# Patient Record
Sex: FEMALE | Race: WHITE | NOT HISPANIC OR LATINO | Employment: FULL TIME | URBAN - METROPOLITAN AREA
[De-identification: names, ages, dates, MRNs, and addresses within clinical notes are randomized per-mention and may not be internally consistent; named-entity substitution may affect disease eponyms.]

---

## 2023-11-09 ENCOUNTER — ANCILLARY PROCEDURE (OUTPATIENT)
Dept: RADIOLOGY | Facility: CLINIC | Age: 38
End: 2023-11-09
Payer: COMMERCIAL

## 2023-11-09 DIAGNOSIS — Z12.31 SCREENING MAMMOGRAM FOR BREAST CANCER: ICD-10-CM

## 2023-11-09 PROCEDURE — 77063 BREAST TOMOSYNTHESIS BI: CPT | Performed by: RADIOLOGY

## 2023-11-09 PROCEDURE — 77067 SCR MAMMO BI INCL CAD: CPT | Performed by: RADIOLOGY

## 2023-11-09 PROCEDURE — 77063 BREAST TOMOSYNTHESIS BI: CPT

## 2023-12-11 ENCOUNTER — APPOINTMENT (OUTPATIENT)
Dept: OBSTETRICS AND GYNECOLOGY | Facility: CLINIC | Age: 38
End: 2023-12-11
Payer: COMMERCIAL

## 2023-12-13 NOTE — PROGRESS NOTES
"ANNUAL SUBJECTIVE    Macy Chavez is a 38 y.o. female who presents for annual exam today. She has a mirena IUD (inserted in 2019) for contraception and is happy with this.  Strings not visualized on last exam but placement confirmed by ultrasound. She has occasional spotting. Sexually active with , no issues. She had a mammogram in November that was normal but noticed a mass on her left breast a few weeks ago.     PMH - denies  PSH - denies  Social: never smoker, rare EtOH, works from home  Meds/Allergies: reviewed and updated  OB history -  x3    Last pap - 2018  Normal HPV Negative    Last mammogram - 2023, BIRADs category 1    Family history of breast or ovarian cancer - denies    OBJECTIVE  /82   Ht 1.727 m (5' 8\")   Wt 74.8 kg (165 lb)   BMI 25.09 kg/m²     General Appearance   - consistent with stated age, well groomed and cooperative    Integumentary  - skin warm and dry without rash    Head and Neck  - normalocephalic and neck supple    Chest and Lung Exam  - normal breathing effort, no respiratory distress    Breast  - Left breast with 3cm mobile mass at 6 o'clock, symmetry noted, no skin change and no nipple discharge.    Abdomen  - soft, nontender and no hepatomegaly, splenomegaly, or mass    Female Genitourinary  - vulva normal without rash or lesion, normal vaginal rugae, no vaginal discharge, uterus normal size & no palpable masses, no adnexal mass, no adnexal tenderness, no cervical motion tenderness    Peripheral Vascular  - no edema present    ASSESSMENT/PLAN  38 y.o.  female who presents for annual exam.       Actions performed during this visit include:  - Clinical breast exam  - Clinical pelvic exam  - Pap: co-test today  - Mammogram: cat 1 last month, 3cm mobile mass palpated on exam today, for diagnostic US and mammogram  - Contraception: Mirena IUD, reviewed 8 year duration, placement confirmed on BSUS today  - STI screening: declined  - Meds: valtrex refilled for " cold sores  - PCP: Encouraged routine annual exam  - Counseled on flu and COVID    Please return for your next visit in 1 year.    Karin Hughes MD

## 2023-12-14 ENCOUNTER — OFFICE VISIT (OUTPATIENT)
Dept: OBSTETRICS AND GYNECOLOGY | Facility: CLINIC | Age: 38
End: 2023-12-14
Payer: COMMERCIAL

## 2023-12-14 VITALS
DIASTOLIC BLOOD PRESSURE: 82 MMHG | BODY MASS INDEX: 25.01 KG/M2 | WEIGHT: 165 LBS | HEIGHT: 68 IN | SYSTOLIC BLOOD PRESSURE: 118 MMHG

## 2023-12-14 DIAGNOSIS — Z01.419 ENCOUNTER FOR BREAST AND PELVIC EXAMINATION: ICD-10-CM

## 2023-12-14 DIAGNOSIS — Z12.31 VISIT FOR SCREENING MAMMOGRAM: Primary | ICD-10-CM

## 2023-12-14 DIAGNOSIS — N63.0 BREAST MASS IN FEMALE: ICD-10-CM

## 2023-12-14 DIAGNOSIS — B00.1 COLD SORE: ICD-10-CM

## 2023-12-14 DIAGNOSIS — Z12.4 CERVICAL CANCER SCREENING: ICD-10-CM

## 2023-12-14 PROCEDURE — 99396 PREV VISIT EST AGE 40-64: CPT | Performed by: STUDENT IN AN ORGANIZED HEALTH CARE EDUCATION/TRAINING PROGRAM

## 2023-12-14 PROCEDURE — 88175 CYTOPATH C/V AUTO FLUID REDO: CPT

## 2023-12-14 PROCEDURE — 1036F TOBACCO NON-USER: CPT | Performed by: STUDENT IN AN ORGANIZED HEALTH CARE EDUCATION/TRAINING PROGRAM

## 2023-12-14 PROCEDURE — 87624 HPV HI-RISK TYP POOLED RSLT: CPT

## 2023-12-14 RX ORDER — VALACYCLOVIR HYDROCHLORIDE 500 MG/1
500 TABLET, FILM COATED ORAL 2 TIMES DAILY
Qty: 6 TABLET | Refills: 2 | Status: SHIPPED | OUTPATIENT
Start: 2023-12-14 | End: 2023-12-17

## 2023-12-14 RX ORDER — VALACYCLOVIR HYDROCHLORIDE 500 MG/1
500 TABLET, FILM COATED ORAL DAILY
COMMUNITY
End: 2023-12-14 | Stop reason: SDUPTHER

## 2023-12-18 ENCOUNTER — TELEPHONE (OUTPATIENT)
Dept: OBSTETRICS AND GYNECOLOGY | Facility: CLINIC | Age: 38
End: 2023-12-18
Payer: COMMERCIAL

## 2023-12-18 NOTE — TELEPHONE ENCOUNTER
Patient called radiology to schedule her breast imaging, radiology told her that her imaging order was wrong. They did not giver her the correct test they want ordered. Patient is concerned and would like for you to review the order.

## 2023-12-20 ENCOUNTER — HOSPITAL ENCOUNTER (OUTPATIENT)
Dept: RADIOLOGY | Facility: HOSPITAL | Age: 38
Discharge: HOME | End: 2023-12-20
Payer: COMMERCIAL

## 2023-12-20 DIAGNOSIS — N63.0 BREAST MASS IN FEMALE: ICD-10-CM

## 2023-12-20 PROCEDURE — 76642 ULTRASOUND BREAST LIMITED: CPT | Mod: RIGHT SIDE | Performed by: RADIOLOGY

## 2023-12-20 PROCEDURE — 76642 ULTRASOUND BREAST LIMITED: CPT | Mod: RT

## 2024-01-02 LAB
CYTOLOGY CMNT CVX/VAG CYTO-IMP: NORMAL
HPV HR 12 DNA GENITAL QL NAA+PROBE: NEGATIVE
HPV HR GENOTYPES PNL CVX NAA+PROBE: NEGATIVE
HPV16 DNA SPEC QL NAA+PROBE: NEGATIVE
HPV18 DNA SPEC QL NAA+PROBE: NEGATIVE
LAB AP CONTRACEPTIVE HISTORY: NORMAL
LAB AP HPV GENOTYPE QUESTION: YES
LAB AP HPV HR: NORMAL
LABORATORY COMMENT REPORT: NORMAL
LMP START DATE: NORMAL
PATH REPORT.TOTAL CANCER: NORMAL

## 2024-01-22 ENCOUNTER — TELEPHONE (OUTPATIENT)
Dept: OBSTETRICS AND GYNECOLOGY | Facility: CLINIC | Age: 39
End: 2024-01-22
Payer: COMMERCIAL

## 2024-01-22 NOTE — TELEPHONE ENCOUNTER
Pt called in regards to her having a lump in her breast. She received an ultrasound and they said everything looked fine but pt said that there is pain and tenderness that comes and goes along with it spreading to underneath her armpit. She wants to discuss it and also wants to know if she should come back in to get it looked at again.

## 2024-01-25 ENCOUNTER — OFFICE VISIT (OUTPATIENT)
Dept: PRIMARY CARE | Facility: CLINIC | Age: 39
End: 2024-01-25
Payer: COMMERCIAL

## 2024-01-25 ENCOUNTER — LAB (OUTPATIENT)
Dept: LAB | Facility: LAB | Age: 39
End: 2024-01-25
Payer: COMMERCIAL

## 2024-01-25 VITALS
RESPIRATION RATE: 16 BRPM | OXYGEN SATURATION: 97 % | HEART RATE: 90 BPM | DIASTOLIC BLOOD PRESSURE: 78 MMHG | HEIGHT: 68 IN | WEIGHT: 159.5 LBS | SYSTOLIC BLOOD PRESSURE: 102 MMHG | TEMPERATURE: 98.1 F | BODY MASS INDEX: 24.17 KG/M2

## 2024-01-25 DIAGNOSIS — R53.83 OTHER FATIGUE: ICD-10-CM

## 2024-01-25 DIAGNOSIS — E78.2 MIXED HYPERLIPIDEMIA: Primary | ICD-10-CM

## 2024-01-25 DIAGNOSIS — E55.9 VITAMIN D DEFICIENCY: ICD-10-CM

## 2024-01-25 DIAGNOSIS — N63.0 BREAST MASS IN FEMALE: Primary | ICD-10-CM

## 2024-01-25 DIAGNOSIS — N63.20 MASS OF LEFT BREAST, UNSPECIFIED QUADRANT: ICD-10-CM

## 2024-01-25 DIAGNOSIS — Z00.00 PHYSICAL EXAM, ANNUAL: Primary | ICD-10-CM

## 2024-01-25 DIAGNOSIS — Z13.220 LIPID SCREENING: ICD-10-CM

## 2024-01-25 PROBLEM — B00.1 RECURRENT HERPES LABIALIS: Status: ACTIVE | Noted: 2024-01-25

## 2024-01-25 LAB
25(OH)D3 SERPL-MCNC: 18 NG/ML (ref 30–100)
ALBUMIN SERPL BCP-MCNC: 4.8 G/DL (ref 3.4–5)
ALP SERPL-CCNC: 58 U/L (ref 33–110)
ALT SERPL W P-5'-P-CCNC: 17 U/L (ref 7–45)
ANION GAP SERPL CALC-SCNC: 13 MMOL/L (ref 10–20)
AST SERPL W P-5'-P-CCNC: 16 U/L (ref 9–39)
BASOPHILS # BLD AUTO: 0.03 X10*3/UL (ref 0–0.1)
BASOPHILS NFR BLD AUTO: 0.4 %
BILIRUB SERPL-MCNC: 1.1 MG/DL (ref 0–1.2)
BUN SERPL-MCNC: 10 MG/DL (ref 6–23)
CALCIUM SERPL-MCNC: 9.7 MG/DL (ref 8.6–10.3)
CHLORIDE SERPL-SCNC: 103 MMOL/L (ref 98–107)
CHOLEST SERPL-MCNC: 251 MG/DL (ref 0–199)
CHOLESTEROL/HDL RATIO: 4
CO2 SERPL-SCNC: 27 MMOL/L (ref 21–32)
CREAT SERPL-MCNC: 0.87 MG/DL (ref 0.5–1.05)
EGFRCR SERPLBLD CKD-EPI 2021: 87 ML/MIN/1.73M*2
EOSINOPHIL # BLD AUTO: 0.01 X10*3/UL (ref 0–0.7)
EOSINOPHIL NFR BLD AUTO: 0.1 %
ERYTHROCYTE [DISTWIDTH] IN BLOOD BY AUTOMATED COUNT: 13.1 % (ref 11.5–14.5)
GLUCOSE SERPL-MCNC: 99 MG/DL (ref 74–99)
HCT VFR BLD AUTO: 42.7 % (ref 36–46)
HDLC SERPL-MCNC: 62.6 MG/DL
HGB BLD-MCNC: 14.2 G/DL (ref 12–16)
IMM GRANULOCYTES # BLD AUTO: 0.02 X10*3/UL (ref 0–0.7)
IMM GRANULOCYTES NFR BLD AUTO: 0.3 % (ref 0–0.9)
LDLC SERPL CALC-MCNC: 174 MG/DL
LYMPHOCYTES # BLD AUTO: 1.61 X10*3/UL (ref 1.2–4.8)
LYMPHOCYTES NFR BLD AUTO: 23.7 %
MCH RBC QN AUTO: 30.6 PG (ref 26–34)
MCHC RBC AUTO-ENTMCNC: 33.3 G/DL (ref 32–36)
MCV RBC AUTO: 92 FL (ref 80–100)
MONOCYTES # BLD AUTO: 0.39 X10*3/UL (ref 0.1–1)
MONOCYTES NFR BLD AUTO: 5.8 %
NEUTROPHILS # BLD AUTO: 4.72 X10*3/UL (ref 1.2–7.7)
NEUTROPHILS NFR BLD AUTO: 69.7 %
NON HDL CHOLESTEROL: 188 MG/DL (ref 0–149)
NRBC BLD-RTO: 0 /100 WBCS (ref 0–0)
PLATELET # BLD AUTO: 217 X10*3/UL (ref 150–450)
POTASSIUM SERPL-SCNC: 4.6 MMOL/L (ref 3.5–5.3)
PROT SERPL-MCNC: 7.3 G/DL (ref 6.4–8.2)
RBC # BLD AUTO: 4.64 X10*6/UL (ref 4–5.2)
SODIUM SERPL-SCNC: 138 MMOL/L (ref 136–145)
TRIGL SERPL-MCNC: 70 MG/DL (ref 0–149)
TSH SERPL-ACNC: 1.11 MIU/L (ref 0.44–3.98)
VLDL: 14 MG/DL (ref 0–40)
WBC # BLD AUTO: 6.8 X10*3/UL (ref 4.4–11.3)

## 2024-01-25 PROCEDURE — 84443 ASSAY THYROID STIM HORMONE: CPT

## 2024-01-25 PROCEDURE — 85025 COMPLETE CBC W/AUTO DIFF WBC: CPT

## 2024-01-25 PROCEDURE — 80061 LIPID PANEL: CPT

## 2024-01-25 PROCEDURE — 36415 COLL VENOUS BLD VENIPUNCTURE: CPT

## 2024-01-25 PROCEDURE — 99396 PREV VISIT EST AGE 40-64: CPT | Performed by: NURSE PRACTITIONER

## 2024-01-25 PROCEDURE — 82306 VITAMIN D 25 HYDROXY: CPT

## 2024-01-25 PROCEDURE — 80053 COMPREHEN METABOLIC PANEL: CPT

## 2024-01-25 PROCEDURE — 1036F TOBACCO NON-USER: CPT | Performed by: NURSE PRACTITIONER

## 2024-01-25 RX ORDER — VALACYCLOVIR HYDROCHLORIDE 1 G/1
1000 TABLET, FILM COATED ORAL 2 TIMES DAILY
COMMUNITY
Start: 2023-05-02 | End: 2023-05-06

## 2024-01-25 RX ORDER — VALACYCLOVIR HYDROCHLORIDE 500 MG/1
500 TABLET, FILM COATED ORAL 2 TIMES DAILY
COMMUNITY
End: 2024-05-28 | Stop reason: SDUPTHER

## 2024-01-25 RX ORDER — ERGOCALCIFEROL 1.25 MG/1
50000 CAPSULE ORAL
Qty: 12 CAPSULE | Refills: 0 | Status: SHIPPED | OUTPATIENT
Start: 2024-01-25 | End: 2024-04-18

## 2024-01-25 ASSESSMENT — ENCOUNTER SYMPTOMS
SHORTNESS OF BREATH: 0
ABDOMINAL PAIN: 0
WEAKNESS: 0
EYE PAIN: 0
SINUS PAIN: 0
CONSTIPATION: 1
VOMITING: 0
MYALGIAS: 0
DYSURIA: 0
APPETITE CHANGE: 0
FEVER: 0
ARTHRALGIAS: 0
WOUND: 0
PALPITATIONS: 0
SINUS PRESSURE: 0
NUMBNESS: 0
DIARRHEA: 0
CHILLS: 0
ROS GI COMMENTS: OCCASIONAL CONSTIPATION
HEADACHES: 0
NAUSEA: 0
DECREASED CONCENTRATION: 0
CONFUSION: 0
CHEST TIGHTNESS: 0
FREQUENCY: 0

## 2024-01-25 ASSESSMENT — PATIENT HEALTH QUESTIONNAIRE - PHQ9
1. LITTLE INTEREST OR PLEASURE IN DOING THINGS: NOT AT ALL
2. FEELING DOWN, DEPRESSED OR HOPELESS: NOT AT ALL
SUM OF ALL RESPONSES TO PHQ9 QUESTIONS 1 AND 2: 0

## 2024-01-25 NOTE — PATIENT INSTRUCTIONS
Scheduled patient to see breast surgeon in 2 weeks. She will try warm compresses, tylenol, and ibuprofen on the enlarged lymph node. Will call with lab results when available. Follow-up in 1 year, or sooner if needed. Call the office if any problems or concerns in the meantime.

## 2024-01-25 NOTE — PROGRESS NOTES
"Subjective   Macy Chavez is a 38 y.o. female who presents for Annual Exam.  Patient stated is here for annual physical, would like to discuss about her right breast.  Last Pap 12.14.23      Review of Systems   All other systems reviewed and are negative.      Objective   Physical Exam  /78 (BP Location: Right arm, Patient Position: Sitting)   Pulse 90   Temp 36.7 °C (98.1 °F)   Resp 16   Ht 1.727 m (5' 8\")   Wt 72.3 kg (159 lb 8 oz)   SpO2 97%   BMI 24.25 kg/m²       Assessment/Plan   Problem List Items Addressed This Visit    None      "

## 2024-01-25 NOTE — PROGRESS NOTES
"Subjective   Patient ID: Macy Chavez is a 38 y.o. female who presents for Annual Exam.    Patient presents today for annual wellness exam.  She states she is having additional symptoms in her right breast.  She states that she is feeling some discomfort beneath and laterally to her breast.  She has also noticed what she describes as a nondescript \"sensation\" down her right arm.  She says that last night, she noticed a new mass in her right axilla.    She had a screening mammogram in November which was normal, then an ultrasound in December, which showed dense tissue. She feels like the breast mass is getting bigger and moving, and yesterday she felt a large lump in her armpit that is slightly tender.     She has a 10 year old daughter, 8 & 5 year old son. 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 She takes magnesium prn constipation.    Review of Systems   Constitutional:  Negative for appetite change, chills and fever.   HENT:  Negative for congestion, sinus pressure and sinus pain.    Eyes:  Negative for pain.   Respiratory:  Negative for chest tightness and shortness of breath.    Cardiovascular:  Negative for chest pain and palpitations.   Gastrointestinal:  Positive for constipation. Negative for abdominal pain, diarrhea, nausea and vomiting.        Occasional constipation   Genitourinary:  Negative for dysuria, frequency and urgency.   Musculoskeletal:  Negative for arthralgias and myalgias.   Skin:  Negative for rash and wound.   Neurological:  Negative for weakness, numbness and headaches.   Psychiatric/Behavioral:  Negative for confusion and decreased concentration.        Objective   /78 (BP Location: Right arm, Patient Position: Sitting)   Pulse 90   Temp 36.7 °C (98.1 °F)   Resp 16   Ht 1.727 m (5' 8\")   Wt 72.3 kg (159 lb 8 oz)   SpO2 97%   BMI 24.25 kg/m²     Physical Exam  Constitutional:       Appearance: Normal appearance. She is normal weight.   HENT:      Head: " Normocephalic and atraumatic.      Right Ear: Tympanic membrane, ear canal and external ear normal.      Left Ear: Tympanic membrane, ear canal and external ear normal.      Nose: Nose normal.      Mouth/Throat:      Mouth: Mucous membranes are moist.   Eyes:      Conjunctiva/sclera: Conjunctivae normal.      Pupils: Pupils are equal, round, and reactive to light.   Cardiovascular:      Rate and Rhythm: Normal rate and regular rhythm.      Pulses: Normal pulses.      Heart sounds: Normal heart sounds.   Pulmonary:      Effort: Pulmonary effort is normal.      Breath sounds: Normal breath sounds.   Chest:   Breasts:     Right: Tenderness present.      Left: Normal.       Abdominal:      General: Bowel sounds are normal.      Palpations: Abdomen is soft.   Musculoskeletal:         General: Normal range of motion.      Cervical back: Normal range of motion.   Lymphadenopathy:      Upper Body:      Right upper body: Axillary adenopathy present.   Skin:     General: Skin is warm and dry.   Neurological:      General: No focal deficit present.      Mental Status: She is alert and oriented to person, place, and time.   Psychiatric:         Mood and Affect: Mood normal.         Behavior: Behavior normal.         Thought Content: Thought content normal.         Judgment: Judgment normal.         Assessment/Plan   Problem List Items Addressed This Visit    None  Visit Diagnoses       Lump in upper outer quadrant of right breast    -  Primary    Physical exam, annual        Other fatigue        Relevant Orders    Tsh With Reflex To Free T4 If Abnormal (Completed)    CBC and Auto Differential (Completed)    Comprehensive metabolic panel (Completed)    Vitamin D deficiency        Relevant Orders    Vitamin D 25-Hydroxy,Total (for eval of Vitamin D levels) (Completed)    Lipid screening        Relevant Orders    Lipid panel (Completed)          Patient Instructions   Scheduled patient to see breast surgeon in 2 weeks. She will  try warm compresses, tylenol, and ibuprofen on the enlarged lymph node. Will call with lab results when available. Follow-up in 1 year, or sooner if needed. Call the office if any problems or concerns in the meantime.

## 2024-01-30 NOTE — PROGRESS NOTES
Macy Chavez female   1985 38 y.o.   [id number]      Chief Complaint  Right breast mass    History Of Present Illness  Macy Chavez is a very pleasant 38 y.o.  female [facility] by Benitez Russell for right breast mass. She is here with her , Wallace. She first noticed the mass on 2023. She denies trauma to the breast. She states her right breast has become swollen as well, but the swelling has since subsided. She denies nipple discharge, fever or chills. She has no family history of breast cancer. She denies breast surgery or biopsy.      BREAST IMAGIN2023 Right breast ultrasound, indicates BI-RADS Category 2. 2023 Bilateral screening mammogram, indicates BI-RADS Category 1.       REPRODUCTIVE HISTORY: menarche age 12, , first birth age 27,  x 23 months, OCP's x 15 years, premenopausal, LMP 2024, spotting with IUD in place, heterogeneously dense tissue    FAMILY CANCER HISTORY:   Maternal Grandfather: Kidney versus Liver cancer  Paternal Grandmother: Soft cell sarcoma    Surgical History  She has a past surgical history that includes Other surgical history (2016) and [place] tooth extraction (2000).     Social History  She reports that she has never smoked. She has never used smokeless tobacco. She reports current alcohol use of about 1.0 standard drink of alcohol per week. She reports that she does not use drugs.    Family History  Family History   Problem Relation Name Age of Onset    Stroke Father Jad     Other (cancer) Maternal Grandfather Sotero         liver or kidney    Cancer Paternal Grandmother Lizbeth         carcinoma of thigh    Stroke Paternal Grandmother Lizbeth     COPD Paternal Grandfather Mitchell         Allergies  Dog dander and Other    Medications  Current Outpatient Medications   Medication Instructions    ergocalciferol (VITAMIN D-2) 50,000 Units, oral, Weekly    valACYclovir (VALTREX) 500 mg, oral, 2 times daily          REVIEW OF SYSTEMS    Constitutional:  Negative for appetite change, fatigue, fever and unexpected weight change.   HENT:  Negative for ear pain, hearing loss, nosebleeds, sore throat and trouble swallowing.    Eyes:  Negative for discharge, itching and visual disturbance.   Respiratory:  Negative for cough, chest tightness and shortness of breath.    Cardiovascular:  Negative for chest pain, palpitations and leg swelling.   Breast: as indicated in HPI  Gastrointestinal:  Negative for abdominal pain, constipation, diarrhea and nausea.   Endocrine: Negative for cold intolerance and heat intolerance.   Genitourinary:  Negative for dysuria, frequency, hematuria, pelvic pain and vaginal bleeding.   Musculoskeletal:  Negative for arthralgias, back pain, gait problem, joint swelling and myalgias.   Skin:  Negative for color change and rash.   Allergic/Immunologic: Negative for environmental allergies and food allergies.   Neurological:  Negative for dizziness, tremors, speech difficulty, weakness, numbness and headaches.   Hematological:  Does not bruise/bleed easily.   Psychiatric/Behavioral:  Negative for agitation, dysphoric mood and sleep disturbance. The patient is not nervous/anxious.         Past Medical History  She has a past medical history of Other specified health status (09/29/2016).     Physical Exam  Patient is alert and oriented x3 and in a relaxed and appropriate mood. Her gait is steady and hand grasps are equal. Sclera is clear. The breasts are full and nearly symmetrical. The left breast tissue is dense without palpable abnormalities, discrete nodules or masses. The right breast, 7:00, 4 cm from the nipple, 6 x 6 cm round, firm and visible mobile mass. The right breast, 10:00, 8 cm from the nipple, 2 x 2 cm round, moderately firm and mobile mass. The remaining right breast tissue is dense without palpable abnormalities, discrete nodules or masses. The right breast has very mild erythema  overlying palpable mass at 7:00. The left breast skin and both nipples appear normal. There is no cervical, supraclavicular or left axillary lymphadenopathy. The right axilla has a large 4 x 4 cm palpable lymph node. Heart rate and rhythm normal, S1 and S2 appreciated. The lungs are clear to auscultation bilaterally. Abdomen is soft and non-tender.       Physical Exam  Chest:              Last Recorded Vitals  Vitals:    02/06/24 1049   BP: 123/80   Pulse: 72   Resp: 16       Relevant Results   Time was spent discussing digital images of the radiology testing with the patient. I explained the results in depth, along with suggested explanation for follow up recommendations based on the testing results. BI-RADS Category 4      Imaging     Narrative & Impression   Interpreted By:  Francisco Mansfield,   STUDY:  BI US BREAST LIMITED RIGHT;  2/6/2024 11:49 am      ACCESSION NUMBER(S):  [id number]      ORDERING CLINICIAN:  VITOR HARO      INDICATION:  Patient describes an increase in the size of a palpable lump in the  right lower outer breast which was previously assessed in December of 2023. Recent negative screening mammogram from 11/09/2023. She also  describes a new palpable lump in the right axilla. After being seen  by her provider, a nother palpable lump is identified in the upper  outer right breast.      COMPARISON:  12/20/2023, 11/09/2023      FINDINGS:  Targeted ultrasound was performed of the right breast and axilla by a  registered sonographer with elastography.      In the patient's area of palpable concern in the right breast at 7  o'clock, 5 cm from the nipple, there is a large vague heterogeneous  mass with internal vascularity which has the appearance of dense  fibroglandular tissue measuring up to 5 cm. This area is stiff on  elastography. Upon clinical evaluation, it is visible protruding from  the skin with discoloration. In the patient's area of palpable  concern felt by her provider in the right  breast at 10 o'clock, 8 cm  from the nipple, there is an oval parallel heterogeneous mass with  internal vascularity measuring approximately 1.1 x 0.9 x 1.4 cm. This  is intermediate on elastography.      In the right axilla, in the patient's area of palpable concern, 3  enlarged markedly abnormal lymph nodes are visualized with cortical  thickness measuring over 1.5 cm in all 3 lymph nodes.      IMPRESSION:  Suspicious enlarging palpable right breast mass with axillary  lymphadenopathy. Additional suspicious right breast mass felt by the  patient's provider in clinic today. Further evaluation with  ultrasound-guided biopsies are recommended. Dr. Francisco Mansfield explained  the findings and recommendations to the patient and her clinician at  the time of exam. She will undergo her biopsies this afternoon.      Method of Detection: Category Pat - Patient Reported Self-examination  Finding      BI-RADS CATEGORY:      BI-RADS Category:  4 Suspicious.  Recommendation:  Recommendations as Above.  Recommended Date:  Immediate.  Laterality:  Right.       Assessment/Plan   Abnormal mammogram, right breast masses, right axillary lymphadenopathy, no family history of breast cancer, heterogeneously dense tissue    Plan: Right breast and right axillary ultrasound guided biopsies    Patient Discussion/Summary  I recommend right breast and right axillary ultrasound guided biopsy. A breast radiology physician will perform the procedure. Possible diagnoses include benign, atypia or cancer. Bruising and mild discomfort after the biopsy is normal and will improve. I typically have results in 3-5 business days. I will call you with the results, please have your phone handy to take my call. If you receive medical information from [facility] Personal Health Record, it is possible to view or see results of your biopsy or procedure before I contact you directly. I will provide recommendations for future follow up based on your biopsy results.      You can see your health information, review clinical summaries from office visits & test results online when you follow your health with MY  Chart, a personal health record. To sign up go to [email or web address]. If you need assistance with signing up or trouble getting into your account call [facility] Patient Line 24/7 at [phone].    Should you have any questions or concerns after biopsy, please do not hesitate to call my office at [phone]. If it has been more than a week since your biopsy was performed and you have not received results, please call my office at [phone]. Thank you for choosing [facility] and trusting me as your healthcare provider. I am honored to be a provider on your health care team and I remain dedicated to helping you achieve your health goals.      Clarissa Hendricks, APRN-CNP

## 2024-02-06 ENCOUNTER — OFFICE VISIT (OUTPATIENT)
Dept: SURGICAL ONCOLOGY | Facility: HOSPITAL | Age: 39
End: 2024-02-06
Payer: COMMERCIAL

## 2024-02-06 ENCOUNTER — APPOINTMENT (OUTPATIENT)
Dept: RADIOLOGY | Facility: HOSPITAL | Age: 39
End: 2024-02-06
Payer: COMMERCIAL

## 2024-02-06 ENCOUNTER — HOSPITAL ENCOUNTER (OUTPATIENT)
Dept: RADIOLOGY | Facility: HOSPITAL | Age: 39
Discharge: HOME | End: 2024-02-06
Payer: COMMERCIAL

## 2024-02-06 VITALS
HEART RATE: 72 BPM | DIASTOLIC BLOOD PRESSURE: 80 MMHG | BODY MASS INDEX: 23.7 KG/M2 | SYSTOLIC BLOOD PRESSURE: 123 MMHG | HEIGHT: 69 IN | RESPIRATION RATE: 16 BRPM | WEIGHT: 160 LBS

## 2024-02-06 DIAGNOSIS — N63.13 MASS OF LOWER OUTER QUADRANT OF RIGHT BREAST: ICD-10-CM

## 2024-02-06 DIAGNOSIS — R92.8 ABNORMAL MAMMOGRAM: Primary | ICD-10-CM

## 2024-02-06 DIAGNOSIS — R92.8 ABNORMAL MAMMOGRAM: ICD-10-CM

## 2024-02-06 DIAGNOSIS — R59.0 AXILLARY LYMPHADENOPATHY: ICD-10-CM

## 2024-02-06 DIAGNOSIS — N63.0 BREAST MASS IN FEMALE: ICD-10-CM

## 2024-02-06 DIAGNOSIS — R92.8 MAMMOGRAM ABNORMAL: ICD-10-CM

## 2024-02-06 DIAGNOSIS — N63.10 MASS OF RIGHT BREAST, UNSPECIFIED QUADRANT: ICD-10-CM

## 2024-02-06 PROCEDURE — 88342 IMHCHEM/IMCYTCHM 1ST ANTB: CPT | Performed by: SPECIALIST

## 2024-02-06 PROCEDURE — 77065 DX MAMMO INCL CAD UNI: CPT | Mod: RIGHT SIDE | Performed by: STUDENT IN AN ORGANIZED HEALTH CARE EDUCATION/TRAINING PROGRAM

## 2024-02-06 PROCEDURE — 88360 TUMOR IMMUNOHISTOCHEM/MANUAL: CPT | Mod: TC | Performed by: NURSE PRACTITIONER

## 2024-02-06 PROCEDURE — 99214 OFFICE O/P EST MOD 30 MIN: CPT | Performed by: NURSE PRACTITIONER

## 2024-02-06 PROCEDURE — 19084 BX BREAST ADD LESION US IMAG: CPT | Mod: RT

## 2024-02-06 PROCEDURE — 88360 TUMOR IMMUNOHISTOCHEM/MANUAL: CPT | Performed by: SPECIALIST

## 2024-02-06 PROCEDURE — 19083 BX BREAST 1ST LESION US IMAG: CPT | Mod: RIGHT SIDE | Performed by: STUDENT IN AN ORGANIZED HEALTH CARE EDUCATION/TRAINING PROGRAM

## 2024-02-06 PROCEDURE — 77065 DX MAMMO INCL CAD UNI: CPT | Mod: RT

## 2024-02-06 PROCEDURE — 1036F TOBACCO NON-USER: CPT | Performed by: NURSE PRACTITIONER

## 2024-02-06 PROCEDURE — 76642 ULTRASOUND BREAST LIMITED: CPT | Mod: RIGHT SIDE | Performed by: STUDENT IN AN ORGANIZED HEALTH CARE EDUCATION/TRAINING PROGRAM

## 2024-02-06 PROCEDURE — 76642 ULTRASOUND BREAST LIMITED: CPT | Mod: RT

## 2024-02-06 PROCEDURE — 19083 BX BREAST 1ST LESION US IMAG: CPT | Mod: RT

## 2024-02-06 PROCEDURE — 10035 PLMT SFT TISS LOCLZJ DEV 1ST: CPT | Mod: RIGHT SIDE | Performed by: STUDENT IN AN ORGANIZED HEALTH CARE EDUCATION/TRAINING PROGRAM

## 2024-02-06 PROCEDURE — 2720000007 HC OR 272 NO HCPCS

## 2024-02-06 PROCEDURE — 19084 BX BREAST ADD LESION US IMAG: CPT | Mod: RIGHT SIDE | Performed by: STUDENT IN AN ORGANIZED HEALTH CARE EDUCATION/TRAINING PROGRAM

## 2024-02-06 PROCEDURE — 2780000003 HC OR 278 NO HCPCS

## 2024-02-06 PROCEDURE — A4648 IMPLANTABLE TISSUE MARKER: HCPCS

## 2024-02-06 PROCEDURE — 88305 TISSUE EXAM BY PATHOLOGIST: CPT | Performed by: SPECIALIST

## 2024-02-06 PROCEDURE — 2720000003 HC TRAY FOLEY SILER W URIMETER

## 2024-02-06 PROCEDURE — 76982 USE 1ST TARGET LESION: CPT | Mod: RT

## 2024-02-06 PROCEDURE — 2500000005 HC RX 250 GENERAL PHARMACY W/O HCPCS: Performed by: STUDENT IN AN ORGANIZED HEALTH CARE EDUCATION/TRAINING PROGRAM

## 2024-02-06 PROCEDURE — C1819 TISSUE LOCALIZATION-EXCISION: HCPCS

## 2024-02-06 PROCEDURE — 99204 OFFICE O/P NEW MOD 45 MIN: CPT | Performed by: NURSE PRACTITIONER

## 2024-02-06 PROCEDURE — 38505 NEEDLE BIOPSY LYMPH NODES: CPT | Mod: RIGHT SIDE | Performed by: STUDENT IN AN ORGANIZED HEALTH CARE EDUCATION/TRAINING PROGRAM

## 2024-02-06 RX ADMIN — Medication 20 ML: at 13:46

## 2024-02-06 NOTE — PATIENT INSTRUCTIONS
I recommend right breast and right axillary ultrasound guided biopsy. A breast radiology physician will perform the procedure. Possible diagnoses include benign, atypia or cancer. Bruising and mild discomfort after the biopsy is normal and will improve. I typically have results in 3-5 business days. I will call you with the results, please have your phone handy to take my call. If you receive medical information from [facility] Personal Health Record, it is possible to view or see results of your biopsy or procedure before I contact you directly. I will provide recommendations for future follow up based on your biopsy results.     You can see your health information, review clinical summaries from office visits & test results online when you follow your health with MY  Chart, a personal health record. To sign up go to [email or web address]. If you need assistance with signing up or trouble getting into your account call [facility] Patient Line 24/7 at [phone].    Should you have any questions or concerns after biopsy, please do not hesitate to call my office at [phone]. If it has been more than a week since your biopsy was performed and you have not received results, please call my office at [phone]. Thank you for choosing [facility] and trusting me as your healthcare provider. I am honored to be a provider on your health care team and I remain dedicated to helping you achieve your health goals.

## 2024-02-13 ENCOUNTER — TELEPHONE (OUTPATIENT)
Dept: SURGERY | Facility: HOSPITAL | Age: 39
End: 2024-02-13
Payer: COMMERCIAL

## 2024-02-13 DIAGNOSIS — C50.911 BREAST CANCER METASTASIZED TO AXILLARY LYMPH NODE, RIGHT (MULTI): Primary | ICD-10-CM

## 2024-02-13 DIAGNOSIS — C77.3 BREAST CANCER METASTASIZED TO AXILLARY LYMPH NODE, RIGHT (MULTI): Primary | ICD-10-CM

## 2024-02-13 LAB
LAB AP ASR DISCLAIMER: NORMAL
LABORATORY COMMENT REPORT: NORMAL
PATH REPORT.ADDENDUM SPEC: NORMAL
PATH REPORT.FINAL DX SPEC: NORMAL
PATH REPORT.GROSS SPEC: NORMAL
PATH REPORT.RELEVANT HX SPEC: NORMAL
PATH REPORT.TOTAL CANCER: NORMAL

## 2024-02-13 NOTE — TELEPHONE ENCOUNTER
Spoke with Macy regarding right breast and right axillary lymph node core biopsies. Referred to [facility]. Breast MRI ordered. Referred to Genetics.

## 2024-02-14 ENCOUNTER — CLINICAL SUPPORT (OUTPATIENT)
Dept: GENETICS | Facility: CLINIC | Age: 39
End: 2024-02-14
Payer: COMMERCIAL

## 2024-02-14 ENCOUNTER — HOSPITAL ENCOUNTER (OUTPATIENT)
Dept: RADIOLOGY | Facility: HOSPITAL | Age: 39
Discharge: HOME | End: 2024-02-14
Payer: COMMERCIAL

## 2024-02-14 ENCOUNTER — LAB (OUTPATIENT)
Dept: LAB | Facility: LAB | Age: 39
End: 2024-02-14
Payer: COMMERCIAL

## 2024-02-14 VITALS
HEART RATE: 82 BPM | WEIGHT: 157.5 LBS | DIASTOLIC BLOOD PRESSURE: 89 MMHG | BODY MASS INDEX: 23.33 KG/M2 | SYSTOLIC BLOOD PRESSURE: 127 MMHG | TEMPERATURE: 98.2 F | HEIGHT: 69 IN

## 2024-02-14 DIAGNOSIS — C50.911 BREAST CANCER METASTASIZED TO AXILLARY LYMPH NODE, RIGHT (MULTI): ICD-10-CM

## 2024-02-14 DIAGNOSIS — Z80.9 FAMILY HX-MALIGNANCY: ICD-10-CM

## 2024-02-14 DIAGNOSIS — Z13.71 ENCOUNTER FOR NONPROCREATIVE GENETIC COUNSELING AND TESTING: Primary | ICD-10-CM

## 2024-02-14 DIAGNOSIS — Z71.83 ENCOUNTER FOR NONPROCREATIVE GENETIC COUNSELING AND TESTING: Primary | ICD-10-CM

## 2024-02-14 DIAGNOSIS — C77.3 BREAST CANCER METASTASIZED TO AXILLARY LYMPH NODE, RIGHT (MULTI): ICD-10-CM

## 2024-02-14 PROCEDURE — 77049 MRI BREAST C-+ W/CAD BI: CPT

## 2024-02-14 PROCEDURE — A9575 INJ GADOTERATE MEGLUMI 0.1ML: HCPCS | Performed by: NURSE PRACTITIONER

## 2024-02-14 PROCEDURE — 77049 MRI BREAST C-+ W/CAD BI: CPT | Performed by: STUDENT IN AN ORGANIZED HEALTH CARE EDUCATION/TRAINING PROGRAM

## 2024-02-14 PROCEDURE — 96040 PR MEDICAL GENETICS COUNSELING EACH 30 MINUTES: CPT | Performed by: GENETIC COUNSELOR, MS

## 2024-02-14 PROCEDURE — 2550000001 HC RX 255 CONTRASTS: Performed by: NURSE PRACTITIONER

## 2024-02-14 RX ORDER — GADOTERATE MEGLUMINE 376.9 MG/ML
14 INJECTION INTRAVENOUS
Status: COMPLETED | OUTPATIENT
Start: 2024-02-14 | End: 2024-02-14

## 2024-02-14 RX ADMIN — GADOTERATE MEGLUMINE 14 ML: 376.9 INJECTION INTRAVENOUS at 13:00

## 2024-02-14 ASSESSMENT — ENCOUNTER SYMPTOMS
OCCASIONAL FEELINGS OF UNSTEADINESS: 0
DEPRESSION: 0
LOSS OF SENSATION IN FEET: 0

## 2024-02-14 NOTE — PROGRESS NOTES
"History of Present Illness:  Macy Chavez is a 38 y.o. female with a recent diagnosis of early-onset breast cancer. Ms. Chavez was referred to the Cancer Genetics Clinic at [facility] by ALFONSO Dwyer. Ms. Chavez is interested in genetic testing to clarify their personal risk for cancer, as well as the risks to their family members. She is here today with her , Wallace.    Cancer Medical History:  Personal history of cancer? Yes.  Type: Breast (right).  Age at diagnosis: 38.  Summary: Biopsy proven (2023) right breast cancer, invasive ductal carcinoma, ER+ (95%)/VT+ (5-10%)/Her2- Right axillary lymph node also biopsied and proven to have metastatic disease. To meet with surgeon (Dr. Carmencita Patel) tomorrow.    History of other cancers: No.    Prior hereditary cancer genetic testing? No.    Cancer screening history:  Mammograms? Yes.  PAP smear? Yes, per gyn. No known hx abn  Colonoscopy? No.  Upper endoscopy? No.  Dermatology? Yes, summer 2023. Removed some skin tags.  Other cancer screening? No.    Reproductive History:  Number of children: 3.  Number of pregnancies: 3.  Age first birth: 27.  Breast feeding? Yes, for 23 months.  Menarche (age): 12.  Menopause (age): N/A (has IUD).  OCP: Yes, for 15 years.  HRT: N/A.    Hysterectomy? No.  Oophorectomy? No.    Family history:  A 4-generation pedigree was obtained and was significant for the following:  -Patient with early onset breast cancer per the above;  -Brother, history of melanoma, alive at 38;  -Paternal grandmother, soft tissue sarcoma in her 70s,  at 75;  -Maternal grandfather, kidney versus liver cancer in his 50s,  at 85;  -Maternal great grandfather, through [facility] [place] \"stomach cancer\",  of metastatic disease;  -No other known family history of cancer.    Ms. Chavez is of Upper sorbian/German (paternal) and Polish (maternal) ancestry.  There is no known Ashkenazi Confucianist ancestry or consanguinity.    Genetic counseling:  Ms. Chavez " is a 38 y.o. female with a recent diagnosis of early-onset breast cancer concerning for possible hereditary breast cancer.  This could be BRCA1 or BRCA2-related hereditary breast and ovarian cancer (HBOC), or hereditary breast cancer due to a different gene mutation, such as PALB2.  Ms. Chavez meets current national (NCCN) criteria for testing of high-penetrance breast cancer susceptibility genes, including BRCA1, BRCA2, CDH1, PALB2, PTEN, and TP53.  Testing is medically necessary, as it will help determine if Ms. Chavez is a candidate for consideration of bilateral mastectomies as well as risk-reducing BSO (having the ovaries and fallopian tubes surgically removed prevent ovarian cancer related cancers).    We reviewed genes and chromosomes, inherited forms of breast and ovarian cancer, and the BRCA1 and BRCA2 genes causing HBOC.  We discussed that most cancers are not due to an inherited genetic susceptibility.  However, in about 5-10% of families, there is an inherited genetic mutation that can make a person more susceptible to developing certain forms of cancer.  Within these families, we often see multiple family members with cancer, occurring in multiple generations.  In addition, earlier onset and bilateral cancers are suggestive of an inherited form of cancer.  Finally, there is a clustering of certain types of cancer in these families, such as breast and ovarian cancer.    We discussed the BRCA1 and BRCA2 genes, which are two genes that have been linked to early-onset breast and/or ovarian cancer.  Mutations in these genes are inherited in a dominant pattern and confer up to an 87% lifetime risk for breast cancer.  This is elevated compared to the general population risk of 10-12%.  In addition, BRCA1 and BRCA2 mutation carriers have up to a 45% lifetime risk for ovarian cancer, which is elevated over the 2% general population risk.  Mutation carriers who have already been diagnosed with cancer have an  increased risk to develop a second, contralateral breast cancer.  BRCA2 gene mutation carriers have an increased risk for male breast cancer, prostate cancer, melanoma, gastric cancer, and pancreatic cancer.    We discussed that there are multiple genes associated with increased breast and/or ovarian cancer risk. Some genes, like the BRCA genes are considered highly penetrant breast and ovarian cancer genes, meaning a mutation in the gene confers a high risk of breast and/or ovarian cancer. On the other hand, there are other intermediate (moderate risk) breast and ovarian cancer genes. For many of the moderate risk genes, there is sometimes limited information regarding the degree to which a mutation in the gene affects risk of different types of cancers. Additionally, for some of these moderate risk genes, the appropriate management for individuals who have a mutation in one of these genes is not always clear. In many cases, even if an individual tests positive for a mutation in a moderate risk gene, recommendations are still based on the family history, not the positive test result.    Ms. Chavez was counseled about hereditary cancer susceptibility including cancer risks, options for increased screening and/or risk reduction, genetic testing, and the implications for other family members.  We discussed performing testing for high-penetrance breast cancer susceptibility genes, ideally as part of a multi-gene panel.  We specifically discussed both the Xspand BRCAPlus panel, which examines the following 8 genes: DILIP, BRCA1, BRCA2, CDH1, CHEK2, PALB2, PTEN, TP53, and the Xspand CancerNext panel, which examines the following 36 genes:  APC, DILIP, AXIN2, BARD1, BRCA1, BRCA2, BRIP1, BMPR1A, CDH1, CDK4, CDKN2A, CHEK2, DICER1, EPCAM, GREM1, HOXB13, MLH1, MSH2, MSH3, MSH6, MUTYH, NBN, NF1, NTHL1, PALB2, PMS2, POLD1, POLE, PTEN, RAD51C, RAD51D, RECQL, SMAD4, SMARCA4, STK11, TP53.  The smaller panel results generally return in  <=2 weeks from the time of blood draw.  The larger panel results can take up to an additional 3 weeks.    We discussed the Genetic Information Nondiscrimination Act (BAHMAN). We discussed the protections and limitations of BAHMAN. BAHMAN generally makes in illegal for health insurance companies, group health plans, and most employers (with >15 employees) to discriminate based on genetic information. It does not protect against genetic discrimination for life insurance, disability insurance, long-term care, or other insurances.    After a discussion about the risks, benefits, and limitations of genetic testing,  Scott Goetz elected to undergo genetic testing for hereditary cancer using the [employer] panels described above. Ms. Chavez provided both oral and written consent for testing.  She had her blood drawn today using an Modern Feed DNA/RNA blood test kit.  We agreed to call out each results when they become available. Should Ms. Chavez be found to have a pathogenic or likely pathogenic variant/mutation, a follow-up visit to review results in further detail would be recommended.    PLAN:  1. Scott Goetz elected to undergo genetic testing for hereditary cancer using the [employer] panels described above. Ms. Chavez provided both oral and written consent for testing.  She had her blood drawn today using an Modern Feed DNA/RNA blood test kit.    2.  We agreed to call out each results to Ms. Chavezwhen they become available. Should Ms. Chavez be found to have a pathogenic or likely pathogenic variant/mutation, a follow-up visit to review results in further detail would be recommended.    3. We remain available to Ms. Chavez at [phone] if any questions arise regarding information discussed at today's visit.  Alesha can be reached directly at [phone].    Alesha Cedeno MS, [facility]  Genetic Counselor  [facility]  [phone]    Reviewed by:  Faith Mauricio MD  Clinical   [facility]  [phone]    Time spent  with patient: 35 minutes (10:17-10:52 am), in person visit.

## 2024-02-15 ENCOUNTER — OFFICE VISIT (OUTPATIENT)
Dept: SURGICAL ONCOLOGY | Facility: HOSPITAL | Age: 39
End: 2024-02-15
Payer: COMMERCIAL

## 2024-02-15 VITALS — WEIGHT: 157 LBS | RESPIRATION RATE: 16 BRPM | HEIGHT: 68 IN | BODY MASS INDEX: 23.79 KG/M2

## 2024-02-15 DIAGNOSIS — Z17.0 MALIGNANT NEOPLASM OF OVERLAPPING SITES OF RIGHT BREAST IN FEMALE, ESTROGEN RECEPTOR POSITIVE (MULTI): Primary | ICD-10-CM

## 2024-02-15 DIAGNOSIS — C50.811 MALIGNANT NEOPLASM OF OVERLAPPING SITES OF RIGHT BREAST IN FEMALE, ESTROGEN RECEPTOR POSITIVE (MULTI): Primary | ICD-10-CM

## 2024-02-15 PROCEDURE — 99205 OFFICE O/P NEW HI 60 MIN: CPT | Performed by: SURGERY

## 2024-02-15 PROCEDURE — 1036F TOBACCO NON-USER: CPT | Performed by: SURGERY

## 2024-02-15 PROCEDURE — 99215 OFFICE O/P EST HI 40 MIN: CPT | Performed by: SURGERY

## 2024-02-15 NOTE — PROGRESS NOTES
BREAST SURGICAL ONCOLOGY NEW CANCER DIAGNOSIS    Assessment/Plan     Right breast multicentric IDC g2-3 ER 95% DC low 5-10% HER2- spanning 13cm on MRI with a conglomerate of at least 3 markedly enlarged axillary lymph node with biopsy proven metastasis and an abnormal IM node.  -[id number] stage IIIA      Discussed the pathology and treatment options with the patient and family.    First, we discussed surgical options: breast conservation vs mastectomy.      Breast conservation involves removal of the tumor with a rim of normal breast tissue called a margin.  BCT is often done in conjunction with radiation to decrease risk of recurrence.  Mastectomy involves removal of all breast tissue.  When mastectomy is recommended, referral to a plastic surgeon for reconstruction is recommended.      Given the extent of her cancer, she would be best served with a mastectomy for local control.    We then discussed the role of axillary staging. We discussed sentinel lymph node biopsy, indications for axillary node dissection and risks and benefits of each procedure.     Next, we discussed adjuvant therapies to surgery.      Radiation is usually recommended when BCT is performed.  Women 70 or greater with early stage hormone receptor positive cancer may be able to omit radiation.  Radiation is recommended after mastectomy for tumors larger than 5cm, positive lymph nodes, or positive margins.  Given extent of disease post mastectomy radiation would be recommended.    The need for chemotherapy will be based on surgical pathology and will be decided by the medical oncologist.  The medical oncologist is also responsible for prescribing endocrine therapy for hormone receptor positive tumors.  In the setting of advanced disease, Macy is not currently a surgical candidate and would require neoadjuvant systemic therapy to downstage the extent of disease in the breast and axilla.  Referred to medical oncology to further discuss  neoadjuvant treatment.     Also recommend staging scans given extent of disease to rule out distant metastasis.    Will follow up with Macy via phone with results of staging scans.  If she does have distant metastatic disease she will likely not be a surgical candidate.  If she has no evidence of distant metastasis will plan on follow up MRI towards the end of treatment to assess response to disease and finalize surgical planning.    Genetics evaluation in process.      Subjective   Macy Chavez is a 38 y.o. female presents today for evaluation of recently diagnosed carcinoma of the right breast.     Baseline screening mammogram 11/3/2023 negative.    Patient then palpated a mass shortly after her screening mammogram. [facility] at [facility]23 interpreted as benign; however, patient notes interval growth and repeat imaging was performed 2/6/2024 revealing a mass at 7:00 5cmFN measuring at least 5cm and stiff on elastography.  Additionally, there was a second area palpated by Cydney Hendricks at 10:00 8cmFN concerning for a satellite lesion measuring 1.4cm.  In the axilla there are 3 markedly enlarged / abnormal lymph nodes.  Same day biopsies were performed:    A. BREAST, RIGHT, 7:00, 5 CM FN, CORE BIOPSY:   -- Invasive ductal carcinoma, grade 2, see note.     Note:  In this limited sample, the invasive carcinoma measures up to 15 mm in greatest dimension.  Immunohistochemical stain for E-cadherin shows intact membranous staining pattern, supporting the ductal feature of this carcinoma.  ER, TN and HER2 will be reported in an addendum.        B. BREAST, RIGHT, 10:00, 8 CM FN, CORE BIOPSY:   -- Invasive ductal carcinoma, grade 2-3, see note.     Note:  In this limited sample, the invasive carcinoma measures up to 8 mm in greatest dimension.  Immunohistochemical stain for E-cadherin shows intact membranous staining pattern, supporting the ductal feature of this carcinoma.  ER, TN and HER2 will be reported in an  addendum.        C. AXILLARY LYMPH NODE, RIGHT, BIOPSY:     -- Metastatic carcinoma, see note.     Note: The metastatic focus measures 12 mm in greatest dimension.  The morphology of the malignant cells is the same as that seen in part A and part B.    Menarche: 12  AFLB: 27  Menopause: no  HRT: no  Previous biopsies: No  Previous breast surgeries: No  Prior breast cancer: No    Family history: Maternal Grandfather: Kidney versus Liver cancer  Paternal Grandmother: Soft cell sarcoma    Review of Systems   Constitutional symptoms: Denies generalized fatigue.  Denies weight change, fevers/chills, difficulty sleeping   Eyes: Denies double vision, glaucoma, cataracts.  Ear/nose/throat/mouth: Denies hearing changes, sore throat, sinus problems.  Cardiovascular: No chest pain.  Denies irregular heartbeat.  Denies ankle swelling.  Respiratory: No wheezing, cough, or shortness of breath.  Gastrointestinal: No abdominal pain,  No nausea/vomiting.  No indigestion/heartburn.  No change in bowel habits.  No constipation or diarrhea.   Genitourinary: No urinary incontinence.  No urinary frequency.  No painful urination.  Musculoskeletal: No bone pain, no muscle pain, no joint pain.   Integumentary: No rash. No masses.  No changes in moles.  No easy bruising.  Neurological: No headaches.  No tremors. No numbness/tingling.  Psychiatric: No anxiety. No depression.  Endocrine: No excessive thirst.  Not too hot or too cold.  Not tired or fatigued.    Hematological/lymphatic: No swollen glands or blood clotting problems.  No bruising.    Objective   Physical Exam  General: Alert and oriented x 3.  Mood and affect are appropriate.  HEENT: EOMI, PERRLA.   Neck: supple, no masses, no cervical adenopathy.  Cardiovascular: no lower extremity edema.  Pulmonary: breathing non labored on room air.  GI: Abdomen soft, no masses. No hepatomegaly or splenomegaly.  Lymph nodes: No supraclavicular adenopathy bilaterally.  Large palpable mass right  axilla which is non mobile / matted.  Musculoskeletal: Full range of motion in the upper extremities bilaterally.  Neuro: denies dizziness, tremors    Breasts: The breasts were examined in both the seated and supine positions.    RIGHT: The nipple is everted without nipple discharge.  There are no skin changes, skin thickening, or dimpling. There is a large mass in the inferior breast spanning lateral to medial approximately 8cm.  There is an additional mass in the UOQ breast at 10:00, this mass though measuring significantly larger than what is appreciated on U/S but more reflective of extent on MRI.  Skin and muscle not involved.  LEFT: The nipple is everted without nipple discharge.  There are no skin changes, skin thickening, or dimpling. There are no masses palpated in the LEFT breast.     Radiology review: All images and reports were personally reviewed.         Carmencita Patel, DO

## 2024-02-19 ENCOUNTER — HOSPITAL ENCOUNTER (OUTPATIENT)
Dept: RADIOLOGY | Facility: HOSPITAL | Age: 39
Discharge: HOME | End: 2024-02-19
Payer: COMMERCIAL

## 2024-02-19 DIAGNOSIS — Z17.0 MALIGNANT NEOPLASM OF OVERLAPPING SITES OF RIGHT BREAST IN FEMALE, ESTROGEN RECEPTOR POSITIVE (MULTI): ICD-10-CM

## 2024-02-19 DIAGNOSIS — C50.811 MALIGNANT NEOPLASM OF OVERLAPPING SITES OF RIGHT BREAST IN FEMALE, ESTROGEN RECEPTOR POSITIVE (MULTI): ICD-10-CM

## 2024-02-19 PROCEDURE — 78306 BONE IMAGING WHOLE BODY: CPT | Performed by: RADIOLOGY

## 2024-02-19 PROCEDURE — 74177 CT ABD & PELVIS W/CONTRAST: CPT

## 2024-02-19 PROCEDURE — 71260 CT THORAX DX C+: CPT | Performed by: RADIOLOGY

## 2024-02-19 PROCEDURE — A9503 TC99M MEDRONATE: HCPCS | Performed by: SURGERY

## 2024-02-19 PROCEDURE — 3430000001 HC RX 343 DIAGNOSTIC RADIOPHARMACEUTICALS: Performed by: SURGERY

## 2024-02-19 PROCEDURE — 74177 CT ABD & PELVIS W/CONTRAST: CPT | Performed by: RADIOLOGY

## 2024-02-19 PROCEDURE — 78306 BONE IMAGING WHOLE BODY: CPT

## 2024-02-19 PROCEDURE — 2550000001 HC RX 255 CONTRASTS: Performed by: SURGERY

## 2024-02-19 RX ADMIN — IOHEXOL 75 ML: 350 INJECTION, SOLUTION INTRAVENOUS at 12:09

## 2024-02-19 RX ADMIN — TECHNETIUM TC 99M MEDRONATE 24.7 MILLICURIE: 25 INJECTION, POWDER, FOR SOLUTION INTRAVENOUS at 11:50

## 2024-02-20 ENCOUNTER — OFFICE VISIT (OUTPATIENT)
Dept: HEMATOLOGY/ONCOLOGY | Facility: CLINIC | Age: 39
End: 2024-02-20
Payer: COMMERCIAL

## 2024-02-20 DIAGNOSIS — C50.811 MALIGNANT NEOPLASM OF OVERLAPPING SITES OF RIGHT BREAST IN FEMALE, ESTROGEN RECEPTOR POSITIVE (MULTI): Primary | ICD-10-CM

## 2024-02-20 DIAGNOSIS — Z17.0 MALIGNANT NEOPLASM OF OVERLAPPING SITES OF RIGHT BREAST IN FEMALE, ESTROGEN RECEPTOR POSITIVE (MULTI): Primary | ICD-10-CM

## 2024-02-20 PROCEDURE — 1036F TOBACCO NON-USER: CPT | Performed by: STUDENT IN AN ORGANIZED HEALTH CARE EDUCATION/TRAINING PROGRAM

## 2024-02-20 PROCEDURE — 99215 OFFICE O/P EST HI 40 MIN: CPT | Performed by: STUDENT IN AN ORGANIZED HEALTH CARE EDUCATION/TRAINING PROGRAM

## 2024-02-20 PROCEDURE — 99205 OFFICE O/P NEW HI 60 MIN: CPT | Performed by: STUDENT IN AN ORGANIZED HEALTH CARE EDUCATION/TRAINING PROGRAM

## 2024-02-20 RX ORDER — PROCHLORPERAZINE MALEATE 5 MG
10 TABLET ORAL EVERY 6 HOURS PRN
Status: CANCELLED | OUTPATIENT
Start: 2024-03-05

## 2024-02-20 RX ORDER — DOXORUBICIN HYDROCHLORIDE 2 MG/ML
60 INJECTION, SOLUTION INTRAVENOUS ONCE
Status: CANCELLED | OUTPATIENT
Start: 2024-03-05

## 2024-02-20 RX ORDER — ALBUTEROL SULFATE 0.83 MG/ML
3 SOLUTION RESPIRATORY (INHALATION) AS NEEDED
Status: CANCELLED | OUTPATIENT
Start: 2024-03-05

## 2024-02-20 RX ORDER — FAMOTIDINE 10 MG/ML
20 INJECTION INTRAVENOUS ONCE AS NEEDED
Status: CANCELLED | OUTPATIENT
Start: 2024-03-05

## 2024-02-20 RX ORDER — EPINEPHRINE 0.3 MG/.3ML
0.3 INJECTION SUBCUTANEOUS EVERY 5 MIN PRN
Status: CANCELLED | OUTPATIENT
Start: 2024-03-05

## 2024-02-20 RX ORDER — DIPHENHYDRAMINE HYDROCHLORIDE 50 MG/ML
50 INJECTION INTRAMUSCULAR; INTRAVENOUS AS NEEDED
Status: CANCELLED | OUTPATIENT
Start: 2024-03-05

## 2024-02-20 RX ORDER — PALONOSETRON 0.05 MG/ML
0.25 INJECTION, SOLUTION INTRAVENOUS ONCE
Status: CANCELLED | OUTPATIENT
Start: 2024-03-05

## 2024-02-20 RX ORDER — PROCHLORPERAZINE EDISYLATE 5 MG/ML
10 INJECTION INTRAMUSCULAR; INTRAVENOUS EVERY 6 HOURS PRN
Status: CANCELLED | OUTPATIENT
Start: 2024-03-05

## 2024-02-20 ASSESSMENT — COLUMBIA-SUICIDE SEVERITY RATING SCALE - C-SSRS
6. HAVE YOU EVER DONE ANYTHING, STARTED TO DO ANYTHING, OR PREPARED TO DO ANYTHING TO END YOUR LIFE?: NO
2. HAVE YOU ACTUALLY HAD ANY THOUGHTS OF KILLING YOURSELF?: NO
1. IN THE PAST MONTH, HAVE YOU WISHED YOU WERE DEAD OR WISHED YOU COULD GO TO SLEEP AND NOT WAKE UP?: NO

## 2024-02-20 ASSESSMENT — PAIN SCALES - GENERAL: PAINLEVEL: 0-NO PAIN

## 2024-02-20 ASSESSMENT — ENCOUNTER SYMPTOMS
DEPRESSION: 0
LOSS OF SENSATION IN FEET: 0
OCCASIONAL FEELINGS OF UNSTEADINESS: 0

## 2024-02-20 NOTE — PROGRESS NOTES
Patient ID: Macy Chavez is a 38 y.o. female.    The patient presents to clinic today for her history of breast cancer.    Cancer Staging   No matching staging information was found for the patient.      Diagnostic/Therapeutic History:    11/03/2023: Bl screening Mammogram: negative       12/20/2023: Right breast mass for which US done, Nonspecific dense fibroglandular tissues in the right breast in the area of reported lump. Dense fibroglandular tissue also evident throughout both breasts on screening mammogram. No discrete lesionidentified.    02/06/2024: Patient describes an increase in the size of a palpable lump in theright lower outer breast which was previously assessed in December of2023. Recent negative screening mammogram from 11/09/2023. She alsodescribes a new palpable lump in the right axilla. After being seen by her provider, a nother palpable lump is identified in the upper outer right breast.    Ultrasound of the right breast showed In the patient's area of palpable concern in the right breast at 7 o'clock, 5 cm from the nipple, there is a large vague heterogeneousmass with internal vascularity which has the appearance of dense fibroglandular tissue measuring up to 5 cm; it is visible protruding from the skin with discoloration. In the patient's area of palpable concern felt by her provider in the right breast at 10 o'clock, 8 cm from the nipple, there is an oval parallel heterogeneous mass withinternal vascularity measuring approximately 1.1 x 0.9 x 1.4 cm. 3 enlarged axillary LN were seen    02/06/2024: US guided core needle biopsy    A. BREAST, RIGHT, 7:00, 5 CM FN, CORE BIOPSY:   -- Invasive ductal carcinoma, grade 2, ER: 95%, FL: 5-10%, HER2 negative (1+)        B. BREAST, RIGHT, 10:00, 8 CM FN, CORE BIOPSY:   -- Invasive ductal carcinoma, grade 2-3,  ER: 95%, FL<5%, HER2 negative (1+)         C. AXILLARY LYMPH NODE, RIGHT, BIOPSY:     -- Metastatic carcinoma, see note.      02/14/2024:  Biopsy-proven multicentric right breast malignancy with the total  span of up to 13.0 cm associated with bulky metastatic axillary and  abnormal internal mammary lymphadenopathy. No evidence of chest wall,  skin or nipple-areolar complex involvement.    No MRI evidence of malignancy in the left breast.    02/15/2024: staging scans: negative- short term follow up       History of Present Illness (HPI)/Interval History:    Accompanied by her  today    She denies any fevers or chills.    She denies any chest pain or breathing issues.     She denies any vision changes or headache issues, dizziness, loss of balance, neuropathy and no falls.     She denies any new or unexplained bone aches or pains.    She denies any skin lesions or masses, hair loss, nail changes, or oral sores.    She reports a normal appetite and normal bowel movements. Her weight is stable.     She denies any issues with sleep or fatigue.     PMH: none     PSH: no surgery    Allergies/meds: negative     Reproductive hx: Menarche: 12, AFLB: 27, Menopause: no, HRT: no     Family history: Maternal Grandfather: Kidney versus Liver cancer,Paternal Grandmother: Soft cell sarcoma-        Review of Systems:  14-point ROS otherwise negative, as per HPI.    Past Medical History:   Diagnosis Date    Other specified health status 09/29/2016    No pertinent past medical history       Past Surgical History:   Procedure Laterality Date    OTHER SURGICAL HISTORY  09/29/2016    Oral Surgery Tooth Extraction [place] [facility]    WISDOM TOOTH EXTRACTION  6/1/2000       Social History     Socioeconomic History    Marital status:      Spouse name: Not on file    Number of children: Not on file    Years of education: Not on file    Highest education level: Not on file   Occupational History    Not on file   Tobacco Use    Smoking status: Never    Smokeless tobacco: Never   Vaping Use    Vaping Use: Never used   Substance and Sexual Activity    Alcohol use: Yes      Alcohol/week: 1.0 standard drink of alcohol     Types: 1 Glasses of wine per week     Comment: A few drinks a month    Drug use: Never    Sexual activity: Yes     Partners: Male     Birth control/protection: I.U.D.   Other Topics Concern    Not on file   Social History Narrative    Not on file     Social Determinants of Health     Financial Resource Strain: Not on file   Food Insecurity: Not on file   Transportation Needs: Not on file   Physical Activity: Not on file   Stress: Not on file   Social Connections: Not on file   Intimate Partner Violence: Not on file   Housing Stability: Not on file       Allergies   Allergen Reactions    Dog Dander Itching         Current Outpatient Medications:     ergocalciferol (Vitamin D-2) 1.25 MG (28205 UT) capsule, Take 1 capsule (50,000 Units) by mouth 1 (one) time per week., Disp: 12 capsule, Rfl: 0    valACYclovir (Valtrex) 500 mg tablet, Take 1 tablet (500 mg) by mouth 2 times a day., Disp: , Rfl:   No current facility-administered medications for this visit.     Objective    BSA: 1.86 meters squared  /85 (BP Location: Left arm, Patient Position: Sitting)   Pulse 89   Temp 36.6 °C (97.9 °F) (Temporal)   Resp 16   Wt 71.9 kg (158 lb 9.6 oz)   SpO2 96%   BMI 24.12 kg/m²     ECOG Performance Status: 0    Physical Exam    GA: alert, oriented, cooperative  HEENT: anicteric sclera, well injected conjunctiva  Heart: RRR, no murmurs or gallops heard  Lung: CTAB  Abd: +BS, soft, non tender  Breast: large breast mass measuring at inferior breast measuring 8cm. Additional mass at 10 oclock- Right axillary mass palpated    Laboratory Data:  Lab Results   Component Value Date    WBC 6.8 01/25/2024    HGB 14.2 01/25/2024    HCT 42.7 01/25/2024    MCV 92 01/25/2024     01/25/2024       Chemistry    Lab Results   Component Value Date/Time     01/25/2024 1055    K 4.6 01/25/2024 1055     01/25/2024 1055    CO2 27 01/25/2024 1055    BUN 10 01/25/2024 1055     CREATININE 0.87 01/25/2024 1055    Lab Results   Component Value Date/Time    CALCIUM 9.7 01/25/2024 1055    ALKPHOS 58 01/25/2024 1055    AST 16 01/25/2024 1055    ALT 17 01/25/2024 1055    BILITOT 1.1 01/25/2024 1055             Radiology:  NM bone whole body  Narrative: Interpreted By:  Lonnie Chavez and Afshari Mirak Sohrab   STUDY:  NM BONE WHOLE BODY;  2/19/2024 4:42 pm      INDICATION:  Signs/Symptoms:newly diagnosed stage 3 breast cancer.      COMPARISON:  Same day CT chest, abdomen and pelvis      ACCESSION NUMBER(S):  [id number]      ORDERING CLINICIAN:  MARLEE POOL      TECHNIQUE:  DIVISION OF NUCLEAR MEDICINE  BONE SCAN, WHOLE BODY plus REGIONAL VIEWS      The patient received an intravenous dose of  24.7 mCi of Tc-99m MDP.  Anterior and posterior images of the skeleton from skull vertex to  feet were then acquired.  Additional regional skeletal images were  also obtained.      FINDINGS:  No evidence of focal radiotracer uptake to suggest osseous metastatic  disease.      Expected, excreted activity is noted in the kidneys and bladder.      Impression: Normal bone scan.      I personally reviewed the images/study and I agree with the findings  as stated.  This study was interpreted at [facility], [place], [place].      MACRO:  None      Signed by: Lonnie Chavez 2/20/2024 9:56 AM  Dictation workstation:   [id number]       MR breast bilateral w contrast full protocol 02/14/2024    Narrative  Interpreted By:  Riki Flor,  STUDY:  BI MR BREAST BILATERAL WITH CONTRAST FULL PROTOCOL;  2/14/2024 1:33 pm    ACCESSION NUMBER(S):  [id number]    ORDERING CLINICIAN:  VITOR HARO    INDICATION:  38-year-old woman status post ultrasound-guided biopsy of right  breast masses at 7 o'clock and 10 o'clock and a right axillary lymph  demonstrating grade 2 invasive ductal carcinoma at both sites with  axillary simon metastasis.    COMPARISON:  Screening mammogram  11/09/2023, right breast ultrasound 12/20/2023,  02/06/2024; ultrasound-guided biopsy 02/06/2024.    TECHNIQUE:  Using a dedicated breast coil, STIR axial and T1-weighted fat  saturation axial images of the breasts were obtained, the latter both  before and after intravenous administration of Gadolinium DTPA. On an  independent workstation, 3-D images were formulated using NauboD  including time enhancement curves, subtraction images and MIP images.    Intravenous contrast:  14 mL of Dotarem    FINDINGS:  There is asymmetric moderate bilateral background enhancement.    Extreme fibroglandular tissue.    RIGHT BREAST:  There are multiple contiguous masses and nonmass  enhancements occupying the entire lateral and central inferior right  breast including at the two sites of biopsy-proven malignancy at 7  o'clock and 10 o'clock, with a total span of 8.5 x 4.5 x 13.0 cm (AP  x TR x CC), better appreciated on the MIP and sagittal reconstruction  images. There is no involvement of the skin, nipple-areolar complex,  or pectoralis major muscle. The most anterior extent of malignancy is  located approximately 2 cm from the base of the nipple.    Within the span of malignancy, more confluent findings are as follows:    - Dominant irregular heterogeneously enhancing mass in the inferior  lateral and central inferior breast at anterior and middle depths  measuring 5.0 x 4.7 x 4.5 cm, corresponding to the site of biopsy at  7 o'clock (series 7, image 127/176).    - Irregular heterogeneously enhancing mass in superior lateral breast  at posterior depth measuring 2.7 x 2.5 x 2.5 cm, corresponding to the  site of biopsy at 10 o'clock (series 7, image 48/176).    - More confluent nonmass enhancement in superior lateral breast at  middle to posterior depth measuring 3.8 x 2.0 x 2.5 cm (series 7,  image 73/176).    - A small satellite mass in superior lateral breast at middle depth  measuring 1.0 x 1.0 x 0.8 cm located 0.8 cm  superolateral to the  dominant mass (series 7, image 108/176).    Evaluation of the axilla is limited due to incomplete coverage in the  field of view. A conglomerate of at least 3 enlarged level I right  lymph nodes associated with a biopsy clip is partially visualized  measuring at least 4.2 x 2.5 x 3.5 cm (series 6, image 1/176).    An abnormal internal mammary lymph node is seen in the 2nd  intercostal space measuring 1.5 x 1.2 x 1.6 cm    LEFT BREAST: No suspicious mass or nonmass enhancement is identified.  There are numerous scattered small benign cysts.    No axillary or internal mammary lymphadenopathy is appreciated within  limitations of limited field of view.    NON-BREAST FINDINGS:  None.    Impression  1. Biopsy-proven multicentric right breast malignancy with the total  span of up to 13.0 cm associated with bulky metastatic axillary and  abnormal internal mammary lymphadenopathy. No evidence of chest wall,  skin or nipple-areolar complex involvement.  2. No MRI evidence of malignancy in the left breast.    BI-RADS CATEGORY:  BI-RADS CATEGORY:  6 Known Biopsy-Proven Malignancy.  Recommendation:  Immediate Follow-up.  Recommended Date:  Immediate.  Laterality:  Right.    For any future breast imaging appointments, please call [phone]  [phone]).      MACRO:  None    Signed by: Riki Flor 2/15/2024 10:43 AM  Dictation workstation:   [id number]          Assessment/Plan:    Macy is a 38 year old female in excellent health with a recent diagnosis of right sided breast cancer. Macy felt a mass in her right breast in December that prompted diagnostic evaluation.     Ultrasound of the right breast showed In the patient's area of palpable concern in the right breast at 7 o'clock, 5 cm from the nipple, there is a large vague heterogeneousmass with internal vascularity which has the appearance of dense fibroglandular tissue measuring up to 5 cm;  In the patient's area of palpable concern felt by her  provider in the right breast at 10 o'clock, 8 cm from the nipple, there is an oval parallel heterogeneous mass withinternal vascularity measuring approximately 1.1 x 0.9 x 1.4 cm. 3 enlarged axillary LN were seen    She had US guided core needle biopsy of  right breast at 7:00, 5 CM FN,  that showed invasive ductal carcinoma, grade 2, ER: 95%, AK: 5-10%, HER2 negative (1+).  And US guided core needle biopsy of the right breast at  10:00, 8 CM FN, that showed  Invasive ductal carcinoma, grade 2-3,  ER: 95%, AK<5%, HER2 negative (1+). Axillary LN consistent with metastatic carcinoma     On MRI she a  multicentric right breast malignancy with the total span of up to 13.0 cm associated with bulky metastatic axillary and abnormal internal mammary lymphadenopathy. No evidence of chest wall, skin or nipple-areolar complex involvement.     Current stage [id number] (staging scans negative for distant mets)  ECOG PS: 0    I reviewed with her the events that led to her diagnosis of breast cancer. We reviewed all the procedures and diagnostic imaging she underwent thus far. I discussed the features of her breast cancer that include the size, grade, lymph node status and  hormone receptor/ her2-jair status.      We discussed neoadjuvant systemic therapy. The goal of treatment would be to downstage her cancer, assess tumor chemosensitivity and treat potential micrometastatic disease. I would treat, given extensive of disease with anthracyclines based regimen ddAC Q2 weeks x4 followed by taxol weekly x12     - Port placement, Onco echo, Onc cardiology referral  - Tentative schedule to start in 2 weeks and will consent then- chemo orders placed.   - Chemo education done and given chemoe ducation package   - RTC in 2 weeks      At least 60 minutes of direct consultation was spent reviewing the patient's chart as well as discussing and  reviewing the  cancer care plan including educating and answering  questions and concerns, greater  than 50 percent spent in counseling and coordination  of care.            Denise Golden MD  Hematology and Medical Oncology  [facility]

## 2024-02-21 VITALS
HEART RATE: 89 BPM | OXYGEN SATURATION: 96 % | SYSTOLIC BLOOD PRESSURE: 121 MMHG | WEIGHT: 158.6 LBS | RESPIRATION RATE: 16 BRPM | DIASTOLIC BLOOD PRESSURE: 85 MMHG | HEIGHT: 69 IN | BODY MASS INDEX: 23.49 KG/M2 | TEMPERATURE: 97.9 F

## 2024-02-22 ENCOUNTER — HOSPITAL ENCOUNTER (OUTPATIENT)
Dept: CARDIOLOGY | Facility: HOSPITAL | Age: 39
Discharge: HOME | End: 2024-02-22
Payer: COMMERCIAL

## 2024-02-22 DIAGNOSIS — Z51.11 ENCOUNTER FOR ANTINEOPLASTIC CHEMOTHERAPY: ICD-10-CM

## 2024-02-22 DIAGNOSIS — C50.811 MALIGNANT NEOPLASM OF OVERLAPPING SITES OF RIGHT BREAST IN FEMALE, ESTROGEN RECEPTOR POSITIVE (MULTI): ICD-10-CM

## 2024-02-22 DIAGNOSIS — Z17.0 MALIGNANT NEOPLASM OF OVERLAPPING SITES OF RIGHT BREAST IN FEMALE, ESTROGEN RECEPTOR POSITIVE (MULTI): ICD-10-CM

## 2024-02-22 LAB
AORTIC VALVE MEAN GRADIENT: 3 MMHG
AORTIC VALVE PEAK VELOCITY: 1.25 M/S
AV PEAK GRADIENT: 6.3 MMHG
AVA (PEAK VEL): 2.43 CM2
AVA (VTI): 2.18 CM2
EJECTION FRACTION APICAL 4 CHAMBER: 67
EJECTION FRACTION: 67 %
GLOBAL LONGITUDINAL STRAIN: -18.2 %
LEFT VENTRICLE INTERNAL DIMENSION DIASTOLE: 4.5 CM (ref 3.5–6)
LEFT VENTRICULAR OUTFLOW TRACT DIAMETER: 1.9 CM
MITRAL VALVE E/E' RATIO: 7.48
RIGHT VENTRICLE FREE WALL PEAK S': 9.46 CM/S
RIGHT VENTRICLE PEAK SYSTOLIC PRESSURE: 12.5 MMHG
TRICUSPID ANNULAR PLANE SYSTOLIC EXCURSION: 2.2 CM

## 2024-02-22 PROCEDURE — 76376 3D RENDER W/INTRP POSTPROCES: CPT | Performed by: INTERNAL MEDICINE

## 2024-02-22 PROCEDURE — 93356 MYOCRD STRAIN IMG SPCKL TRCK: CPT | Performed by: INTERNAL MEDICINE

## 2024-02-22 PROCEDURE — 93306 TTE W/DOPPLER COMPLETE: CPT | Performed by: INTERNAL MEDICINE

## 2024-02-22 PROCEDURE — 76376 3D RENDER W/INTRP POSTPROCES: CPT

## 2024-02-23 ENCOUNTER — TELEPHONE (OUTPATIENT)
Dept: GENETICS | Facility: CLINIC | Age: 39
End: 2024-02-23
Payer: COMMERCIAL

## 2024-02-23 NOTE — TELEPHONE ENCOUNTER
Spoke with Macy Chavez to apprise her that her initial genetic test results (8-gene BRCAPlus panel from [employer]: DILIP, BRCA1, BRCA2, CDH1, CHEK2, PALB2, PTEN, TP53) are negative (normal). Will call out larger panel results when available. At this time, patient is planning for bilateral mastectomies.    Alesha Cedeno MS, [facility]  Genetic Counselor  [facility]  [phone]

## 2024-02-26 ENCOUNTER — HOSPITAL ENCOUNTER (OUTPATIENT)
Dept: RADIOLOGY | Facility: HOSPITAL | Age: 39
Discharge: HOME | End: 2024-02-26
Payer: COMMERCIAL

## 2024-02-26 VITALS
OXYGEN SATURATION: 98 % | RESPIRATION RATE: 16 BRPM | WEIGHT: 157 LBS | HEART RATE: 81 BPM | HEIGHT: 69 IN | SYSTOLIC BLOOD PRESSURE: 112 MMHG | BODY MASS INDEX: 23.25 KG/M2 | DIASTOLIC BLOOD PRESSURE: 71 MMHG | TEMPERATURE: 98.1 F

## 2024-02-26 DIAGNOSIS — C50.811 MALIGNANT NEOPLASM OF OVERLAPPING SITES OF RIGHT BREAST IN FEMALE, ESTROGEN RECEPTOR POSITIVE (MULTI): ICD-10-CM

## 2024-02-26 DIAGNOSIS — Z17.0 MALIGNANT NEOPLASM OF OVERLAPPING SITES OF RIGHT BREAST IN FEMALE, ESTROGEN RECEPTOR POSITIVE (MULTI): ICD-10-CM

## 2024-02-26 LAB
B-HCG SERPL-ACNC: <2 MIU/ML
INR PPP: 1 (ref 0.9–1.1)
PROTHROMBIN TIME: 11.7 SECONDS (ref 9.8–12.8)

## 2024-02-26 PROCEDURE — 99153 MOD SED SAME PHYS/QHP EA: CPT

## 2024-02-26 PROCEDURE — 99152 MOD SED SAME PHYS/QHP 5/>YRS: CPT

## 2024-02-26 PROCEDURE — 77001 FLUOROGUIDE FOR VEIN DEVICE: CPT | Mod: RT

## 2024-02-26 PROCEDURE — 36561 INSERT TUNNELED CV CATH: CPT | Performed by: RADIOLOGY

## 2024-02-26 PROCEDURE — 36561 INSERT TUNNELED CV CATH: CPT | Mod: RT

## 2024-02-26 PROCEDURE — 99152 MOD SED SAME PHYS/QHP 5/>YRS: CPT | Performed by: RADIOLOGY

## 2024-02-26 PROCEDURE — 85610 PROTHROMBIN TIME: CPT | Performed by: RADIOLOGY

## 2024-02-26 PROCEDURE — 76937 US GUIDE VASCULAR ACCESS: CPT | Mod: RT

## 2024-02-26 PROCEDURE — 84702 CHORIONIC GONADOTROPIN TEST: CPT | Performed by: RADIOLOGY

## 2024-02-26 PROCEDURE — 77001 FLUOROGUIDE FOR VEIN DEVICE: CPT | Performed by: RADIOLOGY

## 2024-02-26 PROCEDURE — 2720000007 HC OR 272 NO HCPCS

## 2024-02-26 PROCEDURE — 7100000001 HC RECOVERY ROOM TIME - INITIAL BASE CHARGE

## 2024-02-26 PROCEDURE — 2780000003 HC OR 278 NO HCPCS

## 2024-02-26 PROCEDURE — 7100000010 HC PHASE TWO TIME - EACH INCREMENTAL 1 MINUTE

## 2024-02-26 PROCEDURE — 36415 COLL VENOUS BLD VENIPUNCTURE: CPT | Performed by: RADIOLOGY

## 2024-02-26 PROCEDURE — 99153 MOD SED SAME PHYS/QHP EA: CPT | Performed by: RADIOLOGY

## 2024-02-26 PROCEDURE — 36010 PLACE CATHETER IN VEIN: CPT

## 2024-02-26 PROCEDURE — 7100000009 HC PHASE TWO TIME - INITIAL BASE CHARGE

## 2024-02-26 PROCEDURE — 36010 PLACE CATHETER IN VEIN: CPT | Performed by: RADIOLOGY

## 2024-02-26 PROCEDURE — 76937 US GUIDE VASCULAR ACCESS: CPT | Performed by: RADIOLOGY

## 2024-02-26 PROCEDURE — 2500000004 HC RX 250 GENERAL PHARMACY W/ HCPCS (ALT 636 FOR OP/ED): Performed by: RADIOLOGY

## 2024-02-26 PROCEDURE — 2500000004 HC RX 250 GENERAL PHARMACY W/ HCPCS (ALT 636 FOR OP/ED)

## 2024-02-26 PROCEDURE — C1788 PORT, INDWELLING, IMP: HCPCS

## 2024-02-26 PROCEDURE — 7100000002 HC RECOVERY ROOM TIME - EACH INCREMENTAL 1 MINUTE

## 2024-02-26 RX ORDER — MIDAZOLAM HYDROCHLORIDE 1 MG/ML
INJECTION, SOLUTION INTRAMUSCULAR; INTRAVENOUS
Status: COMPLETED | OUTPATIENT
Start: 2024-02-26 | End: 2024-02-26

## 2024-02-26 RX ORDER — FENTANYL CITRATE 50 UG/ML
INJECTION, SOLUTION INTRAMUSCULAR; INTRAVENOUS
Status: COMPLETED | OUTPATIENT
Start: 2024-02-26 | End: 2024-02-26

## 2024-02-26 RX ADMIN — MIDAZOLAM 0.5 MG: 1 INJECTION INTRAMUSCULAR; INTRAVENOUS at 14:18

## 2024-02-26 RX ADMIN — MIDAZOLAM 0.5 MG: 1 INJECTION INTRAMUSCULAR; INTRAVENOUS at 14:04

## 2024-02-26 RX ADMIN — FENTANYL CITRATE 50 MCG: 50 INJECTION, SOLUTION INTRAMUSCULAR; INTRAVENOUS at 14:04

## 2024-02-26 RX ADMIN — FENTANYL CITRATE 50 MCG: 50 INJECTION, SOLUTION INTRAMUSCULAR; INTRAVENOUS at 14:19

## 2024-02-26 ASSESSMENT — PAIN SCALES - GENERAL

## 2024-02-26 ASSESSMENT — PAIN - FUNCTIONAL ASSESSMENT
PAIN_FUNCTIONAL_ASSESSMENT: 0-10
PAIN_FUNCTIONAL_ASSESSMENT: 0-10

## 2024-02-26 NOTE — POST-PROCEDURE NOTE
Interventional Radiology Brief Postprocedure Note    Attending: Tyler Stewart MD    Assistant:   Staff Role   Efra Constantino, RN Radiology Nurse   Tyler Stewart MD Radiologist   Wilder Llanes, RT Radiology Technologist   Teagan Awan Radiology Technologist       Diagnosis:   1. Malignant neoplasm of overlapping sites of right breast in female, estrogen receptor positive (CMS/HCC)  IR CVC port placement    IR CVC port placement          Description of procedure: right chest port placement.    Timeout:  Yes    Procedure Area: Procedure Area     Anesthesia:   Conscious Sedation    Complications: None    Estimated Blood Loss:  7-10 cc's    Medications (Filter: Administrations occurring from 1332 to 1437 on 02/26/24) As of 02/26/24 1437      ceFAZolin (Ancef) in dextrose 5% (ADV/MBP) IV (g) Total dose:  1 g      Date/Time Rate/Dose/Volume Action       02/26/24  1354 1 g Given               fentaNYL PF (Sublimaze) injection (mcg) Total dose:  100 mcg      Date/Time Rate/Dose/Volume Action       02/26/24  1404 50 mcg Given      1419 50 mcg Given               midazolam (Versed) injection (mg) Total dose:  1 mg      Date/Time Rate/Dose/Volume Action       02/26/24  1404 0.5 mg Given      1418 0.5 mg Given                   No specimens collected      See detailed result report with images in PACS.    The patient tolerated the procedure well without incident or complication and is in stable condition.

## 2024-02-26 NOTE — PRE-PROCEDURE NOTE
Interventional Radiology Preprocedure Note    Indication for procedure: The encounter diagnosis was Malignant neoplasm of overlapping sites of right breast in female, estrogen receptor positive (CMS/HCC).    Relevant review of systems: NA    Relevant Labs:   Lab Results   Component Value Date    CREATININE 0.87 01/25/2024    EGFR 87 01/25/2024    INR 1.0 02/26/2024    PROTIME 11.7 02/26/2024       Planned Sedation/Anesthesia: Moderate    Airway assessment: normal    Directed physical examination:    RRR, CTA-B    Mallampati: III (soft and hard palate and base of uvula visible)    ASA Score: ASA 2 - Patient with mild systemic disease with no functional limitations    Benefits, risks and alternatives of procedure and planned sedation have been discussed with the patient and/or their representative. All questions answered and they agree to proceed.

## 2024-02-26 NOTE — Clinical Note
Procedure complete. Patient tolerated procedure, no complaints or concerns. Port ready to use, heparinized. Site WNL, steri strips and dressing.

## 2024-03-04 ENCOUNTER — TELEPHONE (OUTPATIENT)
Dept: ADMISSION | Facility: HOSPITAL | Age: 39
End: 2024-03-04
Payer: COMMERCIAL

## 2024-03-04 NOTE — TELEPHONE ENCOUNTER
Macy advised to call the office today if her cold cap would not be delivered in time for her first infusion tomorrow.  Secure Chat sent to breast RN partners to see what her options are for her first treatment tomorrow at [facility].    I informed Macy that she can use a cold cap that's available at [facility]. She knows to bring things in to wet her hair, conditioner and a band to protect her forehead.  No further questions or concerns at this time.

## 2024-03-05 ENCOUNTER — APPOINTMENT (OUTPATIENT)
Dept: HEMATOLOGY/ONCOLOGY | Facility: CLINIC | Age: 39
End: 2024-03-05
Payer: COMMERCIAL

## 2024-03-05 ENCOUNTER — INFUSION (OUTPATIENT)
Dept: HEMATOLOGY/ONCOLOGY | Facility: CLINIC | Age: 39
End: 2024-03-05
Payer: COMMERCIAL

## 2024-03-05 ENCOUNTER — OFFICE VISIT (OUTPATIENT)
Dept: HEMATOLOGY/ONCOLOGY | Facility: CLINIC | Age: 39
End: 2024-03-05
Payer: COMMERCIAL

## 2024-03-05 ENCOUNTER — APPOINTMENT (OUTPATIENT)
Dept: CARDIOLOGY | Facility: HOSPITAL | Age: 39
End: 2024-03-05
Payer: COMMERCIAL

## 2024-03-05 VITALS
OXYGEN SATURATION: 97 % | TEMPERATURE: 97.3 F | SYSTOLIC BLOOD PRESSURE: 132 MMHG | WEIGHT: 157.19 LBS | HEART RATE: 109 BPM | DIASTOLIC BLOOD PRESSURE: 86 MMHG | BODY MASS INDEX: 23.21 KG/M2 | RESPIRATION RATE: 18 BRPM

## 2024-03-05 VITALS — BODY MASS INDEX: 23.28 KG/M2 | HEIGHT: 69 IN

## 2024-03-05 DIAGNOSIS — Z17.0 MALIGNANT NEOPLASM OF OVERLAPPING SITES OF RIGHT BREAST IN FEMALE, ESTROGEN RECEPTOR POSITIVE (MULTI): ICD-10-CM

## 2024-03-05 DIAGNOSIS — C50.811 MALIGNANT NEOPLASM OF OVERLAPPING SITES OF RIGHT BREAST IN FEMALE, ESTROGEN RECEPTOR POSITIVE (MULTI): ICD-10-CM

## 2024-03-05 DIAGNOSIS — C50.811 MALIGNANT NEOPLASM OF OVERLAPPING SITES OF RIGHT BREAST IN FEMALE, ESTROGEN RECEPTOR POSITIVE (MULTI): Primary | ICD-10-CM

## 2024-03-05 DIAGNOSIS — Z17.0 MALIGNANT NEOPLASM OF OVERLAPPING SITES OF RIGHT BREAST IN FEMALE, ESTROGEN RECEPTOR POSITIVE (MULTI): Primary | ICD-10-CM

## 2024-03-05 LAB
ALBUMIN SERPL BCP-MCNC: 4.8 G/DL (ref 3.4–5)
ALP SERPL-CCNC: 51 U/L (ref 33–110)
ALT SERPL W P-5'-P-CCNC: 10 U/L (ref 7–45)
ANION GAP SERPL CALC-SCNC: 13 MMOL/L (ref 10–20)
AST SERPL W P-5'-P-CCNC: 12 U/L (ref 9–39)
B-HCG SERPL-ACNC: <3 MIU/ML
BASOPHILS # BLD AUTO: 0.01 X10*3/UL (ref 0–0.1)
BASOPHILS NFR BLD AUTO: 0.1 %
BILIRUB SERPL-MCNC: 0.8 MG/DL (ref 0–1.2)
BUN SERPL-MCNC: 11 MG/DL (ref 6–23)
CALCIUM SERPL-MCNC: 9.8 MG/DL (ref 8.6–10.3)
CHLORIDE SERPL-SCNC: 103 MMOL/L (ref 98–107)
CO2 SERPL-SCNC: 26 MMOL/L (ref 21–32)
CREAT SERPL-MCNC: 0.82 MG/DL (ref 0.5–1.05)
EGFRCR SERPLBLD CKD-EPI 2021: >90 ML/MIN/1.73M*2
EOSINOPHIL # BLD AUTO: 0.04 X10*3/UL (ref 0–0.7)
EOSINOPHIL NFR BLD AUTO: 0.4 %
ERYTHROCYTE [DISTWIDTH] IN BLOOD BY AUTOMATED COUNT: 12.6 % (ref 11.5–14.5)
GLUCOSE SERPL-MCNC: 89 MG/DL (ref 74–99)
HBV CORE AB SER QL: NONREACTIVE
HBV SURFACE AB SER-ACNC: 4.7 MIU/ML
HBV SURFACE AG SERPL QL IA: NONREACTIVE
HCT VFR BLD AUTO: 39.9 % (ref 36–46)
HGB BLD-MCNC: 13.6 G/DL (ref 12–16)
IMM GRANULOCYTES # BLD AUTO: 0 X10*3/UL (ref 0–0.7)
IMM GRANULOCYTES NFR BLD AUTO: 0 % (ref 0–0.9)
LYMPHOCYTES # BLD AUTO: 1.68 X10*3/UL (ref 1.2–4.8)
LYMPHOCYTES NFR BLD AUTO: 18.3 %
MCH RBC QN AUTO: 31 PG (ref 26–34)
MCHC RBC AUTO-ENTMCNC: 34.1 G/DL (ref 32–36)
MCV RBC AUTO: 91 FL (ref 80–100)
MONOCYTES # BLD AUTO: 0.4 X10*3/UL (ref 0.1–1)
MONOCYTES NFR BLD AUTO: 4.4 %
NEUTROPHILS # BLD AUTO: 7.06 X10*3/UL (ref 1.2–7.7)
NEUTROPHILS NFR BLD AUTO: 76.8 %
NRBC BLD-RTO: NORMAL /100{WBCS}
PLATELET # BLD AUTO: 228 X10*3/UL (ref 150–450)
POTASSIUM SERPL-SCNC: 4.1 MMOL/L (ref 3.5–5.3)
PROT SERPL-MCNC: 7.4 G/DL (ref 6.4–8.2)
RBC # BLD AUTO: 4.39 X10*6/UL (ref 4–5.2)
SODIUM SERPL-SCNC: 138 MMOL/L (ref 136–145)
WBC # BLD AUTO: 9.2 X10*3/UL (ref 4.4–11.3)

## 2024-03-05 PROCEDURE — 84702 CHORIONIC GONADOTROPIN TEST: CPT

## 2024-03-05 PROCEDURE — 96411 CHEMO IV PUSH ADDL DRUG: CPT

## 2024-03-05 PROCEDURE — 96372 THER/PROPH/DIAG INJ SC/IM: CPT | Performed by: STUDENT IN AN ORGANIZED HEALTH CARE EDUCATION/TRAINING PROGRAM

## 2024-03-05 PROCEDURE — 96367 TX/PROPH/DG ADDL SEQ IV INF: CPT

## 2024-03-05 PROCEDURE — 96361 HYDRATE IV INFUSION ADD-ON: CPT | Mod: INF

## 2024-03-05 PROCEDURE — 96375 TX/PRO/DX INJ NEW DRUG ADDON: CPT | Mod: INF

## 2024-03-05 PROCEDURE — 2500000004 HC RX 250 GENERAL PHARMACY W/ HCPCS (ALT 636 FOR OP/ED): Performed by: STUDENT IN AN ORGANIZED HEALTH CARE EDUCATION/TRAINING PROGRAM

## 2024-03-05 PROCEDURE — 85025 COMPLETE CBC W/AUTO DIFF WBC: CPT

## 2024-03-05 PROCEDURE — 1036F TOBACCO NON-USER: CPT | Performed by: STUDENT IN AN ORGANIZED HEALTH CARE EDUCATION/TRAINING PROGRAM

## 2024-03-05 PROCEDURE — 86704 HEP B CORE ANTIBODY TOTAL: CPT

## 2024-03-05 PROCEDURE — 86706 HEP B SURFACE ANTIBODY: CPT

## 2024-03-05 PROCEDURE — 80053 COMPREHEN METABOLIC PANEL: CPT

## 2024-03-05 PROCEDURE — 96377 APPLICATON ON-BODY INJECTOR: CPT

## 2024-03-05 PROCEDURE — 99214 OFFICE O/P EST MOD 30 MIN: CPT | Mod: 25 | Performed by: STUDENT IN AN ORGANIZED HEALTH CARE EDUCATION/TRAINING PROGRAM

## 2024-03-05 PROCEDURE — 87340 HEPATITIS B SURFACE AG IA: CPT

## 2024-03-05 PROCEDURE — 99214 OFFICE O/P EST MOD 30 MIN: CPT | Performed by: STUDENT IN AN ORGANIZED HEALTH CARE EDUCATION/TRAINING PROGRAM

## 2024-03-05 PROCEDURE — 96413 CHEMO IV INFUSION 1 HR: CPT

## 2024-03-05 RX ORDER — PROCHLORPERAZINE MALEATE 5 MG
10 TABLET ORAL EVERY 6 HOURS PRN
Status: CANCELLED | OUTPATIENT
Start: 2024-03-20

## 2024-03-05 RX ORDER — PALONOSETRON 0.05 MG/ML
0.25 INJECTION, SOLUTION INTRAVENOUS ONCE
Status: COMPLETED | OUTPATIENT
Start: 2024-03-05 | End: 2024-03-05

## 2024-03-05 RX ORDER — ALBUTEROL SULFATE 0.83 MG/ML
3 SOLUTION RESPIRATORY (INHALATION) AS NEEDED
Status: DISCONTINUED | OUTPATIENT
Start: 2024-03-05 | End: 2024-03-05 | Stop reason: HOSPADM

## 2024-03-05 RX ORDER — HEPARIN 100 UNIT/ML
500 SYRINGE INTRAVENOUS AS NEEDED
Status: CANCELLED | OUTPATIENT
Start: 2024-03-05

## 2024-03-05 RX ORDER — LIDOCAINE AND PRILOCAINE 25; 25 MG/G; MG/G
CREAM TOPICAL
Qty: 250 G | Refills: 1 | Status: SHIPPED | OUTPATIENT
Start: 2024-03-05 | End: 2024-03-05

## 2024-03-05 RX ORDER — DOXORUBICIN HYDROCHLORIDE 2 MG/ML
60 INJECTION, SOLUTION INTRAVENOUS ONCE
Status: COMPLETED | OUTPATIENT
Start: 2024-03-05 | End: 2024-03-05

## 2024-03-05 RX ORDER — HEPARIN SODIUM,PORCINE/PF 10 UNIT/ML
50 SYRINGE (ML) INTRAVENOUS AS NEEDED
Status: DISCONTINUED | OUTPATIENT
Start: 2024-03-05 | End: 2024-03-05 | Stop reason: HOSPADM

## 2024-03-05 RX ORDER — PALONOSETRON 0.05 MG/ML
0.25 INJECTION, SOLUTION INTRAVENOUS ONCE
Status: CANCELLED | OUTPATIENT
Start: 2024-03-20

## 2024-03-05 RX ORDER — HEPARIN 100 UNIT/ML
500 SYRINGE INTRAVENOUS AS NEEDED
Status: DISCONTINUED | OUTPATIENT
Start: 2024-03-05 | End: 2024-03-05 | Stop reason: HOSPADM

## 2024-03-05 RX ORDER — DIPHENHYDRAMINE HYDROCHLORIDE 50 MG/ML
50 INJECTION INTRAMUSCULAR; INTRAVENOUS AS NEEDED
Status: CANCELLED | OUTPATIENT
Start: 2024-03-20

## 2024-03-05 RX ORDER — HEPARIN SODIUM,PORCINE/PF 10 UNIT/ML
50 SYRINGE (ML) INTRAVENOUS AS NEEDED
Status: CANCELLED | OUTPATIENT
Start: 2024-03-05

## 2024-03-05 RX ORDER — DOXORUBICIN HYDROCHLORIDE 2 MG/ML
60 INJECTION, SOLUTION INTRAVENOUS ONCE
Status: CANCELLED | OUTPATIENT
Start: 2024-03-20

## 2024-03-05 RX ORDER — PROCHLORPERAZINE MALEATE 10 MG
10 TABLET ORAL EVERY 6 HOURS PRN
Qty: 30 TABLET | Refills: 2 | Status: SHIPPED | OUTPATIENT
Start: 2024-03-05 | End: 2024-03-27 | Stop reason: SDUPTHER

## 2024-03-05 RX ORDER — FAMOTIDINE 10 MG/ML
20 INJECTION INTRAVENOUS ONCE AS NEEDED
Status: CANCELLED | OUTPATIENT
Start: 2024-03-20

## 2024-03-05 RX ORDER — EPINEPHRINE 0.3 MG/.3ML
0.3 INJECTION SUBCUTANEOUS EVERY 5 MIN PRN
Status: CANCELLED | OUTPATIENT
Start: 2024-03-20

## 2024-03-05 RX ORDER — PROCHLORPERAZINE EDISYLATE 5 MG/ML
10 INJECTION INTRAMUSCULAR; INTRAVENOUS EVERY 6 HOURS PRN
Status: DISCONTINUED | OUTPATIENT
Start: 2024-03-05 | End: 2024-03-05 | Stop reason: HOSPADM

## 2024-03-05 RX ORDER — PROCHLORPERAZINE MALEATE 10 MG
10 TABLET ORAL EVERY 6 HOURS PRN
Status: DISCONTINUED | OUTPATIENT
Start: 2024-03-05 | End: 2024-03-05 | Stop reason: HOSPADM

## 2024-03-05 RX ORDER — PROCHLORPERAZINE EDISYLATE 5 MG/ML
10 INJECTION INTRAMUSCULAR; INTRAVENOUS EVERY 6 HOURS PRN
Status: CANCELLED | OUTPATIENT
Start: 2024-03-20

## 2024-03-05 RX ORDER — EPINEPHRINE 0.3 MG/.3ML
0.3 INJECTION SUBCUTANEOUS EVERY 5 MIN PRN
Status: DISCONTINUED | OUTPATIENT
Start: 2024-03-05 | End: 2024-03-05 | Stop reason: HOSPADM

## 2024-03-05 RX ORDER — DIPHENHYDRAMINE HYDROCHLORIDE 50 MG/ML
50 INJECTION INTRAMUSCULAR; INTRAVENOUS AS NEEDED
Status: DISCONTINUED | OUTPATIENT
Start: 2024-03-05 | End: 2024-03-05 | Stop reason: HOSPADM

## 2024-03-05 RX ORDER — ALBUTEROL SULFATE 0.83 MG/ML
3 SOLUTION RESPIRATORY (INHALATION) AS NEEDED
Status: CANCELLED | OUTPATIENT
Start: 2024-03-20

## 2024-03-05 RX ORDER — FAMOTIDINE 10 MG/ML
20 INJECTION INTRAVENOUS ONCE AS NEEDED
Status: DISCONTINUED | OUTPATIENT
Start: 2024-03-05 | End: 2024-03-05 | Stop reason: HOSPADM

## 2024-03-05 RX ORDER — ONDANSETRON HYDROCHLORIDE 8 MG/1
8 TABLET, FILM COATED ORAL EVERY 8 HOURS PRN
Qty: 20 TABLET | Refills: 3 | Status: SHIPPED | OUTPATIENT
Start: 2024-03-05 | End: 2024-03-20 | Stop reason: SDUPTHER

## 2024-03-05 RX ADMIN — FOSAPREPITANT DIMEGLUMINE 150 MG: 150 INJECTION, POWDER, LYOPHILIZED, FOR SOLUTION INTRAVENOUS at 13:05

## 2024-03-05 RX ADMIN — CYCLOPHOSPHAMIDE 1122 MG: 1 INJECTION, POWDER, FOR SOLUTION INTRAVENOUS; ORAL at 14:32

## 2024-03-05 RX ADMIN — HEPARIN 500 UNITS: 100 SYRINGE at 15:32

## 2024-03-05 RX ADMIN — SODIUM CHLORIDE 500 ML: 9 INJECTION, SOLUTION INTRAVENOUS at 13:06

## 2024-03-05 RX ADMIN — PALONOSETRON 250 MCG: 0.05 INJECTION, SOLUTION INTRAVENOUS at 12:33

## 2024-03-05 RX ADMIN — DOXORUBICIN HYDROCHLORIDE 112 MG: 2 INJECTION, SOLUTION INTRAVENOUS at 13:54

## 2024-03-05 RX ADMIN — DEXAMETHASONE SODIUM PHOSPHATE 12 MG: 4 INJECTION, SOLUTION INTRAMUSCULAR; INTRAVENOUS at 12:34

## 2024-03-05 RX ADMIN — PEGFILGRASTIM 6 MG: KIT SUBCUTANEOUS at 14:38

## 2024-03-05 ASSESSMENT — ENCOUNTER SYMPTOMS
LOSS OF SENSATION IN FEET: 0
OCCASIONAL FEELINGS OF UNSTEADINESS: 0
DEPRESSION: 0

## 2024-03-05 ASSESSMENT — PAIN SCALES - GENERAL: PAINLEVEL: 0-NO PAIN

## 2024-03-05 NOTE — PROGRESS NOTES
Patient ID: Macy Chavez is a 38 y.o. female.    The patient presents to clinic today for her history of breast cancer.     Cancer Staging   Malignant neoplasm of overlapping sites of right breast in female, estrogen receptor positive (CMS/HCC)  Staging form: Breast, AJCC 8th Edition  - Clinical stage from 2/6/2024: Stage IIIA (cT3, cN2, cM0, G3, ER+, SD+, HER2-) - Signed by Denise Golden MD on 2/21/2024      Diagnostic/Therapeutic History:    11/03/2023: Bl screening Mammogram: negative       12/20/2023: Right breast mass for which US done, Nonspecific dense fibroglandular tissues in the right breast in the area of reported lump. Dense fibroglandular tissue also evident throughout both breasts on screening mammogram. No discrete lesionidentified.    02/06/2024: Patient describes an increase in the size of a palpable lump in theright lower outer breast which was previously assessed in December of2023. Recent negative screening mammogram from 11/09/2023. She alsodescribes a new palpable lump in the right axilla. After being seen by her provider, a nother palpable lump is identified in the upper outer right breast.    Ultrasound of the right breast showed In the patient's area of palpable concern in the right breast at 7 o'clock, 5 cm from the nipple, there is a large vague heterogeneousmass with internal vascularity which has the appearance of dense fibroglandular tissue measuring up to 5 cm; it is visible protruding from the skin with discoloration. In the patient's area of palpable concern felt by her provider in the right breast at 10 o'clock, 8 cm from the nipple, there is an oval parallel heterogeneous mass withinternal vascularity measuring approximately 1.1 x 0.9 x 1.4 cm. 3 enlarged axillary LN were seen    02/06/2024: US guided core needle biopsy    A. BREAST, RIGHT, 7:00, 5 CM FN, CORE BIOPSY:   -- Invasive ductal carcinoma, grade 2, ER: 95%, SD: 5-10%, HER2 negative (1+)        B. BREAST, RIGHT, 10:00, 8  CM FN, CORE BIOPSY:   -- Invasive ductal carcinoma, grade 2-3,  ER: 95%, TN<5%, HER2 negative (1+)         C. AXILLARY LYMPH NODE, RIGHT, BIOPSY:     -- Metastatic carcinoma, see note.      02/14/2024: Biopsy-proven multicentric right breast malignancy with the total  span of up to 13.0 cm associated with bulky metastatic axillary and  abnormal internal mammary lymphadenopathy. No evidence of chest wall,  skin or nipple-areolar complex involvement.    No MRI evidence of malignancy in the left breast.    02/15/2024: staging scans: negative- short term follow up       History of Present Illness (HPI)/Interval History:    Accompanied by her  today- No changes compared to last visit    She denies any fevers or chills.    She denies any chest pain or breathing issues.     She denies any vision changes or headache issues, dizziness, loss of balance, neuropathy and no falls.     She denies any new or unexplained bone aches or pains.    She denies any skin lesions or masses, hair loss, nail changes, or oral sores.    She reports a normal appetite and normal bowel movements. Her weight is stable.     She denies any issues with sleep or fatigue.     PMH: none     PSH: no surgery    Allergies/meds: negative     Reproductive hx: Menarche: 12, AFLB: 27, Menopause: no, HRT: no     Family history: Maternal Grandfather: Kidney versus Liver cancer,Paternal Grandmother: Soft cell sarcoma-        Review of Systems:  14-point ROS otherwise negative, as per HPI.    Past Medical History:   Diagnosis Date    Breast cancer (CMS/HCC)     Cancer (CMS/HCC)     Other specified health status 09/29/2016    No pertinent past medical history       Past Surgical History:   Procedure Laterality Date    OTHER SURGICAL HISTORY  09/29/2016    Oral Surgery Tooth Extraction [place] Tooth    WISDOM TOOTH EXTRACTION  6/1/2000       Social History     Socioeconomic History    Marital status:      Spouse name: None    Number of children: None     Years of education: None    Highest education level: None   Occupational History    None   Tobacco Use    Smoking status: Never    Smokeless tobacco: Never   Vaping Use    Vaping Use: Never used   Substance and Sexual Activity    Alcohol use: Yes     Alcohol/week: 1.0 standard drink of alcohol     Types: 1 Glasses of wine per week     Comment: A few drinks a month    Drug use: Never    Sexual activity: Yes     Partners: Male     Birth control/protection: I.U.D.   Other Topics Concern    None   Social History Narrative    None     Social Determinants of Health     Financial Resource Strain: Not on file   Food Insecurity: Not on file   Transportation Needs: Not on file   Physical Activity: Not on file   Stress: Not on file   Social Connections: Not on file   Intimate Partner Violence: Not on file   Housing Stability: Not on file       Allergies   Allergen Reactions    Dog Dander Itching         Current Outpatient Medications:     ergocalciferol (Vitamin D-2) 1.25 MG (60281 UT) capsule, Take 1 capsule (50,000 Units) by mouth 1 (one) time per week., Disp: 12 capsule, Rfl: 0    valACYclovir (Valtrex) 500 mg tablet, Take 1 tablet (500 mg) by mouth 2 times a day., Disp: , Rfl:      Objective    BSA: 1.86 meters squared  /86 (BP Location: Left arm, Patient Position: Sitting, BP Cuff Size: Adult)   Pulse 109   Temp 36.3 °C (97.3 °F) (Temporal)   Resp 18   Wt 71.3 kg (157 lb 3 oz)   SpO2 97%   BMI 23.21 kg/m²     ECOG Performance Status: 0    Physical Exam    GA: alert, oriented, cooperative  HEENT: anicteric sclera, well injected conjunctiva  Heart: RRR, no murmurs or gallops heard  Lung: CTAB  Abd: +BS, soft, non tender  Breast: large breast mass measuring at inferior breast measuring 8cm. Additional mass at 10 oclock- Right axillary mass palpated    Laboratory Data:  Lab Results   Component Value Date    WBC 6.8 01/25/2024    HGB 14.2 01/25/2024    HCT 42.7 01/25/2024    MCV 92 01/25/2024     01/25/2024        Chemistry    Lab Results   Component Value Date/Time     01/25/2024 1055    K 4.6 01/25/2024 1055     01/25/2024 1055    CO2 27 01/25/2024 1055    BUN 10 01/25/2024 1055    CREATININE 0.87 01/25/2024 1055    Lab Results   Component Value Date/Time    CALCIUM 9.7 01/25/2024 1055    ALKPHOS 58 01/25/2024 1055    AST 16 01/25/2024 1055    ALT 17 01/25/2024 1055    BILITOT 1.1 01/25/2024 1055             Radiology:  IR CVC port placement  Narrative: Interpreted By:  Tyler Stewart,   STUDY:  IR CVC PORT PLACEMENT;  2/26/2024 2:06 pm      INDICATION:  Signs/Symptoms:port placement.      COMPARISON:  None.      ACCESSION NUMBER(S):  [id number]      ORDERING CLINICIAN:  SALUD CAMPOS      TECHNIQUE:  INTERVENTIONALIST(S):  Tyler Stewart MD      CONSENT:  The patient/patient's POA/next of kin was informed of the nature of  the proposed procedure. The purposes, alternatives, risks, and  benefits were explained and discussed. All questions were answered  and consent was obtained.      RADIATION EXPOSURE:  Fluoroscopy time: 0.3 min.  Dose: 7.24 mGy.  Dose Area Product (DAP): 1959 mGy cm 2.      SEDATION:  Moderate conscious IV sedation services (supervision of  administration, induction, and maintenance) were provided by the  physician performing the procedure with intravenous fentanyl 100 mcg  and versed 1 mg for 39 minutes. The physician was assisted by an  independent trained observer, an interventional radiology nurse, in  the continuous monitoring of patient level of consciousness and  physiologic status.      MEDICATION/CONTRAST:  No additional      TIME OUT:  A time out was performed immediately prior to procedure start with  the interventional team, correctly identifying the patient name, date  of birth, MRN, procedure, anatomy (including marking of site and  side), patient position, procedure consent form, relevant laboratory  and imaging test results, antibiotic administration,  safety  precautions, and procedure-specific equipment needs.      COMPLICATIONS:  No immediate adverse events identified.      FINDINGS:  Maximum sterile barrier technique was implemented. In the recumbent  position, the patient was positioned on the angiography table. The  right supraclavicular and infraclavicular cutaneous tissues were  prepared and draped in usual sterile manner.      The supraclavicular access site was screened with gray-scale  ultrasound with subsequent subcutaneous instillation of Lidocaine 1%  local anesthesia. Ultrasound images demonstrate a patent  right  internal jugular vein. Under direct ultrasound guidance and  Seldinger/micropuncture technique, the  right internal jugular vein  was accessed. An ultrasound digital spot image was acquired and  stored on the  PACS. After confirmation of location, a 018  Clinton-Mandril guidewire was inserted to secure location. The guidewire  was advanced into the inferior vena cava utilizing intermittent  fluoroscopy. The micro-access needle was removed over the guidewire.  Utilizing a 5-on-4 coaxial dilator sheath system, upsize to a 035  3-J guidewire was performed. Subsequent access tract dilation was  performed to an eventual  8.5-Frisian peel-away sheath dilator system.      Following Lidocaine 1% local anesthesia, a superolateral Mediport  pocket was created in the subcutaneous infraclavicular chest wall.  The Mediport pocket was irrigated with normal saline and prophylactic  antibiotics. A subcutaneous tract was then created from the Mediport  pocket to the venous access site. The  8-Frisian port catheter was  introduced maintaining continuity from the Mediport pocket to the  venous access site and into the central venous system. An optimal  length of catheter was measured with the tip at the right  atrial/superior vena caval junction.      The catheter was trimmed to the optimal length and the external  portion was connected to the Mediport  reservoir hub. After insertion  into the Mediport pocket, a Fisher needle was then utilized to access  the reservoir to assess for leaks, evaluate for aspiration and  flushability. The Mediport was then irrigated with low-dose  heparinized saline.      A fluoroscopic spot image of the chest was acquired in the AP  projection to confirm optimal course and location of the subcutaneous  and central venous catheter. In addition, optimal orientation and  continuity to the Mediport reservoir were identified.      After confirmation of optimal Mediport functioning, the Mediport  pocket site was closed with subcutaneous absorbable Polysorb sutures  in addition to cutaneous subcuticular closure. Sterile dressings were  then placed at the Mediport reservoir and venotomy access site.      The patient tolerated the procedure without complication.      Impression: 1. Uncomplicated placement of a  right infraclavicular  single-lumen  Mediport- 8-Upper sorbian catheter tip residing at the cavoatrial junction.  2. CT power-injection compatible Mediport.  3. MRI-compatible Mediport.      Optimal functioning device and ready for utilization.      I was present for and/or performed the critical portions of the  procedure and immediately available throughout the entire procedure.      I personally reviewed the image(s) / study and resident  interpretation. I agree with the findings as stated.      Performed and dictated at [facility].      MACRO:  None      Signed by: Tyler Stewart 2/26/2024 4:07 PM  Dictation workstation:   [id number]       MR breast bilateral w contrast full protocol 02/14/2024    Narrative  Interpreted By:  Riki Flor,  STUDY:  BI MR BREAST BILATERAL WITH CONTRAST FULL PROTOCOL;  2/14/2024 1:33 pm    ACCESSION NUMBER(S):  [id number]    ORDERING CLINICIAN:  VITOR HARO    INDICATION:  38-year-old woman status post ultrasound-guided biopsy of right  breast masses at 7  o'clock and 10 o'clock and a right axillary lymph  demonstrating grade 2 invasive ductal carcinoma at both sites with  axillary simon metastasis.    COMPARISON:  Screening mammogram 11/09/2023, right breast ultrasound 12/20/2023,  02/06/2024; ultrasound-guided biopsy 02/06/2024.    TECHNIQUE:  Using a dedicated breast coil, STIR axial and T1-weighted fat  saturation axial images of the breasts were obtained, the latter both  before and after intravenous administration of Gadolinium DTPA. On an  independent workstation, 3-D images were formulated using Paperlit  including time enhancement curves, subtraction images and MIP images.    Intravenous contrast:  14 mL of Dotarem    FINDINGS:  There is asymmetric moderate bilateral background enhancement.    Extreme fibroglandular tissue.    RIGHT BREAST:  There are multiple contiguous masses and nonmass  enhancements occupying the entire lateral and central inferior right  breast including at the two sites of biopsy-proven malignancy at 7  o'clock and 10 o'clock, with a total span of 8.5 x 4.5 x 13.0 cm (AP  x TR x CC), better appreciated on the MIP and sagittal reconstruction  images. There is no involvement of the skin, nipple-areolar complex,  or pectoralis major muscle. The most anterior extent of malignancy is  located approximately 2 cm from the base of the nipple.    Within the span of malignancy, more confluent findings are as follows:    - Dominant irregular heterogeneously enhancing mass in the inferior  lateral and central inferior breast at anterior and middle depths  measuring 5.0 x 4.7 x 4.5 cm, corresponding to the site of biopsy at  7 o'clock (series 7, image 127/176).    - Irregular heterogeneously enhancing mass in superior lateral breast  at posterior depth measuring 2.7 x 2.5 x 2.5 cm, corresponding to the  site of biopsy at 10 o'clock (series 7, image 48/176).    - More confluent nonmass enhancement in superior lateral breast at  middle to posterior  depth measuring 3.8 x 2.0 x 2.5 cm (series 7,  image 73/176).    - A small satellite mass in superior lateral breast at middle depth  measuring 1.0 x 1.0 x 0.8 cm located 0.8 cm superolateral to the  dominant mass (series 7, image 108/176).    Evaluation of the axilla is limited due to incomplete coverage in the  field of view. A conglomerate of at least 3 enlarged level I right  lymph nodes associated with a biopsy clip is partially visualized  measuring at least 4.2 x 2.5 x 3.5 cm (series 6, image 1/176).    An abnormal internal mammary lymph node is seen in the 2nd  intercostal space measuring 1.5 x 1.2 x 1.6 cm    LEFT BREAST: No suspicious mass or nonmass enhancement is identified.  There are numerous scattered small benign cysts.    No axillary or internal mammary lymphadenopathy is appreciated within  limitations of limited field of view.    NON-BREAST FINDINGS:  None.    Impression  1. Biopsy-proven multicentric right breast malignancy with the total  span of up to 13.0 cm associated with bulky metastatic axillary and  abnormal internal mammary lymphadenopathy. No evidence of chest wall,  skin or nipple-areolar complex involvement.  2. No MRI evidence of malignancy in the left breast.    BI-RADS CATEGORY:  BI-RADS CATEGORY:  6 Known Biopsy-Proven Malignancy.  Recommendation:  Immediate Follow-up.  Recommended Date:  Immediate.  Laterality:  Right.    For any future breast imaging appointments, please call [phone]  ([phone]).      MACRO:  None    Signed by: Riki Flor 2/15/2024 10:43 AM  Dictation workstation:   [id number]          Assessment/Plan:    Macy is a 38 year old female in excellent health with a recent diagnosis of right sided breast cancer. Macy felt a mass in her right breast in December that prompted diagnostic evaluation.     Ultrasound of the right breast showed In the patient's area of palpable concern in the right breast at 7 o'clock, 5 cm from the nipple, there is a large vague  heterogeneousmass with internal vascularity which has the appearance of dense fibroglandular tissue measuring up to 5 cm;  In the patient's area of palpable concern felt by her provider in the right breast at 10 o'clock, 8 cm from the nipple, there is an oval parallel heterogeneous mass withinternal vascularity measuring approximately 1.1 x 0.9 x 1.4 cm. 3 enlarged axillary LN were seen    She had US guided core needle biopsy of  right breast at 7:00, 5 CM FN,  that showed invasive ductal carcinoma, grade 2, ER: 95%, AL: 5-10%, HER2 negative (1+).  And US guided core needle biopsy of the right breast at  10:00, 8 CM FN, that showed  Invasive ductal carcinoma, grade 2-3,  ER: 95%, AL<5%, HER2 negative (1+). Axillary LN consistent with metastatic carcinoma     On MRI she a  multicentric right breast malignancy with the total span of up to 13.0 cm associated with bulky metastatic axillary and abnormal internal mammary lymphadenopathy. No evidence of chest wall, skin or nipple-areolar complex involvement.     Current stage [id number] (staging scans negative for distant mets)  ECOG PS: 0    I reviewed with her the events that led to her diagnosis of breast cancer. We reviewed all the procedures and diagnostic imaging she underwent thus far. I discussed the features of her breast cancer that include the size, grade, lymph node status and  hormone receptor/ her2-jair status.      We discussed neoadjuvant systemic therapy. The goal of treatment would be to downstage her cancer, assess tumor chemosensitivity and treat potential micrometastatic disease. I would treat, given extensive of disease with anthracyclines based regimen ddAC Q2 weeks x4 followed by taxol weekly x12     - Port placed, echo looks good- has appt with Dr Mares tomorrow  - ready to start cycle 1 today with cold cap- labs today  - RTC prior to cycle 2 on 03/20    At least 35 minutes of direct consultation was spent reviewing the patient's chart as well as  discussing and  reviewing the  cancer care plan including educating and answering  questions and concerns, greater than 50 percent spent in counseling and coordination  of care.            Denise Golden MD  Hematology and Medical Oncology  [facility]

## 2024-03-05 NOTE — SIGNIFICANT EVENT
03/05/24 1218   Prechemo Checklist   Has the patient been in the hospital, ED, or urgent care since last date of service No   Chemo/Immuno Consent Signed Yes   Protocol/Indications Verified Yes   Confirmed to previous date/time of medication N/A  (1st dose)   Compared to previous dose N/A  (1st dose)   All medications are dated accurately Yes   Pregnancy Test Negative Yes   Parameters Met Yes   BSA/Weight-Height Verified Yes   Dose Calculations Verified Yes

## 2024-03-06 ENCOUNTER — OFFICE VISIT (OUTPATIENT)
Dept: CARDIOLOGY | Facility: HOSPITAL | Age: 39
End: 2024-03-06
Payer: COMMERCIAL

## 2024-03-06 ENCOUNTER — SOCIAL WORK (OUTPATIENT)
Dept: CASE MANAGEMENT | Facility: HOSPITAL | Age: 39
End: 2024-03-06

## 2024-03-06 ENCOUNTER — APPOINTMENT (OUTPATIENT)
Dept: CARDIOLOGY | Facility: HOSPITAL | Age: 39
End: 2024-03-06
Payer: COMMERCIAL

## 2024-03-06 VITALS
TEMPERATURE: 97.9 F | HEART RATE: 80 BPM | RESPIRATION RATE: 17 BRPM | BODY MASS INDEX: 23.38 KG/M2 | WEIGHT: 157.85 LBS | OXYGEN SATURATION: 100 % | DIASTOLIC BLOOD PRESSURE: 76 MMHG | SYSTOLIC BLOOD PRESSURE: 116 MMHG

## 2024-03-06 DIAGNOSIS — Z79.899 ENCOUNTER FOR MONITORING CARDIOTOXIC DRUG THERAPY: Primary | ICD-10-CM

## 2024-03-06 DIAGNOSIS — Z78.9 NEVER SMOKED ANY SUBSTANCE: ICD-10-CM

## 2024-03-06 DIAGNOSIS — Z17.0 MALIGNANT NEOPLASM OF OVERLAPPING SITES OF RIGHT BREAST IN FEMALE, ESTROGEN RECEPTOR POSITIVE (MULTI): ICD-10-CM

## 2024-03-06 DIAGNOSIS — Z51.81 ENCOUNTER FOR MONITORING CARDIOTOXIC DRUG THERAPY: Primary | ICD-10-CM

## 2024-03-06 DIAGNOSIS — E78.00 ELEVATED LDL CHOLESTEROL LEVEL: ICD-10-CM

## 2024-03-06 DIAGNOSIS — Z82.3 FAMILY HISTORY OF STROKE: ICD-10-CM

## 2024-03-06 DIAGNOSIS — C50.811 MALIGNANT NEOPLASM OF OVERLAPPING SITES OF RIGHT BREAST IN FEMALE, ESTROGEN RECEPTOR POSITIVE (MULTI): ICD-10-CM

## 2024-03-06 PROCEDURE — 1036F TOBACCO NON-USER: CPT | Performed by: INTERNAL MEDICINE

## 2024-03-06 PROCEDURE — 93005 ELECTROCARDIOGRAM TRACING: CPT | Performed by: INTERNAL MEDICINE

## 2024-03-06 PROCEDURE — 99214 OFFICE O/P EST MOD 30 MIN: CPT | Performed by: INTERNAL MEDICINE

## 2024-03-06 PROCEDURE — 93010 ELECTROCARDIOGRAM REPORT: CPT | Performed by: INTERNAL MEDICINE

## 2024-03-06 PROCEDURE — 99204 OFFICE O/P NEW MOD 45 MIN: CPT | Performed by: INTERNAL MEDICINE

## 2024-03-06 RX ORDER — ATORVASTATIN CALCIUM 20 MG/1
20 TABLET, FILM COATED ORAL DAILY
Qty: 90 TABLET | Refills: 3 | Status: SHIPPED | OUTPATIENT
Start: 2024-03-06 | End: 2025-03-06

## 2024-03-06 ASSESSMENT — PAIN SCALES - GENERAL: PAINLEVEL: 0-NO PAIN

## 2024-03-06 NOTE — PROGRESS NOTES
CARDIOLOGY NEW PATIENT OFFICE VISIT    Patient:  Macy Chavez  YOB: 1985  MRN: [id number]       Chief Complaint/Active Symptoms:       Macy Chavez is a 38 y.o. female who is being seen today at the request of Dr. Golden for evaluation of breast cancer and cardiotoxic drug therapy.    No chief complaint on file.      History of Present Illness:   HPI    Patient here for CV evaluation for treatment for right breast cancer. Had her first chemotherapy session yesterday in [place]. Plan is for DDAC and paclitaxel (see treatment schedule) followed by right breast radiation. No problems with  yesterdays treatment.     No prior CV history. Parents both have elevated cholesterol and have had TIAs but no cardiac events. More remote family history of CHF on her father side but neither parent has had CHF or MI.     Patient has no chest pain or discomfort, no shortness of breath or exercise intolerance. No palpitations, syncope or near syncope. No history of stroke or TIA. No orthopnea or PND, no edema or claudication.     Cancer Diagnosis: Right breast cancer, stage IIIA, ER+, NV+, HER2 neg  OncologistMicah Golden  Treatment: DDAC 14 day cycles with paclitaxel weekly cycles.   Radiation: planned    Risk factors: Elevated LDL cholesterol, + Family history    Testing:   CT scan - no coronary calcifications noted  Echo - Normal LVEF 65%, GLS -18% pre treatment  EKG - NSR, NS T abn, low voltage QRS  Allergies:     Allergies   Allergen Reactions    Dog Dander Itching        Outpatient Medications:     Current Outpatient Medications   Medication Instructions    ergocalciferol (VITAMIN D-2) 50,000 Units, oral, Weekly    ondansetron (ZOFRAN) 8 mg, oral, Every 8 hours PRN    prochlorperazine (COMPAZINE) 10 mg, oral, Every 6 hours PRN    valACYclovir (VALTREX) 500 mg, oral, 2 times daily        Past Medical History:     Past Medical History:   Diagnosis Date    Breast cancer (CMS/HCC)     Cancer (CMS/HCC)     Other specified health  status 09/29/2016    No pertinent past medical history       Social History:     Social History     Socioeconomic History    Marital status:      Spouse name: None    Number of children: None    Years of education: None    Highest education level: None   Occupational History    None   Tobacco Use    Smoking status: Never    Smokeless tobacco: Never   Vaping Use    Vaping Use: Never used   Substance and Sexual Activity    Alcohol use: Yes     Alcohol/week: 1.0 standard drink of alcohol     Types: 1 Glasses of wine per week     Comment: A few drinks a month    Drug use: Never    Sexual activity: Yes     Partners: Male     Birth control/protection: I.U.D.   Other Topics Concern    None   Social History Narrative    None     Social Determinants of Health     Financial Resource Strain: Not on file   Food Insecurity: Not on file   Transportation Needs: Not on file   Physical Activity: Not on file   Stress: Not on file   Social Connections: Not on file   Intimate Partner Violence: Not on file   Housing Stability: Not on file       Family History:        Family History   Problem Relation Name Age of Onset    Stroke Father Jad     Other (cancer) Maternal Grandfather Sotero         liver or kidney    Cancer Paternal Grandmother Lizbeth         carcinoma of thigh    Stroke Paternal Grandmother Lizbeth     COPD Paternal Grandfather Mitchell        Review of Systems:     Review of Systems   All other systems reviewed and are negative.      Objective:     Vitals:    03/06/24 0856   BP: 116/76   Pulse: 80   Resp: 17   Temp: 36.6 °C (97.9 °F)   SpO2: 100%       Vitals:    03/06/24 0856   Weight: 71.6 kg (157 lb 13.6 oz)       Physical Examination:   GENERAL:  Well developed, well nourished, in no acute distress.  HEENT: NC AT EOMI with anicteric sclera  NECK:  Supple, no JVD, no bruit.  CHEST:  Symmetric and nontender. Right port - recently placed - no erythema or drainage.   LUNGS:  Normal respiratory effort. Bilateral breath sounds  clear to auscultation.   HEART:  PMI nondisplaced. Rate and rhythm regular with no evident murmur, no gallop appreciated. There are no rubs, clicks or heaves.  PERIPHERAL VASCULAR:  Pulses present and equally palpable; 2+ throughout.  ABDOMEN: Soft, NT, ND without HSM or palpable organomegaly, no bruits, normoactive bowel sounds  MUSCULOSKELETAL: No significant joint or limb deformity  EXTREMITIES:  Warm with good color, no clubbing or cyanosis.  There is no edema noted.  NEURO/PSYCH:  Alert and oriented times three with approppriate behavior and responses.  SKIN: No rashes on exposed skin, no reported skin lesions.     Lab:     CBC:   Lab Results   Component Value Date    WBC 9.2 03/05/2024    RBC 4.39 03/05/2024    HGB 13.6 03/05/2024    HCT 39.9 03/05/2024     03/05/2024      Lab Results   Component Value Date    WBC 9.2 03/05/2024    WBC 6.8 01/25/2024    RBC 4.39 03/05/2024    RBC 4.64 01/25/2024    HGB 13.6 03/05/2024    HGB 14.2 01/25/2024    HCT 39.9 03/05/2024    HCT 42.7 01/25/2024    MCV 91 03/05/2024    MCV 92 01/25/2024    MCH 31.0 03/05/2024    MCH 30.6 01/25/2024    MCHC 34.1 03/05/2024    MCHC 33.3 01/25/2024    RDW 12.6 03/05/2024    RDW 13.1 01/25/2024     03/05/2024     01/25/2024       CMP:    Lab Results   Component Value Date     03/05/2024    K 4.1 03/05/2024     03/05/2024    CO2 26 03/05/2024    BUN 11 03/05/2024    CREATININE 0.82 03/05/2024    GLUCOSE 89 03/05/2024    CALCIUM 9.8 03/05/2024     Lab Results   Component Value Date     03/05/2024     01/25/2024    K 4.1 03/05/2024    K 4.6 01/25/2024     03/05/2024     01/25/2024    CO2 26 03/05/2024    CO2 27 01/25/2024    BUN 11 03/05/2024    BUN 10 01/25/2024    CREATININE 0.82 03/05/2024    CREATININE 0.87 01/25/2024     Lab Results   Component Value Date    ALKPHOS 51 03/05/2024    ALT 10 03/05/2024    AST 12 03/05/2024    PROT 7.4 03/05/2024    BILITOT 0.8 03/05/2024  "      Magnesium:    No results found for: \"MG\"    Lipid Profile:    Lab Results   Component Value Date    TRIG 70 01/25/2024    HDL 62.6 01/25/2024    LDLCALC 174 (H) 01/25/2024       TSH:    Lab Results   Component Value Date    TSH 1.11 01/25/2024       BNP:   No results found for: \"BNP\"     PT/INR:    Lab Results   Component Value Date    PROTIME 11.7 02/26/2024    INR 1.0 02/26/2024       HgBA1c:    No results found for: \"HGBA1C\"    Cardiac Enzymes:    No results found for: \"TROPHS\"      Diagnostic Studies:   EKG - NSR, low voltage QRS, NS T abn  No echocardiogram results found for the past 12 months  Echo 2/2024 - EF 65%, GLS -18%  No nuclear medicine results found for the past 12 months    No valid procedures specified.    Radiology:     No valid procedures specified.  CT chest abdomen pelvis - No coronary calcifications mentioned.   Assessment:     Problem List Items Addressed This Visit       Malignant neoplasm of overlapping sites of right breast in female, estrogen receptor positive (CMS/HCC)    Encounter for monitoring cardiotoxic drug therapy - Primary    Relevant Orders    Onco-Echo Limited (Strain and 3D)    Follow Up In Cardiology    ECG 12 Lead (Completed)    Elevated LDL cholesterol level    Relevant Medications    atorvastatin (Lipitor) 20 mg tablet    Other Relevant Orders    Lipid panel    ECG 12 Lead (Completed)    Family history of stroke    Relevant Orders    Lipid panel    Never smoked any substance       Plan:   Right breast cancer - ER+/OH+ Started chemotherapy yesterday. Did well. Only risk factor elevated cholesterol. Will check echo in 3 months. If normal will consider repeat at 6 months then end of therapy. Reviewed  signs/symptoms of heart failure to patient and when to contact us.   Elevated LDL cholesterol. Significant elevation. Without coronary calcifications or other risk factors at this time will go for 30% reduction of cholesterol. Given she will be having CT scans done to " follow her breast cancer will NOT plan on CT cardiac score at this time but may consider after age 48. If/when CT cardiac score > 100 would then pursue much more aggressive treatment. Patient already following a Mediterranean diet and is at her ideal body weight.   Family history of stroke - see above  Tobacco and weight status - patient is a lifelong nonsmoker and at her ideal body weight. Continue same.     Will see her after 3 month echo. Sooner if any issues.

## 2024-03-06 NOTE — PATIENT INSTRUCTIONS
Start Atorvastatin 20 mg   Have fasting lipid panel prior to appointment with Dr. Larson in May   Echo and follow up end of May with Dr. Larson at [facility]

## 2024-03-06 NOTE — PROGRESS NOTES
Social Work Note  3/6/2024 CLARITA met with patient,  and her mom yesterday in infusion.  SW explained areas this writer is able to assist with.   mentioned that a test was not approved by his insurance. SW let them know that pre-cert will complete this process.  SW also talked with MD's RN regarding this.  No other questions or concerns voiced.  SW encouraged them to reach out.  Information on [facility] and this writer's contact information were provided.   Patient and  live in [place].  She stated she already turned in FMLA paperwork to med onc.  Patient works full time.  She is being treated for breast cancer.  Med onc RN worked on getting patient a cold cap.  SW will remain available to assist patient.  Cara Finnegan, MSW, LSW

## 2024-03-19 ENCOUNTER — APPOINTMENT (OUTPATIENT)
Dept: HEMATOLOGY/ONCOLOGY | Facility: CLINIC | Age: 39
End: 2024-03-19
Payer: COMMERCIAL

## 2024-03-19 ENCOUNTER — INFUSION (OUTPATIENT)
Dept: HEMATOLOGY/ONCOLOGY | Facility: CLINIC | Age: 39
End: 2024-03-19
Payer: COMMERCIAL

## 2024-03-19 DIAGNOSIS — C50.811 MALIGNANT NEOPLASM OF OVERLAPPING SITES OF RIGHT BREAST IN FEMALE, ESTROGEN RECEPTOR POSITIVE (MULTI): ICD-10-CM

## 2024-03-19 DIAGNOSIS — Z17.0 MALIGNANT NEOPLASM OF OVERLAPPING SITES OF RIGHT BREAST IN FEMALE, ESTROGEN RECEPTOR POSITIVE (MULTI): ICD-10-CM

## 2024-03-19 LAB
ALBUMIN SERPL BCP-MCNC: 4.6 G/DL (ref 3.4–5)
ALP SERPL-CCNC: 67 U/L (ref 33–110)
ALT SERPL W P-5'-P-CCNC: 16 U/L (ref 7–45)
ANION GAP SERPL CALC-SCNC: 13 MMOL/L (ref 10–20)
AST SERPL W P-5'-P-CCNC: 13 U/L (ref 9–39)
BASOPHILS # BLD AUTO: 0.03 X10*3/UL (ref 0–0.1)
BASOPHILS NFR BLD AUTO: 0.3 %
BILIRUB SERPL-MCNC: 0.3 MG/DL (ref 0–1.2)
BUN SERPL-MCNC: 10 MG/DL (ref 6–23)
CALCIUM SERPL-MCNC: 9.6 MG/DL (ref 8.6–10.3)
CHLORIDE SERPL-SCNC: 101 MMOL/L (ref 98–107)
CO2 SERPL-SCNC: 27 MMOL/L (ref 21–32)
CREAT SERPL-MCNC: 0.81 MG/DL (ref 0.5–1.05)
EGFRCR SERPLBLD CKD-EPI 2021: >90 ML/MIN/1.73M*2
EOSINOPHIL # BLD AUTO: 0.06 X10*3/UL (ref 0–0.7)
EOSINOPHIL NFR BLD AUTO: 0.5 %
ERYTHROCYTE [DISTWIDTH] IN BLOOD BY AUTOMATED COUNT: 12.7 % (ref 11.5–14.5)
GLUCOSE SERPL-MCNC: 100 MG/DL (ref 74–99)
HCT VFR BLD AUTO: 39.3 % (ref 36–46)
HGB BLD-MCNC: 13.1 G/DL (ref 12–16)
IMM GRANULOCYTES # BLD AUTO: 0.32 X10*3/UL (ref 0–0.7)
IMM GRANULOCYTES NFR BLD AUTO: 2.8 % (ref 0–0.9)
LYMPHOCYTES # BLD AUTO: 1.58 X10*3/UL (ref 1.2–4.8)
LYMPHOCYTES NFR BLD AUTO: 13.6 %
MCH RBC QN AUTO: 30.5 PG (ref 26–34)
MCHC RBC AUTO-ENTMCNC: 33.3 G/DL (ref 32–36)
MCV RBC AUTO: 92 FL (ref 80–100)
MONOCYTES # BLD AUTO: 0.42 X10*3/UL (ref 0.1–1)
MONOCYTES NFR BLD AUTO: 3.6 %
NEUTROPHILS # BLD AUTO: 9.18 X10*3/UL (ref 1.2–7.7)
NEUTROPHILS NFR BLD AUTO: 79.2 %
NRBC BLD-RTO: ABNORMAL /100{WBCS}
PLATELET # BLD AUTO: 222 X10*3/UL (ref 150–450)
POTASSIUM SERPL-SCNC: 4.1 MMOL/L (ref 3.5–5.3)
PROT SERPL-MCNC: 6.7 G/DL (ref 6.4–8.2)
RBC # BLD AUTO: 4.29 X10*6/UL (ref 4–5.2)
SODIUM SERPL-SCNC: 137 MMOL/L (ref 136–145)
WBC # BLD AUTO: 11.6 X10*3/UL (ref 4.4–11.3)

## 2024-03-19 PROCEDURE — 2500000004 HC RX 250 GENERAL PHARMACY W/ HCPCS (ALT 636 FOR OP/ED): Performed by: STUDENT IN AN ORGANIZED HEALTH CARE EDUCATION/TRAINING PROGRAM

## 2024-03-19 PROCEDURE — 96523 IRRIG DRUG DELIVERY DEVICE: CPT

## 2024-03-19 PROCEDURE — 84075 ASSAY ALKALINE PHOSPHATASE: CPT

## 2024-03-19 PROCEDURE — 85025 COMPLETE CBC W/AUTO DIFF WBC: CPT

## 2024-03-19 RX ORDER — HEPARIN SODIUM,PORCINE/PF 10 UNIT/ML
50 SYRINGE (ML) INTRAVENOUS AS NEEDED
Status: DISCONTINUED | OUTPATIENT
Start: 2024-03-19 | End: 2024-03-19 | Stop reason: HOSPADM

## 2024-03-19 RX ORDER — HEPARIN 100 UNIT/ML
500 SYRINGE INTRAVENOUS AS NEEDED
Status: CANCELLED | OUTPATIENT
Start: 2024-03-19

## 2024-03-19 RX ORDER — HEPARIN 100 UNIT/ML
500 SYRINGE INTRAVENOUS AS NEEDED
Status: DISCONTINUED | OUTPATIENT
Start: 2024-03-19 | End: 2024-03-19 | Stop reason: HOSPADM

## 2024-03-19 RX ORDER — HEPARIN SODIUM,PORCINE/PF 10 UNIT/ML
50 SYRINGE (ML) INTRAVENOUS AS NEEDED
Status: CANCELLED | OUTPATIENT
Start: 2024-03-19

## 2024-03-19 RX ADMIN — HEPARIN 500 UNITS: 100 SYRINGE at 13:05

## 2024-03-19 NOTE — PROGRESS NOTES
Patient came in for port flush and labs. Tolerated without any problems. Patient knows to return for treatment tomorrow.

## 2024-03-20 ENCOUNTER — OFFICE VISIT (OUTPATIENT)
Dept: HEMATOLOGY/ONCOLOGY | Facility: CLINIC | Age: 39
End: 2024-03-20
Payer: COMMERCIAL

## 2024-03-20 ENCOUNTER — TELEPHONE (OUTPATIENT)
Dept: HEMATOLOGY/ONCOLOGY | Facility: HOSPITAL | Age: 39
End: 2024-03-20

## 2024-03-20 ENCOUNTER — INFUSION (OUTPATIENT)
Dept: HEMATOLOGY/ONCOLOGY | Facility: CLINIC | Age: 39
End: 2024-03-20
Payer: COMMERCIAL

## 2024-03-20 VITALS
TEMPERATURE: 96.6 F | BODY MASS INDEX: 22.82 KG/M2 | DIASTOLIC BLOOD PRESSURE: 74 MMHG | OXYGEN SATURATION: 98 % | WEIGHT: 154.1 LBS | HEART RATE: 105 BPM | RESPIRATION RATE: 18 BRPM | SYSTOLIC BLOOD PRESSURE: 107 MMHG

## 2024-03-20 VITALS — HEIGHT: 69 IN | BODY MASS INDEX: 22.82 KG/M2

## 2024-03-20 DIAGNOSIS — Z17.0 MALIGNANT NEOPLASM OF OVERLAPPING SITES OF RIGHT BREAST IN FEMALE, ESTROGEN RECEPTOR POSITIVE (MULTI): ICD-10-CM

## 2024-03-20 DIAGNOSIS — Z17.0 MALIGNANT NEOPLASM OF OVERLAPPING SITES OF RIGHT BREAST IN FEMALE, ESTROGEN RECEPTOR POSITIVE (MULTI): Primary | ICD-10-CM

## 2024-03-20 DIAGNOSIS — C50.811 MALIGNANT NEOPLASM OF OVERLAPPING SITES OF RIGHT BREAST IN FEMALE, ESTROGEN RECEPTOR POSITIVE (MULTI): ICD-10-CM

## 2024-03-20 DIAGNOSIS — C50.811 MALIGNANT NEOPLASM OF OVERLAPPING SITES OF RIGHT BREAST IN FEMALE, ESTROGEN RECEPTOR POSITIVE (MULTI): Primary | ICD-10-CM

## 2024-03-20 PROCEDURE — 96375 TX/PRO/DX INJ NEW DRUG ADDON: CPT | Mod: INF

## 2024-03-20 PROCEDURE — 96413 CHEMO IV INFUSION 1 HR: CPT

## 2024-03-20 PROCEDURE — 96411 CHEMO IV PUSH ADDL DRUG: CPT

## 2024-03-20 PROCEDURE — 99214 OFFICE O/P EST MOD 30 MIN: CPT | Performed by: STUDENT IN AN ORGANIZED HEALTH CARE EDUCATION/TRAINING PROGRAM

## 2024-03-20 PROCEDURE — 99214 OFFICE O/P EST MOD 30 MIN: CPT | Mod: 25 | Performed by: STUDENT IN AN ORGANIZED HEALTH CARE EDUCATION/TRAINING PROGRAM

## 2024-03-20 PROCEDURE — 2500000004 HC RX 250 GENERAL PHARMACY W/ HCPCS (ALT 636 FOR OP/ED): Performed by: STUDENT IN AN ORGANIZED HEALTH CARE EDUCATION/TRAINING PROGRAM

## 2024-03-20 PROCEDURE — 96372 THER/PROPH/DIAG INJ SC/IM: CPT | Performed by: STUDENT IN AN ORGANIZED HEALTH CARE EDUCATION/TRAINING PROGRAM

## 2024-03-20 PROCEDURE — 96367 TX/PROPH/DG ADDL SEQ IV INF: CPT

## 2024-03-20 PROCEDURE — 96377 APPLICATON ON-BODY INJECTOR: CPT

## 2024-03-20 PROCEDURE — 1036F TOBACCO NON-USER: CPT | Performed by: STUDENT IN AN ORGANIZED HEALTH CARE EDUCATION/TRAINING PROGRAM

## 2024-03-20 RX ORDER — FAMOTIDINE 10 MG/ML
20 INJECTION INTRAVENOUS ONCE AS NEEDED
Status: DISCONTINUED | OUTPATIENT
Start: 2024-03-20 | End: 2024-03-20 | Stop reason: HOSPADM

## 2024-03-20 RX ORDER — ONDANSETRON HYDROCHLORIDE 8 MG/1
8 TABLET, FILM COATED ORAL EVERY 8 HOURS PRN
Qty: 20 TABLET | Refills: 3 | Status: SHIPPED | OUTPATIENT
Start: 2024-03-20 | End: 2024-03-21 | Stop reason: SDUPTHER

## 2024-03-20 RX ORDER — FAMOTIDINE 10 MG/ML
20 INJECTION INTRAVENOUS ONCE AS NEEDED
Status: CANCELLED | OUTPATIENT
Start: 2024-04-02

## 2024-03-20 RX ORDER — ALBUTEROL SULFATE 0.83 MG/ML
3 SOLUTION RESPIRATORY (INHALATION) AS NEEDED
Status: CANCELLED | OUTPATIENT
Start: 2024-04-02

## 2024-03-20 RX ORDER — PROCHLORPERAZINE MALEATE 10 MG
10 TABLET ORAL EVERY 6 HOURS PRN
Status: DISCONTINUED | OUTPATIENT
Start: 2024-03-20 | End: 2024-03-20 | Stop reason: HOSPADM

## 2024-03-20 RX ORDER — DIPHENHYDRAMINE HYDROCHLORIDE 50 MG/ML
50 INJECTION INTRAMUSCULAR; INTRAVENOUS AS NEEDED
Status: CANCELLED | OUTPATIENT
Start: 2024-04-02

## 2024-03-20 RX ORDER — HEPARIN 100 UNIT/ML
500 SYRINGE INTRAVENOUS AS NEEDED
Status: DISCONTINUED | OUTPATIENT
Start: 2024-03-20 | End: 2024-03-20 | Stop reason: HOSPADM

## 2024-03-20 RX ORDER — EPINEPHRINE 0.3 MG/.3ML
0.3 INJECTION SUBCUTANEOUS EVERY 5 MIN PRN
Status: CANCELLED | OUTPATIENT
Start: 2024-04-02

## 2024-03-20 RX ORDER — ALBUTEROL SULFATE 0.83 MG/ML
3 SOLUTION RESPIRATORY (INHALATION) AS NEEDED
Status: DISCONTINUED | OUTPATIENT
Start: 2024-03-20 | End: 2024-03-20 | Stop reason: HOSPADM

## 2024-03-20 RX ORDER — PALONOSETRON 0.05 MG/ML
0.25 INJECTION, SOLUTION INTRAVENOUS ONCE
Status: COMPLETED | OUTPATIENT
Start: 2024-03-20 | End: 2024-03-20

## 2024-03-20 RX ORDER — DOXORUBICIN HYDROCHLORIDE 2 MG/ML
60 INJECTION, SOLUTION INTRAVENOUS ONCE
Status: COMPLETED | OUTPATIENT
Start: 2024-03-20 | End: 2024-03-20

## 2024-03-20 RX ORDER — DOXORUBICIN HYDROCHLORIDE 2 MG/ML
60 INJECTION, SOLUTION INTRAVENOUS ONCE
Status: CANCELLED | OUTPATIENT
Start: 2024-04-02

## 2024-03-20 RX ORDER — PALONOSETRON 0.05 MG/ML
0.25 INJECTION, SOLUTION INTRAVENOUS ONCE
Status: CANCELLED | OUTPATIENT
Start: 2024-04-02

## 2024-03-20 RX ORDER — HEPARIN SODIUM,PORCINE/PF 10 UNIT/ML
50 SYRINGE (ML) INTRAVENOUS AS NEEDED
Status: CANCELLED | OUTPATIENT
Start: 2024-03-20

## 2024-03-20 RX ORDER — PROCHLORPERAZINE MALEATE 5 MG
10 TABLET ORAL EVERY 6 HOURS PRN
Status: CANCELLED | OUTPATIENT
Start: 2024-04-02

## 2024-03-20 RX ORDER — EPINEPHRINE 0.3 MG/.3ML
0.3 INJECTION SUBCUTANEOUS EVERY 5 MIN PRN
Status: DISCONTINUED | OUTPATIENT
Start: 2024-03-20 | End: 2024-03-20 | Stop reason: HOSPADM

## 2024-03-20 RX ORDER — DIPHENHYDRAMINE HYDROCHLORIDE 50 MG/ML
50 INJECTION INTRAMUSCULAR; INTRAVENOUS AS NEEDED
Status: DISCONTINUED | OUTPATIENT
Start: 2024-03-20 | End: 2024-03-20 | Stop reason: HOSPADM

## 2024-03-20 RX ORDER — HEPARIN SODIUM,PORCINE/PF 10 UNIT/ML
50 SYRINGE (ML) INTRAVENOUS AS NEEDED
Status: DISCONTINUED | OUTPATIENT
Start: 2024-03-20 | End: 2024-03-20 | Stop reason: HOSPADM

## 2024-03-20 RX ORDER — HEPARIN 100 UNIT/ML
500 SYRINGE INTRAVENOUS AS NEEDED
Status: CANCELLED | OUTPATIENT
Start: 2024-03-20

## 2024-03-20 RX ORDER — PROCHLORPERAZINE EDISYLATE 5 MG/ML
10 INJECTION INTRAMUSCULAR; INTRAVENOUS EVERY 6 HOURS PRN
Status: DISCONTINUED | OUTPATIENT
Start: 2024-03-20 | End: 2024-03-20 | Stop reason: HOSPADM

## 2024-03-20 RX ORDER — PROCHLORPERAZINE EDISYLATE 5 MG/ML
10 INJECTION INTRAMUSCULAR; INTRAVENOUS EVERY 6 HOURS PRN
Status: CANCELLED | OUTPATIENT
Start: 2024-04-02

## 2024-03-20 RX ADMIN — HEPARIN 500 UNITS: 100 SYRINGE at 14:49

## 2024-03-20 RX ADMIN — CYCLOPHOSPHAMIDE 1122 MG: 1 INJECTION, POWDER, FOR SOLUTION INTRAVENOUS; ORAL at 14:06

## 2024-03-20 RX ADMIN — FOSAPREPITANT DIMEGLUMINE 150 MG: 150 INJECTION, POWDER, LYOPHILIZED, FOR SOLUTION INTRAVENOUS at 12:52

## 2024-03-20 RX ADMIN — DEXAMETHASONE SODIUM PHOSPHATE 12 MG: 4 INJECTION, SOLUTION INTRAMUSCULAR; INTRAVENOUS at 12:20

## 2024-03-20 RX ADMIN — DOXORUBICIN HYDROCHLORIDE 112 MG: 2 INJECTION, SOLUTION INTRAVENOUS at 13:34

## 2024-03-20 RX ADMIN — PALONOSETRON 250 MCG: 0.05 INJECTION, SOLUTION INTRAVENOUS at 12:19

## 2024-03-20 RX ADMIN — PEGFILGRASTIM 6 MG: KIT SUBCUTANEOUS at 14:08

## 2024-03-20 RX ADMIN — SODIUM CHLORIDE 500 ML: 9 INJECTION, SOLUTION INTRAVENOUS at 12:20

## 2024-03-20 ASSESSMENT — PAIN SCALES - GENERAL: PAINLEVEL: 0-NO PAIN

## 2024-03-20 NOTE — PROGRESS NOTES
Patient ID: Macy Chavez is a 38 y.o. female.    The patient presents to clinic today for her history of breast cancer.     Cancer Staging   Malignant neoplasm of overlapping sites of right breast in female, estrogen receptor positive (CMS/HCC)  Staging form: Breast, AJCC 8th Edition  - Clinical stage from 2/6/2024: Stage IIIA (cT3, cN2, cM0, G3, ER+, WV+, HER2-) - Signed by Denise Golden MD on 2/21/2024      Diagnostic/Therapeutic History:    11/03/2023: Bl screening Mammogram: negative       12/20/2023: Right breast mass for which US done, Nonspecific dense fibroglandular tissues in the right breast in the area of reported lump. Dense fibroglandular tissue also evident throughout both breasts on screening mammogram. No discrete lesionidentified.    02/06/2024: Patient describes an increase in the size of a palpable lump in theright lower outer breast which was previously assessed in December of2023. Recent negative screening mammogram from 11/09/2023. She alsodescribes a new palpable lump in the right axilla. After being seen by her provider, a nother palpable lump is identified in the upper outer right breast.    Ultrasound of the right breast showed In the patient's area of palpable concern in the right breast at 7 o'clock, 5 cm from the nipple, there is a large vague heterogeneousmass with internal vascularity which has the appearance of dense fibroglandular tissue measuring up to 5 cm; it is visible protruding from the skin with discoloration. In the patient's area of palpable concern felt by her provider in the right breast at 10 o'clock, 8 cm from the nipple, there is an oval parallel heterogeneous mass withinternal vascularity measuring approximately 1.1 x 0.9 x 1.4 cm. 3 enlarged axillary LN were seen    02/06/2024: US guided core needle biopsy    A. BREAST, RIGHT, 7:00, 5 CM FN, CORE BIOPSY:   -- Invasive ductal carcinoma, grade 2, ER: 95%, WV: 5-10%, HER2 negative (1+)        B. BREAST, RIGHT, 10:00, 8  CM FN, CORE BIOPSY:   -- Invasive ductal carcinoma, grade 2-3,  ER: 95%, LA<5%, HER2 negative (1+)         C. AXILLARY LYMPH NODE, RIGHT, BIOPSY:     -- Metastatic carcinoma, see note.      02/14/2024: Biopsy-proven multicentric right breast malignancy with the total  span of up to 13.0 cm associated with bulky metastatic axillary and  abnormal internal mammary lymphadenopathy. No evidence of chest wall,  skin or nipple-areolar complex involvement.    No MRI evidence of malignancy in the left breast.    02/15/2024: staging scans: negative- short term follow up       History of Present Illness (HPI)/Interval History:    24 hours after treatment- felt fatigued for 2 days. that improved   Nausea relieved by nausea meds  Constipation relieved by colace  She denies any fevers or chills.  She denies any chest pain or breathing issues.  Does have headaches- eating and drinking relatively okay   She denies any new or unexplained bone aches or pains.  She denies any skin lesions or masses, hair loss, nail changes, or oral sores.  She denies any issues with sleep     PMH: none     PSH: no surgery    Allergies/meds: negative     Reproductive hx: Menarche: 12, AFLB: 27, Menopause: no, HRT: no     Family history: Maternal Grandfather: Kidney versus Liver cancer,Paternal Grandmother: Soft cell sarcoma-        Review of Systems:  14-point ROS otherwise negative, as per HPI.    Past Medical History:   Diagnosis Date    Breast cancer (CMS/HCC)     Cancer (CMS/HCC)     Other specified health status 09/29/2016    No pertinent past medical history       Past Surgical History:   Procedure Laterality Date    OTHER SURGICAL HISTORY  09/29/2016    Oral Surgery Tooth Extraction Andreas Tooth    WISDOM TOOTH EXTRACTION  6/1/2000       Social History     Socioeconomic History    Marital status:      Spouse name: None    Number of children: None    Years of education: None    Highest education level: None   Occupational History    None    Tobacco Use    Smoking status: Never    Smokeless tobacco: Never   Vaping Use    Vaping Use: Never used   Substance and Sexual Activity    Alcohol use: Yes     Alcohol/week: 1.0 standard drink of alcohol     Types: 1 Glasses of wine per week     Comment: A few drinks a month    Drug use: Never    Sexual activity: Yes     Partners: Male     Birth control/protection: I.U.D.   Other Topics Concern    None   Social History Narrative    None     Social Determinants of Health     Financial Resource Strain: Not on file   Food Insecurity: Not on file   Transportation Needs: Not on file   Physical Activity: Not on file   Stress: Not on file   Social Connections: Not on file   Intimate Partner Violence: Not on file   Housing Stability: Not on file       Allergies   Allergen Reactions    Dog Dander Itching         Current Outpatient Medications:     atorvastatin (Lipitor) 20 mg tablet, Take 1 tablet (20 mg) by mouth once daily., Disp: 90 tablet, Rfl: 3    ergocalciferol (Vitamin D-2) 1.25 MG (30076 UT) capsule, Take 1 capsule (50,000 Units) by mouth 1 (one) time per week., Disp: 12 capsule, Rfl: 0    ondansetron (Zofran) 8 mg tablet, Take 1 tablet (8 mg) by mouth every 8 hours if needed for nausea or vomiting for up to 27 days., Disp: 20 tablet, Rfl: 3    prochlorperazine (Compazine) 10 mg tablet, Take 1 tablet (10 mg) by mouth every 6 hours if needed for nausea or vomiting., Disp: 30 tablet, Rfl: 2    valACYclovir (Valtrex) 500 mg tablet, Take 1 tablet (500 mg) by mouth 2 times a day., Disp: , Rfl:   No current facility-administered medications for this visit.     Objective    BSA: 1.84 meters squared  /74 (BP Location: Left arm, Patient Position: Sitting)   Pulse 105   Temp 35.9 °C (96.6 °F) (Temporal)   Resp 18   Wt 69.9 kg (154 lb 1.6 oz)   SpO2 98%   BMI 22.82 kg/m²     ECOG Performance Status: 0    Physical Exam    GA: alert, oriented, cooperative  HEENT: anicteric sclera, well injected conjunctiva  Heart:  RRR, no murmurs or gallops heard  Lung: CTAB  Abd: +BS, soft, non tender  Breast: large breast mass measuring at inferior breast measuring 8cm. Additional mass at 10 oclock- Right axillary mass palpated    Laboratory Data:  Lab Results   Component Value Date    WBC 11.6 (H) 03/19/2024    HGB 13.1 03/19/2024    HCT 39.3 03/19/2024    MCV 92 03/19/2024     03/19/2024       Chemistry    Lab Results   Component Value Date/Time     03/19/2024 1258    K 4.1 03/19/2024 1258     03/19/2024 1258    CO2 27 03/19/2024 1258    BUN 10 03/19/2024 1258    CREATININE 0.81 03/19/2024 1258    Lab Results   Component Value Date/Time    CALCIUM 9.6 03/19/2024 1258    ALKPHOS 67 03/19/2024 1258    AST 13 03/19/2024 1258    ALT 16 03/19/2024 1258    BILITOT 0.3 03/19/2024 1258             Radiology:  ECG 12 Lead  EKG today - NSR, low voltage QRS, NS T abn       MR breast bilateral w contrast full protocol 02/14/2024    Narrative  Interpreted By:  Riki Flor,  STUDY:  BI MR BREAST BILATERAL WITH CONTRAST FULL PROTOCOL;  2/14/2024 1:33 pm    ACCESSION NUMBER(S):  [id number]    ORDERING CLINICIAN:  VITOR HARO    INDICATION:  38-year-old woman status post ultrasound-guided biopsy of right  breast masses at 7 o'clock and 10 o'clock and a right axillary lymph  demonstrating grade 2 invasive ductal carcinoma at both sites with  axillary simon metastasis.    COMPARISON:  Screening mammogram 11/09/2023, right breast ultrasound 12/20/2023,  02/06/2024; ultrasound-guided biopsy 02/06/2024.    TECHNIQUE:  Using a dedicated breast coil, STIR axial and T1-weighted fat  saturation axial images of the breasts were obtained, the latter both  before and after intravenous administration of Gadolinium DTPA. On an  independent workstation, 3-D images were formulated using [employer]  including time enhancement curves, subtraction images and MIP images.    Intravenous contrast:  14 mL of Dotarem    FINDINGS:  There is asymmetric  moderate bilateral background enhancement.    Extreme fibroglandular tissue.    RIGHT BREAST:  There are multiple contiguous masses and nonmass  enhancements occupying the entire lateral and central inferior right  breast including at the two sites of biopsy-proven malignancy at 7  o'clock and 10 o'clock, with a total span of 8.5 x 4.5 x 13.0 cm (AP  x TR x CC), better appreciated on the MIP and sagittal reconstruction  images. There is no involvement of the skin, nipple-areolar complex,  or pectoralis major muscle. The most anterior extent of malignancy is  located approximately 2 cm from the base of the nipple.    Within the span of malignancy, more confluent findings are as follows:    - Dominant irregular heterogeneously enhancing mass in the inferior  lateral and central inferior breast at anterior and middle depths  measuring 5.0 x 4.7 x 4.5 cm, corresponding to the site of biopsy at  7 o'clock (series 7, image 127/176).    - Irregular heterogeneously enhancing mass in superior lateral breast  at posterior depth measuring 2.7 x 2.5 x 2.5 cm, corresponding to the  site of biopsy at 10 o'clock (series 7, image 48/176).    - More confluent nonmass enhancement in superior lateral breast at  middle to posterior depth measuring 3.8 x 2.0 x 2.5 cm (series 7,  image 73/176).    - A small satellite mass in superior lateral breast at middle depth  measuring 1.0 x 1.0 x 0.8 cm located 0.8 cm superolateral to the  dominant mass (series 7, image 108/176).    Evaluation of the axilla is limited due to incomplete coverage in the  field of view. A conglomerate of at least 3 enlarged level I right  lymph nodes associated with a biopsy clip is partially visualized  measuring at least 4.2 x 2.5 x 3.5 cm (series 6, image 1/176).    An abnormal internal mammary lymph node is seen in the 2nd  intercostal space measuring 1.5 x 1.2 x 1.6 cm    LEFT BREAST: No suspicious mass or nonmass enhancement is identified.  There are numerous  scattered small benign cysts.    No axillary or internal mammary lymphadenopathy is appreciated within  limitations of limited field of view.    NON-BREAST FINDINGS:  None.    Impression  1. Biopsy-proven multicentric right breast malignancy with the total  span of up to 13.0 cm associated with bulky metastatic axillary and  abnormal internal mammary lymphadenopathy. No evidence of chest wall,  skin or nipple-areolar complex involvement.  2. No MRI evidence of malignancy in the left breast.    BI-RADS CATEGORY:  BI-RADS CATEGORY:  6 Known Biopsy-Proven Malignancy.  Recommendation:  Immediate Follow-up.  Recommended Date:  Immediate.  Laterality:  Right.    For any future breast imaging appointments, please call [phone]).      MACRO:  None    Signed by: Riki Flor 2/15/2024 10:43 AM  Dictation workstation:   [id number]          Assessment/Plan:    Macy is a 38 year old female in excellent health with a recent diagnosis of right sided breast cancer. Macy felt a mass in her right breast in December that prompted diagnostic evaluation.     Ultrasound of the right breast showed In the patient's area of palpable concern in the right breast at 7 o'clock, 5 cm from the nipple, there is a large vague heterogeneousmass with internal vascularity which has the appearance of dense fibroglandular tissue measuring up to 5 cm;  In the patient's area of palpable concern felt by her provider in the right breast at 10 o'clock, 8 cm from the nipple, there is an oval parallel heterogeneous mass withinternal vascularity measuring approximately 1.1 x 0.9 x 1.4 cm. 3 enlarged axillary LN were seen    She had US guided core needle biopsy of  right breast at 7:00, 5 CM FN,  that showed invasive ductal carcinoma, grade 2, ER: 95%, MI: 5-10%, HER2 negative (1+).  And US guided core needle biopsy of the right breast at  10:00, 8 CM FN, that showed  Invasive ductal carcinoma, grade 2-3,  ER: 95%, MI<5%, HER2 negative (1+).  Axillary LN consistent with metastatic carcinoma     On MRI she a  multicentric right breast malignancy with the total span of up to 13.0 cm associated with bulky metastatic axillary and abnormal internal mammary lymphadenopathy. No evidence of chest wall, skin or nipple-areolar complex involvement.     Current stage [id number] (staging scans negative for distant mets)  ECOG PS: 0    I reviewed with her the events that led to her diagnosis of breast cancer. We reviewed all the procedures and diagnostic imaging she underwent thus far. I discussed the features of her breast cancer that include the size, grade, lymph node status and  hormone receptor/ her2-jair status.      We discussed neoadjuvant systemic therapy. The goal of treatment would be to downstage her cancer, assess tumor chemosensitivity and treat potential micrometastatic disease. I would treat, given extensive of disease with anthracyclines based regimen ddAC Q2 weeks x4 followed by taxol weekly x12     - Port placed, echo looks good- echo in 3 months  - Elevated LDL- non pharmacological measures for now- follows with cardiology  - okay to proceed with cycle 2   - RTC prior to cycle 3 on 04/02    At least 35 minutes of direct consultation was spent reviewing the patient's chart as well as discussing and  reviewing the  cancer care plan including educating and answering  questions and concerns, greater than 50 percent spent in counseling and coordination  of care.            Denise Golden MD  Hematology and Medical Oncology  [facility]

## 2024-03-20 NOTE — SIGNIFICANT EVENT
03/20/24 1157   Prechemo Checklist   Has the patient been in the hospital, ED, or urgent care since last date of service No   Chemo/Immuno Consent Signed Yes   Protocol/Indications Verified Yes   Confirmed to previous date/time of medication Yes   Compared to previous dose Yes   All medications are dated accurately Yes   Pregnancy Test Negative Yes   Parameters Met Yes   BSA/Weight-Height Verified Yes   Dose Calculations Verified Yes

## 2024-03-20 NOTE — TELEPHONE ENCOUNTER
Patient calls to state that she was notified by her pharmacy that the Zofran prescription needs a prior authorization allowing her to get more than 8 pills per month.  Message sent to  Specialty Pharmacy for prior auth and to team.

## 2024-03-21 DIAGNOSIS — C50.811 MALIGNANT NEOPLASM OF OVERLAPPING SITES OF RIGHT BREAST IN FEMALE, ESTROGEN RECEPTOR POSITIVE (MULTI): ICD-10-CM

## 2024-03-21 DIAGNOSIS — Z17.0 MALIGNANT NEOPLASM OF OVERLAPPING SITES OF RIGHT BREAST IN FEMALE, ESTROGEN RECEPTOR POSITIVE (MULTI): ICD-10-CM

## 2024-03-21 RX ORDER — ONDANSETRON HYDROCHLORIDE 8 MG/1
8 TABLET, FILM COATED ORAL EVERY 8 HOURS PRN
Qty: 30 TABLET | Refills: 0 | Status: SHIPPED | OUTPATIENT
Start: 2024-03-21 | End: 2024-04-16 | Stop reason: SDUPTHER

## 2024-03-27 DIAGNOSIS — C50.811 MALIGNANT NEOPLASM OF OVERLAPPING SITES OF RIGHT BREAST IN FEMALE, ESTROGEN RECEPTOR POSITIVE (MULTI): Primary | ICD-10-CM

## 2024-03-27 DIAGNOSIS — C50.811 MALIGNANT NEOPLASM OF OVERLAPPING SITES OF RIGHT BREAST IN FEMALE, ESTROGEN RECEPTOR POSITIVE (MULTI): ICD-10-CM

## 2024-03-27 DIAGNOSIS — Z17.0 MALIGNANT NEOPLASM OF OVERLAPPING SITES OF RIGHT BREAST IN FEMALE, ESTROGEN RECEPTOR POSITIVE (MULTI): ICD-10-CM

## 2024-03-27 DIAGNOSIS — Z17.0 MALIGNANT NEOPLASM OF OVERLAPPING SITES OF RIGHT BREAST IN FEMALE, ESTROGEN RECEPTOR POSITIVE (MULTI): Primary | ICD-10-CM

## 2024-03-27 RX ORDER — PROCHLORPERAZINE MALEATE 10 MG
10 TABLET ORAL EVERY 6 HOURS PRN
Qty: 30 TABLET | Refills: 3 | Status: SHIPPED | OUTPATIENT
Start: 2024-03-27 | End: 2024-05-24 | Stop reason: WASHOUT

## 2024-03-27 RX ORDER — PROCHLORPERAZINE MALEATE 10 MG
10 TABLET ORAL EVERY 6 HOURS PRN
Qty: 30 TABLET | Refills: 2 | Status: SHIPPED | OUTPATIENT
Start: 2024-03-27

## 2024-04-01 ENCOUNTER — INFUSION (OUTPATIENT)
Dept: HEMATOLOGY/ONCOLOGY | Facility: CLINIC | Age: 39
End: 2024-04-01
Payer: COMMERCIAL

## 2024-04-01 DIAGNOSIS — C50.811 MALIGNANT NEOPLASM OF OVERLAPPING SITES OF RIGHT BREAST IN FEMALE, ESTROGEN RECEPTOR POSITIVE (MULTI): ICD-10-CM

## 2024-04-01 DIAGNOSIS — Z17.0 MALIGNANT NEOPLASM OF OVERLAPPING SITES OF RIGHT BREAST IN FEMALE, ESTROGEN RECEPTOR POSITIVE (MULTI): ICD-10-CM

## 2024-04-01 LAB
ALBUMIN SERPL BCP-MCNC: 4.4 G/DL (ref 3.4–5)
ALP SERPL-CCNC: 70 U/L (ref 33–110)
ALT SERPL W P-5'-P-CCNC: 12 U/L (ref 7–45)
ANION GAP SERPL CALC-SCNC: 10 MMOL/L (ref 10–20)
AST SERPL W P-5'-P-CCNC: 11 U/L (ref 9–39)
B-HCG SERPL-ACNC: <3 MIU/ML
BASOPHILS # BLD AUTO: 0.09 X10*3/UL (ref 0–0.1)
BASOPHILS NFR BLD AUTO: 1.5 %
BILIRUB SERPL-MCNC: 0.2 MG/DL (ref 0–1.2)
BUN SERPL-MCNC: 8 MG/DL (ref 6–23)
CALCIUM SERPL-MCNC: 8.8 MG/DL (ref 8.6–10.3)
CHLORIDE SERPL-SCNC: 105 MMOL/L (ref 98–107)
CO2 SERPL-SCNC: 26 MMOL/L (ref 21–32)
CREAT SERPL-MCNC: 0.66 MG/DL (ref 0.5–1.05)
EGFRCR SERPLBLD CKD-EPI 2021: >90 ML/MIN/1.73M*2
EOSINOPHIL # BLD AUTO: 0.05 X10*3/UL (ref 0–0.7)
EOSINOPHIL NFR BLD AUTO: 0.8 %
ERYTHROCYTE [DISTWIDTH] IN BLOOD BY AUTOMATED COUNT: 13.2 % (ref 11.5–14.5)
GLUCOSE SERPL-MCNC: 116 MG/DL (ref 74–99)
HCT VFR BLD AUTO: 35.7 % (ref 36–46)
HGB BLD-MCNC: 12 G/DL (ref 12–16)
IMM GRANULOCYTES # BLD AUTO: 0.13 X10*3/UL (ref 0–0.7)
IMM GRANULOCYTES NFR BLD AUTO: 2.2 % (ref 0–0.9)
LYMPHOCYTES # BLD AUTO: 1.5 X10*3/UL (ref 1.2–4.8)
LYMPHOCYTES NFR BLD AUTO: 25 %
MCH RBC QN AUTO: 31.1 PG (ref 26–34)
MCHC RBC AUTO-ENTMCNC: 33.6 G/DL (ref 32–36)
MCV RBC AUTO: 93 FL (ref 80–100)
MONOCYTES # BLD AUTO: 0.6 X10*3/UL (ref 0.1–1)
MONOCYTES NFR BLD AUTO: 10 %
NEUTROPHILS # BLD AUTO: 3.63 X10*3/UL (ref 1.2–7.7)
NEUTROPHILS NFR BLD AUTO: 60.5 %
NRBC BLD-RTO: ABNORMAL /100{WBCS}
PLATELET # BLD AUTO: 202 X10*3/UL (ref 150–450)
POTASSIUM SERPL-SCNC: 3.9 MMOL/L (ref 3.5–5.3)
PROT SERPL-MCNC: 6.6 G/DL (ref 6.4–8.2)
RBC # BLD AUTO: 3.86 X10*6/UL (ref 4–5.2)
SODIUM SERPL-SCNC: 137 MMOL/L (ref 136–145)
WBC # BLD AUTO: 6 X10*3/UL (ref 4.4–11.3)

## 2024-04-01 PROCEDURE — 84075 ASSAY ALKALINE PHOSPHATASE: CPT

## 2024-04-01 PROCEDURE — 36591 DRAW BLOOD OFF VENOUS DEVICE: CPT

## 2024-04-01 PROCEDURE — 85025 COMPLETE CBC W/AUTO DIFF WBC: CPT

## 2024-04-01 PROCEDURE — 2500000004 HC RX 250 GENERAL PHARMACY W/ HCPCS (ALT 636 FOR OP/ED): Performed by: STUDENT IN AN ORGANIZED HEALTH CARE EDUCATION/TRAINING PROGRAM

## 2024-04-01 PROCEDURE — 84702 CHORIONIC GONADOTROPIN TEST: CPT

## 2024-04-01 RX ORDER — HEPARIN 100 UNIT/ML
500 SYRINGE INTRAVENOUS AS NEEDED
Status: CANCELLED | OUTPATIENT
Start: 2024-04-01

## 2024-04-01 RX ORDER — HEPARIN 100 UNIT/ML
500 SYRINGE INTRAVENOUS AS NEEDED
Status: DISCONTINUED | OUTPATIENT
Start: 2024-04-01 | End: 2024-04-01 | Stop reason: HOSPADM

## 2024-04-01 RX ORDER — HEPARIN SODIUM,PORCINE/PF 10 UNIT/ML
50 SYRINGE (ML) INTRAVENOUS AS NEEDED
Status: CANCELLED | OUTPATIENT
Start: 2024-04-01

## 2024-04-01 RX ADMIN — HEPARIN 500 UNITS: 100 SYRINGE at 13:15

## 2024-04-02 ENCOUNTER — APPOINTMENT (OUTPATIENT)
Dept: HEMATOLOGY/ONCOLOGY | Facility: CLINIC | Age: 39
End: 2024-04-02
Payer: COMMERCIAL

## 2024-04-02 ENCOUNTER — INFUSION (OUTPATIENT)
Dept: HEMATOLOGY/ONCOLOGY | Facility: CLINIC | Age: 39
End: 2024-04-02
Payer: COMMERCIAL

## 2024-04-02 ENCOUNTER — OFFICE VISIT (OUTPATIENT)
Dept: HEMATOLOGY/ONCOLOGY | Facility: CLINIC | Age: 39
End: 2024-04-02
Payer: COMMERCIAL

## 2024-04-02 VITALS
HEART RATE: 84 BPM | SYSTOLIC BLOOD PRESSURE: 123 MMHG | BODY MASS INDEX: 22.76 KG/M2 | DIASTOLIC BLOOD PRESSURE: 80 MMHG | RESPIRATION RATE: 16 BRPM | OXYGEN SATURATION: 97 % | TEMPERATURE: 98.1 F | WEIGHT: 153.66 LBS

## 2024-04-02 DIAGNOSIS — Z17.0 MALIGNANT NEOPLASM OF OVERLAPPING SITES OF RIGHT BREAST IN FEMALE, ESTROGEN RECEPTOR POSITIVE (MULTI): ICD-10-CM

## 2024-04-02 DIAGNOSIS — Z17.0 MALIGNANT NEOPLASM OF OVERLAPPING SITES OF RIGHT BREAST IN FEMALE, ESTROGEN RECEPTOR POSITIVE (MULTI): Primary | ICD-10-CM

## 2024-04-02 DIAGNOSIS — C50.811 MALIGNANT NEOPLASM OF OVERLAPPING SITES OF RIGHT BREAST IN FEMALE, ESTROGEN RECEPTOR POSITIVE (MULTI): Primary | ICD-10-CM

## 2024-04-02 DIAGNOSIS — C50.811 MALIGNANT NEOPLASM OF OVERLAPPING SITES OF RIGHT BREAST IN FEMALE, ESTROGEN RECEPTOR POSITIVE (MULTI): ICD-10-CM

## 2024-04-02 PROCEDURE — 96377 APPLICATON ON-BODY INJECTOR: CPT | Mod: 59

## 2024-04-02 PROCEDURE — 96413 CHEMO IV INFUSION 1 HR: CPT

## 2024-04-02 PROCEDURE — 96367 TX/PROPH/DG ADDL SEQ IV INF: CPT

## 2024-04-02 PROCEDURE — 2500000004 HC RX 250 GENERAL PHARMACY W/ HCPCS (ALT 636 FOR OP/ED): Performed by: STUDENT IN AN ORGANIZED HEALTH CARE EDUCATION/TRAINING PROGRAM

## 2024-04-02 PROCEDURE — 96411 CHEMO IV PUSH ADDL DRUG: CPT

## 2024-04-02 PROCEDURE — 99214 OFFICE O/P EST MOD 30 MIN: CPT | Performed by: STUDENT IN AN ORGANIZED HEALTH CARE EDUCATION/TRAINING PROGRAM

## 2024-04-02 PROCEDURE — 96372 THER/PROPH/DIAG INJ SC/IM: CPT | Performed by: STUDENT IN AN ORGANIZED HEALTH CARE EDUCATION/TRAINING PROGRAM

## 2024-04-02 PROCEDURE — 96375 TX/PRO/DX INJ NEW DRUG ADDON: CPT | Mod: INF

## 2024-04-02 RX ORDER — DOXORUBICIN HYDROCHLORIDE 2 MG/ML
60 INJECTION, SOLUTION INTRAVENOUS ONCE
Status: CANCELLED | OUTPATIENT
Start: 2024-04-16

## 2024-04-02 RX ORDER — HEPARIN SODIUM,PORCINE/PF 10 UNIT/ML
50 SYRINGE (ML) INTRAVENOUS AS NEEDED
Status: DISCONTINUED | OUTPATIENT
Start: 2024-04-02 | End: 2024-04-02 | Stop reason: HOSPADM

## 2024-04-02 RX ORDER — EPINEPHRINE 0.3 MG/.3ML
0.3 INJECTION SUBCUTANEOUS EVERY 5 MIN PRN
Status: CANCELLED | OUTPATIENT
Start: 2024-04-16

## 2024-04-02 RX ORDER — FAMOTIDINE 10 MG/ML
20 INJECTION INTRAVENOUS ONCE AS NEEDED
Status: CANCELLED | OUTPATIENT
Start: 2024-04-16

## 2024-04-02 RX ORDER — PROCHLORPERAZINE EDISYLATE 5 MG/ML
10 INJECTION INTRAMUSCULAR; INTRAVENOUS EVERY 6 HOURS PRN
Status: CANCELLED | OUTPATIENT
Start: 2024-04-16

## 2024-04-02 RX ORDER — DOXORUBICIN HYDROCHLORIDE 2 MG/ML
60 INJECTION, SOLUTION INTRAVENOUS ONCE
Status: COMPLETED | OUTPATIENT
Start: 2024-04-02 | End: 2024-04-02

## 2024-04-02 RX ORDER — DIPHENHYDRAMINE HYDROCHLORIDE 50 MG/ML
50 INJECTION INTRAMUSCULAR; INTRAVENOUS AS NEEDED
Status: DISCONTINUED | OUTPATIENT
Start: 2024-04-02 | End: 2024-04-02 | Stop reason: HOSPADM

## 2024-04-02 RX ORDER — HEPARIN 100 UNIT/ML
500 SYRINGE INTRAVENOUS AS NEEDED
Status: DISCONTINUED | OUTPATIENT
Start: 2024-04-02 | End: 2024-04-02 | Stop reason: HOSPADM

## 2024-04-02 RX ORDER — EPINEPHRINE 0.3 MG/.3ML
0.3 INJECTION SUBCUTANEOUS EVERY 5 MIN PRN
Status: DISCONTINUED | OUTPATIENT
Start: 2024-04-02 | End: 2024-04-02 | Stop reason: HOSPADM

## 2024-04-02 RX ORDER — PROCHLORPERAZINE EDISYLATE 5 MG/ML
10 INJECTION INTRAMUSCULAR; INTRAVENOUS EVERY 6 HOURS PRN
Status: DISCONTINUED | OUTPATIENT
Start: 2024-04-02 | End: 2024-04-02 | Stop reason: HOSPADM

## 2024-04-02 RX ORDER — HEPARIN 100 UNIT/ML
500 SYRINGE INTRAVENOUS AS NEEDED
Status: CANCELLED | OUTPATIENT
Start: 2024-04-02

## 2024-04-02 RX ORDER — PROCHLORPERAZINE MALEATE 5 MG
10 TABLET ORAL EVERY 6 HOURS PRN
Status: CANCELLED | OUTPATIENT
Start: 2024-04-16

## 2024-04-02 RX ORDER — PROCHLORPERAZINE MALEATE 10 MG
10 TABLET ORAL EVERY 6 HOURS PRN
Status: DISCONTINUED | OUTPATIENT
Start: 2024-04-02 | End: 2024-04-02 | Stop reason: HOSPADM

## 2024-04-02 RX ORDER — PALONOSETRON 0.05 MG/ML
0.25 INJECTION, SOLUTION INTRAVENOUS ONCE
Status: COMPLETED | OUTPATIENT
Start: 2024-04-02 | End: 2024-04-02

## 2024-04-02 RX ORDER — DIPHENHYDRAMINE HYDROCHLORIDE 50 MG/ML
50 INJECTION INTRAMUSCULAR; INTRAVENOUS AS NEEDED
Status: CANCELLED | OUTPATIENT
Start: 2024-04-16

## 2024-04-02 RX ORDER — PALONOSETRON 0.05 MG/ML
0.25 INJECTION, SOLUTION INTRAVENOUS ONCE
Status: CANCELLED | OUTPATIENT
Start: 2024-04-16

## 2024-04-02 RX ORDER — FAMOTIDINE 10 MG/ML
20 INJECTION INTRAVENOUS ONCE AS NEEDED
Status: DISCONTINUED | OUTPATIENT
Start: 2024-04-02 | End: 2024-04-02 | Stop reason: HOSPADM

## 2024-04-02 RX ORDER — HEPARIN SODIUM,PORCINE/PF 10 UNIT/ML
50 SYRINGE (ML) INTRAVENOUS AS NEEDED
Status: CANCELLED | OUTPATIENT
Start: 2024-04-02

## 2024-04-02 RX ORDER — ALBUTEROL SULFATE 0.83 MG/ML
3 SOLUTION RESPIRATORY (INHALATION) AS NEEDED
Status: CANCELLED | OUTPATIENT
Start: 2024-04-16

## 2024-04-02 RX ORDER — ALBUTEROL SULFATE 0.83 MG/ML
3 SOLUTION RESPIRATORY (INHALATION) AS NEEDED
Status: DISCONTINUED | OUTPATIENT
Start: 2024-04-02 | End: 2024-04-02 | Stop reason: HOSPADM

## 2024-04-02 RX ADMIN — SODIUM CHLORIDE 500 ML: 9 INJECTION, SOLUTION INTRAVENOUS at 12:57

## 2024-04-02 RX ADMIN — PEGFILGRASTIM 6 MG: KIT SUBCUTANEOUS at 14:39

## 2024-04-02 RX ADMIN — HEPARIN 500 UNITS: 100 SYRINGE at 15:18

## 2024-04-02 RX ADMIN — FOSAPREPITANT DIMEGLUMINE 150 MG: 150 INJECTION, POWDER, LYOPHILIZED, FOR SOLUTION INTRAVENOUS at 13:25

## 2024-04-02 RX ADMIN — PALONOSETRON 250 MCG: 0.05 INJECTION, SOLUTION INTRAVENOUS at 12:57

## 2024-04-02 RX ADMIN — CYCLOPHOSPHAMIDE 1122 MG: 1 INJECTION, POWDER, FOR SOLUTION INTRAVENOUS; ORAL at 14:35

## 2024-04-02 RX ADMIN — DEXAMETHASONE SODIUM PHOSPHATE 12 MG: 4 INJECTION, SOLUTION INTRAMUSCULAR; INTRAVENOUS at 13:02

## 2024-04-02 RX ADMIN — DOXORUBICIN HYDROCHLORIDE 112 MG: 2 INJECTION, SOLUTION INTRAVENOUS at 14:04

## 2024-04-02 ASSESSMENT — PAIN SCALES - GENERAL: PAINLEVEL: 0-NO PAIN

## 2024-04-02 NOTE — PROGRESS NOTES
Patient ID: Macy Chavez is a 38 y.o. female.    The patient presents to clinic today for her history of breast cancer.     Cancer Staging   Malignant neoplasm of overlapping sites of right breast in female, estrogen receptor positive (CMS/HCC)  Staging form: Breast, AJCC 8th Edition  - Clinical stage from 2/6/2024: Stage IIIA (cT3, cN2, cM0, G3, ER+, MO+, HER2-) - Signed by Denise Golden MD on 2/21/2024      Diagnostic/Therapeutic History:    11/03/2023: Bl screening Mammogram: negative       12/20/2023: Right breast mass for which US done, Nonspecific dense fibroglandular tissues in the right breast in the area of reported lump. Dense fibroglandular tissue also evident throughout both breasts on screening mammogram. No discrete lesionidentified.    02/06/2024: Patient describes an increase in the size of a palpable lump in theright lower outer breast which was previously assessed in December of2023. Recent negative screening mammogram from 11/09/2023. She alsodescribes a new palpable lump in the right axilla. After being seen by her provider, a nother palpable lump is identified in the upper outer right breast.    Ultrasound of the right breast showed In the patient's area of palpable concern in the right breast at 7 o'clock, 5 cm from the nipple, there is a large vague heterogeneousmass with internal vascularity which has the appearance of dense fibroglandular tissue measuring up to 5 cm; it is visible protruding from the skin with discoloration. In the patient's area of palpable concern felt by her provider in the right breast at 10 o'clock, 8 cm from the nipple, there is an oval parallel heterogeneous mass withinternal vascularity measuring approximately 1.1 x 0.9 x 1.4 cm. 3 enlarged axillary LN were seen    02/06/2024: US guided core needle biopsy    A. BREAST, RIGHT, 7:00, 5 CM FN, CORE BIOPSY:   -- Invasive ductal carcinoma, grade 2, ER: 95%, MO: 5-10%, HER2 negative (1+)        B. BREAST, RIGHT, 10:00, 8  CM FN, CORE BIOPSY:   -- Invasive ductal carcinoma, grade 2-3,  ER: 95%, MI<5%, HER2 negative (1+)         C. AXILLARY LYMPH NODE, RIGHT, BIOPSY:     -- Metastatic carcinoma, see note.      02/14/2024: Biopsy-proven multicentric right breast malignancy with the total  span of up to 13.0 cm associated with bulky metastatic axillary and  abnormal internal mammary lymphadenopathy. No evidence of chest wall,  skin or nipple-areolar complex involvement.    No MRI evidence of malignancy in the left breast.    02/15/2024: staging scans: negative- short term follow up       History of Present Illness (HPI)/Interval History:    24 hours after treatment- felt fatigued for 2 days. that improved   Nausea relieved by nausea meds, compazine  No HA this time (not using compazine)  Constipation relieved by colace  She denies any fevers or chills.  She denies any chest pain or breathing issues.  She denies any new or unexplained bone aches or pains.      PMH: none     PSH: no surgery    Allergies/meds: negative     Reproductive hx: Menarche: 12, AFLB: 27, Menopause: no, HRT: no     Family history: Maternal Grandfather: Kidney versus Liver cancer,Paternal Grandmother: Soft cell sarcoma-        Review of Systems:  14-point ROS otherwise negative, as per HPI.    Past Medical History:   Diagnosis Date    Breast cancer (CMS/HCC)     Cancer (CMS/HCC)     Other specified health status 09/29/2016    No pertinent past medical history       Past Surgical History:   Procedure Laterality Date    OTHER SURGICAL HISTORY  09/29/2016    Oral Surgery Tooth Extraction [place] Tooth    WISDOM TOOTH EXTRACTION  6/1/2000       Social History     Socioeconomic History    Marital status:      Spouse name: Not on file    Number of children: Not on file    Years of education: Not on file    Highest education level: Not on file   Occupational History    Not on file   Tobacco Use    Smoking status: Never    Smokeless tobacco: Never   Vaping Use     Vaping Use: Never used   Substance and Sexual Activity    Alcohol use: Yes     Alcohol/week: 1.0 standard drink of alcohol     Types: 1 Glasses of wine per week     Comment: A few drinks a month    Drug use: Never    Sexual activity: Yes     Partners: Male     Birth control/protection: I.U.D.   Other Topics Concern    Not on file   Social History Narrative    Not on file     Social Determinants of Health     Financial Resource Strain: Not on file   Food Insecurity: Not on file   Transportation Needs: Not on file   Physical Activity: Not on file   Stress: Not on file   Social Connections: Not on file   Intimate Partner Violence: Not on file   Housing Stability: Not on file       Allergies   Allergen Reactions    Dog Dander Itching         Current Outpatient Medications:     atorvastatin (Lipitor) 20 mg tablet, Take 1 tablet (20 mg) by mouth once daily., Disp: 90 tablet, Rfl: 3    ergocalciferol (Vitamin D-2) 1.25 MG (64632 UT) capsule, Take 1 capsule (50,000 Units) by mouth 1 (one) time per week., Disp: 12 capsule, Rfl: 0    ondansetron (Zofran) 8 mg tablet, Take 1 tablet (8 mg) by mouth every 8 hours if needed for nausea or vomiting for up to 27 days., Disp: 30 tablet, Rfl: 0    prochlorperazine (Compazine) 10 mg tablet, Take 1 tablet (10 mg) by mouth every 6 hours if needed for nausea or vomiting., Disp: 30 tablet, Rfl: 2    prochlorperazine (Compazine) 10 mg tablet, Take 1 tablet (10 mg) by mouth every 6 hours if needed for nausea or vomiting., Disp: 30 tablet, Rfl: 3    valACYclovir (Valtrex) 500 mg tablet, Take 1 tablet (500 mg) by mouth 2 times a day., Disp: , Rfl:   No current facility-administered medications for this visit.     Objective    BSA: 1.84 meters squared  /80 (BP Location: Left arm, Patient Position: Sitting)   Pulse 84   Temp 36.7 °C (98.1 °F) (Temporal)   Resp 16   Wt 69.7 kg (153 lb 10.6 oz)   SpO2 97%   BMI 22.76 kg/m²     ECOG Performance Status: 0    Physical Exam    GA: alert,  oriented, cooperative  HEENT: anicteric sclera, well injected conjunctiva  Heart: RRR, no murmurs or gallops heard  Lung: CTAB  Abd: +BS, soft, non tender  Breast: large breast mass measuring at inferior breast measuring 8cm. Additional mass at 10 oclock- Right axillary mass palpated    Laboratory Data:  Lab Results   Component Value Date    WBC 6.0 04/01/2024    HGB 12.0 04/01/2024    HCT 35.7 (L) 04/01/2024    MCV 93 04/01/2024     04/01/2024       Chemistry    Lab Results   Component Value Date/Time     04/01/2024 1306    K 3.9 04/01/2024 1306     04/01/2024 1306    CO2 26 04/01/2024 1306    BUN 8 04/01/2024 1306    CREATININE 0.66 04/01/2024 1306    Lab Results   Component Value Date/Time    CALCIUM 8.8 04/01/2024 1306    ALKPHOS 70 04/01/2024 1306    AST 11 04/01/2024 1306    ALT 12 04/01/2024 1306    BILITOT 0.2 04/01/2024 1306             Radiology:  ECG 12 Lead  EKG today - NSR, low voltage QRS, NS T abn       MR breast bilateral w contrast full protocol 02/14/2024    Narrative  Interpreted By:  Riki Flor,  STUDY:  BI MR BREAST BILATERAL WITH CONTRAST FULL PROTOCOL;  2/14/2024 1:33 pm    ACCESSION NUMBER(S):  [id number]    ORDERING CLINICIAN:  VITOR HARO    INDICATION:  38-year-old woman status post ultrasound-guided biopsy of right  breast masses at 7 o'clock and 10 o'clock and a right axillary lymph  demonstrating grade 2 invasive ductal carcinoma at both sites with  axillary simon metastasis.    COMPARISON:  Screening mammogram 11/09/2023, right breast ultrasound 12/20/2023,  02/06/2024; ultrasound-guided biopsy 02/06/2024.    TECHNIQUE:  Using a dedicated breast coil, STIR axial and T1-weighted fat  saturation axial images of the breasts were obtained, the latter both  before and after intravenous administration of Gadolinium DTPA. On an  independent workstation, 3-D images were formulated using [employer]  including time enhancement curves, subtraction images and MIP  images.    Intravenous contrast:  14 mL of Dotarem    FINDINGS:  There is asymmetric moderate bilateral background enhancement.    Extreme fibroglandular tissue.    RIGHT BREAST:  There are multiple contiguous masses and nonmass  enhancements occupying the entire lateral and central inferior right  breast including at the two sites of biopsy-proven malignancy at 7  o'clock and 10 o'clock, with a total span of 8.5 x 4.5 x 13.0 cm (AP  x TR x CC), better appreciated on the MIP and sagittal reconstruction  images. There is no involvement of the skin, nipple-areolar complex,  or pectoralis major muscle. The most anterior extent of malignancy is  located approximately 2 cm from the base of the nipple.    Within the span of malignancy, more confluent findings are as follows:    - Dominant irregular heterogeneously enhancing mass in the inferior  lateral and central inferior breast at anterior and middle depths  measuring 5.0 x 4.7 x 4.5 cm, corresponding to the site of biopsy at  7 o'clock (series 7, image 127/176).    - Irregular heterogeneously enhancing mass in superior lateral breast  at posterior depth measuring 2.7 x 2.5 x 2.5 cm, corresponding to the  site of biopsy at 10 o'clock (series 7, image 48/176).    - More confluent nonmass enhancement in superior lateral breast at  middle to posterior depth measuring 3.8 x 2.0 x 2.5 cm (series 7,  image 73/176).    - A small satellite mass in superior lateral breast at middle depth  measuring 1.0 x 1.0 x 0.8 cm located 0.8 cm superolateral to the  dominant mass (series 7, image 108/176).    Evaluation of the axilla is limited due to incomplete coverage in the  field of view. A conglomerate of at least 3 enlarged level I right  lymph nodes associated with a biopsy clip is partially visualized  measuring at least 4.2 x 2.5 x 3.5 cm (series 6, image 1/176).    An abnormal internal mammary lymph node is seen in the 2nd  intercostal space measuring 1.5 x 1.2 x 1.6 cm    LEFT  BREAST: No suspicious mass or nonmass enhancement is identified.  There are numerous scattered small benign cysts.    No axillary or internal mammary lymphadenopathy is appreciated within  limitations of limited field of view.    NON-BREAST FINDINGS:  None.    Impression  1. Biopsy-proven multicentric right breast malignancy with the total  span of up to 13.0 cm associated with bulky metastatic axillary and  abnormal internal mammary lymphadenopathy. No evidence of chest wall,  skin or nipple-areolar complex involvement.  2. No MRI evidence of malignancy in the left breast.    BI-RADS CATEGORY:  BI-RADS CATEGORY:  6 Known Biopsy-Proven Malignancy.  Recommendation:  Immediate Follow-up.  Recommended Date:  Immediate.  Laterality:  Right.    For any future breast imaging appointments, please call [phone]  ([phone]).      MACRO:  None    Signed by: Riki Flor 2/15/2024 10:43 AM  Dictation workstation:   [id number]          Assessment/Plan:    Macy is a 38 year old female in excellent health with a recent diagnosis of right sided breast cancer. Macy felt a mass in her right breast in December that prompted diagnostic evaluation.     Ultrasound of the right breast showed In the patient's area of palpable concern in the right breast at 7 o'clock, 5 cm from the nipple, there is a large vague heterogeneousmass with internal vascularity which has the appearance of dense fibroglandular tissue measuring up to 5 cm;  In the patient's area of palpable concern felt by her provider in the right breast at 10 o'clock, 8 cm from the nipple, there is an oval parallel heterogeneous mass withinternal vascularity measuring approximately 1.1 x 0.9 x 1.4 cm. 3 enlarged axillary LN were seen    She had US guided core needle biopsy of  right breast at 7:00, 5 CM FN,  that showed invasive ductal carcinoma, grade 2, ER: 95%, AZ: 5-10%, HER2 negative (1+).  And US guided core needle biopsy of the right breast at  10:00, 8 CM FN, that  showed  Invasive ductal carcinoma, grade 2-3,  ER: 95%, FL<5%, HER2 negative (1+). Axillary LN consistent with metastatic carcinoma     On MRI she a  multicentric right breast malignancy with the total span of up to 13.0 cm associated with bulky metastatic axillary and abnormal internal mammary lymphadenopathy. No evidence of chest wall, skin or nipple-areolar complex involvement.     Current stage [id number] (staging scans negative for distant mets)  ECOG PS: 0    I reviewed with her the events that led to her diagnosis of breast cancer. We reviewed all the procedures and diagnostic imaging she underwent thus far. I discussed the features of her breast cancer that include the size, grade, lymph node status and  hormone receptor/ her2-jair status.      We discussed neoadjuvant systemic therapy. The goal of treatment would be to downstage her cancer, assess tumor chemosensitivity and treat potential micrometastatic disease. I would treat, given extensive of disease with anthracyclines based regimen ddAC Q2 weeks x4 followed by taxol weekly x12     - Port placed, echo looks good- fup  echo in  May   - Elevated LDL- non pharmacological measures for now- follows with cardiology  - okay to proceed with cycle 3  - RTC prior to cycle 4 on 04/16 with Masha Sadler    At least 35 minutes of direct consultation was spent reviewing the patient's chart as well as discussing and  reviewing the  cancer care plan including educating and answering  questions and concerns, greater than 50 percent spent in counseling and coordination  of care.            Denise Golden MD  Hematology and Medical Oncology  [facility]

## 2024-04-02 NOTE — PROGRESS NOTES
Patient was seen and assessed by MD. Patient states she had nausea and some fatigue but otherwise feels good. Patient knows when to return for her next infusion.

## 2024-04-08 ENCOUNTER — APPOINTMENT (OUTPATIENT)
Dept: PLASTIC SURGERY | Facility: CLINIC | Age: 39
End: 2024-04-08
Payer: COMMERCIAL

## 2024-04-15 ENCOUNTER — INFUSION (OUTPATIENT)
Dept: HEMATOLOGY/ONCOLOGY | Facility: CLINIC | Age: 39
End: 2024-04-15
Payer: COMMERCIAL

## 2024-04-15 ENCOUNTER — OFFICE VISIT (OUTPATIENT)
Dept: PLASTIC SURGERY | Facility: CLINIC | Age: 39
End: 2024-04-15
Payer: COMMERCIAL

## 2024-04-15 VITALS
HEART RATE: 108 BPM | WEIGHT: 153 LBS | BODY MASS INDEX: 22.66 KG/M2 | SYSTOLIC BLOOD PRESSURE: 118 MMHG | HEIGHT: 69 IN | DIASTOLIC BLOOD PRESSURE: 84 MMHG

## 2024-04-15 DIAGNOSIS — C50.811 MALIGNANT NEOPLASM OF OVERLAPPING SITES OF RIGHT BREAST IN FEMALE, ESTROGEN RECEPTOR POSITIVE (MULTI): ICD-10-CM

## 2024-04-15 DIAGNOSIS — Z17.0 MALIGNANT NEOPLASM OF OVERLAPPING SITES OF RIGHT BREAST IN FEMALE, ESTROGEN RECEPTOR POSITIVE (MULTI): ICD-10-CM

## 2024-04-15 DIAGNOSIS — Z17.0 MALIGNANT NEOPLASM OF OVERLAPPING SITES OF RIGHT BREAST IN FEMALE, ESTROGEN RECEPTOR POSITIVE (MULTI): Primary | ICD-10-CM

## 2024-04-15 DIAGNOSIS — C50.811 MALIGNANT NEOPLASM OF OVERLAPPING SITES OF RIGHT BREAST IN FEMALE, ESTROGEN RECEPTOR POSITIVE (MULTI): Primary | ICD-10-CM

## 2024-04-15 LAB
ALBUMIN SERPL BCP-MCNC: 4.5 G/DL (ref 3.4–5)
ALP SERPL-CCNC: 76 U/L (ref 33–110)
ALT SERPL W P-5'-P-CCNC: 14 U/L (ref 7–45)
ANION GAP SERPL CALC-SCNC: 12 MMOL/L (ref 10–20)
AST SERPL W P-5'-P-CCNC: 11 U/L (ref 9–39)
BASOPHILS # BLD AUTO: 0.06 X10*3/UL (ref 0–0.1)
BASOPHILS NFR BLD AUTO: 0.9 %
BILIRUB SERPL-MCNC: 0.3 MG/DL (ref 0–1.2)
BUN SERPL-MCNC: 11 MG/DL (ref 6–23)
CALCIUM SERPL-MCNC: 9.4 MG/DL (ref 8.6–10.3)
CHLORIDE SERPL-SCNC: 103 MMOL/L (ref 98–107)
CO2 SERPL-SCNC: 26 MMOL/L (ref 21–32)
CREAT SERPL-MCNC: 0.71 MG/DL (ref 0.5–1.05)
EGFRCR SERPLBLD CKD-EPI 2021: >90 ML/MIN/1.73M*2
EOSINOPHIL # BLD AUTO: 0.06 X10*3/UL (ref 0–0.7)
EOSINOPHIL NFR BLD AUTO: 0.9 %
ERYTHROCYTE [DISTWIDTH] IN BLOOD BY AUTOMATED COUNT: 14.7 % (ref 11.5–14.5)
GLUCOSE SERPL-MCNC: 87 MG/DL (ref 74–99)
HCT VFR BLD AUTO: 35.2 % (ref 36–46)
HGB BLD-MCNC: 11.8 G/DL (ref 12–16)
IMM GRANULOCYTES # BLD AUTO: 0.33 X10*3/UL (ref 0–0.7)
IMM GRANULOCYTES NFR BLD AUTO: 5 % (ref 0–0.9)
LYMPHOCYTES # BLD AUTO: 1.22 X10*3/UL (ref 1.2–4.8)
LYMPHOCYTES NFR BLD AUTO: 18.4 %
MCH RBC QN AUTO: 31.1 PG (ref 26–34)
MCHC RBC AUTO-ENTMCNC: 33.5 G/DL (ref 32–36)
MCV RBC AUTO: 93 FL (ref 80–100)
MONOCYTES # BLD AUTO: 0.78 X10*3/UL (ref 0.1–1)
MONOCYTES NFR BLD AUTO: 11.8 %
NEUTROPHILS # BLD AUTO: 4.18 X10*3/UL (ref 1.2–7.7)
NEUTROPHILS NFR BLD AUTO: 63 %
NRBC BLD-RTO: ABNORMAL /100{WBCS}
PLATELET # BLD AUTO: 252 X10*3/UL (ref 150–450)
POTASSIUM SERPL-SCNC: 3.9 MMOL/L (ref 3.5–5.3)
PROT SERPL-MCNC: 6.8 G/DL (ref 6.4–8.2)
RBC # BLD AUTO: 3.8 X10*6/UL (ref 4–5.2)
SODIUM SERPL-SCNC: 137 MMOL/L (ref 136–145)
WBC # BLD AUTO: 6.6 X10*3/UL (ref 4.4–11.3)

## 2024-04-15 PROCEDURE — 2500000004 HC RX 250 GENERAL PHARMACY W/ HCPCS (ALT 636 FOR OP/ED): Performed by: STUDENT IN AN ORGANIZED HEALTH CARE EDUCATION/TRAINING PROGRAM

## 2024-04-15 PROCEDURE — 36591 DRAW BLOOD OFF VENOUS DEVICE: CPT

## 2024-04-15 PROCEDURE — 99222 1ST HOSP IP/OBS MODERATE 55: CPT | Performed by: SURGERY

## 2024-04-15 PROCEDURE — 85025 COMPLETE CBC W/AUTO DIFF WBC: CPT

## 2024-04-15 PROCEDURE — 80053 COMPREHEN METABOLIC PANEL: CPT

## 2024-04-15 RX ORDER — HEPARIN 100 UNIT/ML
500 SYRINGE INTRAVENOUS AS NEEDED
Status: CANCELLED | OUTPATIENT
Start: 2024-04-15

## 2024-04-15 RX ORDER — HEPARIN SODIUM,PORCINE/PF 10 UNIT/ML
50 SYRINGE (ML) INTRAVENOUS AS NEEDED
Status: CANCELLED | OUTPATIENT
Start: 2024-04-15

## 2024-04-15 RX ORDER — HEPARIN 100 UNIT/ML
500 SYRINGE INTRAVENOUS AS NEEDED
Status: DISCONTINUED | OUTPATIENT
Start: 2024-04-15 | End: 2024-04-15 | Stop reason: HOSPADM

## 2024-04-15 RX ADMIN — HEPARIN 500 UNITS: 100 SYRINGE at 13:40

## 2024-04-15 NOTE — PROGRESS NOTES
Patient ID: Macy Chavez is a 38 y.o. female.    The patient presents to clinic today for her history of breast cancer.     Cancer Staging   Malignant neoplasm of overlapping sites of right breast in female, estrogen receptor positive (Multi)  Staging form: Breast, AJCC 8th Edition  - Clinical stage from 2/6/2024: Stage IIIA (cT3, cN2, cM0, G3, ER+, MN+, HER2-) - Signed by Denise Godlen MD on 2/21/2024      Diagnostic/Therapeutic History:    11/03/2023: Bl screening Mammogram: negative       12/20/2023: Right breast mass for which US done, Nonspecific dense fibroglandular tissues in the right breast in the area of reported lump. Dense fibroglandular tissue also evident throughout both breasts on screening mammogram. No discrete lesionidentified.    02/06/2024: Patient describes an increase in the size of a palpable lump in theright lower outer breast which was previously assessed in December of2023. Recent negative screening mammogram from 11/09/2023. She alsodescribes a new palpable lump in the right axilla. After being seen by her provider, a nother palpable lump is identified in the upper outer right breast.    Ultrasound of the right breast showed In the patient's area of palpable concern in the right breast at 7 o'clock, 5 cm from the nipple, there is a large vague heterogeneousmass with internal vascularity which has the appearance of dense fibroglandular tissue measuring up to 5 cm; it is visible protruding from the skin with discoloration. In the patient's area of palpable concern felt by her provider in the right breast at 10 o'clock, 8 cm from the nipple, there is an oval parallel heterogeneous mass withinternal vascularity measuring approximately 1.1 x 0.9 x 1.4 cm. 3 enlarged axillary LN were seen    02/06/2024: US guided core needle biopsy    A. BREAST, RIGHT, 7:00, 5 CM FN, CORE BIOPSY:   -- Invasive ductal carcinoma, grade 2, ER: 95%, MN: 5-10%, HER2 negative (1+)        B. BREAST, RIGHT, 10:00, 8 CM  FN, CORE BIOPSY:   -- Invasive ductal carcinoma, grade 2-3,  ER: 95%, DE<5%, HER2 negative (1+)         C. AXILLARY LYMPH NODE, RIGHT, BIOPSY:     -- Metastatic carcinoma, see note.      02/14/2024: Biopsy-proven multicentric right breast malignancy with the total  span of up to 13.0 cm associated with bulky metastatic axillary and  abnormal internal mammary lymphadenopathy. No evidence of chest wall,  skin or nipple-areolar complex involvement.    No MRI evidence of malignancy in the left breast.    02/15/2024: staging scans: negative- short term follow up       History of Present Illness (HPI)/Interval History:  Accompanied by  today.   Fatigue was best after this last cycle. Tolerating well overall  Nausea relieved by nausea meds, compazine mainly. Issue with getting refills on zofran.   No HA this time (not using compazine)  Very mind diarrhea this time around. Took one imodium with relief.   She denies any fevers or chills.  She denies any chest pain or breathing issues.  She denies any new or unexplained bone aches or pains.      PMH: none     PSH: no surgery    Allergies/meds: negative     Reproductive hx: Menarche: 12, AFLB: 27, Menopause: no, HRT: no     Family history: Maternal Grandfather: Kidney versus Liver cancer,Paternal Grandmother: Soft cell sarcoma-        Review of Systems:  14-point ROS otherwise negative, as per HPI.    Past Medical History:   Diagnosis Date    Breast cancer (Multi)     Cancer (Multi)     Other specified health status 09/29/2016    No pertinent past medical history       Past Surgical History:   Procedure Laterality Date    OTHER SURGICAL HISTORY  09/29/2016    Oral Surgery Tooth Extraction [place] Tooth    WISDOM TOOTH EXTRACTION  6/1/2000       Social History     Socioeconomic History    Marital status:      Spouse name: Not on file    Number of children: Not on file    Years of education: Not on file    Highest education level: Not on file   Occupational History     Not on file   Tobacco Use    Smoking status: Never    Smokeless tobacco: Never   Vaping Use    Vaping status: Never Used   Substance and Sexual Activity    Alcohol use: Yes     Alcohol/week: 1.0 standard drink of alcohol     Types: 1 Glasses of wine per week     Comment: A few drinks a month    Drug use: Never    Sexual activity: Yes     Partners: Male     Birth control/protection: I.U.D.   Other Topics Concern    Not on file   Social History Narrative    Not on file     Social Determinants of Health     Financial Resource Strain: Not on file   Food Insecurity: Not on file   Transportation Needs: Not on file   Physical Activity: Not on file   Stress: Not on file   Social Connections: Not on file   Intimate Partner Violence: Not on file   Housing Stability: Not on file       Allergies   Allergen Reactions    Dog Dander Itching         Current Outpatient Medications:     atorvastatin (Lipitor) 20 mg tablet, Take 1 tablet (20 mg) by mouth once daily., Disp: 90 tablet, Rfl: 3    ergocalciferol (Vitamin D-2) 1.25 MG (18091 UT) capsule, Take 1 capsule (50,000 Units) by mouth 1 (one) time per week., Disp: 12 capsule, Rfl: 0    prochlorperazine (Compazine) 10 mg tablet, Take 1 tablet (10 mg) by mouth every 6 hours if needed for nausea or vomiting., Disp: 30 tablet, Rfl: 2    prochlorperazine (Compazine) 10 mg tablet, Take 1 tablet (10 mg) by mouth every 6 hours if needed for nausea or vomiting., Disp: 30 tablet, Rfl: 3    valACYclovir (Valtrex) 500 mg tablet, Take 1 tablet (500 mg) by mouth 2 times a day., Disp: , Rfl:     ondansetron (Zofran) 8 mg tablet, Take 1 tablet (8 mg) by mouth every 8 hours if needed for nausea or vomiting., Disp: 90 tablet, Rfl: 0  No current facility-administered medications for this visit.    Facility-Administered Medications Ordered in Other Visits:     albuterol 2.5 mg /3 mL (0.083 %) nebulizer solution 3 mL, 3 mL, nebulization, PRN, Denise Golden MD    alteplase (Cathflo Activase)  injection 2 mg, 2 mg, intra-catheter, PRN, Denise Golden MD    dextrose 5 % in water (D5W) bolus, 500 mL, intravenous, PRN, Denise Golden MD    diphenhydrAMINE (BENADryl) injection 50 mg, 50 mg, intravenous, PRN, Denise Golden MD    EPINEPHrine (Epipen) injection syringe 0.3 mg, 0.3 mg, intramuscular, q5 min PRN, Denise Golden MD    famotidine PF (Pepcid) injection 20 mg, 20 mg, intravenous, Once PRN, Denise Golden MD    heparin flush 10 unit/mL syringe 50 Units, 50 Units, intra-catheter, PRN, Denise Golden MD    heparin flush 100 unit/mL syringe 500 Units, 500 Units, intra-catheter, PRN, Denise Golden MD    methylPREDNISolone sod succinate (SOLU-Medrol) 40 mg/mL injection 40 mg, 40 mg, intravenous, PRN, Denise Golden MD    prochlorperazine (Compazine) injection 10 mg, 10 mg, intravenous, q6h PRN, Dneise Golden MD, 10 mg at 04/16/24 1356    prochlorperazine (Compazine) tablet 10 mg, 10 mg, oral, q6h PRN, Denise Golden MD    sodium chloride 0.9 % bolus 500 mL, 500 mL, intravenous, PRN, Denise Golden MD     Objective    BSA: 1.84 meters squared  /83 (BP Location: Left arm, Patient Position: Sitting)   Pulse 97   Temp 36.1 °C (97 °F) (Temporal)   Resp 16   Wt 69.7 kg (153 lb 10.6 oz)   SpO2 98%   BMI 22.76 kg/m²     ECOG Performance Status: 0    GA: alert, oriented, cooperative  HEENT: anicteric sclera, well injected conjunctiva  Heart: RRR, no murmurs or gallops heard  Lung: CTAB  Abd: +BS, soft, non tender  Breast: large breast mass measuring at inferior breast measuring 4cm. Additional mass at 10 oclock- Right axillary mass ~ 4 cm    Laboratory Data:  Lab Results   Component Value Date    WBC 6.6 04/15/2024    HGB 11.8 (L) 04/15/2024    HCT 35.2 (L) 04/15/2024    MCV 93 04/15/2024     04/15/2024       Chemistry    Lab Results   Component Value Date/Time     04/15/2024 1342    K 3.9 04/15/2024 1342     04/15/2024 1342    CO2 26 04/15/2024 1342    BUN 11 04/15/2024 1342     CREATININE 0.71 04/15/2024 1342    Lab Results   Component Value Date/Time    CALCIUM 9.4 04/15/2024 1342    ALKPHOS 76 04/15/2024 1342    AST 11 04/15/2024 1342    ALT 14 04/15/2024 1342    BILITOT 0.3 04/15/2024 1342             Radiology:  ECG 12 Lead  EKG today - NSR, low voltage QRS, NS T abn       MR breast bilateral w contrast full protocol 02/14/2024    Narrative  Interpreted By:  Riki Flor,  STUDY:  BI MR BREAST BILATERAL WITH CONTRAST FULL PROTOCOL;  2/14/2024 1:33 pm    ACCESSION NUMBER(S):  [id number]    ORDERING CLINICIAN:  VITOR HARO    INDICATION:  38-year-old woman status post ultrasound-guided biopsy of right  breast masses at 7 o'clock and 10 o'clock and a right axillary lymph  demonstrating grade 2 invasive ductal carcinoma at both sites with  axillary simon metastasis.    COMPARISON:  Screening mammogram 11/09/2023, right breast ultrasound 12/20/2023,  02/06/2024; ultrasound-guided biopsy 02/06/2024.    TECHNIQUE:  Using a dedicated breast coil, STIR axial and T1-weighted fat  saturation axial images of the breasts were obtained, the latter both  before and after intravenous administration of Gadolinium DTPA. On an  independent workstation, 3-D images were formulated using [employer]  including time enhancement curves, subtraction images and MIP images.    Intravenous contrast:  14 mL of Dotarem    FINDINGS:  There is asymmetric moderate bilateral background enhancement.    Extreme fibroglandular tissue.    RIGHT BREAST:  There are multiple contiguous masses and nonmass  enhancements occupying the entire lateral and central inferior right  breast including at the two sites of biopsy-proven malignancy at 7  o'clock and 10 o'clock, with a total span of 8.5 x 4.5 x 13.0 cm (AP  x TR x CC), better appreciated on the MIP and sagittal reconstruction  images. There is no involvement of the skin, nipple-areolar complex,  or pectoralis major muscle. The most anterior extent of malignancy  is  located approximately 2 cm from the base of the nipple.    Within the span of malignancy, more confluent findings are as follows:    - Dominant irregular heterogeneously enhancing mass in the inferior  lateral and central inferior breast at anterior and middle depths  measuring 5.0 x 4.7 x 4.5 cm, corresponding to the site of biopsy at  7 o'clock (series 7, image 127/176).    - Irregular heterogeneously enhancing mass in superior lateral breast  at posterior depth measuring 2.7 x 2.5 x 2.5 cm, corresponding to the  site of biopsy at 10 o'clock (series 7, image 48/176).    - More confluent nonmass enhancement in superior lateral breast at  middle to posterior depth measuring 3.8 x 2.0 x 2.5 cm (series 7,  image 73/176).    - A small satellite mass in superior lateral breast at middle depth  measuring 1.0 x 1.0 x 0.8 cm located 0.8 cm superolateral to the  dominant mass (series 7, image 108/176).    Evaluation of the axilla is limited due to incomplete coverage in the  field of view. A conglomerate of at least 3 enlarged level I right  lymph nodes associated with a biopsy clip is partially visualized  measuring at least 4.2 x 2.5 x 3.5 cm (series 6, image 1/176).    An abnormal internal mammary lymph node is seen in the 2nd  intercostal space measuring 1.5 x 1.2 x 1.6 cm    LEFT BREAST: No suspicious mass or nonmass enhancement is identified.  There are numerous scattered small benign cysts.    No axillary or internal mammary lymphadenopathy is appreciated within  limitations of limited field of view.    NON-BREAST FINDINGS:  None.    Impression  1. Biopsy-proven multicentric right breast malignancy with the total  span of up to 13.0 cm associated with bulky metastatic axillary and  abnormal internal mammary lymphadenopathy. No evidence of chest wall,  skin or nipple-areolar complex involvement.  2. No MRI evidence of malignancy in the left breast.    BI-RADS CATEGORY:  BI-RADS CATEGORY:  6 Known Biopsy-Proven  Malignancy.  Recommendation:  Immediate Follow-up.  Recommended Date:  Immediate.  Laterality:  Right.    For any future breast imaging appointments, please call [phone]).      MACRO:  None    Signed by: Riki Flor 2/15/2024 10:43 AM  Dictation workstation:   [id number]          Assessment/Plan:    Macy is a 38 year old female in excellent health with a recent diagnosis of right sided breast cancer. Macy felt a mass in her right breast in December that prompted diagnostic evaluation.     Ultrasound of the right breast showed In the patient's area of palpable concern in the right breast at 7 o'clock, 5 cm from the nipple, there is a large vague heterogeneousmass with internal vascularity which has the appearance of dense fibroglandular tissue measuring up to 5 cm;  In the patient's area of palpable concern felt by her provider in the right breast at 10 o'clock, 8 cm from the nipple, there is an oval parallel heterogeneous mass withinternal vascularity measuring approximately 1.1 x 0.9 x 1.4 cm. 3 enlarged axillary LN were seen    She had US guided core needle biopsy of  right breast at 7:00, 5 CM FN,  that showed invasive ductal carcinoma, grade 2, ER: 95%, UT: 5-10%, HER2 negative (1+).  And US guided core needle biopsy of the right breast at  10:00, 8 CM FN, that showed  Invasive ductal carcinoma, grade 2-3,  ER: 95%, UT<5%, HER2 negative (1+). Axillary LN consistent with metastatic carcinoma     On MRI she a  multicentric right breast malignancy with the total span of up to 13.0 cm associated with bulky metastatic axillary and abnormal internal mammary lymphadenopathy. No evidence of chest wall, skin or nipple-areolar complex involvement.     Current stage [id number] (staging scans negative for distant mets)  ECOG PS: 0    I reviewed with her the events that led to her diagnosis of breast cancer. We reviewed all the procedures and diagnostic imaging she underwent thus far. I discussed the  features of her breast cancer that include the size, grade, lymph node status and  hormone receptor/ her2-jair status.      We discussed neoadjuvant systemic therapy. The goal of treatment would be to downstage her cancer, assess tumor chemosensitivity and treat potential micrometastatic disease. I would treat, given extensive of disease with anthracyclines based regimen ddAC Q2 weeks x4 followed by taxol weekly x12     - Port placed, echo looks good- fup  echo in  May   - Elevated LDL- non pharmacological measures for now- follows with cardiology  - labs reviewed okay to proceed with cycle 4  - Good clinical response on exam today  - Zofran script resent   - RTC prior to cycle 5 (week 1 taxol) on 04/30 with MD Masha Hair PA-C  Hematology and Medical Oncology  [facility]

## 2024-04-15 NOTE — LETTER
April 15, 2024     Carmencita Patel DO  [id number] [facility], [address] [place] [id number]    Patient: Macy Chavez   YOB: 1985   Date of Visit: 4/15/2024       Dear Dr. Carmencita Patel DO:    Thank you for referring Macy Chavez to me for evaluation. Below are my notes for this consultation.  If you have questions, please do not hesitate to call me. I look forward to following your patient along with you.       Sincerely,     Mundo Mendoza MD      CC: No Recipients  ______________________________________________________________________________________                    Department of Plastic and Reconstructive Surgery            New Patient Visit      Date: 4/15/2024     Subjective  Macy Chavez is a 38 y.o. female who was referred by Carmencita Patel DO  for evaluation of the right breast.    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  She presents with her  today.    Her medical history is noncontributory, she has never had surgery before, she only takes a cholesterol patient.  She does not use nicotine nor has she ever.    Patient had a baseline mammogram on 11/3/2023 which was negative.  She did self palpated a mass shortly thereafter, she had imaging repeated on 12/20/2023 shows benign, patient subjectively noted interval growth and repeat imaging was performed on 2/6/2024 revealing a mass 7:00, 5 cm from nipple measuring 5 cm.    There is a second area palpated by Clarissa Hendricks at 10:00, 8 cm from nipple concerning for satellite lesion measuring 1.4 cm.  There were 3 markedly noted lymph nodes on examination.  Biopsies were performed on that same day on 2/6/2024.  All positive for invasive ductal carcinoma, including the right axillary lymph node all positive for invasive ductal carcinoma, including.   right axillary lymph node      All other systems have been reviewed with the patient and have been found to be negative with exception to the  chief complaint as mentioned in the history of present illness.    ROS: As noted in history of present illness  - CONSTITUTIONAL: Denies weight loss, fever and chills.  - HEENT: Denies changes in vision and hearing.  - RESPIRATORY: Denies SOB and cough, difficulty breathing  - CV: Denies palpitations and CP  - GI: Denies abdominal pain, nausea, vomiting and diarrhea.  - : Denies dysuria and urinary frequency.  - MSK: Denies myalgia and joint pain.  - SKIN: Denies rash and pruritus.  - NEUROLOGICAL: Denies headache and syncope.  - PSYCHIATRIC: Denies recent changes in mood. Denies anxiety and depression.    I reviewed with the patient the remainder of the his personal, medical, surgical and social history, found not to be pertinent to chief complaint. I specifically reviewed the family history with patient, found not to be pertinent to chief complaint.     Objective   There were no vitals filed for this visit.    Gen: interactive and pleasant  Head: NCAT  Eyes: EOMI, PERRLA  Mouth: MMM  Throat: trachea midline  Cor: RRR  Pulm: nonlabored breathing  Abd: s/nt/nd  Neuro: AAOx3  Ext: extremities perfused    Body mass index is 22.66 kg/m².        Focused exam of the: Bilateral breasts                                R                  L  SN:NAC             24cm            23cm  NAC:IMF            11cm           11cm         BW                    15cm            15cm        NAC diam         5.5cm             6cm               Ptosis Grade     0               0                                                       Bra Size         34DD      IMAGING  I reviewed imaging from 2/14/2024 and 2/6/2024  Right breast masses at 7 o'clock and 10 o'clock and a right axillary lymph demonstrating grade 2 invasive ductal carcinoma at both sites with axillary simon metastasis.    Assessment/Plan      Macy is a 38 y.o. female who presents for  right breast multicentric infiltrating ductal carcinoma, spanning approximately 13 cm with a  conglomerate at least 3 markedly enlarged positive lymph nodes    I had a long discussion regarding options for unilateral and bilateral reconstruction, we discussed indications for both, and stressed that oncologic decisions and discussions are best had with her breast surgeon and oncology team.    We reviewed autologous and implant-based reconstruction. I laid out for her in lay English the benefits and risks of both types of reconstruction. We discussed the most common reconstructions performed, we discussed the anticipated recovery and follow up for each.    I described to her autologous tissue reconstruction including pedicled TRAM, pedicled Latissimus (with and without expander/implant), free Gracilis, profunda artery , free Gluteus-based and free ANIYA-type reconstructions. I described the post-operative recovery, donor site morbidity, basic technical aspects of each reconstruction, post-operative protocols and outcomes of each. We reviewed that in our practice, we routinely perform autologous reconstruction in two stages. We reviewed the reasoning for this, and stressed that it is our practice to avoid radiating the final reconstruction.     I went on to discuss implant based reconstruction including total submuscular, sub-pectoral, and pre-pectoral tissue expander based surgery.  I discussed direct-to-implant reconstruction and stressed that if this were an option, it would essentially mean that she might be able to forego expansion, but noted that this was not a single stage reconstruction. I described the benefits of each and post-operative protocol for each.    We went on to discuss the risks of implant-based reconstruction, including infection, delayed healing, and flap necrosis.  We discussed the possibility of delayed reconstruction in the rare event that the mastectomy flaps blood supply is deemed unsuitable for reconstruction intraoperatively.     We went into great detail on what effects  radiation, if indicated, may have on the breast skin and torso shape.  We discussed asymmetry, contracture, and fibrosis of the skin, we mentioned rare radiation sequelae including osteoradionecrosis, and stressed that these would likely indicate her for autologous reconstruction.    I discussed with her common implant complications as rippling, animation deformity, rupture, and capsular contracture. Furthermore, I discussed the current cases of anaplastic T cell lymphoma as examples of risks of form-stable and textured implant based reconstruction.        I gave her supplemental reading information regarding our discussion. I had her repeat to me in her own words what we had discussed, and she was able to do so satisfactorily.    After reviewing our discussion, it is my impression that -she is interested in implant-based reconstruction downsizing downsizing the overall size of her breasts.    I discussed with her that we could expect some decrease in size with her natural skin shrinkage and a nipple sparing reconstruction, I indicated that to achieve a firm and more substantial decrease in overall size, we may consider skin excision/skin sparing or areolar sparing mastectomy.    Plan:   We will perform a phone call follow-up to answer all questions moving forward    I spent 60 minutes with this patient. Greater than 50% of this time was spent in the counselling and/or coordination of care of this patient.  This note was created using voice recognition software and was not corrected for typographical or grammatical errors.    Signature: Mundo Mendoza MD   Date: 4/15/2024

## 2024-04-15 NOTE — PROGRESS NOTES
Department of Plastic and Reconstructive Surgery            New Patient Visit      Date: 4/15/2024     Subjective   Macy Chavez is a 38 y.o. female who was referred by Carmencita Patel DO  for evaluation of the right breast.    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  She presents with her  today.    Her medical history is noncontributory, she has never had surgery before, she only takes a cholesterol patient.  She does not use nicotine nor has she ever.    Patient had a baseline mammogram on 11/3/2023 which was negative.  She did self palpated a mass shortly thereafter, she had imaging repeated on 12/20/2023 shows benign, patient subjectively noted interval growth and repeat imaging was performed on 2/6/2024 revealing a mass 7:00, 5 cm from nipple measuring 5 cm.    There is a second area palpated by Clarissa Hendricks at 10:00, 8 cm from nipple concerning for satellite lesion measuring 1.4 cm.  There were 3 markedly noted lymph nodes on examination.  Biopsies were performed on that same day on 2/6/2024.  All positive for invasive ductal carcinoma, including the right axillary lymph node all positive for invasive ductal carcinoma, including.   right axillary lymph node      All other systems have been reviewed with the patient and have been found to be negative with exception to the chief complaint as mentioned in the history of present illness.    ROS: As noted in history of present illness  - CONSTITUTIONAL: Denies weight loss, fever and chills.  - HEENT: Denies changes in vision and hearing.  - RESPIRATORY: Denies SOB and cough, difficulty breathing  - CV: Denies palpitations and CP  - GI: Denies abdominal pain, nausea, vomiting and diarrhea.  - : Denies dysuria and urinary frequency.  - MSK: Denies myalgia and joint pain.  - SKIN: Denies rash and pruritus.  - NEUROLOGICAL: Denies headache and syncope.  - PSYCHIATRIC: Denies recent changes in mood. Denies anxiety and  depression.    I reviewed with the patient the remainder of the his personal, medical, surgical and social history, found not to be pertinent to chief complaint. I specifically reviewed the family history with patient, found not to be pertinent to chief complaint.     Objective    There were no vitals filed for this visit.    Gen: interactive and pleasant  Head: NCAT  Eyes: EOMI, PERRLA  Mouth: MMM  Throat: trachea midline  Cor: RRR  Pulm: nonlabored breathing  Abd: s/nt/nd  Neuro: AAOx3  Ext: extremities perfused    Body mass index is 22.66 kg/m².        Focused exam of the: Bilateral breasts                                R                  L  SN:NAC             24cm            23cm  NAC:IMF            11cm           11cm         BW                    15cm            15cm        NAC diam         5.5cm             6cm               Ptosis Grade     0               0                                                       Bra Size         34DD      IMAGING  I reviewed imaging from 2/14/2024 and 2/6/2024  Right breast masses at 7 o'clock and 10 o'clock and a right axillary lymph demonstrating grade 2 invasive ductal carcinoma at both sites with axillary simon metastasis.    Assessment/Plan       Macy is a 38 y.o. female who presents for  right breast multicentric infiltrating ductal carcinoma, spanning approximately 13 cm with a conglomerate at least 3 markedly enlarged positive lymph nodes    I had a long discussion regarding options for unilateral and bilateral reconstruction, we discussed indications for both, and stressed that oncologic decisions and discussions are best had with her breast surgeon and oncology team.    We reviewed autologous and implant-based reconstruction. I laid out for her in lay English the benefits and risks of both types of reconstruction. We discussed the most common reconstructions performed, we discussed the anticipated recovery and follow up for each.    I described to her autologous  tissue reconstruction including pedicled TRAM, pedicled Latissimus (with and without expander/implant), free Gracilis, profunda artery , free Gluteus-based and free ANIYA-type reconstructions. I described the post-operative recovery, donor site morbidity, basic technical aspects of each reconstruction, post-operative protocols and outcomes of each. We reviewed that in our practice, we routinely perform autologous reconstruction in two stages. We reviewed the reasoning for this, and stressed that it is our practice to avoid radiating the final reconstruction.     I went on to discuss implant based reconstruction including total submuscular, sub-pectoral, and pre-pectoral tissue expander based surgery.  I discussed direct-to-implant reconstruction and stressed that if this were an option, it would essentially mean that she might be able to forego expansion, but noted that this was not a single stage reconstruction. I described the benefits of each and post-operative protocol for each.    We went on to discuss the risks of implant-based reconstruction, including infection, delayed healing, and flap necrosis.  We discussed the possibility of delayed reconstruction in the rare event that the mastectomy flaps blood supply is deemed unsuitable for reconstruction intraoperatively.     We went into great detail on what effects radiation, if indicated, may have on the breast skin and torso shape.  We discussed asymmetry, contracture, and fibrosis of the skin, we mentioned rare radiation sequelae including osteoradionecrosis, and stressed that these would likely indicate her for autologous reconstruction.    I discussed with her common implant complications as rippling, animation deformity, rupture, and capsular contracture. Furthermore, I discussed the current cases of anaplastic T cell lymphoma as examples of risks of form-stable and textured implant based reconstruction.        I gave her supplemental reading  information regarding our discussion. I had her repeat to me in her own words what we had discussed, and she was able to do so satisfactorily.    After reviewing our discussion, it is my impression that -she is interested in implant-based reconstruction downsizing downsizing the overall size of her breasts.    I discussed with her that we could expect some decrease in size with her natural skin shrinkage and a nipple sparing reconstruction, I indicated that to achieve a firm and more substantial decrease in overall size, we may consider skin excision/skin sparing or areolar sparing mastectomy.    Plan:   We will perform a phone call follow-up to answer all questions moving forward    I spent 60 minutes with this patient. Greater than 50% of this time was spent in the counselling and/or coordination of care of this patient.  This note was created using voice recognition software and was not corrected for typographical or grammatical errors.    Signature: Mundo Mendoza MD   Date: 4/15/2024

## 2024-04-16 ENCOUNTER — INFUSION (OUTPATIENT)
Dept: HEMATOLOGY/ONCOLOGY | Facility: CLINIC | Age: 39
End: 2024-04-16
Payer: COMMERCIAL

## 2024-04-16 ENCOUNTER — OFFICE VISIT (OUTPATIENT)
Dept: HEMATOLOGY/ONCOLOGY | Facility: CLINIC | Age: 39
End: 2024-04-16
Payer: COMMERCIAL

## 2024-04-16 ENCOUNTER — APPOINTMENT (OUTPATIENT)
Dept: HEMATOLOGY/ONCOLOGY | Facility: CLINIC | Age: 39
End: 2024-04-16
Payer: COMMERCIAL

## 2024-04-16 VITALS
DIASTOLIC BLOOD PRESSURE: 83 MMHG | TEMPERATURE: 97 F | WEIGHT: 153.66 LBS | OXYGEN SATURATION: 98 % | SYSTOLIC BLOOD PRESSURE: 116 MMHG | RESPIRATION RATE: 16 BRPM | BODY MASS INDEX: 22.76 KG/M2 | HEART RATE: 97 BPM

## 2024-04-16 DIAGNOSIS — C50.811 MALIGNANT NEOPLASM OF OVERLAPPING SITES OF RIGHT BREAST IN FEMALE, ESTROGEN RECEPTOR POSITIVE (MULTI): ICD-10-CM

## 2024-04-16 DIAGNOSIS — Z17.0 MALIGNANT NEOPLASM OF OVERLAPPING SITES OF RIGHT BREAST IN FEMALE, ESTROGEN RECEPTOR POSITIVE (MULTI): ICD-10-CM

## 2024-04-16 PROCEDURE — 96367 TX/PROPH/DG ADDL SEQ IV INF: CPT

## 2024-04-16 PROCEDURE — 2500000004 HC RX 250 GENERAL PHARMACY W/ HCPCS (ALT 636 FOR OP/ED): Performed by: STUDENT IN AN ORGANIZED HEALTH CARE EDUCATION/TRAINING PROGRAM

## 2024-04-16 PROCEDURE — 96411 CHEMO IV PUSH ADDL DRUG: CPT

## 2024-04-16 PROCEDURE — 96375 TX/PRO/DX INJ NEW DRUG ADDON: CPT | Mod: INF

## 2024-04-16 PROCEDURE — 96413 CHEMO IV INFUSION 1 HR: CPT

## 2024-04-16 PROCEDURE — 99214 OFFICE O/P EST MOD 30 MIN: CPT

## 2024-04-16 PROCEDURE — 96377 APPLICATON ON-BODY INJECTOR: CPT

## 2024-04-16 RX ORDER — HEPARIN 100 UNIT/ML
500 SYRINGE INTRAVENOUS AS NEEDED
Status: CANCELLED | OUTPATIENT
Start: 2024-04-16

## 2024-04-16 RX ORDER — PROCHLORPERAZINE MALEATE 10 MG
10 TABLET ORAL EVERY 6 HOURS PRN
Status: DISCONTINUED | OUTPATIENT
Start: 2024-04-16 | End: 2024-04-16 | Stop reason: HOSPADM

## 2024-04-16 RX ORDER — HEPARIN SODIUM,PORCINE/PF 10 UNIT/ML
50 SYRINGE (ML) INTRAVENOUS AS NEEDED
Status: DISCONTINUED | OUTPATIENT
Start: 2024-04-16 | End: 2024-04-16 | Stop reason: HOSPADM

## 2024-04-16 RX ORDER — EPINEPHRINE 0.3 MG/.3ML
0.3 INJECTION SUBCUTANEOUS EVERY 5 MIN PRN
Status: DISCONTINUED | OUTPATIENT
Start: 2024-04-16 | End: 2024-04-16 | Stop reason: HOSPADM

## 2024-04-16 RX ORDER — DOXORUBICIN HYDROCHLORIDE 2 MG/ML
60 INJECTION, SOLUTION INTRAVENOUS ONCE
Status: COMPLETED | OUTPATIENT
Start: 2024-04-16 | End: 2024-04-16

## 2024-04-16 RX ORDER — HEPARIN SODIUM,PORCINE/PF 10 UNIT/ML
50 SYRINGE (ML) INTRAVENOUS AS NEEDED
Status: CANCELLED | OUTPATIENT
Start: 2024-04-16

## 2024-04-16 RX ORDER — HEPARIN 100 UNIT/ML
500 SYRINGE INTRAVENOUS AS NEEDED
Status: DISCONTINUED | OUTPATIENT
Start: 2024-04-16 | End: 2024-04-16 | Stop reason: HOSPADM

## 2024-04-16 RX ORDER — PALONOSETRON 0.05 MG/ML
0.25 INJECTION, SOLUTION INTRAVENOUS ONCE
Status: COMPLETED | OUTPATIENT
Start: 2024-04-16 | End: 2024-04-16

## 2024-04-16 RX ORDER — FAMOTIDINE 10 MG/ML
20 INJECTION INTRAVENOUS ONCE AS NEEDED
Status: DISCONTINUED | OUTPATIENT
Start: 2024-04-16 | End: 2024-04-16 | Stop reason: HOSPADM

## 2024-04-16 RX ORDER — ALBUTEROL SULFATE 0.83 MG/ML
3 SOLUTION RESPIRATORY (INHALATION) AS NEEDED
Status: DISCONTINUED | OUTPATIENT
Start: 2024-04-16 | End: 2024-04-16 | Stop reason: HOSPADM

## 2024-04-16 RX ORDER — PROCHLORPERAZINE EDISYLATE 5 MG/ML
10 INJECTION INTRAMUSCULAR; INTRAVENOUS EVERY 6 HOURS PRN
Status: DISCONTINUED | OUTPATIENT
Start: 2024-04-16 | End: 2024-04-16 | Stop reason: HOSPADM

## 2024-04-16 RX ORDER — DIPHENHYDRAMINE HYDROCHLORIDE 50 MG/ML
50 INJECTION INTRAMUSCULAR; INTRAVENOUS AS NEEDED
Status: DISCONTINUED | OUTPATIENT
Start: 2024-04-16 | End: 2024-04-16 | Stop reason: HOSPADM

## 2024-04-16 RX ORDER — ONDANSETRON HYDROCHLORIDE 8 MG/1
8 TABLET, FILM COATED ORAL EVERY 8 HOURS PRN
Qty: 90 TABLET | Refills: 0 | Status: SHIPPED | OUTPATIENT
Start: 2024-04-16 | End: 2024-05-16

## 2024-04-16 RX ADMIN — HEPARIN 500 UNITS: 100 SYRINGE at 15:59

## 2024-04-16 RX ADMIN — PALONOSETRON 250 MCG: 0.05 INJECTION, SOLUTION INTRAVENOUS at 12:35

## 2024-04-16 RX ADMIN — CYCLOPHOSPHAMIDE 1122 MG: 1 INJECTION, POWDER, FOR SOLUTION INTRAVENOUS; ORAL at 14:40

## 2024-04-16 RX ADMIN — DEXAMETHASONE SODIUM PHOSPHATE 12 MG: 4 INJECTION, SOLUTION INTRAMUSCULAR; INTRAVENOUS at 12:46

## 2024-04-16 RX ADMIN — DOXORUBICIN HYDROCHLORIDE 112 MG: 2 INJECTION, SOLUTION INTRAVENOUS at 14:04

## 2024-04-16 RX ADMIN — PROCHLORPERAZINE EDISYLATE 10 MG: 5 INJECTION INTRAMUSCULAR; INTRAVENOUS at 13:56

## 2024-04-16 RX ADMIN — SODIUM CHLORIDE 500 ML: 9 INJECTION, SOLUTION INTRAVENOUS at 12:33

## 2024-04-16 RX ADMIN — PEGFILGRASTIM 6 MG: KIT SUBCUTANEOUS at 15:17

## 2024-04-16 RX ADMIN — FOSAPREPITANT DIMEGLUMINE 150 MG: 150 INJECTION, POWDER, LYOPHILIZED, FOR SOLUTION INTRAVENOUS at 13:17

## 2024-04-16 ASSESSMENT — PAIN SCALES - GENERAL: PAINLEVEL: 0-NO PAIN

## 2024-04-16 NOTE — SIGNIFICANT EVENT
04/16/24 1211   Prechemo Checklist   Has the patient been in the hospital, ED, or urgent care since last date of service No   Chemo/Immuno Consent Signed Yes   Protocol/Indications Verified Yes   Confirmed to previous date/time of medication Yes   Compared to previous dose Yes   All medications are dated accurately Yes   Pregnancy Test Negative Yes   Parameters Met Yes   BSA/Weight-Height Verified Yes   Dose Calculations Verified (current, total, cumulative) Yes

## 2024-04-29 ENCOUNTER — INFUSION (OUTPATIENT)
Dept: HEMATOLOGY/ONCOLOGY | Facility: CLINIC | Age: 39
End: 2024-04-29
Payer: COMMERCIAL

## 2024-04-29 ENCOUNTER — OFFICE VISIT (OUTPATIENT)
Dept: PLASTIC SURGERY | Facility: CLINIC | Age: 39
End: 2024-04-29
Payer: COMMERCIAL

## 2024-04-29 VITALS
RESPIRATION RATE: 18 BRPM | SYSTOLIC BLOOD PRESSURE: 116 MMHG | DIASTOLIC BLOOD PRESSURE: 78 MMHG | BODY MASS INDEX: 22.56 KG/M2 | WEIGHT: 152.34 LBS | OXYGEN SATURATION: 97 % | HEART RATE: 79 BPM | TEMPERATURE: 98.1 F

## 2024-04-29 DIAGNOSIS — C50.811 MALIGNANT NEOPLASM OF OVERLAPPING SITES OF RIGHT BREAST IN FEMALE, ESTROGEN RECEPTOR POSITIVE (MULTI): Primary | ICD-10-CM

## 2024-04-29 DIAGNOSIS — C50.811 MALIGNANT NEOPLASM OF OVERLAPPING SITES OF RIGHT BREAST IN FEMALE, ESTROGEN RECEPTOR POSITIVE (MULTI): ICD-10-CM

## 2024-04-29 DIAGNOSIS — Z17.0 MALIGNANT NEOPLASM OF OVERLAPPING SITES OF RIGHT BREAST IN FEMALE, ESTROGEN RECEPTOR POSITIVE (MULTI): Primary | ICD-10-CM

## 2024-04-29 DIAGNOSIS — Z17.0 MALIGNANT NEOPLASM OF OVERLAPPING SITES OF RIGHT BREAST IN FEMALE, ESTROGEN RECEPTOR POSITIVE (MULTI): ICD-10-CM

## 2024-04-29 LAB
ALBUMIN SERPL BCP-MCNC: 4.4 G/DL (ref 3.4–5)
ALP SERPL-CCNC: 81 U/L (ref 33–110)
ALT SERPL W P-5'-P-CCNC: 14 U/L (ref 7–45)
ANION GAP SERPL CALC-SCNC: 12 MMOL/L (ref 10–20)
AST SERPL W P-5'-P-CCNC: 13 U/L (ref 9–39)
B-HCG SERPL-ACNC: <3 MIU/ML
BASOPHILS # BLD MANUAL: 0 X10*3/UL (ref 0–0.1)
BASOPHILS NFR BLD MANUAL: 0 %
BILIRUB SERPL-MCNC: 0.3 MG/DL (ref 0–1.2)
BUN SERPL-MCNC: 10 MG/DL (ref 6–23)
CALCIUM SERPL-MCNC: 9.2 MG/DL (ref 8.6–10.3)
CHLORIDE SERPL-SCNC: 104 MMOL/L (ref 98–107)
CO2 SERPL-SCNC: 27 MMOL/L (ref 21–32)
CREAT SERPL-MCNC: 0.75 MG/DL (ref 0.5–1.05)
EGFRCR SERPLBLD CKD-EPI 2021: >90 ML/MIN/1.73M*2
EOSINOPHIL # BLD MANUAL: 0.09 X10*3/UL (ref 0–0.7)
EOSINOPHIL NFR BLD MANUAL: 1 %
ERYTHROCYTE [DISTWIDTH] IN BLOOD BY AUTOMATED COUNT: 15.8 % (ref 11.5–14.5)
GLUCOSE SERPL-MCNC: 114 MG/DL (ref 74–99)
HCT VFR BLD AUTO: 34.3 % (ref 36–46)
HGB BLD-MCNC: 11.7 G/DL (ref 12–16)
IMM GRANULOCYTES # BLD AUTO: 0.76 X10*3/UL (ref 0–0.7)
IMM GRANULOCYTES NFR BLD AUTO: 8.2 % (ref 0–0.9)
LYMPHOCYTES # BLD MANUAL: 1.01 X10*3/UL (ref 1.2–4.8)
LYMPHOCYTES NFR BLD MANUAL: 11 %
MCH RBC QN AUTO: 31.9 PG (ref 26–34)
MCHC RBC AUTO-ENTMCNC: 34.1 G/DL (ref 32–36)
MCV RBC AUTO: 94 FL (ref 80–100)
METAMYELOCYTES # BLD MANUAL: 0.09 X10*3/UL
METAMYELOCYTES NFR BLD MANUAL: 1 %
MONOCYTES # BLD MANUAL: 0.64 X10*3/UL (ref 0.1–1)
MONOCYTES NFR BLD MANUAL: 7 %
MYELOCYTES # BLD MANUAL: 0.09 X10*3/UL
MYELOCYTES NFR BLD MANUAL: 1 %
NEUTROPHILS # BLD MANUAL: 7.27 X10*3/UL (ref 1.2–7.7)
NEUTS BAND # BLD MANUAL: 1.66 X10*3/UL (ref 0–0.7)
NEUTS BAND NFR BLD MANUAL: 18 %
NEUTS SEG # BLD MANUAL: 5.61 X10*3/UL (ref 1.2–7)
NEUTS SEG NFR BLD MANUAL: 61 %
NRBC BLD-RTO: ABNORMAL /100{WBCS}
PLATELET # BLD AUTO: 205 X10*3/UL (ref 150–450)
POLYCHROMASIA BLD QL SMEAR: ABNORMAL
POTASSIUM SERPL-SCNC: 3.8 MMOL/L (ref 3.5–5.3)
PROT SERPL-MCNC: 6.6 G/DL (ref 6.4–8.2)
RBC # BLD AUTO: 3.67 X10*6/UL (ref 4–5.2)
RBC MORPH BLD: ABNORMAL
SODIUM SERPL-SCNC: 139 MMOL/L (ref 136–145)
TOTAL CELLS COUNTED BLD: 100
WBC # BLD AUTO: 9.2 X10*3/UL (ref 4.4–11.3)

## 2024-04-29 PROCEDURE — 99213 OFFICE O/P EST LOW 20 MIN: CPT | Performed by: SURGERY

## 2024-04-29 PROCEDURE — 84702 CHORIONIC GONADOTROPIN TEST: CPT

## 2024-04-29 PROCEDURE — 2500000004 HC RX 250 GENERAL PHARMACY W/ HCPCS (ALT 636 FOR OP/ED): Performed by: STUDENT IN AN ORGANIZED HEALTH CARE EDUCATION/TRAINING PROGRAM

## 2024-04-29 PROCEDURE — 85027 COMPLETE CBC AUTOMATED: CPT

## 2024-04-29 PROCEDURE — 36591 DRAW BLOOD OFF VENOUS DEVICE: CPT

## 2024-04-29 PROCEDURE — 85007 BL SMEAR W/DIFF WBC COUNT: CPT

## 2024-04-29 PROCEDURE — 80053 COMPREHEN METABOLIC PANEL: CPT

## 2024-04-29 RX ORDER — FAMOTIDINE 10 MG/ML
20 INJECTION INTRAVENOUS ONCE AS NEEDED
Status: CANCELLED | OUTPATIENT
Start: 2024-05-07

## 2024-04-29 RX ORDER — ALBUTEROL SULFATE 0.83 MG/ML
3 SOLUTION RESPIRATORY (INHALATION) AS NEEDED
Status: CANCELLED | OUTPATIENT
Start: 2024-05-14

## 2024-04-29 RX ORDER — PROCHLORPERAZINE MALEATE 5 MG
10 TABLET ORAL EVERY 6 HOURS PRN
Status: CANCELLED | OUTPATIENT
Start: 2024-04-30

## 2024-04-29 RX ORDER — DIPHENHYDRAMINE HCL 50 MG
50 CAPSULE ORAL ONCE
Status: CANCELLED | OUTPATIENT
Start: 2024-05-14

## 2024-04-29 RX ORDER — FAMOTIDINE 10 MG/ML
20 INJECTION INTRAVENOUS ONCE AS NEEDED
Status: CANCELLED | OUTPATIENT
Start: 2024-04-30

## 2024-04-29 RX ORDER — FAMOTIDINE 10 MG/ML
20 INJECTION INTRAVENOUS ONCE AS NEEDED
Status: CANCELLED | OUTPATIENT
Start: 2024-05-14

## 2024-04-29 RX ORDER — DIPHENHYDRAMINE HCL 50 MG
50 CAPSULE ORAL ONCE
Status: CANCELLED | OUTPATIENT
Start: 2024-04-30

## 2024-04-29 RX ORDER — DEXAMETHASONE SODIUM PHOSPHATE 100 MG/10ML
10 INJECTION INTRAMUSCULAR; INTRAVENOUS ONCE
Status: CANCELLED | OUTPATIENT
Start: 2024-05-14

## 2024-04-29 RX ORDER — FAMOTIDINE 10 MG/ML
20 INJECTION INTRAVENOUS ONCE
Status: CANCELLED | OUTPATIENT
Start: 2024-05-07

## 2024-04-29 RX ORDER — FAMOTIDINE 10 MG/ML
20 INJECTION INTRAVENOUS ONCE
Status: CANCELLED | OUTPATIENT
Start: 2024-05-14

## 2024-04-29 RX ORDER — PROCHLORPERAZINE MALEATE 5 MG
10 TABLET ORAL EVERY 6 HOURS PRN
Status: CANCELLED | OUTPATIENT
Start: 2024-05-07

## 2024-04-29 RX ORDER — DIPHENHYDRAMINE HCL 50 MG
50 CAPSULE ORAL ONCE
Status: CANCELLED | OUTPATIENT
Start: 2024-05-07

## 2024-04-29 RX ORDER — EPINEPHRINE 0.3 MG/.3ML
0.3 INJECTION SUBCUTANEOUS EVERY 5 MIN PRN
Status: CANCELLED | OUTPATIENT
Start: 2024-04-30

## 2024-04-29 RX ORDER — DIPHENHYDRAMINE HYDROCHLORIDE 50 MG/ML
50 INJECTION INTRAMUSCULAR; INTRAVENOUS AS NEEDED
Status: CANCELLED | OUTPATIENT
Start: 2024-05-07

## 2024-04-29 RX ORDER — ALBUTEROL SULFATE 0.83 MG/ML
3 SOLUTION RESPIRATORY (INHALATION) AS NEEDED
Status: CANCELLED | OUTPATIENT
Start: 2024-05-07

## 2024-04-29 RX ORDER — ONDANSETRON HYDROCHLORIDE 2 MG/ML
8 INJECTION, SOLUTION INTRAVENOUS ONCE
Status: CANCELLED | OUTPATIENT
Start: 2024-05-14

## 2024-04-29 RX ORDER — ALBUTEROL SULFATE 0.83 MG/ML
3 SOLUTION RESPIRATORY (INHALATION) AS NEEDED
Status: CANCELLED | OUTPATIENT
Start: 2024-04-30

## 2024-04-29 RX ORDER — EPINEPHRINE 0.3 MG/.3ML
0.3 INJECTION SUBCUTANEOUS EVERY 5 MIN PRN
Status: CANCELLED | OUTPATIENT
Start: 2024-05-14

## 2024-04-29 RX ORDER — FAMOTIDINE 10 MG/ML
20 INJECTION INTRAVENOUS ONCE
Status: CANCELLED | OUTPATIENT
Start: 2024-04-30

## 2024-04-29 RX ORDER — HEPARIN 100 UNIT/ML
500 SYRINGE INTRAVENOUS AS NEEDED
Status: DISCONTINUED | OUTPATIENT
Start: 2024-04-29 | End: 2024-04-29 | Stop reason: HOSPADM

## 2024-04-29 RX ORDER — PROCHLORPERAZINE EDISYLATE 5 MG/ML
10 INJECTION INTRAMUSCULAR; INTRAVENOUS EVERY 6 HOURS PRN
Status: CANCELLED | OUTPATIENT
Start: 2024-04-30

## 2024-04-29 RX ORDER — EPINEPHRINE 0.3 MG/.3ML
0.3 INJECTION SUBCUTANEOUS EVERY 5 MIN PRN
Status: CANCELLED | OUTPATIENT
Start: 2024-05-07

## 2024-04-29 RX ORDER — ONDANSETRON HYDROCHLORIDE 2 MG/ML
8 INJECTION, SOLUTION INTRAVENOUS ONCE
Status: CANCELLED | OUTPATIENT
Start: 2024-04-30

## 2024-04-29 RX ORDER — ONDANSETRON HYDROCHLORIDE 2 MG/ML
8 INJECTION, SOLUTION INTRAVENOUS ONCE
Status: CANCELLED | OUTPATIENT
Start: 2024-05-07

## 2024-04-29 RX ORDER — HEPARIN 100 UNIT/ML
500 SYRINGE INTRAVENOUS AS NEEDED
Status: CANCELLED | OUTPATIENT
Start: 2024-04-29

## 2024-04-29 RX ORDER — PROCHLORPERAZINE EDISYLATE 5 MG/ML
10 INJECTION INTRAMUSCULAR; INTRAVENOUS EVERY 6 HOURS PRN
Status: CANCELLED | OUTPATIENT
Start: 2024-05-07

## 2024-04-29 RX ORDER — HEPARIN SODIUM,PORCINE/PF 10 UNIT/ML
50 SYRINGE (ML) INTRAVENOUS AS NEEDED
Status: DISCONTINUED | OUTPATIENT
Start: 2024-04-29 | End: 2024-04-29 | Stop reason: HOSPADM

## 2024-04-29 RX ORDER — PROCHLORPERAZINE EDISYLATE 5 MG/ML
10 INJECTION INTRAMUSCULAR; INTRAVENOUS EVERY 6 HOURS PRN
Status: CANCELLED | OUTPATIENT
Start: 2024-05-14

## 2024-04-29 RX ORDER — DEXAMETHASONE SODIUM PHOSPHATE 100 MG/10ML
10 INJECTION INTRAMUSCULAR; INTRAVENOUS ONCE
Status: CANCELLED | OUTPATIENT
Start: 2024-05-07

## 2024-04-29 RX ORDER — DIPHENHYDRAMINE HYDROCHLORIDE 50 MG/ML
50 INJECTION INTRAMUSCULAR; INTRAVENOUS AS NEEDED
Status: CANCELLED | OUTPATIENT
Start: 2024-04-30

## 2024-04-29 RX ORDER — DIPHENHYDRAMINE HYDROCHLORIDE 50 MG/ML
50 INJECTION INTRAMUSCULAR; INTRAVENOUS AS NEEDED
Status: CANCELLED | OUTPATIENT
Start: 2024-05-14

## 2024-04-29 RX ORDER — DEXAMETHASONE SODIUM PHOSPHATE 100 MG/10ML
10 INJECTION INTRAMUSCULAR; INTRAVENOUS ONCE
Status: CANCELLED | OUTPATIENT
Start: 2024-04-30

## 2024-04-29 RX ORDER — PROCHLORPERAZINE MALEATE 5 MG
10 TABLET ORAL EVERY 6 HOURS PRN
Status: CANCELLED | OUTPATIENT
Start: 2024-05-14

## 2024-04-29 RX ORDER — HEPARIN SODIUM,PORCINE/PF 10 UNIT/ML
50 SYRINGE (ML) INTRAVENOUS AS NEEDED
Status: CANCELLED | OUTPATIENT
Start: 2024-04-29

## 2024-04-29 RX ADMIN — HEPARIN 500 UNITS: 100 SYRINGE at 15:01

## 2024-04-29 ASSESSMENT — PAIN SCALES - GENERAL: PAINLEVEL: 0-NO PAIN

## 2024-04-29 NOTE — PROGRESS NOTES
Department of Plastic and Reconstructive Surgery            New Patient Visit      Date: 4/29/2024     Surya Cahvez is a 38 y.o. female who  presents with  right breast multicentric infiltrating ductal carcinoma, spanning approximately 13 cm    She presents today for FUV after initial consultation.    IN BRIEF:  Patient had a baseline mammogram on 11/3/2023 which was negative.  She did self palpated a mass shortly thereafter, she had imaging repeated on 12/20/2023 shows benign, patient subjectively noted interval growth and repeat imaging was performed on 2/6/2024 revealing a mass 7:00, 5 cm from nipple measuring 5 cm.    There was a second area palpated at 10:00, 8 cm from nipple concerning for satellite lesion measuring 1.4 cm.  There were 3 markedly noted lymph nodes on examination.  Biopsies were performed on that same day on 2/6/2024: All positive for invasive ductal carcinoma, including the right axillary lymph node all positive for invasive ductal carcinoma      I reviewed with the patient the remainder of the his personal, medical, surgical and social history, found not to be pertinent to chief complaint. I specifically reviewed the family history with patient, found not to be pertinent to chief complaint.     Objective    NO EXAM. VIRTUAL VISIT.   From previous, clinical photography reviewed:    Focused exam of the: Bilateral breasts                                R                  L  SN:NAC             24cm            23cm  NAC:IMF            11cm           11cm         BW                    15cm            15cm        NAC diam         5.5cm             6cm               Ptosis Grade     0               0                                                       Bra Size         34DD          Assessment/Plan       Macy is a 38 y.o. female who presents for  right breast multicentric infiltrating ductal carcinoma, spanning approximately 13 cm with a conglomerate at least 3 markedly  enlarged positive lymph nodes    Patient would like to be a smaller size, ideally a C cup    Her final chemotherapy treatment is scheduled July 16th    Plan:     Bilateral nipple sparing mastectomy  Immediate Direct to Implant with structured saline implants  Bilateral nipple reinnervation with nerve allograft     I spent 30 minutes with this patient. Greater than 50% of this time was spent in the counselling and/or coordination of care of this patient.  This note was created using voice recognition software and was not corrected for typographical or grammatical errors.    Signature: Mundo Mendoza MD   Date: 4/29/2024

## 2024-04-29 NOTE — LETTER
April 29, 2024     Carmencita Patel DO  [address] [facility], [address] [place] [id number]    Patient: Macy Chavez   YOB: 1985   Date of Visit: 4/29/2024       Dear Dr. Carmencita Patel DO:    Thank you for referring Macy Chavez to me for evaluation. Below are my notes for this consultation.  If you have questions, please do not hesitate to call me. I look forward to following your patient along with you.       Sincerely,     Mundo Mendoza MD      CC: No Recipients  ______________________________________________________________________________________                    Department of Plastic and Reconstructive Surgery            New Patient Visit      Date: 4/29/2024     Subjective  Macy Chavez is a 38 y.o. female who  presents with  right breast multicentric infiltrating ductal carcinoma, spanning approximately 13 cm    She presents today for FUV after initial consultation.    IN BRIEF:  Patient had a baseline mammogram on 11/3/2023 which was negative.  She did self palpated a mass shortly thereafter, she had imaging repeated on 12/20/2023 shows benign, patient subjectively noted interval growth and repeat imaging was performed on 2/6/2024 revealing a mass 7:00, 5 cm from nipple measuring 5 cm.    There was a second area palpated at 10:00, 8 cm from nipple concerning for satellite lesion measuring 1.4 cm.  There were 3 markedly noted lymph nodes on examination.  Biopsies were performed on that same day on 2/6/2024: All positive for invasive ductal carcinoma, including the right axillary lymph node all positive for invasive ductal carcinoma      I reviewed with the patient the remainder of the his personal, medical, surgical and social history, found not to be pertinent to chief complaint. I specifically reviewed the family history with patient, found not to be pertinent to chief complaint.     Objective   NO EXAM. VIRTUAL VISIT.   From previous, clinical photography  reviewed:    Focused exam of the: Bilateral breasts                                R                  L  SN:NAC             24cm            23cm  NAC:IMF            11cm           11cm         BW                    15cm            15cm        NAC diam         5.5cm             6cm               Ptosis Grade     0               0                                                       Bra Size         34DD          Assessment/Plan      Macy is a 38 y.o. female who presents for  right breast multicentric infiltrating ductal carcinoma, spanning approximately 13 cm with a conglomerate at least 3 markedly enlarged positive lymph nodes    Patient would like to be a smaller size, ideally a C cup    Her final chemotherapy treatment is scheduled July 16th    Plan:     Bilateral nipple sparing mastectomy  Immediate Direct to Implant with structured saline implants  Bilateral nipple reinnervation with nerve allograft     I spent 30 minutes with this patient. Greater than 50% of this time was spent in the counselling and/or coordination of care of this patient.  This note was created using voice recognition software and was not corrected for typographical or grammatical errors.    Signature: Mundo Mendoza MD   Date: 4/29/2024

## 2024-04-30 ENCOUNTER — APPOINTMENT (OUTPATIENT)
Dept: HEMATOLOGY/ONCOLOGY | Facility: CLINIC | Age: 39
End: 2024-04-30
Payer: COMMERCIAL

## 2024-04-30 ENCOUNTER — INFUSION (OUTPATIENT)
Dept: HEMATOLOGY/ONCOLOGY | Facility: CLINIC | Age: 39
End: 2024-04-30
Payer: COMMERCIAL

## 2024-04-30 ENCOUNTER — OFFICE VISIT (OUTPATIENT)
Dept: HEMATOLOGY/ONCOLOGY | Facility: CLINIC | Age: 39
End: 2024-04-30
Payer: COMMERCIAL

## 2024-04-30 VITALS
HEART RATE: 97 BPM | WEIGHT: 153.88 LBS | SYSTOLIC BLOOD PRESSURE: 97 MMHG | TEMPERATURE: 97 F | BODY MASS INDEX: 22.79 KG/M2 | DIASTOLIC BLOOD PRESSURE: 70 MMHG | RESPIRATION RATE: 16 BRPM | OXYGEN SATURATION: 98 %

## 2024-04-30 DIAGNOSIS — C50.811 MALIGNANT NEOPLASM OF OVERLAPPING SITES OF RIGHT BREAST IN FEMALE, ESTROGEN RECEPTOR POSITIVE (MULTI): Primary | ICD-10-CM

## 2024-04-30 DIAGNOSIS — Z17.0 MALIGNANT NEOPLASM OF OVERLAPPING SITES OF RIGHT BREAST IN FEMALE, ESTROGEN RECEPTOR POSITIVE (MULTI): ICD-10-CM

## 2024-04-30 DIAGNOSIS — Z17.0 MALIGNANT NEOPLASM OF OVERLAPPING SITES OF RIGHT BREAST IN FEMALE, ESTROGEN RECEPTOR POSITIVE (MULTI): Primary | ICD-10-CM

## 2024-04-30 DIAGNOSIS — C50.811 MALIGNANT NEOPLASM OF OVERLAPPING SITES OF RIGHT BREAST IN FEMALE, ESTROGEN RECEPTOR POSITIVE (MULTI): ICD-10-CM

## 2024-04-30 PROCEDURE — 2500000001 HC RX 250 WO HCPCS SELF ADMINISTERED DRUGS (ALT 637 FOR MEDICARE OP)

## 2024-04-30 PROCEDURE — 2500000004 HC RX 250 GENERAL PHARMACY W/ HCPCS (ALT 636 FOR OP/ED): Performed by: STUDENT IN AN ORGANIZED HEALTH CARE EDUCATION/TRAINING PROGRAM

## 2024-04-30 PROCEDURE — 99214 OFFICE O/P EST MOD 30 MIN: CPT | Mod: 25 | Performed by: STUDENT IN AN ORGANIZED HEALTH CARE EDUCATION/TRAINING PROGRAM

## 2024-04-30 PROCEDURE — 99214 OFFICE O/P EST MOD 30 MIN: CPT | Performed by: STUDENT IN AN ORGANIZED HEALTH CARE EDUCATION/TRAINING PROGRAM

## 2024-04-30 PROCEDURE — 96413 CHEMO IV INFUSION 1 HR: CPT

## 2024-04-30 PROCEDURE — 2500000004 HC RX 250 GENERAL PHARMACY W/ HCPCS (ALT 636 FOR OP/ED)

## 2024-04-30 PROCEDURE — 96375 TX/PRO/DX INJ NEW DRUG ADDON: CPT | Mod: INF

## 2024-04-30 RX ORDER — ONDANSETRON HYDROCHLORIDE 2 MG/ML
8 INJECTION, SOLUTION INTRAVENOUS ONCE
Status: COMPLETED | OUTPATIENT
Start: 2024-04-30 | End: 2024-04-30

## 2024-04-30 RX ORDER — DEXAMETHASONE SODIUM PHOSPHATE 100 MG/10ML
10 INJECTION INTRAMUSCULAR; INTRAVENOUS ONCE
Status: CANCELLED | OUTPATIENT
Start: 2024-05-28

## 2024-04-30 RX ORDER — ONDANSETRON HYDROCHLORIDE 2 MG/ML
8 INJECTION, SOLUTION INTRAVENOUS ONCE
Status: CANCELLED | OUTPATIENT
Start: 2024-05-28

## 2024-04-30 RX ORDER — FAMOTIDINE 10 MG/ML
20 INJECTION INTRAVENOUS ONCE AS NEEDED
Status: CANCELLED | OUTPATIENT
Start: 2024-05-21

## 2024-04-30 RX ORDER — EPINEPHRINE 0.3 MG/.3ML
0.3 INJECTION SUBCUTANEOUS EVERY 5 MIN PRN
Status: DISCONTINUED | OUTPATIENT
Start: 2024-04-30 | End: 2024-04-30 | Stop reason: HOSPADM

## 2024-04-30 RX ORDER — ALBUTEROL SULFATE 0.83 MG/ML
3 SOLUTION RESPIRATORY (INHALATION) AS NEEDED
Status: CANCELLED | OUTPATIENT
Start: 2024-05-21

## 2024-04-30 RX ORDER — DIPHENHYDRAMINE HCL 50 MG
50 CAPSULE ORAL ONCE
Status: CANCELLED | OUTPATIENT
Start: 2024-05-21

## 2024-04-30 RX ORDER — DIPHENHYDRAMINE HYDROCHLORIDE 50 MG/ML
50 INJECTION INTRAMUSCULAR; INTRAVENOUS AS NEEDED
Status: CANCELLED | OUTPATIENT
Start: 2024-05-28

## 2024-04-30 RX ORDER — DIPHENHYDRAMINE HYDROCHLORIDE 50 MG/ML
50 INJECTION INTRAMUSCULAR; INTRAVENOUS AS NEEDED
Status: CANCELLED | OUTPATIENT
Start: 2024-05-21

## 2024-04-30 RX ORDER — FAMOTIDINE 10 MG/ML
20 INJECTION INTRAVENOUS ONCE AS NEEDED
Status: CANCELLED | OUTPATIENT
Start: 2024-05-28

## 2024-04-30 RX ORDER — EPINEPHRINE 0.3 MG/.3ML
0.3 INJECTION SUBCUTANEOUS EVERY 5 MIN PRN
Status: CANCELLED | OUTPATIENT
Start: 2024-05-21

## 2024-04-30 RX ORDER — ALBUTEROL SULFATE 0.83 MG/ML
3 SOLUTION RESPIRATORY (INHALATION) AS NEEDED
Status: CANCELLED | OUTPATIENT
Start: 2024-05-28

## 2024-04-30 RX ORDER — PROCHLORPERAZINE MALEATE 5 MG
10 TABLET ORAL EVERY 6 HOURS PRN
Status: CANCELLED | OUTPATIENT
Start: 2024-05-21

## 2024-04-30 RX ORDER — FAMOTIDINE 10 MG/ML
20 INJECTION INTRAVENOUS ONCE
Status: COMPLETED | OUTPATIENT
Start: 2024-04-30 | End: 2024-04-30

## 2024-04-30 RX ORDER — PROCHLORPERAZINE EDISYLATE 5 MG/ML
10 INJECTION INTRAMUSCULAR; INTRAVENOUS EVERY 6 HOURS PRN
Status: CANCELLED | OUTPATIENT
Start: 2024-05-28

## 2024-04-30 RX ORDER — HEPARIN 100 UNIT/ML
500 SYRINGE INTRAVENOUS AS NEEDED
Status: CANCELLED | OUTPATIENT
Start: 2024-04-30

## 2024-04-30 RX ORDER — ONDANSETRON HYDROCHLORIDE 2 MG/ML
8 INJECTION, SOLUTION INTRAVENOUS ONCE
Status: CANCELLED | OUTPATIENT
Start: 2024-05-21

## 2024-04-30 RX ORDER — FAMOTIDINE 10 MG/ML
20 INJECTION INTRAVENOUS ONCE
Status: CANCELLED | OUTPATIENT
Start: 2024-05-28

## 2024-04-30 RX ORDER — HEPARIN SODIUM,PORCINE/PF 10 UNIT/ML
50 SYRINGE (ML) INTRAVENOUS AS NEEDED
Status: CANCELLED | OUTPATIENT
Start: 2024-04-30

## 2024-04-30 RX ORDER — PROCHLORPERAZINE EDISYLATE 5 MG/ML
10 INJECTION INTRAMUSCULAR; INTRAVENOUS EVERY 6 HOURS PRN
Status: DISCONTINUED | OUTPATIENT
Start: 2024-04-30 | End: 2024-04-30 | Stop reason: HOSPADM

## 2024-04-30 RX ORDER — FAMOTIDINE 10 MG/ML
20 INJECTION INTRAVENOUS ONCE AS NEEDED
Status: DISCONTINUED | OUTPATIENT
Start: 2024-04-30 | End: 2024-04-30 | Stop reason: HOSPADM

## 2024-04-30 RX ORDER — ALBUTEROL SULFATE 0.83 MG/ML
3 SOLUTION RESPIRATORY (INHALATION) AS NEEDED
Status: DISCONTINUED | OUTPATIENT
Start: 2024-04-30 | End: 2024-04-30 | Stop reason: HOSPADM

## 2024-04-30 RX ORDER — PROCHLORPERAZINE MALEATE 5 MG
10 TABLET ORAL EVERY 6 HOURS PRN
Status: CANCELLED | OUTPATIENT
Start: 2024-05-28

## 2024-04-30 RX ORDER — DIPHENHYDRAMINE HYDROCHLORIDE 50 MG/ML
50 INJECTION INTRAMUSCULAR; INTRAVENOUS AS NEEDED
Status: DISCONTINUED | OUTPATIENT
Start: 2024-04-30 | End: 2024-04-30 | Stop reason: HOSPADM

## 2024-04-30 RX ORDER — DEXAMETHASONE SODIUM PHOSPHATE 100 MG/10ML
10 INJECTION INTRAMUSCULAR; INTRAVENOUS ONCE
Status: CANCELLED | OUTPATIENT
Start: 2024-05-21

## 2024-04-30 RX ORDER — PROCHLORPERAZINE MALEATE 10 MG
10 TABLET ORAL EVERY 6 HOURS PRN
Status: DISCONTINUED | OUTPATIENT
Start: 2024-04-30 | End: 2024-04-30 | Stop reason: HOSPADM

## 2024-04-30 RX ORDER — FAMOTIDINE 10 MG/ML
20 INJECTION INTRAVENOUS ONCE
Status: CANCELLED | OUTPATIENT
Start: 2024-05-21

## 2024-04-30 RX ORDER — EPINEPHRINE 0.3 MG/.3ML
0.3 INJECTION SUBCUTANEOUS EVERY 5 MIN PRN
Status: CANCELLED | OUTPATIENT
Start: 2024-05-28

## 2024-04-30 RX ORDER — DIPHENHYDRAMINE HCL 50 MG
50 CAPSULE ORAL ONCE
Status: CANCELLED | OUTPATIENT
Start: 2024-05-28

## 2024-04-30 RX ORDER — PROCHLORPERAZINE EDISYLATE 5 MG/ML
10 INJECTION INTRAMUSCULAR; INTRAVENOUS EVERY 6 HOURS PRN
Status: CANCELLED | OUTPATIENT
Start: 2024-05-21

## 2024-04-30 RX ORDER — DIPHENHYDRAMINE HCL 25 MG
50 CAPSULE ORAL ONCE
Status: COMPLETED | OUTPATIENT
Start: 2024-04-30 | End: 2024-04-30

## 2024-04-30 RX ORDER — DEXAMETHASONE SODIUM PHOSPHATE 100 MG/10ML
10 INJECTION INTRAMUSCULAR; INTRAVENOUS ONCE
Status: COMPLETED | OUTPATIENT
Start: 2024-04-30 | End: 2024-04-30

## 2024-04-30 RX ORDER — HEPARIN 100 UNIT/ML
500 SYRINGE INTRAVENOUS AS NEEDED
Status: DISCONTINUED | OUTPATIENT
Start: 2024-04-30 | End: 2024-04-30 | Stop reason: HOSPADM

## 2024-04-30 RX ADMIN — ONDANSETRON 8 MG: 2 INJECTION INTRAMUSCULAR; INTRAVENOUS at 12:56

## 2024-04-30 RX ADMIN — HEPARIN 500 UNITS: 100 SYRINGE at 14:51

## 2024-04-30 RX ADMIN — DIPHENHYDRAMINE HYDROCHLORIDE 50 MG: 25 CAPSULE ORAL at 12:54

## 2024-04-30 RX ADMIN — FAMOTIDINE 20 MG: 10 INJECTION INTRAVENOUS at 12:56

## 2024-04-30 RX ADMIN — DEXAMETHASONE SODIUM PHOSPHATE 10 MG: 10 INJECTION INTRAMUSCULAR; INTRAVENOUS at 12:55

## 2024-04-30 RX ADMIN — PACLITAXEL 150 MG: 6 INJECTION, SOLUTION INTRAVENOUS at 13:44

## 2024-04-30 ASSESSMENT — PAIN SCALES - GENERAL: PAINLEVEL: 0-NO PAIN

## 2024-04-30 NOTE — PROGRESS NOTES
Patient ID: Macy Chavez is a 38 y.o. female.    The patient presents to clinic today for her history of breast cancer.     Cancer Staging   Malignant neoplasm of overlapping sites of right breast in female, estrogen receptor positive (Multi)  Staging form: Breast, AJCC 8th Edition  - Clinical stage from 2/6/2024: Stage IIIA (cT3, cN2, cM0, G3, ER+, MN+, HER2-) - Signed by Denise Golden MD on 2/21/2024      Diagnostic/Therapeutic History:    11/03/2023: Bl screening Mammogram: negative       12/20/2023: Right breast mass for which US done, Nonspecific dense fibroglandular tissues in the right breast in the area of reported lump. Dense fibroglandular tissue also evident throughout both breasts on screening mammogram. No discrete lesionidentified.    02/06/2024: Patient describes an increase in the size of a palpable lump in theright lower outer breast which was previously assessed in December of2023. Recent negative screening mammogram from 11/09/2023. She alsodescribes a new palpable lump in the right axilla. After being seen by her provider, a nother palpable lump is identified in the upper outer right breast.    Ultrasound of the right breast showed In the patient's area of palpable concern in the right breast at 7 o'clock, 5 cm from the nipple, there is a large vague heterogeneousmass with internal vascularity which has the appearance of dense fibroglandular tissue measuring up to 5 cm; it is visible protruding from the skin with discoloration. In the patient's area of palpable concern felt by her provider in the right breast at 10 o'clock, 8 cm from the nipple, there is an oval parallel heterogeneous mass withinternal vascularity measuring approximately 1.1 x 0.9 x 1.4 cm. 3 enlarged axillary LN were seen    02/06/2024: US guided core needle biopsy    A. BREAST, RIGHT, 7:00, 5 CM FN, CORE BIOPSY:   -- Invasive ductal carcinoma, grade 2, ER: 95%, MN: 5-10%, HER2 negative (1+)        B. BREAST, RIGHT, 10:00, 8 CM  FN, CORE BIOPSY:   -- Invasive ductal carcinoma, grade 2-3,  ER: 95%, LA<5%, HER2 negative (1+)         C. AXILLARY LYMPH NODE, RIGHT, BIOPSY:     -- Metastatic carcinoma, see note.      02/14/2024: Biopsy-proven multicentric right breast malignancy with the total  span of up to 13.0 cm associated with bulky metastatic axillary and  abnormal internal mammary lymphadenopathy. No evidence of chest wall,  skin or nipple-areolar complex involvement.    No MRI evidence of malignancy in the left breast.    02/15/2024: staging scans: negative- short term follow up       History of Present Illness (HPI)/Interval History:  Accompanied by  today.   Doing okay overall, just came back from trip to [place] with  and kids   She denies any fevers or chills.  She denies any chest pain or breathing issues.  She denies any new or unexplained bone aches or pains.      PMH: none     PSH: no surgery    Allergies/meds: negative     Reproductive hx: Menarche: 12, AFLB: 27, Menopause: no, HRT: no     Family history: Maternal Grandfather: Kidney versus Liver cancer,Paternal Grandmother: Soft cell sarcoma-        Review of Systems:  14-point ROS otherwise negative, as per HPI.    Past Medical History:   Diagnosis Date    Breast cancer (Multi)     Cancer (Multi)     Other specified health status 09/29/2016    No pertinent past medical history       Past Surgical History:   Procedure Laterality Date    OTHER SURGICAL HISTORY  09/29/2016    Oral Surgery Tooth Extraction [facility] Tooth    WISDOM TOOTH EXTRACTION  6/1/2000       Social History     Socioeconomic History    Marital status:      Spouse name: Not on file    Number of children: Not on file    Years of education: Not on file    Highest education level: Not on file   Occupational History    Not on file   Tobacco Use    Smoking status: Never    Smokeless tobacco: Never   Vaping Use    Vaping status: Never Used   Substance and Sexual Activity    Alcohol use: Yes      Alcohol/week: 1.0 standard drink of alcohol     Types: 1 Glasses of wine per week     Comment: A few drinks a month    Drug use: Never    Sexual activity: Yes     Partners: Male     Birth control/protection: I.U.D.   Other Topics Concern    Not on file   Social History Narrative    Not on file     Social Determinants of Health     Financial Resource Strain: Not on file   Food Insecurity: Not on file   Transportation Needs: Not on file   Physical Activity: Not on file   Stress: Not on file   Social Connections: Not on file   Intimate Partner Violence: Not on file   Housing Stability: Not on file       Allergies   Allergen Reactions    Dog Dander Itching         Current Outpatient Medications:     atorvastatin (Lipitor) 20 mg tablet, Take 1 tablet (20 mg) by mouth once daily., Disp: 90 tablet, Rfl: 3    ondansetron (Zofran) 8 mg tablet, Take 1 tablet (8 mg) by mouth every 8 hours if needed for nausea or vomiting., Disp: 90 tablet, Rfl: 0    prochlorperazine (Compazine) 10 mg tablet, Take 1 tablet (10 mg) by mouth every 6 hours if needed for nausea or vomiting., Disp: 30 tablet, Rfl: 2    prochlorperazine (Compazine) 10 mg tablet, Take 1 tablet (10 mg) by mouth every 6 hours if needed for nausea or vomiting., Disp: 30 tablet, Rfl: 3    valACYclovir (Valtrex) 500 mg tablet, Take 1 tablet (500 mg) by mouth 2 times a day., Disp: , Rfl:   No current facility-administered medications for this visit.     Objective    BSA: 1.84 meters squared  BP 97/70 (BP Location: Left arm, Patient Position: Sitting)   Pulse 97   Temp 36.1 °C (97 °F) (Temporal)   Resp 16   Wt 69.8 kg (153 lb 14.1 oz)   SpO2 98%   BMI 22.79 kg/m²     ECOG Performance Status: 0    GA: alert, oriented, cooperative  HEENT: anicteric sclera, well injected conjunctiva  Heart: RRR, no murmurs or gallops heard  Lung: CTAB  Abd: +BS, soft, non tender  Breast: large breast mass measuring at inferior breast measuring 4cm. Additional mass at 10 oclock- Right  axillary mass ~ 4 cm    Laboratory Data:  Lab Results   Component Value Date    WBC 9.2 04/29/2024    HGB 11.7 (L) 04/29/2024    HCT 34.3 (L) 04/29/2024    MCV 94 04/29/2024     04/29/2024    ANC 7.27 04/29/2024       Chemistry    Lab Results   Component Value Date/Time     04/29/2024 1505    K 3.8 04/29/2024 1505     04/29/2024 1505    CO2 27 04/29/2024 1505    BUN 10 04/29/2024 1505    CREATININE 0.75 04/29/2024 1505    Lab Results   Component Value Date/Time    CALCIUM 9.2 04/29/2024 1505    ALKPHOS 81 04/29/2024 1505    AST 13 04/29/2024 1505    ALT 14 04/29/2024 1505    BILITOT 0.3 04/29/2024 1505             Radiology:  ECG 12 Lead  EKG today - NSR, low voltage QRS, NS T abn       MR breast bilateral w contrast full protocol 02/14/2024    Narrative  Interpreted By:  Riki Flor,  STUDY:  BI MR BREAST BILATERAL WITH CONTRAST FULL PROTOCOL;  2/14/2024 1:33 pm    ACCESSION NUMBER(S):  [id number]    ORDERING CLINICIAN:  VITOR HARO    INDICATION:  38-year-old woman status post ultrasound-guided biopsy of right  breast masses at 7 o'clock and 10 o'clock and a right axillary lymph  demonstrating grade 2 invasive ductal carcinoma at both sites with  axillary simon metastasis.    COMPARISON:  Screening mammogram 11/09/2023, right breast ultrasound 12/20/2023,  02/06/2024; ultrasound-guided biopsy 02/06/2024.    TECHNIQUE:  Using a dedicated breast coil, STIR axial and T1-weighted fat  saturation axial images of the breasts were obtained, the latter both  before and after intravenous administration of Gadolinium DTPA. On an  independent workstation, 3-D images were formulated using [employer]  including time enhancement curves, subtraction images and MIP images.    Intravenous contrast:  14 mL of Dotarem    FINDINGS:  There is asymmetric moderate bilateral background enhancement.    Extreme fibroglandular tissue.    RIGHT BREAST:  There are multiple contiguous masses and nonmass  enhancements  occupying the entire lateral and central inferior right  breast including at the two sites of biopsy-proven malignancy at 7  o'clock and 10 o'clock, with a total span of 8.5 x 4.5 x 13.0 cm (AP  x TR x CC), better appreciated on the MIP and sagittal reconstruction  images. There is no involvement of the skin, nipple-areolar complex,  or pectoralis major muscle. The most anterior extent of malignancy is  located approximately 2 cm from the base of the nipple.    Within the span of malignancy, more confluent findings are as follows:    - Dominant irregular heterogeneously enhancing mass in the inferior  lateral and central inferior breast at anterior and middle depths  measuring 5.0 x 4.7 x 4.5 cm, corresponding to the site of biopsy at  7 o'clock (series 7, image 127/176).    - Irregular heterogeneously enhancing mass in superior lateral breast  at posterior depth measuring 2.7 x 2.5 x 2.5 cm, corresponding to the  site of biopsy at 10 o'clock (series 7, image 48/176).    - More confluent nonmass enhancement in superior lateral breast at  middle to posterior depth measuring 3.8 x 2.0 x 2.5 cm (series 7,  image 73/176).    - A small satellite mass in superior lateral breast at middle depth  measuring 1.0 x 1.0 x 0.8 cm located 0.8 cm superolateral to the  dominant mass (series 7, image 108/176).    Evaluation of the axilla is limited due to incomplete coverage in the  field of view. A conglomerate of at least 3 enlarged level I right  lymph nodes associated with a biopsy clip is partially visualized  measuring at least 4.2 x 2.5 x 3.5 cm (series 6, image 1/176).    An abnormal internal mammary lymph node is seen in the 2nd  intercostal space measuring 1.5 x 1.2 x 1.6 cm    LEFT BREAST: No suspicious mass or nonmass enhancement is identified.  There are numerous scattered small benign cysts.    No axillary or internal mammary lymphadenopathy is appreciated within  limitations of limited field of view.    NON-BREAST  FINDINGS:  None.    Impression  1. Biopsy-proven multicentric right breast malignancy with the total  span of up to 13.0 cm associated with bulky metastatic axillary and  abnormal internal mammary lymphadenopathy. No evidence of chest wall,  skin or nipple-areolar complex involvement.  2. No MRI evidence of malignancy in the left breast.    BI-RADS CATEGORY:  BI-RADS CATEGORY:  6 Known Biopsy-Proven Malignancy.  Recommendation:  Immediate Follow-up.  Recommended Date:  Immediate.  Laterality:  Right.    For any future breast imaging appointments, please call [phone]  ([phone]).      MACRO:  None    Signed by: Riki Flor 2/15/2024 10:43 AM  Dictation workstation:   [id number]          Assessment/Plan:    Macy is a 38 year old female in excellent health with a recent diagnosis of right sided breast cancer. Macy felt a mass in her right breast in December that prompted diagnostic evaluation.     Ultrasound of the right breast showed In the patient's area of palpable concern in the right breast at 7 o'clock, 5 cm from the nipple, there is a large vague heterogeneousmass with internal vascularity which has the appearance of dense fibroglandular tissue measuring up to 5 cm;  In the patient's area of palpable concern felt by her provider in the right breast at 10 o'clock, 8 cm from the nipple, there is an oval parallel heterogeneous mass withinternal vascularity measuring approximately 1.1 x 0.9 x 1.4 cm. 3 enlarged axillary LN were seen    She had US guided core needle biopsy of  right breast at 7:00, 5 CM FN,  that showed invasive ductal carcinoma, grade 2, ER: 95%, VA: 5-10%, HER2 negative (1+).  And US guided core needle biopsy of the right breast at  10:00, 8 CM FN, that showed  Invasive ductal carcinoma, grade 2-3,  ER: 95%, VA<5%, HER2 negative (1+). Axillary LN consistent with metastatic carcinoma     On MRI she a  multicentric right breast malignancy with the total span of up to 13.0 cm associated with  bulky metastatic axillary and abnormal internal mammary lymphadenopathy. No evidence of chest wall, skin or nipple-areolar complex involvement.     Current stage [id number] (staging scans negative for distant mets)  ECOG PS: 0    I reviewed with her the events that led to her diagnosis of breast cancer. We reviewed all the procedures and diagnostic imaging she underwent thus far. I discussed the features of her breast cancer that include the size, grade, lymph node status and  hormone receptor/ her2-jair status.      We discussed neoadjuvant systemic therapy. The goal of treatment would be to downstage her cancer, assess tumor chemosensitivity and treat potential micrometastatic disease. I would treat, given extensive of disease with anthracyclines based regimen ddAC Q2 weeks x4 followed by taxol weekly x12     - Port placed, echo looks good- fup  echo in  May 05/20/24  - Elevated LDL- non pharmacological measures for now- follows with cardiology  - okay to proceed with week 1 taxol (04/30/2024)  - RTC in 4 weeks       Denise Golden MD  Hematology and Medical Oncology  [facility]

## 2024-05-06 ENCOUNTER — INFUSION (OUTPATIENT)
Dept: HEMATOLOGY/ONCOLOGY | Facility: CLINIC | Age: 39
End: 2024-05-06
Payer: COMMERCIAL

## 2024-05-06 DIAGNOSIS — Z17.0 MALIGNANT NEOPLASM OF OVERLAPPING SITES OF RIGHT BREAST IN FEMALE, ESTROGEN RECEPTOR POSITIVE (MULTI): ICD-10-CM

## 2024-05-06 DIAGNOSIS — C50.811 MALIGNANT NEOPLASM OF OVERLAPPING SITES OF RIGHT BREAST IN FEMALE, ESTROGEN RECEPTOR POSITIVE (MULTI): ICD-10-CM

## 2024-05-06 LAB
ALBUMIN SERPL BCP-MCNC: 4.3 G/DL (ref 3.4–5)
ALP SERPL-CCNC: 54 U/L (ref 33–110)
ALT SERPL W P-5'-P-CCNC: 40 U/L (ref 7–45)
ANION GAP SERPL CALC-SCNC: 10 MMOL/L (ref 10–20)
AST SERPL W P-5'-P-CCNC: 26 U/L (ref 9–39)
BASOPHILS # BLD AUTO: 0.04 X10*3/UL (ref 0–0.1)
BASOPHILS NFR BLD AUTO: 0.7 %
BILIRUB SERPL-MCNC: 0.6 MG/DL (ref 0–1.2)
BUN SERPL-MCNC: 12 MG/DL (ref 6–23)
CALCIUM SERPL-MCNC: 8.9 MG/DL (ref 8.6–10.3)
CHLORIDE SERPL-SCNC: 105 MMOL/L (ref 98–107)
CO2 SERPL-SCNC: 26 MMOL/L (ref 21–32)
CREAT SERPL-MCNC: 0.6 MG/DL (ref 0.5–1.05)
EGFRCR SERPLBLD CKD-EPI 2021: >90 ML/MIN/1.73M*2
EOSINOPHIL # BLD AUTO: 0.04 X10*3/UL (ref 0–0.7)
EOSINOPHIL NFR BLD AUTO: 0.7 %
ERYTHROCYTE [DISTWIDTH] IN BLOOD BY AUTOMATED COUNT: 15.6 % (ref 11.5–14.5)
GLUCOSE SERPL-MCNC: 90 MG/DL (ref 74–99)
HCT VFR BLD AUTO: 31.6 % (ref 36–46)
HGB BLD-MCNC: 10.7 G/DL (ref 12–16)
IMM GRANULOCYTES # BLD AUTO: 0.02 X10*3/UL (ref 0–0.7)
IMM GRANULOCYTES NFR BLD AUTO: 0.4 % (ref 0–0.9)
LYMPHOCYTES # BLD AUTO: 0.6 X10*3/UL (ref 1.2–4.8)
LYMPHOCYTES NFR BLD AUTO: 10.8 %
MCH RBC QN AUTO: 31.8 PG (ref 26–34)
MCHC RBC AUTO-ENTMCNC: 33.9 G/DL (ref 32–36)
MCV RBC AUTO: 94 FL (ref 80–100)
MONOCYTES # BLD AUTO: 0.32 X10*3/UL (ref 0.1–1)
MONOCYTES NFR BLD AUTO: 5.8 %
NEUTROPHILS # BLD AUTO: 4.53 X10*3/UL (ref 1.2–7.7)
NEUTROPHILS NFR BLD AUTO: 81.6 %
NRBC BLD-RTO: ABNORMAL /100{WBCS}
PLATELET # BLD AUTO: 231 X10*3/UL (ref 150–450)
POTASSIUM SERPL-SCNC: 3.9 MMOL/L (ref 3.5–5.3)
PROT SERPL-MCNC: 6.5 G/DL (ref 6.4–8.2)
RBC # BLD AUTO: 3.37 X10*6/UL (ref 4–5.2)
SODIUM SERPL-SCNC: 137 MMOL/L (ref 136–145)
WBC # BLD AUTO: 5.6 X10*3/UL (ref 4.4–11.3)

## 2024-05-06 PROCEDURE — 2500000004 HC RX 250 GENERAL PHARMACY W/ HCPCS (ALT 636 FOR OP/ED)

## 2024-05-06 PROCEDURE — 85025 COMPLETE CBC W/AUTO DIFF WBC: CPT

## 2024-05-06 PROCEDURE — 36591 DRAW BLOOD OFF VENOUS DEVICE: CPT

## 2024-05-06 PROCEDURE — 80053 COMPREHEN METABOLIC PANEL: CPT

## 2024-05-06 RX ORDER — HEPARIN 100 UNIT/ML
500 SYRINGE INTRAVENOUS AS NEEDED
Status: CANCELLED | OUTPATIENT
Start: 2024-05-06

## 2024-05-06 RX ORDER — HEPARIN SODIUM,PORCINE/PF 10 UNIT/ML
50 SYRINGE (ML) INTRAVENOUS AS NEEDED
Status: CANCELLED | OUTPATIENT
Start: 2024-05-06

## 2024-05-06 RX ORDER — HEPARIN SODIUM,PORCINE/PF 10 UNIT/ML
50 SYRINGE (ML) INTRAVENOUS AS NEEDED
Status: DISCONTINUED | OUTPATIENT
Start: 2024-05-06 | End: 2024-05-06 | Stop reason: HOSPADM

## 2024-05-06 RX ORDER — HEPARIN 100 UNIT/ML
500 SYRINGE INTRAVENOUS AS NEEDED
Status: DISCONTINUED | OUTPATIENT
Start: 2024-05-06 | End: 2024-05-06 | Stop reason: HOSPADM

## 2024-05-06 RX ORDER — HEPARIN 100 UNIT/ML
SYRINGE INTRAVENOUS
Status: COMPLETED
Start: 2024-05-06 | End: 2024-05-06

## 2024-05-06 RX ADMIN — HEPARIN 500 UNITS: 100 SYRINGE at 11:20

## 2024-05-07 ENCOUNTER — INFUSION (OUTPATIENT)
Dept: HEMATOLOGY/ONCOLOGY | Facility: CLINIC | Age: 39
End: 2024-05-07
Payer: COMMERCIAL

## 2024-05-07 VITALS
SYSTOLIC BLOOD PRESSURE: 116 MMHG | DIASTOLIC BLOOD PRESSURE: 78 MMHG | WEIGHT: 154.1 LBS | TEMPERATURE: 97 F | OXYGEN SATURATION: 98 % | BODY MASS INDEX: 22.82 KG/M2 | RESPIRATION RATE: 18 BRPM | HEART RATE: 94 BPM

## 2024-05-07 DIAGNOSIS — Z17.0 MALIGNANT NEOPLASM OF OVERLAPPING SITES OF RIGHT BREAST IN FEMALE, ESTROGEN RECEPTOR POSITIVE (MULTI): Primary | ICD-10-CM

## 2024-05-07 DIAGNOSIS — Z17.0 MALIGNANT NEOPLASM OF OVERLAPPING SITES OF RIGHT BREAST IN FEMALE, ESTROGEN RECEPTOR POSITIVE (MULTI): ICD-10-CM

## 2024-05-07 DIAGNOSIS — C50.811 MALIGNANT NEOPLASM OF OVERLAPPING SITES OF RIGHT BREAST IN FEMALE, ESTROGEN RECEPTOR POSITIVE (MULTI): ICD-10-CM

## 2024-05-07 DIAGNOSIS — C50.811 MALIGNANT NEOPLASM OF OVERLAPPING SITES OF RIGHT BREAST IN FEMALE, ESTROGEN RECEPTOR POSITIVE (MULTI): Primary | ICD-10-CM

## 2024-05-07 PROCEDURE — 2500000004 HC RX 250 GENERAL PHARMACY W/ HCPCS (ALT 636 FOR OP/ED): Performed by: STUDENT IN AN ORGANIZED HEALTH CARE EDUCATION/TRAINING PROGRAM

## 2024-05-07 PROCEDURE — 96413 CHEMO IV INFUSION 1 HR: CPT

## 2024-05-07 PROCEDURE — 2500000001 HC RX 250 WO HCPCS SELF ADMINISTERED DRUGS (ALT 637 FOR MEDICARE OP)

## 2024-05-07 PROCEDURE — 96375 TX/PRO/DX INJ NEW DRUG ADDON: CPT | Mod: INF

## 2024-05-07 PROCEDURE — 2500000004 HC RX 250 GENERAL PHARMACY W/ HCPCS (ALT 636 FOR OP/ED)

## 2024-05-07 RX ORDER — ONDANSETRON HYDROCHLORIDE 2 MG/ML
8 INJECTION, SOLUTION INTRAVENOUS ONCE
Status: COMPLETED | OUTPATIENT
Start: 2024-05-07 | End: 2024-05-07

## 2024-05-07 RX ORDER — FAMOTIDINE 10 MG/ML
20 INJECTION INTRAVENOUS ONCE AS NEEDED
OUTPATIENT
Start: 2024-06-25

## 2024-05-07 RX ORDER — PROCHLORPERAZINE MALEATE 10 MG
10 TABLET ORAL EVERY 6 HOURS PRN
OUTPATIENT
Start: 2024-06-25

## 2024-05-07 RX ORDER — ALBUTEROL SULFATE 0.83 MG/ML
3 SOLUTION RESPIRATORY (INHALATION) AS NEEDED
Status: CANCELLED | OUTPATIENT
Start: 2024-06-11

## 2024-05-07 RX ORDER — DIPHENHYDRAMINE HYDROCHLORIDE 50 MG/ML
50 INJECTION INTRAMUSCULAR; INTRAVENOUS AS NEEDED
OUTPATIENT
Start: 2024-07-02

## 2024-05-07 RX ORDER — ALBUTEROL SULFATE 0.83 MG/ML
3 SOLUTION RESPIRATORY (INHALATION) AS NEEDED
OUTPATIENT
Start: 2024-07-16

## 2024-05-07 RX ORDER — EPINEPHRINE 0.3 MG/.3ML
0.3 INJECTION SUBCUTANEOUS EVERY 5 MIN PRN
Status: CANCELLED | OUTPATIENT
Start: 2024-06-11

## 2024-05-07 RX ORDER — DIPHENHYDRAMINE HCL 25 MG
50 CAPSULE ORAL ONCE
Status: CANCELLED | OUTPATIENT
Start: 2024-06-04

## 2024-05-07 RX ORDER — PROCHLORPERAZINE MALEATE 10 MG
10 TABLET ORAL EVERY 6 HOURS PRN
Status: CANCELLED | OUTPATIENT
Start: 2024-06-11

## 2024-05-07 RX ORDER — FAMOTIDINE 10 MG/ML
20 INJECTION INTRAVENOUS ONCE
OUTPATIENT
Start: 2024-07-16

## 2024-05-07 RX ORDER — PROCHLORPERAZINE EDISYLATE 5 MG/ML
10 INJECTION INTRAMUSCULAR; INTRAVENOUS EVERY 6 HOURS PRN
OUTPATIENT
Start: 2024-07-09

## 2024-05-07 RX ORDER — DIPHENHYDRAMINE HYDROCHLORIDE 50 MG/ML
50 INJECTION INTRAMUSCULAR; INTRAVENOUS AS NEEDED
OUTPATIENT
Start: 2024-06-18

## 2024-05-07 RX ORDER — EPINEPHRINE 0.3 MG/.3ML
0.3 INJECTION SUBCUTANEOUS EVERY 5 MIN PRN
OUTPATIENT
Start: 2024-06-25

## 2024-05-07 RX ORDER — DIPHENHYDRAMINE HYDROCHLORIDE 50 MG/ML
50 INJECTION INTRAMUSCULAR; INTRAVENOUS AS NEEDED
OUTPATIENT
Start: 2024-07-09

## 2024-05-07 RX ORDER — PROCHLORPERAZINE MALEATE 10 MG
10 TABLET ORAL EVERY 6 HOURS PRN
OUTPATIENT
Start: 2024-06-18

## 2024-05-07 RX ORDER — PROCHLORPERAZINE EDISYLATE 5 MG/ML
10 INJECTION INTRAMUSCULAR; INTRAVENOUS EVERY 6 HOURS PRN
Status: CANCELLED | OUTPATIENT
Start: 2024-06-04

## 2024-05-07 RX ORDER — ONDANSETRON HYDROCHLORIDE 2 MG/ML
8 INJECTION, SOLUTION INTRAVENOUS ONCE
OUTPATIENT
Start: 2024-06-18

## 2024-05-07 RX ORDER — ONDANSETRON HYDROCHLORIDE 2 MG/ML
8 INJECTION, SOLUTION INTRAVENOUS ONCE
OUTPATIENT
Start: 2024-07-09

## 2024-05-07 RX ORDER — PROCHLORPERAZINE EDISYLATE 5 MG/ML
10 INJECTION INTRAMUSCULAR; INTRAVENOUS EVERY 6 HOURS PRN
Status: DISCONTINUED | OUTPATIENT
Start: 2024-05-07 | End: 2024-05-07 | Stop reason: HOSPADM

## 2024-05-07 RX ORDER — EPINEPHRINE 0.3 MG/.3ML
0.3 INJECTION SUBCUTANEOUS EVERY 5 MIN PRN
OUTPATIENT
Start: 2024-06-18

## 2024-05-07 RX ORDER — PROCHLORPERAZINE EDISYLATE 5 MG/ML
10 INJECTION INTRAMUSCULAR; INTRAVENOUS EVERY 6 HOURS PRN
OUTPATIENT
Start: 2024-07-16

## 2024-05-07 RX ORDER — EPINEPHRINE 0.3 MG/.3ML
0.3 INJECTION SUBCUTANEOUS EVERY 5 MIN PRN
OUTPATIENT
Start: 2024-07-02

## 2024-05-07 RX ORDER — FAMOTIDINE 10 MG/ML
20 INJECTION INTRAVENOUS ONCE AS NEEDED
OUTPATIENT
Start: 2024-07-09

## 2024-05-07 RX ORDER — DEXAMETHASONE SODIUM PHOSPHATE 100 MG/10ML
10 INJECTION INTRAMUSCULAR; INTRAVENOUS ONCE
Status: CANCELLED | OUTPATIENT
Start: 2024-06-11

## 2024-05-07 RX ORDER — DIPHENHYDRAMINE HCL 25 MG
50 CAPSULE ORAL ONCE
OUTPATIENT
Start: 2024-06-18

## 2024-05-07 RX ORDER — PROCHLORPERAZINE EDISYLATE 5 MG/ML
10 INJECTION INTRAMUSCULAR; INTRAVENOUS EVERY 6 HOURS PRN
Status: CANCELLED | OUTPATIENT
Start: 2024-06-11

## 2024-05-07 RX ORDER — FAMOTIDINE 10 MG/ML
20 INJECTION INTRAVENOUS ONCE
OUTPATIENT
Start: 2024-06-18

## 2024-05-07 RX ORDER — ONDANSETRON HYDROCHLORIDE 2 MG/ML
8 INJECTION, SOLUTION INTRAVENOUS ONCE
Status: CANCELLED | OUTPATIENT
Start: 2024-06-04

## 2024-05-07 RX ORDER — DIPHENHYDRAMINE HCL 25 MG
50 CAPSULE ORAL ONCE
Status: COMPLETED | OUTPATIENT
Start: 2024-05-07 | End: 2024-05-07

## 2024-05-07 RX ORDER — FAMOTIDINE 10 MG/ML
20 INJECTION INTRAVENOUS ONCE
OUTPATIENT
Start: 2024-06-25

## 2024-05-07 RX ORDER — FAMOTIDINE 10 MG/ML
20 INJECTION INTRAVENOUS ONCE AS NEEDED
Status: DISCONTINUED | OUTPATIENT
Start: 2024-05-07 | End: 2024-05-07 | Stop reason: HOSPADM

## 2024-05-07 RX ORDER — DEXAMETHASONE SODIUM PHOSPHATE 100 MG/10ML
10 INJECTION INTRAMUSCULAR; INTRAVENOUS ONCE
OUTPATIENT
Start: 2024-07-02

## 2024-05-07 RX ORDER — DEXAMETHASONE SODIUM PHOSPHATE 100 MG/10ML
10 INJECTION INTRAMUSCULAR; INTRAVENOUS ONCE
OUTPATIENT
Start: 2024-06-18

## 2024-05-07 RX ORDER — HEPARIN 100 UNIT/ML
500 SYRINGE INTRAVENOUS AS NEEDED
Status: CANCELLED | OUTPATIENT
Start: 2024-05-07

## 2024-05-07 RX ORDER — DEXAMETHASONE SODIUM PHOSPHATE 100 MG/10ML
10 INJECTION INTRAMUSCULAR; INTRAVENOUS ONCE
Status: CANCELLED | OUTPATIENT
Start: 2024-06-04

## 2024-05-07 RX ORDER — ALBUTEROL SULFATE 0.83 MG/ML
3 SOLUTION RESPIRATORY (INHALATION) AS NEEDED
OUTPATIENT
Start: 2024-06-18

## 2024-05-07 RX ORDER — HEPARIN SODIUM,PORCINE/PF 10 UNIT/ML
50 SYRINGE (ML) INTRAVENOUS AS NEEDED
Status: DISCONTINUED | OUTPATIENT
Start: 2024-05-07 | End: 2024-05-07 | Stop reason: HOSPADM

## 2024-05-07 RX ORDER — FAMOTIDINE 10 MG/ML
20 INJECTION INTRAVENOUS ONCE AS NEEDED
OUTPATIENT
Start: 2024-06-18

## 2024-05-07 RX ORDER — DIPHENHYDRAMINE HCL 25 MG
50 CAPSULE ORAL ONCE
OUTPATIENT
Start: 2024-07-09

## 2024-05-07 RX ORDER — HEPARIN 100 UNIT/ML
500 SYRINGE INTRAVENOUS AS NEEDED
Status: DISCONTINUED | OUTPATIENT
Start: 2024-05-07 | End: 2024-05-07 | Stop reason: HOSPADM

## 2024-05-07 RX ORDER — FAMOTIDINE 10 MG/ML
20 INJECTION INTRAVENOUS ONCE AS NEEDED
OUTPATIENT
Start: 2024-07-02

## 2024-05-07 RX ORDER — DIPHENHYDRAMINE HYDROCHLORIDE 50 MG/ML
50 INJECTION INTRAMUSCULAR; INTRAVENOUS AS NEEDED
Status: CANCELLED | OUTPATIENT
Start: 2024-06-11

## 2024-05-07 RX ORDER — PROCHLORPERAZINE MALEATE 10 MG
10 TABLET ORAL EVERY 6 HOURS PRN
OUTPATIENT
Start: 2024-07-16

## 2024-05-07 RX ORDER — ALBUTEROL SULFATE 0.83 MG/ML
3 SOLUTION RESPIRATORY (INHALATION) AS NEEDED
OUTPATIENT
Start: 2024-07-02

## 2024-05-07 RX ORDER — PROCHLORPERAZINE EDISYLATE 5 MG/ML
10 INJECTION INTRAMUSCULAR; INTRAVENOUS EVERY 6 HOURS PRN
OUTPATIENT
Start: 2024-07-02

## 2024-05-07 RX ORDER — FAMOTIDINE 10 MG/ML
20 INJECTION INTRAVENOUS ONCE AS NEEDED
OUTPATIENT
Start: 2024-07-16

## 2024-05-07 RX ORDER — ALBUTEROL SULFATE 0.83 MG/ML
3 SOLUTION RESPIRATORY (INHALATION) AS NEEDED
OUTPATIENT
Start: 2024-06-25

## 2024-05-07 RX ORDER — ONDANSETRON HYDROCHLORIDE 2 MG/ML
8 INJECTION, SOLUTION INTRAVENOUS ONCE
Status: CANCELLED | OUTPATIENT
Start: 2024-06-11

## 2024-05-07 RX ORDER — FAMOTIDINE 10 MG/ML
20 INJECTION INTRAVENOUS ONCE AS NEEDED
Status: CANCELLED | OUTPATIENT
Start: 2024-06-04

## 2024-05-07 RX ORDER — EPINEPHRINE 0.3 MG/.3ML
0.3 INJECTION SUBCUTANEOUS EVERY 5 MIN PRN
Status: CANCELLED | OUTPATIENT
Start: 2024-06-04

## 2024-05-07 RX ORDER — HEPARIN SODIUM,PORCINE/PF 10 UNIT/ML
50 SYRINGE (ML) INTRAVENOUS AS NEEDED
Status: CANCELLED | OUTPATIENT
Start: 2024-05-07

## 2024-05-07 RX ORDER — FAMOTIDINE 10 MG/ML
20 INJECTION INTRAVENOUS ONCE
Status: CANCELLED | OUTPATIENT
Start: 2024-06-04

## 2024-05-07 RX ORDER — PROCHLORPERAZINE MALEATE 10 MG
10 TABLET ORAL EVERY 6 HOURS PRN
Status: CANCELLED | OUTPATIENT
Start: 2024-06-04

## 2024-05-07 RX ORDER — FAMOTIDINE 10 MG/ML
20 INJECTION INTRAVENOUS ONCE
Status: COMPLETED | OUTPATIENT
Start: 2024-05-07 | End: 2024-05-07

## 2024-05-07 RX ORDER — PROCHLORPERAZINE MALEATE 10 MG
10 TABLET ORAL EVERY 6 HOURS PRN
Status: DISCONTINUED | OUTPATIENT
Start: 2024-05-07 | End: 2024-05-07 | Stop reason: HOSPADM

## 2024-05-07 RX ORDER — DIPHENHYDRAMINE HCL 25 MG
50 CAPSULE ORAL ONCE
OUTPATIENT
Start: 2024-06-25

## 2024-05-07 RX ORDER — DIPHENHYDRAMINE HYDROCHLORIDE 50 MG/ML
50 INJECTION INTRAMUSCULAR; INTRAVENOUS AS NEEDED
Status: DISCONTINUED | OUTPATIENT
Start: 2024-05-07 | End: 2024-05-07 | Stop reason: HOSPADM

## 2024-05-07 RX ORDER — PROCHLORPERAZINE MALEATE 10 MG
10 TABLET ORAL EVERY 6 HOURS PRN
OUTPATIENT
Start: 2024-07-09

## 2024-05-07 RX ORDER — EPINEPHRINE 0.3 MG/.3ML
0.3 INJECTION SUBCUTANEOUS EVERY 5 MIN PRN
OUTPATIENT
Start: 2024-07-09

## 2024-05-07 RX ORDER — ALBUTEROL SULFATE 0.83 MG/ML
3 SOLUTION RESPIRATORY (INHALATION) AS NEEDED
OUTPATIENT
Start: 2024-07-09

## 2024-05-07 RX ORDER — FAMOTIDINE 10 MG/ML
20 INJECTION INTRAVENOUS ONCE
OUTPATIENT
Start: 2024-07-09

## 2024-05-07 RX ORDER — DIPHENHYDRAMINE HYDROCHLORIDE 50 MG/ML
50 INJECTION INTRAMUSCULAR; INTRAVENOUS AS NEEDED
Status: CANCELLED | OUTPATIENT
Start: 2024-06-04

## 2024-05-07 RX ORDER — DEXAMETHASONE SODIUM PHOSPHATE 100 MG/10ML
10 INJECTION INTRAMUSCULAR; INTRAVENOUS ONCE
OUTPATIENT
Start: 2024-06-25

## 2024-05-07 RX ORDER — EPINEPHRINE 0.3 MG/.3ML
0.3 INJECTION SUBCUTANEOUS EVERY 5 MIN PRN
OUTPATIENT
Start: 2024-07-16

## 2024-05-07 RX ORDER — DIPHENHYDRAMINE HCL 25 MG
50 CAPSULE ORAL ONCE
Status: CANCELLED | OUTPATIENT
Start: 2024-06-11

## 2024-05-07 RX ORDER — DIPHENHYDRAMINE HCL 25 MG
50 CAPSULE ORAL ONCE
OUTPATIENT
Start: 2024-07-16

## 2024-05-07 RX ORDER — DEXAMETHASONE SODIUM PHOSPHATE 100 MG/10ML
10 INJECTION INTRAMUSCULAR; INTRAVENOUS ONCE
Status: COMPLETED | OUTPATIENT
Start: 2024-05-07 | End: 2024-05-07

## 2024-05-07 RX ORDER — FAMOTIDINE 10 MG/ML
20 INJECTION INTRAVENOUS ONCE
OUTPATIENT
Start: 2024-07-02

## 2024-05-07 RX ORDER — FAMOTIDINE 10 MG/ML
20 INJECTION INTRAVENOUS ONCE
Status: CANCELLED | OUTPATIENT
Start: 2024-06-11

## 2024-05-07 RX ORDER — EPINEPHRINE 0.3 MG/.3ML
0.3 INJECTION SUBCUTANEOUS EVERY 5 MIN PRN
Status: DISCONTINUED | OUTPATIENT
Start: 2024-05-07 | End: 2024-05-07 | Stop reason: HOSPADM

## 2024-05-07 RX ORDER — DEXAMETHASONE SODIUM PHOSPHATE 100 MG/10ML
10 INJECTION INTRAMUSCULAR; INTRAVENOUS ONCE
OUTPATIENT
Start: 2024-07-09

## 2024-05-07 RX ORDER — FAMOTIDINE 10 MG/ML
20 INJECTION INTRAVENOUS ONCE AS NEEDED
Status: CANCELLED | OUTPATIENT
Start: 2024-06-11

## 2024-05-07 RX ORDER — DEXAMETHASONE SODIUM PHOSPHATE 100 MG/10ML
10 INJECTION INTRAMUSCULAR; INTRAVENOUS ONCE
OUTPATIENT
Start: 2024-07-16

## 2024-05-07 RX ORDER — ONDANSETRON HYDROCHLORIDE 2 MG/ML
8 INJECTION, SOLUTION INTRAVENOUS ONCE
OUTPATIENT
Start: 2024-06-25

## 2024-05-07 RX ORDER — PROCHLORPERAZINE EDISYLATE 5 MG/ML
10 INJECTION INTRAMUSCULAR; INTRAVENOUS EVERY 6 HOURS PRN
OUTPATIENT
Start: 2024-06-25

## 2024-05-07 RX ORDER — ALBUTEROL SULFATE 0.83 MG/ML
3 SOLUTION RESPIRATORY (INHALATION) AS NEEDED
Status: CANCELLED | OUTPATIENT
Start: 2024-06-04

## 2024-05-07 RX ORDER — PROCHLORPERAZINE EDISYLATE 5 MG/ML
10 INJECTION INTRAMUSCULAR; INTRAVENOUS EVERY 6 HOURS PRN
OUTPATIENT
Start: 2024-06-18

## 2024-05-07 RX ORDER — ONDANSETRON HYDROCHLORIDE 2 MG/ML
8 INJECTION, SOLUTION INTRAVENOUS ONCE
OUTPATIENT
Start: 2024-07-02

## 2024-05-07 RX ORDER — ALBUTEROL SULFATE 0.83 MG/ML
3 SOLUTION RESPIRATORY (INHALATION) AS NEEDED
Status: DISCONTINUED | OUTPATIENT
Start: 2024-05-07 | End: 2024-05-07 | Stop reason: HOSPADM

## 2024-05-07 RX ORDER — ONDANSETRON HYDROCHLORIDE 2 MG/ML
8 INJECTION, SOLUTION INTRAVENOUS ONCE
OUTPATIENT
Start: 2024-07-16

## 2024-05-07 RX ORDER — DIPHENHYDRAMINE HYDROCHLORIDE 50 MG/ML
50 INJECTION INTRAMUSCULAR; INTRAVENOUS AS NEEDED
OUTPATIENT
Start: 2024-07-16

## 2024-05-07 RX ORDER — DIPHENHYDRAMINE HYDROCHLORIDE 50 MG/ML
50 INJECTION INTRAMUSCULAR; INTRAVENOUS AS NEEDED
OUTPATIENT
Start: 2024-06-25

## 2024-05-07 RX ORDER — PROCHLORPERAZINE MALEATE 10 MG
10 TABLET ORAL EVERY 6 HOURS PRN
OUTPATIENT
Start: 2024-07-02

## 2024-05-07 RX ORDER — DIPHENHYDRAMINE HCL 25 MG
50 CAPSULE ORAL ONCE
OUTPATIENT
Start: 2024-07-02

## 2024-05-07 RX ADMIN — ONDANSETRON 8 MG: 2 INJECTION INTRAMUSCULAR; INTRAVENOUS at 13:28

## 2024-05-07 RX ADMIN — PACLITAXEL 150 MG: 6 INJECTION, SOLUTION INTRAVENOUS at 14:12

## 2024-05-07 RX ADMIN — DIPHENHYDRAMINE HYDROCHLORIDE 50 MG: 25 CAPSULE ORAL at 13:28

## 2024-05-07 RX ADMIN — FAMOTIDINE 20 MG: 10 INJECTION INTRAVENOUS at 13:28

## 2024-05-07 RX ADMIN — DEXAMETHASONE SODIUM PHOSPHATE 10 MG: 10 INJECTION INTRAMUSCULAR; INTRAVENOUS at 13:28

## 2024-05-07 RX ADMIN — HEPARIN 500 UNITS: 100 SYRINGE at 15:26

## 2024-05-07 ASSESSMENT — PAIN SCALES - GENERAL: PAINLEVEL: 0-NO PAIN

## 2024-05-07 NOTE — SIGNIFICANT EVENT
05/07/24 1302   Prechemo Checklist   Has the patient been in the hospital, ED, or urgent care since last date of service No   Chemo/Immuno Consent Completed and Signed Yes   Protocol/Indications Verified Yes   Confirmed to previous date/time of medication Yes   Compared to previous dose Yes   All medications are dated accurately Yes   Pregnancy Test Negative Yes   Parameters Met Yes   BSA/Weight-Height Verified Yes   Dose Calculations Verified (current, total, cumulative) Yes

## 2024-05-13 ENCOUNTER — INFUSION (OUTPATIENT)
Dept: HEMATOLOGY/ONCOLOGY | Facility: CLINIC | Age: 39
End: 2024-05-13
Payer: COMMERCIAL

## 2024-05-13 DIAGNOSIS — Z17.0 MALIGNANT NEOPLASM OF OVERLAPPING SITES OF RIGHT BREAST IN FEMALE, ESTROGEN RECEPTOR POSITIVE (MULTI): ICD-10-CM

## 2024-05-13 DIAGNOSIS — C50.811 MALIGNANT NEOPLASM OF OVERLAPPING SITES OF RIGHT BREAST IN FEMALE, ESTROGEN RECEPTOR POSITIVE (MULTI): ICD-10-CM

## 2024-05-13 LAB
ALBUMIN SERPL BCP-MCNC: 4.4 G/DL (ref 3.4–5)
ALP SERPL-CCNC: 53 U/L (ref 33–110)
ALT SERPL W P-5'-P-CCNC: 102 U/L (ref 7–45)
ANION GAP SERPL CALC-SCNC: 11 MMOL/L (ref 10–20)
AST SERPL W P-5'-P-CCNC: 36 U/L (ref 9–39)
BASOPHILS # BLD AUTO: 0.05 X10*3/UL (ref 0–0.1)
BASOPHILS NFR BLD AUTO: 0.8 %
BILIRUB SERPL-MCNC: 0.7 MG/DL (ref 0–1.2)
BUN SERPL-MCNC: 11 MG/DL (ref 6–23)
CALCIUM SERPL-MCNC: 9.3 MG/DL (ref 8.6–10.3)
CHLORIDE SERPL-SCNC: 103 MMOL/L (ref 98–107)
CO2 SERPL-SCNC: 27 MMOL/L (ref 21–32)
CREAT SERPL-MCNC: 0.65 MG/DL (ref 0.5–1.05)
EGFRCR SERPLBLD CKD-EPI 2021: >90 ML/MIN/1.73M*2
EOSINOPHIL # BLD AUTO: 0.09 X10*3/UL (ref 0–0.7)
EOSINOPHIL NFR BLD AUTO: 1.5 %
ERYTHROCYTE [DISTWIDTH] IN BLOOD BY AUTOMATED COUNT: 16.1 % (ref 11.5–14.5)
GLUCOSE SERPL-MCNC: 93 MG/DL (ref 74–99)
HCT VFR BLD AUTO: 32.5 % (ref 36–46)
HGB BLD-MCNC: 10.9 G/DL (ref 12–16)
IMM GRANULOCYTES # BLD AUTO: 0.02 X10*3/UL (ref 0–0.7)
IMM GRANULOCYTES NFR BLD AUTO: 0.3 % (ref 0–0.9)
LYMPHOCYTES # BLD AUTO: 0.66 X10*3/UL (ref 1.2–4.8)
LYMPHOCYTES NFR BLD AUTO: 10.9 %
MCH RBC QN AUTO: 31.8 PG (ref 26–34)
MCHC RBC AUTO-ENTMCNC: 33.5 G/DL (ref 32–36)
MCV RBC AUTO: 95 FL (ref 80–100)
MONOCYTES # BLD AUTO: 0.41 X10*3/UL (ref 0.1–1)
MONOCYTES NFR BLD AUTO: 6.8 %
NEUTROPHILS # BLD AUTO: 4.83 X10*3/UL (ref 1.2–7.7)
NEUTROPHILS NFR BLD AUTO: 79.7 %
NRBC BLD-RTO: ABNORMAL /100{WBCS}
PLATELET # BLD AUTO: 260 X10*3/UL (ref 150–450)
POTASSIUM SERPL-SCNC: 3.9 MMOL/L (ref 3.5–5.3)
PROT SERPL-MCNC: 6.8 G/DL (ref 6.4–8.2)
RBC # BLD AUTO: 3.43 X10*6/UL (ref 4–5.2)
SODIUM SERPL-SCNC: 137 MMOL/L (ref 136–145)
WBC # BLD AUTO: 6.1 X10*3/UL (ref 4.4–11.3)

## 2024-05-13 PROCEDURE — 96523 IRRIG DRUG DELIVERY DEVICE: CPT

## 2024-05-13 PROCEDURE — 84075 ASSAY ALKALINE PHOSPHATASE: CPT

## 2024-05-13 PROCEDURE — 2500000004 HC RX 250 GENERAL PHARMACY W/ HCPCS (ALT 636 FOR OP/ED): Performed by: STUDENT IN AN ORGANIZED HEALTH CARE EDUCATION/TRAINING PROGRAM

## 2024-05-13 PROCEDURE — 85025 COMPLETE CBC W/AUTO DIFF WBC: CPT

## 2024-05-13 RX ORDER — HEPARIN SODIUM,PORCINE/PF 10 UNIT/ML
50 SYRINGE (ML) INTRAVENOUS AS NEEDED
Status: DISCONTINUED | OUTPATIENT
Start: 2024-05-13 | End: 2024-05-13 | Stop reason: HOSPADM

## 2024-05-13 RX ORDER — HEPARIN SODIUM,PORCINE/PF 10 UNIT/ML
50 SYRINGE (ML) INTRAVENOUS AS NEEDED
Status: CANCELLED | OUTPATIENT
Start: 2024-05-13

## 2024-05-13 RX ORDER — HEPARIN 100 UNIT/ML
500 SYRINGE INTRAVENOUS AS NEEDED
Status: DISCONTINUED | OUTPATIENT
Start: 2024-05-13 | End: 2024-05-13 | Stop reason: HOSPADM

## 2024-05-13 RX ORDER — HEPARIN 100 UNIT/ML
500 SYRINGE INTRAVENOUS AS NEEDED
Status: CANCELLED | OUTPATIENT
Start: 2024-05-13

## 2024-05-13 RX ADMIN — HEPARIN 500 UNITS: 100 SYRINGE at 12:03

## 2024-05-13 NOTE — PROGRESS NOTES
Patient came in for port flush and labs; tolerated without problems and knows to return tomorrow for infusion.

## 2024-05-14 ENCOUNTER — INFUSION (OUTPATIENT)
Dept: HEMATOLOGY/ONCOLOGY | Facility: CLINIC | Age: 39
End: 2024-05-14
Payer: COMMERCIAL

## 2024-05-14 ENCOUNTER — APPOINTMENT (OUTPATIENT)
Dept: HEMATOLOGY/ONCOLOGY | Facility: CLINIC | Age: 39
End: 2024-05-14
Payer: COMMERCIAL

## 2024-05-14 VITALS
RESPIRATION RATE: 18 BRPM | HEART RATE: 110 BPM | BODY MASS INDEX: 22.92 KG/M2 | SYSTOLIC BLOOD PRESSURE: 113 MMHG | OXYGEN SATURATION: 97 % | HEIGHT: 69 IN | TEMPERATURE: 96.8 F | WEIGHT: 154.76 LBS | DIASTOLIC BLOOD PRESSURE: 73 MMHG

## 2024-05-14 DIAGNOSIS — C50.811 MALIGNANT NEOPLASM OF OVERLAPPING SITES OF RIGHT BREAST IN FEMALE, ESTROGEN RECEPTOR POSITIVE (MULTI): ICD-10-CM

## 2024-05-14 DIAGNOSIS — Z17.0 MALIGNANT NEOPLASM OF OVERLAPPING SITES OF RIGHT BREAST IN FEMALE, ESTROGEN RECEPTOR POSITIVE (MULTI): ICD-10-CM

## 2024-05-14 PROCEDURE — 2500000004 HC RX 250 GENERAL PHARMACY W/ HCPCS (ALT 636 FOR OP/ED): Performed by: STUDENT IN AN ORGANIZED HEALTH CARE EDUCATION/TRAINING PROGRAM

## 2024-05-14 PROCEDURE — 2500000004 HC RX 250 GENERAL PHARMACY W/ HCPCS (ALT 636 FOR OP/ED)

## 2024-05-14 PROCEDURE — 96413 CHEMO IV INFUSION 1 HR: CPT

## 2024-05-14 PROCEDURE — 2500000001 HC RX 250 WO HCPCS SELF ADMINISTERED DRUGS (ALT 637 FOR MEDICARE OP)

## 2024-05-14 PROCEDURE — 96375 TX/PRO/DX INJ NEW DRUG ADDON: CPT | Mod: INF

## 2024-05-14 RX ORDER — PROCHLORPERAZINE MALEATE 10 MG
10 TABLET ORAL EVERY 6 HOURS PRN
Status: DISCONTINUED | OUTPATIENT
Start: 2024-05-14 | End: 2024-05-14 | Stop reason: HOSPADM

## 2024-05-14 RX ORDER — FAMOTIDINE 10 MG/ML
20 INJECTION INTRAVENOUS ONCE AS NEEDED
Status: DISCONTINUED | OUTPATIENT
Start: 2024-05-14 | End: 2024-05-14 | Stop reason: HOSPADM

## 2024-05-14 RX ORDER — EPINEPHRINE 0.3 MG/.3ML
0.3 INJECTION SUBCUTANEOUS EVERY 5 MIN PRN
Status: DISCONTINUED | OUTPATIENT
Start: 2024-05-14 | End: 2024-05-14 | Stop reason: HOSPADM

## 2024-05-14 RX ORDER — DIPHENHYDRAMINE HCL 25 MG
50 CAPSULE ORAL ONCE
Status: COMPLETED | OUTPATIENT
Start: 2024-05-14 | End: 2024-05-14

## 2024-05-14 RX ORDER — ONDANSETRON HYDROCHLORIDE 2 MG/ML
8 INJECTION, SOLUTION INTRAVENOUS ONCE
Status: COMPLETED | OUTPATIENT
Start: 2024-05-14 | End: 2024-05-14

## 2024-05-14 RX ORDER — DEXAMETHASONE SODIUM PHOSPHATE 100 MG/10ML
10 INJECTION INTRAMUSCULAR; INTRAVENOUS ONCE
Status: COMPLETED | OUTPATIENT
Start: 2024-05-14 | End: 2024-05-14

## 2024-05-14 RX ORDER — HEPARIN 100 UNIT/ML
500 SYRINGE INTRAVENOUS AS NEEDED
Status: DISCONTINUED | OUTPATIENT
Start: 2024-05-14 | End: 2024-05-14 | Stop reason: HOSPADM

## 2024-05-14 RX ORDER — HEPARIN 100 UNIT/ML
500 SYRINGE INTRAVENOUS AS NEEDED
Status: CANCELLED | OUTPATIENT
Start: 2024-05-14

## 2024-05-14 RX ORDER — FAMOTIDINE 10 MG/ML
20 INJECTION INTRAVENOUS ONCE
Status: COMPLETED | OUTPATIENT
Start: 2024-05-14 | End: 2024-05-14

## 2024-05-14 RX ORDER — HEPARIN SODIUM,PORCINE/PF 10 UNIT/ML
50 SYRINGE (ML) INTRAVENOUS AS NEEDED
Status: CANCELLED | OUTPATIENT
Start: 2024-05-14

## 2024-05-14 RX ORDER — DIPHENHYDRAMINE HYDROCHLORIDE 50 MG/ML
50 INJECTION INTRAMUSCULAR; INTRAVENOUS AS NEEDED
Status: DISCONTINUED | OUTPATIENT
Start: 2024-05-14 | End: 2024-05-14 | Stop reason: HOSPADM

## 2024-05-14 RX ORDER — ALBUTEROL SULFATE 0.83 MG/ML
3 SOLUTION RESPIRATORY (INHALATION) AS NEEDED
Status: DISCONTINUED | OUTPATIENT
Start: 2024-05-14 | End: 2024-05-14 | Stop reason: HOSPADM

## 2024-05-14 RX ORDER — PROCHLORPERAZINE EDISYLATE 5 MG/ML
10 INJECTION INTRAMUSCULAR; INTRAVENOUS EVERY 6 HOURS PRN
Status: DISCONTINUED | OUTPATIENT
Start: 2024-05-14 | End: 2024-05-14 | Stop reason: HOSPADM

## 2024-05-14 RX ADMIN — DEXAMETHASONE SODIUM PHOSPHATE 10 MG: 10 INJECTION INTRAMUSCULAR; INTRAVENOUS at 12:48

## 2024-05-14 RX ADMIN — HEPARIN 500 UNITS: 100 SYRINGE at 14:32

## 2024-05-14 RX ADMIN — DIPHENHYDRAMINE HYDROCHLORIDE 50 MG: 25 CAPSULE ORAL at 12:48

## 2024-05-14 RX ADMIN — PACLITAXEL 150 MG: 6 INJECTION, SOLUTION INTRAVENOUS at 13:28

## 2024-05-14 RX ADMIN — FAMOTIDINE 20 MG: 10 INJECTION INTRAVENOUS at 12:48

## 2024-05-14 RX ADMIN — ONDANSETRON 8 MG: 2 INJECTION INTRAMUSCULAR; INTRAVENOUS at 12:48

## 2024-05-14 ASSESSMENT — PAIN SCALES - GENERAL: PAINLEVEL: 0-NO PAIN

## 2024-05-14 NOTE — SIGNIFICANT EVENT

## 2024-05-20 ENCOUNTER — APPOINTMENT (OUTPATIENT)
Dept: CARDIOLOGY | Facility: HOSPITAL | Age: 39
End: 2024-05-20
Payer: COMMERCIAL

## 2024-05-20 ENCOUNTER — APPOINTMENT (OUTPATIENT)
Dept: CARDIOLOGY | Facility: CLINIC | Age: 39
End: 2024-05-20
Payer: COMMERCIAL

## 2024-05-21 ENCOUNTER — INFUSION (OUTPATIENT)
Dept: HEMATOLOGY/ONCOLOGY | Facility: CLINIC | Age: 39
End: 2024-05-21
Payer: COMMERCIAL

## 2024-05-21 ENCOUNTER — APPOINTMENT (OUTPATIENT)
Dept: HEMATOLOGY/ONCOLOGY | Facility: CLINIC | Age: 39
End: 2024-05-21
Payer: COMMERCIAL

## 2024-05-21 VITALS
RESPIRATION RATE: 18 BRPM | SYSTOLIC BLOOD PRESSURE: 125 MMHG | DIASTOLIC BLOOD PRESSURE: 82 MMHG | TEMPERATURE: 97.5 F | HEART RATE: 100 BPM | WEIGHT: 152.56 LBS | BODY MASS INDEX: 22.6 KG/M2

## 2024-05-21 DIAGNOSIS — Z17.0 MALIGNANT NEOPLASM OF OVERLAPPING SITES OF RIGHT BREAST IN FEMALE, ESTROGEN RECEPTOR POSITIVE (MULTI): ICD-10-CM

## 2024-05-21 DIAGNOSIS — C50.811 MALIGNANT NEOPLASM OF OVERLAPPING SITES OF RIGHT BREAST IN FEMALE, ESTROGEN RECEPTOR POSITIVE (MULTI): ICD-10-CM

## 2024-05-21 LAB
ALBUMIN SERPL BCP-MCNC: 4.1 G/DL (ref 3.4–5)
ALP SERPL-CCNC: 53 U/L (ref 33–110)
ALT SERPL W P-5'-P-CCNC: 52 U/L (ref 7–45)
ANION GAP SERPL CALC-SCNC: 13 MMOL/L (ref 10–20)
AST SERPL W P-5'-P-CCNC: 21 U/L (ref 9–39)
BASOPHILS # BLD AUTO: 0.07 X10*3/UL (ref 0–0.1)
BASOPHILS NFR BLD AUTO: 1 %
BILIRUB SERPL-MCNC: 0.7 MG/DL (ref 0–1.2)
BUN SERPL-MCNC: 13 MG/DL (ref 6–23)
CALCIUM SERPL-MCNC: 9.2 MG/DL (ref 8.6–10.3)
CHLORIDE SERPL-SCNC: 103 MMOL/L (ref 98–107)
CO2 SERPL-SCNC: 25 MMOL/L (ref 21–32)
CREAT SERPL-MCNC: 0.76 MG/DL (ref 0.5–1.05)
EGFRCR SERPLBLD CKD-EPI 2021: >90 ML/MIN/1.73M*2
EOSINOPHIL # BLD AUTO: 0.42 X10*3/UL (ref 0–0.7)
EOSINOPHIL NFR BLD AUTO: 5.7 %
ERYTHROCYTE [DISTWIDTH] IN BLOOD BY AUTOMATED COUNT: 16.1 % (ref 11.5–14.5)
GLUCOSE SERPL-MCNC: 143 MG/DL (ref 74–99)
HCT VFR BLD AUTO: 32.3 % (ref 36–46)
HGB BLD-MCNC: 10.6 G/DL (ref 12–16)
IMM GRANULOCYTES # BLD AUTO: 0.02 X10*3/UL (ref 0–0.7)
IMM GRANULOCYTES NFR BLD AUTO: 0.3 % (ref 0–0.9)
LYMPHOCYTES # BLD AUTO: 0.47 X10*3/UL (ref 1.2–4.8)
LYMPHOCYTES NFR BLD AUTO: 6.4 %
MCH RBC QN AUTO: 31.9 PG (ref 26–34)
MCHC RBC AUTO-ENTMCNC: 32.8 G/DL (ref 32–36)
MCV RBC AUTO: 97 FL (ref 80–100)
MONOCYTES # BLD AUTO: 0.52 X10*3/UL (ref 0.1–1)
MONOCYTES NFR BLD AUTO: 7.1 %
NEUTROPHILS # BLD AUTO: 5.84 X10*3/UL (ref 1.2–7.7)
NEUTROPHILS NFR BLD AUTO: 79.5 %
NRBC BLD-RTO: ABNORMAL /100{WBCS}
PLATELET # BLD AUTO: 219 X10*3/UL (ref 150–450)
POTASSIUM SERPL-SCNC: 4 MMOL/L (ref 3.5–5.3)
PROT SERPL-MCNC: 6.5 G/DL (ref 6.4–8.2)
RBC # BLD AUTO: 3.32 X10*6/UL (ref 4–5.2)
SODIUM SERPL-SCNC: 137 MMOL/L (ref 136–145)
WBC # BLD AUTO: 7.3 X10*3/UL (ref 4.4–11.3)

## 2024-05-21 PROCEDURE — 2500000001 HC RX 250 WO HCPCS SELF ADMINISTERED DRUGS (ALT 637 FOR MEDICARE OP): Performed by: STUDENT IN AN ORGANIZED HEALTH CARE EDUCATION/TRAINING PROGRAM

## 2024-05-21 PROCEDURE — 80053 COMPREHEN METABOLIC PANEL: CPT

## 2024-05-21 PROCEDURE — 85025 COMPLETE CBC W/AUTO DIFF WBC: CPT

## 2024-05-21 PROCEDURE — 2500000004 HC RX 250 GENERAL PHARMACY W/ HCPCS (ALT 636 FOR OP/ED): Performed by: STUDENT IN AN ORGANIZED HEALTH CARE EDUCATION/TRAINING PROGRAM

## 2024-05-21 PROCEDURE — 96375 TX/PRO/DX INJ NEW DRUG ADDON: CPT | Mod: INF

## 2024-05-21 PROCEDURE — 96413 CHEMO IV INFUSION 1 HR: CPT

## 2024-05-21 RX ORDER — DIPHENHYDRAMINE HCL 25 MG
50 CAPSULE ORAL ONCE
Status: COMPLETED | OUTPATIENT
Start: 2024-05-21 | End: 2024-05-21

## 2024-05-21 RX ORDER — HEPARIN SODIUM,PORCINE/PF 10 UNIT/ML
50 SYRINGE (ML) INTRAVENOUS AS NEEDED
Status: DISCONTINUED | OUTPATIENT
Start: 2024-05-21 | End: 2024-05-21 | Stop reason: HOSPADM

## 2024-05-21 RX ORDER — PROCHLORPERAZINE EDISYLATE 5 MG/ML
10 INJECTION INTRAMUSCULAR; INTRAVENOUS EVERY 6 HOURS PRN
Status: DISCONTINUED | OUTPATIENT
Start: 2024-05-21 | End: 2024-05-21 | Stop reason: HOSPADM

## 2024-05-21 RX ORDER — HEPARIN 100 UNIT/ML
500 SYRINGE INTRAVENOUS AS NEEDED
Status: CANCELLED | OUTPATIENT
Start: 2024-05-21

## 2024-05-21 RX ORDER — FAMOTIDINE 10 MG/ML
20 INJECTION INTRAVENOUS ONCE AS NEEDED
Status: DISCONTINUED | OUTPATIENT
Start: 2024-05-21 | End: 2024-05-21 | Stop reason: HOSPADM

## 2024-05-21 RX ORDER — FAMOTIDINE 10 MG/ML
20 INJECTION INTRAVENOUS ONCE
Status: COMPLETED | OUTPATIENT
Start: 2024-05-21 | End: 2024-05-21

## 2024-05-21 RX ORDER — ALBUTEROL SULFATE 0.83 MG/ML
3 SOLUTION RESPIRATORY (INHALATION) AS NEEDED
Status: DISCONTINUED | OUTPATIENT
Start: 2024-05-21 | End: 2024-05-21 | Stop reason: HOSPADM

## 2024-05-21 RX ORDER — DIPHENHYDRAMINE HYDROCHLORIDE 50 MG/ML
50 INJECTION INTRAMUSCULAR; INTRAVENOUS AS NEEDED
Status: DISCONTINUED | OUTPATIENT
Start: 2024-05-21 | End: 2024-05-21 | Stop reason: HOSPADM

## 2024-05-21 RX ORDER — DEXAMETHASONE SODIUM PHOSPHATE 100 MG/10ML
10 INJECTION INTRAMUSCULAR; INTRAVENOUS ONCE
Status: COMPLETED | OUTPATIENT
Start: 2024-05-21 | End: 2024-05-21

## 2024-05-21 RX ORDER — PROCHLORPERAZINE MALEATE 10 MG
10 TABLET ORAL EVERY 6 HOURS PRN
Status: DISCONTINUED | OUTPATIENT
Start: 2024-05-21 | End: 2024-05-21 | Stop reason: HOSPADM

## 2024-05-21 RX ORDER — HEPARIN SODIUM,PORCINE/PF 10 UNIT/ML
50 SYRINGE (ML) INTRAVENOUS AS NEEDED
Status: CANCELLED | OUTPATIENT
Start: 2024-05-21

## 2024-05-21 RX ORDER — HEPARIN 100 UNIT/ML
500 SYRINGE INTRAVENOUS AS NEEDED
Status: DISCONTINUED | OUTPATIENT
Start: 2024-05-21 | End: 2024-05-21 | Stop reason: HOSPADM

## 2024-05-21 RX ORDER — EPINEPHRINE 0.3 MG/.3ML
0.3 INJECTION SUBCUTANEOUS EVERY 5 MIN PRN
Status: DISCONTINUED | OUTPATIENT
Start: 2024-05-21 | End: 2024-05-21 | Stop reason: HOSPADM

## 2024-05-21 RX ORDER — ONDANSETRON HYDROCHLORIDE 2 MG/ML
8 INJECTION, SOLUTION INTRAVENOUS ONCE
Status: COMPLETED | OUTPATIENT
Start: 2024-05-21 | End: 2024-05-21

## 2024-05-21 RX ADMIN — PACLITAXEL 150 MG: 6 INJECTION, SOLUTION INTRAVENOUS at 09:40

## 2024-05-21 RX ADMIN — HEPARIN 500 UNITS: 100 SYRINGE at 11:52

## 2024-05-21 RX ADMIN — DIPHENHYDRAMINE HYDROCHLORIDE 50 MG: 25 CAPSULE ORAL at 09:04

## 2024-05-21 RX ADMIN — ONDANSETRON 8 MG: 2 INJECTION INTRAMUSCULAR; INTRAVENOUS at 09:05

## 2024-05-21 RX ADMIN — DEXAMETHASONE SODIUM PHOSPHATE 10 MG: 10 INJECTION INTRAMUSCULAR; INTRAVENOUS at 09:05

## 2024-05-21 RX ADMIN — FAMOTIDINE 20 MG: 10 INJECTION INTRAVENOUS at 09:04

## 2024-05-21 ASSESSMENT — PAIN SCALES - GENERAL: PAINLEVEL: 0-NO PAIN

## 2024-05-21 NOTE — SIGNIFICANT EVENT
05/21/24 0852   Prechemo Checklist   Has the patient been in the hospital, ED, or urgent care since last date of service No   Chemo/Immuno Consent Completed and Signed Yes   Protocol/Indications Verified Yes   Confirmed to previous date/time of medication Yes   Compared to previous dose Yes   All medications are dated accurately Yes   Pregnancy Test Negative Yes   Parameters Met Yes   BSA/Weight-Height Verified Yes   Dose Calculations Verified (current, total, cumulative) Yes

## 2024-05-22 ENCOUNTER — HOSPITAL ENCOUNTER (OUTPATIENT)
Dept: CARDIOLOGY | Facility: HOSPITAL | Age: 39
Discharge: HOME | End: 2024-05-22
Payer: COMMERCIAL

## 2024-05-22 DIAGNOSIS — Z51.81 ENCOUNTER FOR MONITORING CARDIOTOXIC DRUG THERAPY: ICD-10-CM

## 2024-05-22 DIAGNOSIS — Z79.899 ENCOUNTER FOR MONITORING CARDIOTOXIC DRUG THERAPY: ICD-10-CM

## 2024-05-22 PROCEDURE — 76376 3D RENDER W/INTRP POSTPROCES: CPT

## 2024-05-24 ENCOUNTER — LAB (OUTPATIENT)
Dept: LAB | Facility: CLINIC | Age: 39
End: 2024-05-24
Payer: COMMERCIAL

## 2024-05-24 ENCOUNTER — OFFICE VISIT (OUTPATIENT)
Dept: CARDIOLOGY | Facility: CLINIC | Age: 39
End: 2024-05-24
Payer: COMMERCIAL

## 2024-05-24 VITALS
HEIGHT: 70 IN | BODY MASS INDEX: 21.9 KG/M2 | WEIGHT: 153 LBS | SYSTOLIC BLOOD PRESSURE: 116 MMHG | HEART RATE: 119 BPM | DIASTOLIC BLOOD PRESSURE: 80 MMHG | TEMPERATURE: 98.4 F

## 2024-05-24 DIAGNOSIS — R00.0 SINUS TACHYCARDIA: Primary | ICD-10-CM

## 2024-05-24 DIAGNOSIS — R07.89 CHEST TIGHTNESS: ICD-10-CM

## 2024-05-24 DIAGNOSIS — Z51.81 ENCOUNTER FOR MONITORING CARDIOTOXIC DRUG THERAPY: ICD-10-CM

## 2024-05-24 DIAGNOSIS — R06.02 SHORTNESS OF BREATH: ICD-10-CM

## 2024-05-24 DIAGNOSIS — Z79.899 ENCOUNTER FOR MONITORING CARDIOTOXIC DRUG THERAPY: ICD-10-CM

## 2024-05-24 DIAGNOSIS — I42.7 CARDIOTOXICITY (MULTI): ICD-10-CM

## 2024-05-24 DIAGNOSIS — Z17.0 MALIGNANT NEOPLASM OF OVERLAPPING SITES OF RIGHT BREAST IN FEMALE, ESTROGEN RECEPTOR POSITIVE (MULTI): ICD-10-CM

## 2024-05-24 DIAGNOSIS — C50.811 MALIGNANT NEOPLASM OF OVERLAPPING SITES OF RIGHT BREAST IN FEMALE, ESTROGEN RECEPTOR POSITIVE (MULTI): ICD-10-CM

## 2024-05-24 LAB
BNP SERPL-MCNC: 7 PG/ML (ref 0–99)
CARDIAC TROPONIN I PNL SERPL HS: 31 NG/L (ref 0–13)

## 2024-05-24 PROCEDURE — 83880 ASSAY OF NATRIURETIC PEPTIDE: CPT

## 2024-05-24 PROCEDURE — 84484 ASSAY OF TROPONIN QUANT: CPT

## 2024-05-24 PROCEDURE — 99215 OFFICE O/P EST HI 40 MIN: CPT | Performed by: INTERNAL MEDICINE

## 2024-05-24 RX ORDER — METOPROLOL SUCCINATE 25 MG/1
25 TABLET, EXTENDED RELEASE ORAL DAILY
Qty: 90 TABLET | Refills: 3 | Status: SHIPPED | OUTPATIENT
Start: 2024-05-24 | End: 2025-05-24

## 2024-05-24 RX ORDER — LIDOCAINE AND PRILOCAINE 25; 25 MG/G; MG/G
CREAM TOPICAL
COMMUNITY
Start: 2024-04-30

## 2024-05-24 ASSESSMENT — ENCOUNTER SYMPTOMS
ACTIVITY CHANGE: 0
NERVOUS/ANXIOUS: 1
GASTROINTESTINAL NEGATIVE: 1
CHEST TIGHTNESS: 1
COUGH: 1
SHORTNESS OF BREATH: 1
MUSCULOSKELETAL NEGATIVE: 1

## 2024-05-24 NOTE — PROGRESS NOTES
Patient:  Macy Chavez  YOB: 1985  MRN: [id number]       Chief Complaint/Active Symptoms:       Macy Chavez is a 38 y.o. female who returns today for cardiac follow-up.    Finished her DDAC and felt well until this week when she had some nausea and some mild SOB with activity. No orthopnea or PND. Feels like her chest is tight and she takes a deep breath and it goes away. No edema, orthopnea or PND. No palpitations, syncope or near syncope.       Cancer Diagnosis: Right breast cancer, stage IIIA, ER+, NV+, HER2 neg  Oncologist. Mallat  Treatment: DDAC 14 day cycles with paclitaxel weekly cycles. Completed 4 cycles of DDAC, now on weekly paclitaxel   Radiation: planned  Total dose 242.82mg/m2    Risk factors: Elevated LDL cholesterol, + Family history     Testing:   CT scan - no coronary calcifications noted  Echo - Normal LVEF 65%, GLS -18% pre treatment  EKG - NSR, NS T abn, low voltage QRS      Review of Systems   Constitutional:  Negative for activity change.   Respiratory:  Positive for cough, chest tightness and shortness of breath.    Gastrointestinal: Negative.    Genitourinary: Negative.    Musculoskeletal: Negative.    Psychiatric/Behavioral:  The patient is nervous/anxious.    All other systems reviewed and are negative.      Objective:     Vitals:    05/24/24 1342   BP: 116/80   Pulse: (!) 119   Temp: 36.9 °C (98.4 °F)       Vitals:    05/24/24 1342   Weight: 69.4 kg (153 lb)       Allergies:     Allergies   Allergen Reactions    Dog Dander Itching          Medications:     Current Outpatient Medications   Medication Instructions    atorvastatin (LIPITOR) 20 mg, oral, Daily    lidocaine-prilocaine (Emla) 2.5-2.5 % cream PRN    prochlorperazine (COMPAZINE) 10 mg, oral, Every 6 hours PRN    valACYclovir (VALTREX) 500 mg, oral, 2 times daily       Physical Examination:   GENERAL:  Well developed, well nourished, in no acute distress.  HEENT: NC AT, EOMI with anicteric sclera  NECK:  Supple, no  "JVD, no bruit.  LUNGS:  Clear to auscultation bilaterally, normal respiratory effort.  HEART:  PMI is nondisplaced.  Rhythm regular mildly tachycardic with normal S1 and S2, no S3, no mumur or rub. No carotid or abdominal bruits  ABDOMEN: Soft, NT, ND without palpable organomegaly or bruits  EXTREMITIES:  Warm with good color, no clubbing or cyanosis.  There is no edema noted.  PERIPHERAL VASCULAR:  Pulses present and equally palpable; 2+ throughout.  MUSCULOSKELETAL: No significant joint or limb deformity  NEURO/PSYCH:  Alert and oriented times three with approppriate behavior and responses. Nonfocal motor examination with normal gait and ambulation  Skin: no rash or lesions on exposed skin or reported.    Lab:     CBC:   Lab Results   Component Value Date    WBC 7.3 05/21/2024    RBC 3.32 (L) 05/21/2024    HGB 10.6 (L) 05/21/2024    HCT 32.3 (L) 05/21/2024     05/21/2024        CMP:    Lab Results   Component Value Date     05/21/2024    K 4.0 05/21/2024     05/21/2024    CO2 25 05/21/2024    BUN 13 05/21/2024    CREATININE 0.76 05/21/2024    GLUCOSE 143 (H) 05/21/2024    CALCIUM 9.2 05/21/2024       Magnesium:    No results found for: \"MG\"    Lipid Profile:    Lab Results   Component Value Date    TRIG 70 01/25/2024    HDL 62.6 01/25/2024    LDLCALC 174 (H) 01/25/2024       TSH:    Lab Results   Component Value Date    TSH 1.11 01/25/2024       BNP:   No results found for: \"BNP\"     PT/INR:    Lab Results   Component Value Date    PROTIME 11.7 02/26/2024    INR 1.0 02/26/2024       HgBA1c:    No results found for: \"HGBA1C\"    BMP:  Lab Results   Component Value Date     05/21/2024     05/13/2024     05/06/2024    K 4.0 05/21/2024    K 3.9 05/13/2024    K 3.9 05/06/2024     05/21/2024     05/13/2024     05/06/2024    CO2 25 05/21/2024    CO2 27 05/13/2024    CO2 26 05/06/2024    BUN 13 05/21/2024    BUN 11 05/13/2024    BUN 12 05/06/2024    CREATININE 0.76 " "05/21/2024    CREATININE 0.65 05/13/2024    CREATININE 0.60 05/06/2024       Cardiac Enzymes:    No results found for: \"TROPHS\"    Hepatic Function Panel:    Lab Results   Component Value Date    ALKPHOS 53 05/21/2024    ALT 52 (H) 05/21/2024    AST 21 05/21/2024    PROT 6.5 05/21/2024    BILITOT 0.7 05/21/2024         Diagnostic Studies:     Recent echocardiogram shows a mild drop in her ejection fraction from 65% to 57%.  We are awaiting an update in the report to look and see what her 3D EF is to compared to her previous study as well as her global longitudinal strain.       Radiology:     No orders to display     ASSESSMENT     Problem List Items Addressed This Visit       Malignant neoplasm of overlapping sites of right breast in female, estrogen receptor positive (Multi)    Relevant Orders    Follow Up In Cardiology    Encounter for monitoring cardiotoxic drug therapy    Relevant Medications    metoprolol succinate XL (Toprol-XL) 25 mg 24 hr tablet    Shortness of breath    Relevant Orders    B-Type Natriuretic Peptide    Follow Up In Cardiology    Chest tightness    Relevant Orders    Troponin I, High Sensitivity    Follow Up In Cardiology    Cardiotoxicity (Multi)    Relevant Medications    metoprolol succinate XL (Toprol-XL) 25 mg 24 hr tablet    Other Relevant Orders    Follow Up In Cardiology     Other Visit Diagnoses       Sinus tachycardia    -  Primary    Relevant Medications    metoprolol succinate XL (Toprol-XL) 25 mg 24 hr tablet            PLAN     1.  Mild cardiotoxicity.  Patient's had a mild drop in her EF is about 17%.  We are awaiting the update and the report for her 3D EF and global longitudinal strain.  Because of her mild sinus tachycardia we will start first with low-dose beta-blockers.  If review of the imaging shows clear drop in strain as well as a mild drop in EF in 1 to 2 weeks we will add low-dose losartan to her medical regimen 2.  I have asked her to contact us if there is any " problems with the medications and will reach out to her in the next week to see what her home blood pressure readings are before adding the ARB.  Will see her back in the office in a month and we have asked for a BN peptide and troponin today to see if there is subclinical heart failure present.  If her BN peptide is normal we will see how she does on the medications and see her in 1 month.  If there is significant clinical heart failure we will repeat her imaging in a month otherwise we will wait 3 months for repeat evaluation.  2.  Breast cancer on cardiotoxic drug therapy.  Patient is completed her cardiotoxic portion of her chemotherapy for her breast cancer.  It is possible for the heart function to continue to change or deteriorate after administration of therapy which is well we will continue to follow her very closely throughout this process.  She knows when to seek earlier medical attention.  3.  Sinus tachycardia.  Not quite sure how much of this is anxiety.  I do not expect tachycardia because of heart drop in heart function because her EF is still within normal limits but because of this we will start with a low-dose beta-blocker and then add low-dose ARB.    Will see her in follow-up in 1 month sooner if there is any problems or concerns we will have the nurses check her with her and her blood pressure in 1 to 2 weeks and add low-dose losartan if blood pressures will permit.

## 2024-05-25 LAB
EJECTION FRACTION APICAL 4 CHAMBER: 59.9
LEFT ATRIUM VOLUME AREA LENGTH INDEX BSA: 13.5 ML/M2
LEFT VENTRICLE INTERNAL DIMENSION DIASTOLE: 4.9 CM (ref 3.5–6)
LV EJECTION FRACTION BIPLANE: 58 %
MITRAL VALVE E/A RATIO: 1.83
MITRAL VALVE E/E' RATIO: 6.63
RIGHT VENTRICLE FREE WALL PEAK S': 12.6 CM/S

## 2024-05-28 ENCOUNTER — OFFICE VISIT (OUTPATIENT)
Dept: HEMATOLOGY/ONCOLOGY | Facility: CLINIC | Age: 39
End: 2024-05-28
Payer: COMMERCIAL

## 2024-05-28 ENCOUNTER — LAB (OUTPATIENT)
Dept: LAB | Facility: CLINIC | Age: 39
End: 2024-05-28
Payer: COMMERCIAL

## 2024-05-28 ENCOUNTER — INFUSION (OUTPATIENT)
Dept: HEMATOLOGY/ONCOLOGY | Facility: CLINIC | Age: 39
End: 2024-05-28
Payer: COMMERCIAL

## 2024-05-28 VITALS
DIASTOLIC BLOOD PRESSURE: 75 MMHG | SYSTOLIC BLOOD PRESSURE: 108 MMHG | HEART RATE: 139 BPM | RESPIRATION RATE: 18 BRPM | OXYGEN SATURATION: 98 % | TEMPERATURE: 97.3 F | WEIGHT: 150.4 LBS | BODY MASS INDEX: 21.89 KG/M2

## 2024-05-28 VITALS — BODY MASS INDEX: 22.28 KG/M2 | HEIGHT: 69 IN

## 2024-05-28 DIAGNOSIS — Z17.0 MALIGNANT NEOPLASM OF OVERLAPPING SITES OF RIGHT BREAST IN FEMALE, ESTROGEN RECEPTOR POSITIVE (MULTI): ICD-10-CM

## 2024-05-28 DIAGNOSIS — R79.89 ELEVATED TROPONIN: Primary | ICD-10-CM

## 2024-05-28 DIAGNOSIS — C50.811 MALIGNANT NEOPLASM OF OVERLAPPING SITES OF RIGHT BREAST IN FEMALE, ESTROGEN RECEPTOR POSITIVE (MULTI): ICD-10-CM

## 2024-05-28 DIAGNOSIS — C50.912 LOBULAR CARCINOMA OF LEFT BREAST (MULTI): Primary | ICD-10-CM

## 2024-05-28 LAB
ALBUMIN SERPL BCP-MCNC: 4.5 G/DL (ref 3.4–5)
ALP SERPL-CCNC: 58 U/L (ref 33–110)
ALT SERPL W P-5'-P-CCNC: 30 U/L (ref 7–45)
ANION GAP SERPL CALC-SCNC: 12 MMOL/L (ref 10–20)
AST SERPL W P-5'-P-CCNC: 20 U/L (ref 9–39)
B-HCG SERPL-ACNC: <3 MIU/ML
BASOPHILS # BLD AUTO: 0.05 X10*3/UL (ref 0–0.1)
BASOPHILS NFR BLD AUTO: 0.7 %
BILIRUB SERPL-MCNC: 0.6 MG/DL (ref 0–1.2)
BUN SERPL-MCNC: 9 MG/DL (ref 6–23)
CALCIUM SERPL-MCNC: 9.6 MG/DL (ref 8.6–10.3)
CHLORIDE SERPL-SCNC: 103 MMOL/L (ref 98–107)
CO2 SERPL-SCNC: 27 MMOL/L (ref 21–32)
CREAT SERPL-MCNC: 0.8 MG/DL (ref 0.5–1.05)
EGFRCR SERPLBLD CKD-EPI 2021: >90 ML/MIN/1.73M*2
EOSINOPHIL # BLD AUTO: 0.56 X10*3/UL (ref 0–0.7)
EOSINOPHIL NFR BLD AUTO: 7.4 %
ERYTHROCYTE [DISTWIDTH] IN BLOOD BY AUTOMATED COUNT: 15.4 % (ref 11.5–14.5)
GLUCOSE SERPL-MCNC: 104 MG/DL (ref 74–99)
HCT VFR BLD AUTO: 34.9 % (ref 36–46)
HGB BLD-MCNC: 11.7 G/DL (ref 12–16)
IMM GRANULOCYTES # BLD AUTO: 0.01 X10*3/UL (ref 0–0.7)
IMM GRANULOCYTES NFR BLD AUTO: 0.1 % (ref 0–0.9)
LYMPHOCYTES # BLD AUTO: 0.72 X10*3/UL (ref 1.2–4.8)
LYMPHOCYTES NFR BLD AUTO: 9.5 %
MCH RBC QN AUTO: 32.6 PG (ref 26–34)
MCHC RBC AUTO-ENTMCNC: 33.5 G/DL (ref 32–36)
MCV RBC AUTO: 97 FL (ref 80–100)
MONOCYTES # BLD AUTO: 0.53 X10*3/UL (ref 0.1–1)
MONOCYTES NFR BLD AUTO: 7 %
NEUTROPHILS # BLD AUTO: 5.72 X10*3/UL (ref 1.2–7.7)
NEUTROPHILS NFR BLD AUTO: 75.3 %
NRBC BLD-RTO: ABNORMAL /100{WBCS}
PLATELET # BLD AUTO: 250 X10*3/UL (ref 150–450)
POTASSIUM SERPL-SCNC: 4.3 MMOL/L (ref 3.5–5.3)
PROT SERPL-MCNC: 7.1 G/DL (ref 6.4–8.2)
RBC # BLD AUTO: 3.59 X10*6/UL (ref 4–5.2)
SODIUM SERPL-SCNC: 138 MMOL/L (ref 136–145)
WBC # BLD AUTO: 7.6 X10*3/UL (ref 4.4–11.3)

## 2024-05-28 PROCEDURE — 2500000001 HC RX 250 WO HCPCS SELF ADMINISTERED DRUGS (ALT 637 FOR MEDICARE OP): Performed by: STUDENT IN AN ORGANIZED HEALTH CARE EDUCATION/TRAINING PROGRAM

## 2024-05-28 PROCEDURE — 99214 OFFICE O/P EST MOD 30 MIN: CPT | Performed by: STUDENT IN AN ORGANIZED HEALTH CARE EDUCATION/TRAINING PROGRAM

## 2024-05-28 PROCEDURE — 2500000004 HC RX 250 GENERAL PHARMACY W/ HCPCS (ALT 636 FOR OP/ED): Performed by: STUDENT IN AN ORGANIZED HEALTH CARE EDUCATION/TRAINING PROGRAM

## 2024-05-28 PROCEDURE — 80053 COMPREHEN METABOLIC PANEL: CPT

## 2024-05-28 PROCEDURE — 85025 COMPLETE CBC W/AUTO DIFF WBC: CPT

## 2024-05-28 PROCEDURE — 99214 OFFICE O/P EST MOD 30 MIN: CPT | Mod: 25 | Performed by: STUDENT IN AN ORGANIZED HEALTH CARE EDUCATION/TRAINING PROGRAM

## 2024-05-28 PROCEDURE — 1036F TOBACCO NON-USER: CPT | Performed by: STUDENT IN AN ORGANIZED HEALTH CARE EDUCATION/TRAINING PROGRAM

## 2024-05-28 PROCEDURE — 96375 TX/PRO/DX INJ NEW DRUG ADDON: CPT | Mod: INF

## 2024-05-28 PROCEDURE — 84702 CHORIONIC GONADOTROPIN TEST: CPT

## 2024-05-28 PROCEDURE — 96413 CHEMO IV INFUSION 1 HR: CPT

## 2024-05-28 RX ORDER — PROCHLORPERAZINE MALEATE 10 MG
10 TABLET ORAL EVERY 6 HOURS PRN
Status: DISCONTINUED | OUTPATIENT
Start: 2024-05-28 | End: 2024-05-28 | Stop reason: HOSPADM

## 2024-05-28 RX ORDER — FAMOTIDINE 10 MG/ML
20 INJECTION INTRAVENOUS ONCE
Status: COMPLETED | OUTPATIENT
Start: 2024-05-28 | End: 2024-05-28

## 2024-05-28 RX ORDER — DIPHENHYDRAMINE HYDROCHLORIDE 50 MG/ML
50 INJECTION INTRAMUSCULAR; INTRAVENOUS AS NEEDED
Status: DISCONTINUED | OUTPATIENT
Start: 2024-05-28 | End: 2024-05-28 | Stop reason: HOSPADM

## 2024-05-28 RX ORDER — ONDANSETRON HYDROCHLORIDE 2 MG/ML
8 INJECTION, SOLUTION INTRAVENOUS ONCE
Status: COMPLETED | OUTPATIENT
Start: 2024-05-28 | End: 2024-05-28

## 2024-05-28 RX ORDER — EPINEPHRINE 0.3 MG/.3ML
0.3 INJECTION SUBCUTANEOUS EVERY 5 MIN PRN
Status: DISCONTINUED | OUTPATIENT
Start: 2024-05-28 | End: 2024-05-28 | Stop reason: HOSPADM

## 2024-05-28 RX ORDER — VALACYCLOVIR HYDROCHLORIDE 500 MG/1
500 TABLET, FILM COATED ORAL DAILY
Qty: 30 TABLET | Refills: 2 | Status: SHIPPED | OUTPATIENT
Start: 2024-05-28

## 2024-05-28 RX ORDER — HEPARIN 100 UNIT/ML
500 SYRINGE INTRAVENOUS AS NEEDED
Status: CANCELLED | OUTPATIENT
Start: 2024-05-28

## 2024-05-28 RX ORDER — HEPARIN SODIUM,PORCINE/PF 10 UNIT/ML
50 SYRINGE (ML) INTRAVENOUS AS NEEDED
Status: CANCELLED | OUTPATIENT
Start: 2024-05-28

## 2024-05-28 RX ORDER — FAMOTIDINE 10 MG/ML
20 INJECTION INTRAVENOUS ONCE AS NEEDED
Status: DISCONTINUED | OUTPATIENT
Start: 2024-05-28 | End: 2024-05-28 | Stop reason: HOSPADM

## 2024-05-28 RX ORDER — HEPARIN 100 UNIT/ML
500 SYRINGE INTRAVENOUS AS NEEDED
Status: DISCONTINUED | OUTPATIENT
Start: 2024-05-28 | End: 2024-05-28 | Stop reason: HOSPADM

## 2024-05-28 RX ORDER — ALBUTEROL SULFATE 0.83 MG/ML
3 SOLUTION RESPIRATORY (INHALATION) AS NEEDED
Status: DISCONTINUED | OUTPATIENT
Start: 2024-05-28 | End: 2024-05-28 | Stop reason: HOSPADM

## 2024-05-28 RX ORDER — DIPHENHYDRAMINE HCL 25 MG
50 CAPSULE ORAL ONCE
Status: COMPLETED | OUTPATIENT
Start: 2024-05-28 | End: 2024-05-28

## 2024-05-28 RX ORDER — DEXAMETHASONE SODIUM PHOSPHATE 100 MG/10ML
10 INJECTION INTRAMUSCULAR; INTRAVENOUS ONCE
Status: COMPLETED | OUTPATIENT
Start: 2024-05-28 | End: 2024-05-28

## 2024-05-28 RX ORDER — PROCHLORPERAZINE EDISYLATE 5 MG/ML
10 INJECTION INTRAMUSCULAR; INTRAVENOUS EVERY 6 HOURS PRN
Status: DISCONTINUED | OUTPATIENT
Start: 2024-05-28 | End: 2024-05-28 | Stop reason: HOSPADM

## 2024-05-28 RX ADMIN — FAMOTIDINE 20 MG: 10 INJECTION INTRAVENOUS at 10:06

## 2024-05-28 RX ADMIN — PACLITAXEL 150 MG: 6 INJECTION, SOLUTION, CONCENTRATE INTRAVENOUS at 10:50

## 2024-05-28 RX ADMIN — HEPARIN 500 UNITS: 100 SYRINGE at 12:06

## 2024-05-28 RX ADMIN — DEXAMETHASONE SODIUM PHOSPHATE 10 MG: 10 INJECTION INTRAMUSCULAR; INTRAVENOUS at 10:06

## 2024-05-28 RX ADMIN — DIPHENHYDRAMINE HYDROCHLORIDE 50 MG: 25 CAPSULE ORAL at 10:05

## 2024-05-28 RX ADMIN — ONDANSETRON 8 MG: 2 INJECTION INTRAMUSCULAR; INTRAVENOUS at 10:06

## 2024-05-28 ASSESSMENT — PAIN SCALES - GENERAL: PAINLEVEL: 0-NO PAIN

## 2024-05-28 NOTE — SIGNIFICANT EVENT

## 2024-05-28 NOTE — PROGRESS NOTES
Patient ID: Macy Chavez is a 38 y.o. female.    The patient presents to clinic today for her history of breast cancer.     Cancer Staging   Malignant neoplasm of overlapping sites of right breast in female, estrogen receptor positive (Multi)  Staging form: Breast, AJCC 8th Edition  - Clinical stage from 2/6/2024: Stage IIIA (cT3, cN2, cM0, G3, ER+, IA+, HER2-) - Signed by Denise Golden MD on 2/21/2024      Diagnostic/Therapeutic History:    11/03/2023: Bl screening Mammogram: negative       12/20/2023: Right breast mass for which US done, Nonspecific dense fibroglandular tissues in the right breast in the area of reported lump. Dense fibroglandular tissue also evident throughout both breasts on screening mammogram. No discrete lesionidentified.    02/06/2024: Patient describes an increase in the size of a palpable lump in theright lower outer breast which was previously assessed in December of2023. Recent negative screening mammogram from 11/09/2023. She alsodescribes a new palpable lump in the right axilla. After being seen by her provider, a nother palpable lump is identified in the upper outer right breast.    Ultrasound of the right breast showed In the patient's area of palpable concern in the right breast at 7 o'clock, 5 cm from the nipple, there is a large vague heterogeneousmass with internal vascularity which has the appearance of dense fibroglandular tissue measuring up to 5 cm; it is visible protruding from the skin with discoloration. In the patient's area of palpable concern felt by her provider in the right breast at 10 o'clock, 8 cm from the nipple, there is an oval parallel heterogeneous mass withinternal vascularity measuring approximately 1.1 x 0.9 x 1.4 cm. 3 enlarged axillary LN were seen    02/06/2024: US guided core needle biopsy    A. BREAST, RIGHT, 7:00, 5 CM FN, CORE BIOPSY:   -- Invasive ductal carcinoma, grade 2, ER: 95%, IA: 5-10%, HER2 negative (1+)        B. BREAST, RIGHT, 10:00, 8 CM  FN, CORE BIOPSY:   -- Invasive ductal carcinoma, grade 2-3,  ER: 95%, AK<5%, HER2 negative (1+)         C. AXILLARY LYMPH NODE, RIGHT, BIOPSY:     -- Metastatic carcinoma, see note.      02/14/2024: Biopsy-proven multicentric right breast malignancy with the total  span of up to 13.0 cm associated with bulky metastatic axillary and  abnormal internal mammary lymphadenopathy. No evidence of chest wall,  skin or nipple-areolar complex involvement.    No MRI evidence of malignancy in the left breast.    02/15/2024: staging scans: negative- short term follow up       History of Present Illness (HPI)/Interval History:    Did have nausea and fatigue last Thursday; she is presenting today for week 5/12 of taxol  She denies any fevers or chills.  No chest pain, does have intermittent cough  She does have chest discomfort., with intermittent shortness of breath   She denies any new or unexplained bone aches or pains.      PMH: none     PSH: no surgery    Allergies/meds: negative     Reproductive hx: Menarche: 12, AFLB: 27, Menopause: no, HRT: no     Family history: Maternal Grandfather: Kidney versus Liver cancer,Paternal Grandmother: Soft cell sarcoma-        Review of Systems:  14-point ROS otherwise negative, as per HPI.    Past Medical History:   Diagnosis Date    Breast cancer (Multi)     Cancer (Multi)     Other specified health status 09/29/2016    No pertinent past medical history       Past Surgical History:   Procedure Laterality Date    OTHER SURGICAL HISTORY  09/29/2016    Oral Surgery Tooth Extraction [place] Tooth    WISDOM TOOTH EXTRACTION  6/1/2000       Social History     Socioeconomic History    Marital status:      Spouse name: None    Number of children: None    Years of education: None    Highest education level: None   Occupational History    None   Tobacco Use    Smoking status: Never    Smokeless tobacco: Never   Vaping Use    Vaping status: Never Used   Substance and Sexual Activity    Alcohol  use: Yes     Alcohol/week: 1.0 standard drink of alcohol     Types: 1 Glasses of wine per week     Comment: A few drinks a month    Drug use: Never    Sexual activity: Yes     Partners: Male     Birth control/protection: I.U.D.   Other Topics Concern    None   Social History Narrative    None     Social Determinants of Health     Financial Resource Strain: Not on file   Food Insecurity: Not on file   Transportation Needs: Not on file   Physical Activity: Not on file   Stress: Not on file   Social Connections: Not on file   Intimate Partner Violence: Not on file   Housing Stability: Not on file       Allergies   Allergen Reactions    Dog Dander Itching         Current Outpatient Medications:     atorvastatin (Lipitor) 20 mg tablet, Take 1 tablet (20 mg) by mouth once daily., Disp: 90 tablet, Rfl: 3    lidocaine-prilocaine (Emla) 2.5-2.5 % cream, PRN, Disp: , Rfl:     metoprolol succinate XL (Toprol-XL) 25 mg 24 hr tablet, Take 1 tablet (25 mg) by mouth once daily. Do not crush or chew., Disp: 90 tablet, Rfl: 3    prochlorperazine (Compazine) 10 mg tablet, Take 1 tablet (10 mg) by mouth every 6 hours if needed for nausea or vomiting., Disp: 30 tablet, Rfl: 2    valACYclovir (Valtrex) 500 mg tablet, Take 1 tablet (500 mg) by mouth 2 times a day., Disp: , Rfl:      Objective    BSA: 1.83 meters squared  /75 (BP Location: Left arm, Patient Position: Sitting)   Pulse (!) 139   Temp 36.3 °C (97.3 °F) (Temporal)   Resp 18   Wt 68.2 kg (150 lb 6.4 oz)   SpO2 98%   BMI 21.89 kg/m²     ECOG Performance Status: 0    GA: alert, oriented, cooperative  HEENT: anicteric sclera, well injected conjunctiva  Heart: RRR, no murmurs or gallops heard  Lung: CTAB  Abd: +BS, soft, non tender  Breast: large breast mass measuring at inferior breast measuring 4cm. Additional mass at 10 oclock- Right axillary mass ~ 4 cm    Laboratory Data:  Lab Results   Component Value Date    WBC 7.6 05/28/2024    HGB 11.7 (L) 05/28/2024    HCT  34.9 (L) 05/28/2024    MCV 97 05/28/2024     05/28/2024    ANC 7.27 04/29/2024       Chemistry    Lab Results   Component Value Date/Time     05/28/2024 0838    K 4.3 05/28/2024 0838     05/28/2024 0838    CO2 27 05/28/2024 0838    BUN 9 05/28/2024 0838    CREATININE 0.80 05/28/2024 0838    Lab Results   Component Value Date/Time    CALCIUM 9.6 05/28/2024 0838    ALKPHOS 58 05/28/2024 0838    AST 20 05/28/2024 0838    ALT 30 05/28/2024 0838    BILITOT 0.6 05/28/2024 0838             Radiology:  [facility] (Strain and 3D)     [facility], [address] [place] [address]                 Tel [phone] and Fax [phone]    TRANSTHORACIC ECHOCARDIOGRAM REPORT       Patient Name:      SERA Ramirez Physician:    [id number] Sanderson MD  Study Date:        5/22/2024            Ordering Provider:    [id number] VIPUL BRYANT  MRN/PID:           [id number]             Fellow:  Accession#:        [id number]         Nurse:  Date of Birth/Age: 1985 / 38 years Sonographer:          MARLENA Callejas RDCS  Gender:            F                    Additional Staff:  Height:            172.72 cm            Admit Date:  Weight:            68.95 kg             Admission Status:     Outpatient  BSA / BMI:         1.82 m2 / 23.11      Encounter#:           [id number]                     kg/m2                                          Department Location:  [facility] Non                                                                Invasive  Blood Pressure: 125 /76 mmHg    Study Type:    ONCO-ECHO LIMITED (STRAIN AND 3D)  Diagnosis/ICD: Encounter for monitoring cardiotoxic drug therapy-Z51.81  Indication:    Chemotherapy  CPT Code:      Echo Limited-58964; Myocardial  Strain Imaging-01931; 3D Rendering                 w/o independent workstation-[id number]; Color Doppler-40434    Patient History:  Pertinent History: Hyperlipidemia. Breast CA.    Study Detail: The following Echo studies were performed: 2D, M-Mode, Doppler and                color flow.       PHYSICIAN INTERPRETATION:  Left Ventricle: The left ventricular systolic function is normal, with an estimated ejection fraction of 55-60%. There are no regional wall motion abnormalities. The left ventricular cavity size is normal. Left ventricular diastolic filling was not assessed. Strain values are normal, which imply normal myocardial function.  Left Atrium: The left atrium is normal in size.  Right Ventricle: The right ventricle is normal in size. There is normal right ventricular global systolic function.  Right Atrium: The right atrium is normal in size.  Aortic Valve: The aortic valve is trileaflet. There is no evidence of aortic valve regurgitation.  Mitral Valve: The mitral valve is normal in structure. There is no evidence of mitral valve regurgitation.  Tricuspid Valve: The tricuspid valve is structurally normal. There is trace tricuspid regurgitation. The right ventricular systolic pressure is unable to be estimated.  Pulmonic Valve: The pulmonic valve was not assessed. Pulmonic valve regurgitation was not assessed.  Pericardium: There is no pericardial effusion noted.  Aorta: The aortic root is normal.     ONCO-CARDIOLOGY:  Vital Signs: The patients heart rate during the study was 69 beats per minute.  The patients blood pressure was 125/76 during the study.  Machine: This study was performed on the [employer] Epic.  Previous Study: The patient had a previous Onco-Cardiology echocardiogram dated  2/22/2024. The patient's previous study was performed on the [employer] Epic.       Onco-Cardiology Measurements:  Historical Measurements from Previous Study  2D EF (Biplane)                   67.3%  3D EF                                66%  Global Longitudinal Strain (GLS) -18.2%    Current Measurements  2D EF (Biplane)                   57.9%  3D EF                               62%  Global Longitudinal Strain (GLS) -20.9%    GLS Tracking Quality: Good       CONCLUSIONS:   1. Left ventricular systolic function is normal with a 55-60% estimated ejection fraction.    QUANTITATIVE DATA SUMMARY:  2D MEASUREMENTS:                           Normal Ranges:  Ao Root d:     2.85 cm   (2.0-3.7cm)  LAs:           3.20 cm   (2.7-4.0cm)  IVSd:          0.60 cm   (0.6-1.1cm)  LVPWd:         0.60 cm   (0.6-1.1cm)  LVIDd:         4.90 cm   (3.9-5.9cm)  LVIDs:         3.60 cm  LV Mass Index: 50.3 g/m2  LV % FS        26.5 %    LA VOLUME:                                Normal Ranges:  LA Vol A4C:        27.2 ml    (22+/-6mL/m2)  LA Vol A2C:        20.8 ml  LA Vol BP:         24.5 ml  LA Vol Index A4C:  15.0ml/m2  LA Vol Index A2C:  11.5 ml/m2  LA Vol Index BP:   13.5 ml/m2  LA Area A4C:       12.4 cm2  LA Area A2C:       10.5 cm2  LA Major Axis A4C: 4.8 cm  LA Major Axis A2C: 4.5 cm    LV SYSTOLIC FUNCTION BY 2D PLANIMETRY (MOD):                      Normal Ranges:  EF-A4C View: 59.9 % (>=55%)  EF-A2C View: 54.4 %  EF-Biplane:  57.9 %    LV DIASTOLIC FUNCTION:                             Normal Ranges:  MV Peak E:      1.06 m/s   (0.7-1.2 m/s)  MV Peak A:      0.58 m/s   (0.42-0.7 m/s)  E/A Ratio:      1.83       (1.0-2.2)  MV e'           0.16 m/s   (>8.0)  MV lateral e'   0.17 m/s  MV medial e'    0.15 m/s  MV A Dur:       87.00 msec  E/e' Ratio:     6.62       (<8.0)  PulmV Sys Ellis:  48.40 cm/s  PulmV Angelo Ellis: 33.80 cm/s  PulmV S/D Ellis:  1.40    MITRAL VALVE:                  Normal Ranges:  MV DT: 148 msec (150-240msec)       RIGHT VENTRICLE:  RV s' 0.13 m/s    Pulmonary Veins:  PulmV Angelo Ellis: 33.80 cm/s  PulmV S/D Ellis:  1.40  PulmV Sys Ellis:  48.40 cm/s       [id number] Fede Sanderson MD  Electronically signed on 5/25/2024 at 10:46:23 AM       ** Final  (Updated) **       MR breast bilateral w contrast full protocol 02/14/2024    Narrative  Interpreted By:  Riki Flor,  STUDY:  BI MR BREAST BILATERAL WITH CONTRAST FULL PROTOCOL;  2/14/2024 1:33 pm    ACCESSION NUMBER(S):  [id number]    ORDERING CLINICIAN:  VITOR HARO    INDICATION:  38-year-old woman status post ultrasound-guided biopsy of right  breast masses at 7 o'clock and 10 o'clock and a right axillary lymph  demonstrating grade 2 invasive ductal carcinoma at both sites with  axillary simon metastasis.    COMPARISON:  Screening mammogram 11/09/2023, right breast ultrasound 12/20/2023,  02/06/2024; ultrasound-guided biopsy 02/06/2024.    TECHNIQUE:  Using a dedicated breast coil, STIR axial and T1-weighted fat  saturation axial images of the breasts were obtained, the latter both  before and after intravenous administration of Gadolinium DTPA. On an  independent workstation, 3-D images were formulated using [employer]  including time enhancement curves, subtraction images and MIP images.    Intravenous contrast:  14 mL of Dotarem    FINDINGS:  There is asymmetric moderate bilateral background enhancement.    Extreme fibroglandular tissue.    RIGHT BREAST:  There are multiple contiguous masses and nonmass  enhancements occupying the entire lateral and central inferior right  breast including at the two sites of biopsy-proven malignancy at 7  o'clock and 10 o'clock, with a total span of 8.5 x 4.5 x 13.0 cm (AP  x TR x CC), better appreciated on the MIP and sagittal reconstruction  images. There is no involvement of the skin, nipple-areolar complex,  or pectoralis major muscle. The most anterior extent of malignancy is  located approximately 2 cm from the base of the nipple.    Within the span of malignancy, more confluent findings are as follows:    - Dominant irregular heterogeneously enhancing mass in the inferior  lateral and central inferior breast at anterior and middle depths  measuring 5.0 x 4.7  x 4.5 cm, corresponding to the site of biopsy at  7 o'clock (series 7, image 127/176).    - Irregular heterogeneously enhancing mass in superior lateral breast  at posterior depth measuring 2.7 x 2.5 x 2.5 cm, corresponding to the  site of biopsy at 10 o'clock (series 7, image 48/176).    - More confluent nonmass enhancement in superior lateral breast at  middle to posterior depth measuring 3.8 x 2.0 x 2.5 cm (series 7,  image 73/176).    - A small satellite mass in superior lateral breast at middle depth  measuring 1.0 x 1.0 x 0.8 cm located 0.8 cm superolateral to the  dominant mass (series 7, image 108/176).    Evaluation of the axilla is limited due to incomplete coverage in the  field of view. A conglomerate of at least 3 enlarged level I right  lymph nodes associated with a biopsy clip is partially visualized  measuring at least 4.2 x 2.5 x 3.5 cm (series 6, image 1/176).    An abnormal internal mammary lymph node is seen in the 2nd  intercostal space measuring 1.5 x 1.2 x 1.6 cm    LEFT BREAST: No suspicious mass or nonmass enhancement is identified.  There are numerous scattered small benign cysts.    No axillary or internal mammary lymphadenopathy is appreciated within  limitations of limited field of view.    NON-BREAST FINDINGS:  None.    Impression  1. Biopsy-proven multicentric right breast malignancy with the total  span of up to 13.0 cm associated with bulky metastatic axillary and  abnormal internal mammary lymphadenopathy. No evidence of chest wall,  skin or nipple-areolar complex involvement.  2. No MRI evidence of malignancy in the left breast.    BI-RADS CATEGORY:  BI-RADS CATEGORY:  6 Known Biopsy-Proven Malignancy.  Recommendation:  Immediate Follow-up.  Recommended Date:  Immediate.  Laterality:  Right.    For any future breast imaging appointments, please call [phone]  ([phone]).      MACRO:  None    Signed by: Riki Flor 2/15/2024 10:43 AM  Dictation workstation:   [id number]           Assessment/Plan:    Macy is a 38 year old female in excellent health with a recent diagnosis of right sided breast cancer. Macy felt a mass in her right breast in December that prompted diagnostic evaluation.     Ultrasound of the right breast showed In the patient's area of palpable concern in the right breast at 7 o'clock, 5 cm from the nipple, there is a large vague heterogeneousmass with internal vascularity which has the appearance of dense fibroglandular tissue measuring up to 5 cm;  In the patient's area of palpable concern felt by her provider in the right breast at 10 o'clock, 8 cm from the nipple, there is an oval parallel heterogeneous mass withinternal vascularity measuring approximately 1.1 x 0.9 x 1.4 cm. 3 enlarged axillary LN were seen    She had US guided core needle biopsy of  right breast at 7:00, 5 CM FN,  that showed invasive ductal carcinoma, grade 2, ER: 95%, MN: 5-10%, HER2 negative (1+).  And US guided core needle biopsy of the right breast at  10:00, 8 CM FN, that showed  Invasive ductal carcinoma, grade 2-3,  ER: 95%, MN<5%, HER2 negative (1+). Axillary LN consistent with metastatic carcinoma     On MRI she a  multicentric right breast malignancy with the total span of up to 13.0 cm associated with bulky metastatic axillary and abnormal internal mammary lymphadenopathy. No evidence of chest wall, skin or nipple-areolar complex involvement.     Current stage [id number] (staging scans negative for distant mets)  ECOG PS: 0    I reviewed with her the events that led to her diagnosis of breast cancer. We reviewed all the procedures and diagnostic imaging she underwent thus far. I discussed the features of her breast cancer that include the size, grade, lymph node status and  hormone receptor/ her2-jair status.      We discussed neoadjuvant systemic therapy. The goal of treatment would be to downstage her cancer, assess tumor chemosensitivity and treat potential micrometastatic disease. I would  treat, given extensive of disease with anthracyclines based regimen ddAC Q2 weeks x4 followed by taxol weekly x12     - mild drop in EF: 65% ->55-60% on her most recent echo- sees Dr Mares, does have sinus tachycardia, not clear how much is due to anxiety. She was started on low dose beta blocker.  - Elevated LDL- non pharmacological measures for now- follows with cardiology  - okay to proceed with week 5/12 taxol    - RTC in 4 weeks       Denise Golden MD  Hematology and Medical Oncology  [facility]

## 2024-05-31 ENCOUNTER — PREP FOR PROCEDURE (OUTPATIENT)
Dept: OCCUPATIONAL MEDICINE | Facility: HOSPITAL | Age: 39
End: 2024-05-31
Payer: COMMERCIAL

## 2024-06-03 ENCOUNTER — LAB (OUTPATIENT)
Dept: LAB | Facility: CLINIC | Age: 39
End: 2024-06-03
Payer: COMMERCIAL

## 2024-06-03 ENCOUNTER — INFUSION (OUTPATIENT)
Dept: HEMATOLOGY/ONCOLOGY | Facility: CLINIC | Age: 39
End: 2024-06-03
Payer: COMMERCIAL

## 2024-06-03 VITALS
WEIGHT: 153.99 LBS | BODY MASS INDEX: 22.81 KG/M2 | OXYGEN SATURATION: 98 % | SYSTOLIC BLOOD PRESSURE: 119 MMHG | HEART RATE: 100 BPM | RESPIRATION RATE: 15 BRPM | DIASTOLIC BLOOD PRESSURE: 82 MMHG | TEMPERATURE: 97 F

## 2024-06-03 DIAGNOSIS — Z17.0 MALIGNANT NEOPLASM OF OVERLAPPING SITES OF RIGHT BREAST IN FEMALE, ESTROGEN RECEPTOR POSITIVE (MULTI): ICD-10-CM

## 2024-06-03 DIAGNOSIS — R79.89 ELEVATED TROPONIN: ICD-10-CM

## 2024-06-03 DIAGNOSIS — C50.811 MALIGNANT NEOPLASM OF OVERLAPPING SITES OF RIGHT BREAST IN FEMALE, ESTROGEN RECEPTOR POSITIVE (MULTI): ICD-10-CM

## 2024-06-03 LAB
ALBUMIN SERPL BCP-MCNC: 4.2 G/DL (ref 3.4–5)
ALP SERPL-CCNC: 56 U/L (ref 33–110)
ALT SERPL W P-5'-P-CCNC: 41 U/L (ref 7–45)
ANION GAP SERPL CALC-SCNC: 13 MMOL/L (ref 10–20)
AST SERPL W P-5'-P-CCNC: 23 U/L (ref 9–39)
BASOPHILS # BLD AUTO: 0.04 X10*3/UL (ref 0–0.1)
BASOPHILS NFR BLD AUTO: 0.8 %
BILIRUB SERPL-MCNC: 0.5 MG/DL (ref 0–1.2)
BUN SERPL-MCNC: 9 MG/DL (ref 6–23)
CALCIUM SERPL-MCNC: 9.1 MG/DL (ref 8.6–10.3)
CHLORIDE SERPL-SCNC: 104 MMOL/L (ref 98–107)
CO2 SERPL-SCNC: 25 MMOL/L (ref 21–32)
CREAT SERPL-MCNC: 0.66 MG/DL (ref 0.5–1.05)
EGFRCR SERPLBLD CKD-EPI 2021: >90 ML/MIN/1.73M*2
EOSINOPHIL # BLD AUTO: 0.27 X10*3/UL (ref 0–0.7)
EOSINOPHIL NFR BLD AUTO: 5.7 %
ERYTHROCYTE [DISTWIDTH] IN BLOOD BY AUTOMATED COUNT: 14.6 % (ref 11.5–14.5)
GLUCOSE SERPL-MCNC: 113 MG/DL (ref 74–99)
HCT VFR BLD AUTO: 33.8 % (ref 36–46)
HGB BLD-MCNC: 11.2 G/DL (ref 12–16)
IMM GRANULOCYTES # BLD AUTO: 0.02 X10*3/UL (ref 0–0.7)
IMM GRANULOCYTES NFR BLD AUTO: 0.4 % (ref 0–0.9)
LYMPHOCYTES # BLD AUTO: 0.68 X10*3/UL (ref 1.2–4.8)
LYMPHOCYTES NFR BLD AUTO: 14.3 %
MCH RBC QN AUTO: 32.2 PG (ref 26–34)
MCHC RBC AUTO-ENTMCNC: 33.1 G/DL (ref 32–36)
MCV RBC AUTO: 97 FL (ref 80–100)
MONOCYTES # BLD AUTO: 0.31 X10*3/UL (ref 0.1–1)
MONOCYTES NFR BLD AUTO: 6.5 %
NEUTROPHILS # BLD AUTO: 3.42 X10*3/UL (ref 1.2–7.7)
NEUTROPHILS NFR BLD AUTO: 72.3 %
NRBC BLD-RTO: ABNORMAL /100{WBCS}
PLATELET # BLD AUTO: 234 X10*3/UL (ref 150–450)
POTASSIUM SERPL-SCNC: 3.9 MMOL/L (ref 3.5–5.3)
PROT SERPL-MCNC: 6.7 G/DL (ref 6.4–8.2)
RBC # BLD AUTO: 3.48 X10*6/UL (ref 4–5.2)
SODIUM SERPL-SCNC: 138 MMOL/L (ref 136–145)
WBC # BLD AUTO: 4.7 X10*3/UL (ref 4.4–11.3)

## 2024-06-03 PROCEDURE — 36591 DRAW BLOOD OFF VENOUS DEVICE: CPT

## 2024-06-03 PROCEDURE — 85025 COMPLETE CBC W/AUTO DIFF WBC: CPT

## 2024-06-03 PROCEDURE — 84484 ASSAY OF TROPONIN QUANT: CPT

## 2024-06-03 PROCEDURE — 2500000004 HC RX 250 GENERAL PHARMACY W/ HCPCS (ALT 636 FOR OP/ED): Performed by: STUDENT IN AN ORGANIZED HEALTH CARE EDUCATION/TRAINING PROGRAM

## 2024-06-03 PROCEDURE — 84075 ASSAY ALKALINE PHOSPHATASE: CPT

## 2024-06-03 RX ORDER — HEPARIN 100 UNIT/ML
500 SYRINGE INTRAVENOUS AS NEEDED
Status: DISCONTINUED | OUTPATIENT
Start: 2024-06-03 | End: 2024-06-03 | Stop reason: HOSPADM

## 2024-06-03 RX ORDER — HEPARIN 100 UNIT/ML
500 SYRINGE INTRAVENOUS AS NEEDED
Status: CANCELLED | OUTPATIENT
Start: 2024-06-03

## 2024-06-03 RX ORDER — HEPARIN SODIUM,PORCINE/PF 10 UNIT/ML
50 SYRINGE (ML) INTRAVENOUS AS NEEDED
Status: CANCELLED | OUTPATIENT
Start: 2024-06-03

## 2024-06-03 RX ADMIN — HEPARIN 500 UNITS: 100 SYRINGE at 12:05

## 2024-06-03 ASSESSMENT — PAIN SCALES - GENERAL: PAINLEVEL: 0-NO PAIN

## 2024-06-04 ENCOUNTER — LAB (OUTPATIENT)
Dept: LAB | Facility: CLINIC | Age: 39
End: 2024-06-04
Payer: COMMERCIAL

## 2024-06-04 ENCOUNTER — INFUSION (OUTPATIENT)
Dept: HEMATOLOGY/ONCOLOGY | Facility: CLINIC | Age: 39
End: 2024-06-04
Payer: COMMERCIAL

## 2024-06-04 VITALS
HEART RATE: 91 BPM | BODY MASS INDEX: 22.67 KG/M2 | SYSTOLIC BLOOD PRESSURE: 108 MMHG | WEIGHT: 153.03 LBS | TEMPERATURE: 97 F | RESPIRATION RATE: 15 BRPM | OXYGEN SATURATION: 98 % | DIASTOLIC BLOOD PRESSURE: 76 MMHG

## 2024-06-04 DIAGNOSIS — Z17.0 MALIGNANT NEOPLASM OF OVERLAPPING SITES OF RIGHT BREAST IN FEMALE, ESTROGEN RECEPTOR POSITIVE (MULTI): ICD-10-CM

## 2024-06-04 DIAGNOSIS — E78.00 ELEVATED LDL CHOLESTEROL LEVEL: ICD-10-CM

## 2024-06-04 DIAGNOSIS — C50.811 MALIGNANT NEOPLASM OF OVERLAPPING SITES OF RIGHT BREAST IN FEMALE, ESTROGEN RECEPTOR POSITIVE (MULTI): ICD-10-CM

## 2024-06-04 DIAGNOSIS — Z82.3 FAMILY HISTORY OF STROKE: ICD-10-CM

## 2024-06-04 PROCEDURE — 96413 CHEMO IV INFUSION 1 HR: CPT

## 2024-06-04 PROCEDURE — 2500000004 HC RX 250 GENERAL PHARMACY W/ HCPCS (ALT 636 FOR OP/ED): Performed by: STUDENT IN AN ORGANIZED HEALTH CARE EDUCATION/TRAINING PROGRAM

## 2024-06-04 PROCEDURE — 80061 LIPID PANEL: CPT

## 2024-06-04 PROCEDURE — 2500000001 HC RX 250 WO HCPCS SELF ADMINISTERED DRUGS (ALT 637 FOR MEDICARE OP): Performed by: STUDENT IN AN ORGANIZED HEALTH CARE EDUCATION/TRAINING PROGRAM

## 2024-06-04 PROCEDURE — 96375 TX/PRO/DX INJ NEW DRUG ADDON: CPT | Mod: INF

## 2024-06-04 RX ORDER — PROCHLORPERAZINE MALEATE 10 MG
10 TABLET ORAL EVERY 6 HOURS PRN
Status: DISCONTINUED | OUTPATIENT
Start: 2024-06-04 | End: 2024-06-04 | Stop reason: HOSPADM

## 2024-06-04 RX ORDER — DIPHENHYDRAMINE HCL 25 MG
50 CAPSULE ORAL ONCE
Status: COMPLETED | OUTPATIENT
Start: 2024-06-04 | End: 2024-06-04

## 2024-06-04 RX ORDER — HEPARIN 100 UNIT/ML
500 SYRINGE INTRAVENOUS AS NEEDED
Status: CANCELLED | OUTPATIENT
Start: 2024-06-04

## 2024-06-04 RX ORDER — ONDANSETRON HYDROCHLORIDE 2 MG/ML
8 INJECTION, SOLUTION INTRAVENOUS ONCE
Status: COMPLETED | OUTPATIENT
Start: 2024-06-04 | End: 2024-06-04

## 2024-06-04 RX ORDER — FAMOTIDINE 10 MG/ML
20 INJECTION INTRAVENOUS ONCE AS NEEDED
Status: DISCONTINUED | OUTPATIENT
Start: 2024-06-04 | End: 2024-06-04 | Stop reason: HOSPADM

## 2024-06-04 RX ORDER — HEPARIN SODIUM,PORCINE/PF 10 UNIT/ML
50 SYRINGE (ML) INTRAVENOUS AS NEEDED
Status: CANCELLED | OUTPATIENT
Start: 2024-06-04

## 2024-06-04 RX ORDER — EPINEPHRINE 0.3 MG/.3ML
0.3 INJECTION SUBCUTANEOUS EVERY 5 MIN PRN
Status: DISCONTINUED | OUTPATIENT
Start: 2024-06-04 | End: 2024-06-04 | Stop reason: HOSPADM

## 2024-06-04 RX ORDER — DIPHENHYDRAMINE HYDROCHLORIDE 50 MG/ML
50 INJECTION INTRAMUSCULAR; INTRAVENOUS AS NEEDED
Status: DISCONTINUED | OUTPATIENT
Start: 2024-06-04 | End: 2024-06-04 | Stop reason: HOSPADM

## 2024-06-04 RX ORDER — DEXAMETHASONE SODIUM PHOSPHATE 100 MG/10ML
10 INJECTION INTRAMUSCULAR; INTRAVENOUS ONCE
Status: COMPLETED | OUTPATIENT
Start: 2024-06-04 | End: 2024-06-04

## 2024-06-04 RX ORDER — PROCHLORPERAZINE EDISYLATE 5 MG/ML
10 INJECTION INTRAMUSCULAR; INTRAVENOUS EVERY 6 HOURS PRN
Status: DISCONTINUED | OUTPATIENT
Start: 2024-06-04 | End: 2024-06-04 | Stop reason: HOSPADM

## 2024-06-04 RX ORDER — FAMOTIDINE 10 MG/ML
20 INJECTION INTRAVENOUS ONCE
Status: COMPLETED | OUTPATIENT
Start: 2024-06-04 | End: 2024-06-04

## 2024-06-04 RX ORDER — HEPARIN 100 UNIT/ML
500 SYRINGE INTRAVENOUS AS NEEDED
Status: DISCONTINUED | OUTPATIENT
Start: 2024-06-04 | End: 2024-06-04 | Stop reason: HOSPADM

## 2024-06-04 RX ORDER — ALBUTEROL SULFATE 0.83 MG/ML
3 SOLUTION RESPIRATORY (INHALATION) AS NEEDED
Status: DISCONTINUED | OUTPATIENT
Start: 2024-06-04 | End: 2024-06-04 | Stop reason: HOSPADM

## 2024-06-04 RX ADMIN — HEPARIN 500 UNITS: 100 SYRINGE at 10:53

## 2024-06-04 RX ADMIN — PACLITAXEL 150 MG: 6 INJECTION, SOLUTION, CONCENTRATE INTRAVENOUS at 09:19

## 2024-06-04 RX ADMIN — FAMOTIDINE 20 MG: 10 INJECTION INTRAVENOUS at 08:34

## 2024-06-04 RX ADMIN — DEXAMETHASONE SODIUM PHOSPHATE 10 MG: 10 INJECTION INTRAMUSCULAR; INTRAVENOUS at 08:34

## 2024-06-04 RX ADMIN — ONDANSETRON 8 MG: 2 INJECTION INTRAMUSCULAR; INTRAVENOUS at 08:34

## 2024-06-04 RX ADMIN — DIPHENHYDRAMINE HYDROCHLORIDE 50 MG: 25 CAPSULE ORAL at 08:32

## 2024-06-04 ASSESSMENT — PAIN SCALES - GENERAL: PAINLEVEL: 0-NO PAIN

## 2024-06-04 NOTE — SIGNIFICANT EVENT

## 2024-06-05 LAB
CARDIAC TROPONIN I PNL SERPL HS: 54 NG/L (ref 0–34)
CHOLEST SERPL-MCNC: 197 MG/DL (ref 0–199)
CHOLESTEROL/HDL RATIO: 4.4
HDLC SERPL-MCNC: 44.5 MG/DL
LDLC SERPL CALC-MCNC: 117 MG/DL
NON HDL CHOLESTEROL: 153 MG/DL (ref 0–149)
TRIGL SERPL-MCNC: 176 MG/DL (ref 0–149)
VLDL: 35 MG/DL (ref 0–40)

## 2024-06-10 ENCOUNTER — APPOINTMENT (OUTPATIENT)
Dept: HEMATOLOGY/ONCOLOGY | Facility: CLINIC | Age: 39
End: 2024-06-10
Payer: COMMERCIAL

## 2024-06-10 ENCOUNTER — LAB (OUTPATIENT)
Dept: LAB | Facility: CLINIC | Age: 39
End: 2024-06-10
Payer: COMMERCIAL

## 2024-06-10 DIAGNOSIS — Z17.0 MALIGNANT NEOPLASM OF OVERLAPPING SITES OF RIGHT BREAST IN FEMALE, ESTROGEN RECEPTOR POSITIVE (MULTI): ICD-10-CM

## 2024-06-10 DIAGNOSIS — C50.811 MALIGNANT NEOPLASM OF OVERLAPPING SITES OF RIGHT BREAST IN FEMALE, ESTROGEN RECEPTOR POSITIVE (MULTI): ICD-10-CM

## 2024-06-10 LAB
ALBUMIN SERPL BCP-MCNC: 4.5 G/DL (ref 3.4–5)
ALP SERPL-CCNC: 55 U/L (ref 33–110)
ALT SERPL W P-5'-P-CCNC: 53 U/L (ref 7–45)
ANION GAP SERPL CALC-SCNC: 12 MMOL/L (ref 10–20)
AST SERPL W P-5'-P-CCNC: 29 U/L (ref 9–39)
BASOPHILS # BLD AUTO: 0.04 X10*3/UL (ref 0–0.1)
BASOPHILS NFR BLD AUTO: 0.8 %
BILIRUB SERPL-MCNC: 0.5 MG/DL (ref 0–1.2)
BUN SERPL-MCNC: 11 MG/DL (ref 6–23)
CALCIUM SERPL-MCNC: 9.5 MG/DL (ref 8.6–10.3)
CHLORIDE SERPL-SCNC: 105 MMOL/L (ref 98–107)
CO2 SERPL-SCNC: 27 MMOL/L (ref 21–32)
CREAT SERPL-MCNC: 0.76 MG/DL (ref 0.5–1.05)
EGFRCR SERPLBLD CKD-EPI 2021: >90 ML/MIN/1.73M*2
EOSINOPHIL # BLD AUTO: 0.37 X10*3/UL (ref 0–0.7)
EOSINOPHIL NFR BLD AUTO: 7.1 %
ERYTHROCYTE [DISTWIDTH] IN BLOOD BY AUTOMATED COUNT: 14.7 % (ref 11.5–14.5)
GLUCOSE SERPL-MCNC: 105 MG/DL (ref 74–99)
HCT VFR BLD AUTO: 37.2 % (ref 36–46)
HGB BLD-MCNC: 12 G/DL (ref 12–16)
IMM GRANULOCYTES # BLD AUTO: 0.01 X10*3/UL (ref 0–0.7)
IMM GRANULOCYTES NFR BLD AUTO: 0.2 % (ref 0–0.9)
LYMPHOCYTES # BLD AUTO: 1.03 X10*3/UL (ref 1.2–4.8)
LYMPHOCYTES NFR BLD AUTO: 19.7 %
MCH RBC QN AUTO: 32.2 PG (ref 26–34)
MCHC RBC AUTO-ENTMCNC: 32.3 G/DL (ref 32–36)
MCV RBC AUTO: 100 FL (ref 80–100)
MONOCYTES # BLD AUTO: 0.31 X10*3/UL (ref 0.1–1)
MONOCYTES NFR BLD AUTO: 5.9 %
NEUTROPHILS # BLD AUTO: 3.48 X10*3/UL (ref 1.2–7.7)
NEUTROPHILS NFR BLD AUTO: 66.3 %
NRBC BLD-RTO: ABNORMAL /100{WBCS}
PLATELET # BLD AUTO: 264 X10*3/UL (ref 150–450)
POTASSIUM SERPL-SCNC: 4.6 MMOL/L (ref 3.5–5.3)
PROT SERPL-MCNC: 6.8 G/DL (ref 6.4–8.2)
RBC # BLD AUTO: 3.73 X10*6/UL (ref 4–5.2)
SODIUM SERPL-SCNC: 139 MMOL/L (ref 136–145)
WBC # BLD AUTO: 5.2 X10*3/UL (ref 4.4–11.3)

## 2024-06-10 PROCEDURE — 84075 ASSAY ALKALINE PHOSPHATASE: CPT

## 2024-06-10 PROCEDURE — 85025 COMPLETE CBC W/AUTO DIFF WBC: CPT

## 2024-06-10 PROCEDURE — 80053 COMPREHEN METABOLIC PANEL: CPT

## 2024-06-11 ENCOUNTER — INFUSION (OUTPATIENT)
Dept: HEMATOLOGY/ONCOLOGY | Facility: CLINIC | Age: 39
End: 2024-06-11
Payer: COMMERCIAL

## 2024-06-11 VITALS
DIASTOLIC BLOOD PRESSURE: 81 MMHG | BODY MASS INDEX: 22.95 KG/M2 | TEMPERATURE: 95.2 F | OXYGEN SATURATION: 98 % | HEART RATE: 97 BPM | RESPIRATION RATE: 15 BRPM | SYSTOLIC BLOOD PRESSURE: 111 MMHG | WEIGHT: 154.98 LBS

## 2024-06-11 DIAGNOSIS — C50.811 MALIGNANT NEOPLASM OF OVERLAPPING SITES OF RIGHT BREAST IN FEMALE, ESTROGEN RECEPTOR POSITIVE (MULTI): ICD-10-CM

## 2024-06-11 DIAGNOSIS — Z17.0 MALIGNANT NEOPLASM OF OVERLAPPING SITES OF RIGHT BREAST IN FEMALE, ESTROGEN RECEPTOR POSITIVE (MULTI): ICD-10-CM

## 2024-06-11 PROCEDURE — 2500000001 HC RX 250 WO HCPCS SELF ADMINISTERED DRUGS (ALT 637 FOR MEDICARE OP): Performed by: STUDENT IN AN ORGANIZED HEALTH CARE EDUCATION/TRAINING PROGRAM

## 2024-06-11 PROCEDURE — 2500000004 HC RX 250 GENERAL PHARMACY W/ HCPCS (ALT 636 FOR OP/ED): Performed by: STUDENT IN AN ORGANIZED HEALTH CARE EDUCATION/TRAINING PROGRAM

## 2024-06-11 PROCEDURE — 96375 TX/PRO/DX INJ NEW DRUG ADDON: CPT | Mod: INF

## 2024-06-11 PROCEDURE — 96413 CHEMO IV INFUSION 1 HR: CPT

## 2024-06-11 RX ORDER — EPINEPHRINE 0.3 MG/.3ML
0.3 INJECTION SUBCUTANEOUS EVERY 5 MIN PRN
Status: DISCONTINUED | OUTPATIENT
Start: 2024-06-11 | End: 2024-06-11 | Stop reason: HOSPADM

## 2024-06-11 RX ORDER — ONDANSETRON HYDROCHLORIDE 2 MG/ML
8 INJECTION, SOLUTION INTRAVENOUS ONCE
Status: COMPLETED | OUTPATIENT
Start: 2024-06-11 | End: 2024-06-11

## 2024-06-11 RX ORDER — DIPHENHYDRAMINE HCL 25 MG
50 CAPSULE ORAL ONCE
Status: COMPLETED | OUTPATIENT
Start: 2024-06-11 | End: 2024-06-11

## 2024-06-11 RX ORDER — DEXAMETHASONE SODIUM PHOSPHATE 100 MG/10ML
10 INJECTION INTRAMUSCULAR; INTRAVENOUS ONCE
Status: COMPLETED | OUTPATIENT
Start: 2024-06-11 | End: 2024-06-11

## 2024-06-11 RX ORDER — PROCHLORPERAZINE MALEATE 10 MG
10 TABLET ORAL EVERY 6 HOURS PRN
Status: DISCONTINUED | OUTPATIENT
Start: 2024-06-11 | End: 2024-06-11 | Stop reason: HOSPADM

## 2024-06-11 RX ORDER — FAMOTIDINE 10 MG/ML
20 INJECTION INTRAVENOUS ONCE AS NEEDED
Status: DISCONTINUED | OUTPATIENT
Start: 2024-06-11 | End: 2024-06-11 | Stop reason: HOSPADM

## 2024-06-11 RX ORDER — DIPHENHYDRAMINE HYDROCHLORIDE 50 MG/ML
50 INJECTION INTRAMUSCULAR; INTRAVENOUS AS NEEDED
Status: DISCONTINUED | OUTPATIENT
Start: 2024-06-11 | End: 2024-06-11 | Stop reason: HOSPADM

## 2024-06-11 RX ORDER — HEPARIN SODIUM,PORCINE/PF 10 UNIT/ML
50 SYRINGE (ML) INTRAVENOUS AS NEEDED
OUTPATIENT
Start: 2024-06-11

## 2024-06-11 RX ORDER — PROCHLORPERAZINE EDISYLATE 5 MG/ML
10 INJECTION INTRAMUSCULAR; INTRAVENOUS EVERY 6 HOURS PRN
Status: DISCONTINUED | OUTPATIENT
Start: 2024-06-11 | End: 2024-06-11 | Stop reason: HOSPADM

## 2024-06-11 RX ORDER — ALBUTEROL SULFATE 0.83 MG/ML
3 SOLUTION RESPIRATORY (INHALATION) AS NEEDED
Status: DISCONTINUED | OUTPATIENT
Start: 2024-06-11 | End: 2024-06-11 | Stop reason: HOSPADM

## 2024-06-11 RX ORDER — HEPARIN 100 UNIT/ML
500 SYRINGE INTRAVENOUS AS NEEDED
OUTPATIENT
Start: 2024-06-11

## 2024-06-11 RX ORDER — FAMOTIDINE 10 MG/ML
20 INJECTION INTRAVENOUS ONCE
Status: COMPLETED | OUTPATIENT
Start: 2024-06-11 | End: 2024-06-11

## 2024-06-11 RX ORDER — HEPARIN 100 UNIT/ML
500 SYRINGE INTRAVENOUS AS NEEDED
Status: DISCONTINUED | OUTPATIENT
Start: 2024-06-11 | End: 2024-06-11 | Stop reason: HOSPADM

## 2024-06-11 RX ADMIN — ONDANSETRON 8 MG: 2 INJECTION INTRAMUSCULAR; INTRAVENOUS at 12:28

## 2024-06-11 RX ADMIN — DEXAMETHASONE SODIUM PHOSPHATE 10 MG: 10 INJECTION INTRAMUSCULAR; INTRAVENOUS at 12:28

## 2024-06-11 RX ADMIN — PACLITAXEL 150 MG: 6 INJECTION, SOLUTION, CONCENTRATE INTRAVENOUS at 13:14

## 2024-06-11 RX ADMIN — FAMOTIDINE 20 MG: 10 INJECTION INTRAVENOUS at 12:28

## 2024-06-11 RX ADMIN — HEPARIN 500 UNITS: 100 SYRINGE at 14:23

## 2024-06-11 RX ADMIN — DIPHENHYDRAMINE HYDROCHLORIDE 50 MG: 25 CAPSULE ORAL at 12:19

## 2024-06-11 ASSESSMENT — PAIN SCALES - GENERAL: PAINLEVEL: 0-NO PAIN

## 2024-06-11 NOTE — SIGNIFICANT EVENT

## 2024-06-18 ENCOUNTER — LAB (OUTPATIENT)
Dept: LAB | Facility: CLINIC | Age: 39
End: 2024-06-18
Payer: COMMERCIAL

## 2024-06-18 ENCOUNTER — INFUSION (OUTPATIENT)
Dept: HEMATOLOGY/ONCOLOGY | Facility: CLINIC | Age: 39
End: 2024-06-18
Payer: COMMERCIAL

## 2024-06-18 VITALS
DIASTOLIC BLOOD PRESSURE: 77 MMHG | HEART RATE: 78 BPM | SYSTOLIC BLOOD PRESSURE: 118 MMHG | TEMPERATURE: 97.3 F | RESPIRATION RATE: 18 BRPM | WEIGHT: 158.29 LBS | BODY MASS INDEX: 23.44 KG/M2 | OXYGEN SATURATION: 98 %

## 2024-06-18 DIAGNOSIS — C50.811 MALIGNANT NEOPLASM OF OVERLAPPING SITES OF RIGHT BREAST IN FEMALE, ESTROGEN RECEPTOR POSITIVE (MULTI): ICD-10-CM

## 2024-06-18 DIAGNOSIS — Z17.0 MALIGNANT NEOPLASM OF OVERLAPPING SITES OF RIGHT BREAST IN FEMALE, ESTROGEN RECEPTOR POSITIVE (MULTI): ICD-10-CM

## 2024-06-18 LAB
ALBUMIN SERPL BCP-MCNC: 4.3 G/DL (ref 3.4–5)
ALP SERPL-CCNC: 49 U/L (ref 33–110)
ALT SERPL W P-5'-P-CCNC: 44 U/L (ref 7–45)
ANION GAP SERPL CALC-SCNC: 10 MMOL/L (ref 10–20)
AST SERPL W P-5'-P-CCNC: 20 U/L (ref 9–39)
BASOPHILS # BLD AUTO: 0.05 X10*3/UL (ref 0–0.1)
BASOPHILS NFR BLD AUTO: 1.3 %
BILIRUB SERPL-MCNC: 0.5 MG/DL (ref 0–1.2)
BUN SERPL-MCNC: 9 MG/DL (ref 6–23)
CALCIUM SERPL-MCNC: 9.2 MG/DL (ref 8.6–10.3)
CHLORIDE SERPL-SCNC: 107 MMOL/L (ref 98–107)
CO2 SERPL-SCNC: 27 MMOL/L (ref 21–32)
CREAT SERPL-MCNC: 0.73 MG/DL (ref 0.5–1.05)
EGFRCR SERPLBLD CKD-EPI 2021: >90 ML/MIN/1.73M*2
EOSINOPHIL # BLD AUTO: 0.16 X10*3/UL (ref 0–0.7)
EOSINOPHIL NFR BLD AUTO: 4.3 %
ERYTHROCYTE [DISTWIDTH] IN BLOOD BY AUTOMATED COUNT: 13.9 % (ref 11.5–14.5)
GLUCOSE SERPL-MCNC: 103 MG/DL (ref 74–99)
HCT VFR BLD AUTO: 38.1 % (ref 36–46)
HGB BLD-MCNC: 12.3 G/DL (ref 12–16)
IMM GRANULOCYTES # BLD AUTO: 0.02 X10*3/UL (ref 0–0.7)
IMM GRANULOCYTES NFR BLD AUTO: 0.5 % (ref 0–0.9)
LYMPHOCYTES # BLD AUTO: 1.08 X10*3/UL (ref 1.2–4.8)
LYMPHOCYTES NFR BLD AUTO: 28.9 %
MCH RBC QN AUTO: 33 PG (ref 26–34)
MCHC RBC AUTO-ENTMCNC: 32.3 G/DL (ref 32–36)
MCV RBC AUTO: 102 FL (ref 80–100)
MONOCYTES # BLD AUTO: 0.27 X10*3/UL (ref 0.1–1)
MONOCYTES NFR BLD AUTO: 7.2 %
NEUTROPHILS # BLD AUTO: 2.16 X10*3/UL (ref 1.2–7.7)
NEUTROPHILS NFR BLD AUTO: 57.8 %
NRBC BLD-RTO: ABNORMAL /100{WBCS}
PLATELET # BLD AUTO: 218 X10*3/UL (ref 150–450)
POTASSIUM SERPL-SCNC: 4.1 MMOL/L (ref 3.5–5.3)
PROT SERPL-MCNC: 6.6 G/DL (ref 6.4–8.2)
RBC # BLD AUTO: 3.73 X10*6/UL (ref 4–5.2)
SODIUM SERPL-SCNC: 140 MMOL/L (ref 136–145)
WBC # BLD AUTO: 3.7 X10*3/UL (ref 4.4–11.3)

## 2024-06-18 PROCEDURE — 84075 ASSAY ALKALINE PHOSPHATASE: CPT

## 2024-06-18 PROCEDURE — 96413 CHEMO IV INFUSION 1 HR: CPT

## 2024-06-18 PROCEDURE — 85025 COMPLETE CBC W/AUTO DIFF WBC: CPT

## 2024-06-18 PROCEDURE — 2500000004 HC RX 250 GENERAL PHARMACY W/ HCPCS (ALT 636 FOR OP/ED): Performed by: STUDENT IN AN ORGANIZED HEALTH CARE EDUCATION/TRAINING PROGRAM

## 2024-06-18 PROCEDURE — 96375 TX/PRO/DX INJ NEW DRUG ADDON: CPT | Mod: INF

## 2024-06-18 PROCEDURE — 2500000001 HC RX 250 WO HCPCS SELF ADMINISTERED DRUGS (ALT 637 FOR MEDICARE OP): Performed by: STUDENT IN AN ORGANIZED HEALTH CARE EDUCATION/TRAINING PROGRAM

## 2024-06-18 RX ORDER — FAMOTIDINE 10 MG/ML
20 INJECTION INTRAVENOUS ONCE
Status: COMPLETED | OUTPATIENT
Start: 2024-06-18 | End: 2024-06-18

## 2024-06-18 RX ORDER — DEXAMETHASONE SODIUM PHOSPHATE 100 MG/10ML
10 INJECTION INTRAMUSCULAR; INTRAVENOUS ONCE
Status: COMPLETED | OUTPATIENT
Start: 2024-06-18 | End: 2024-06-18

## 2024-06-18 RX ORDER — DIPHENHYDRAMINE HCL 25 MG
50 CAPSULE ORAL ONCE
Status: COMPLETED | OUTPATIENT
Start: 2024-06-18 | End: 2024-06-18

## 2024-06-18 RX ORDER — HEPARIN SODIUM,PORCINE/PF 10 UNIT/ML
50 SYRINGE (ML) INTRAVENOUS AS NEEDED
OUTPATIENT
Start: 2024-06-18

## 2024-06-18 RX ORDER — ALBUTEROL SULFATE 0.83 MG/ML
3 SOLUTION RESPIRATORY (INHALATION) AS NEEDED
Status: DISCONTINUED | OUTPATIENT
Start: 2024-06-18 | End: 2024-06-18 | Stop reason: HOSPADM

## 2024-06-18 RX ORDER — HEPARIN 100 UNIT/ML
500 SYRINGE INTRAVENOUS AS NEEDED
OUTPATIENT
Start: 2024-06-18

## 2024-06-18 RX ORDER — PROCHLORPERAZINE MALEATE 10 MG
10 TABLET ORAL EVERY 6 HOURS PRN
Status: DISCONTINUED | OUTPATIENT
Start: 2024-06-18 | End: 2024-06-18 | Stop reason: HOSPADM

## 2024-06-18 RX ORDER — PROCHLORPERAZINE EDISYLATE 5 MG/ML
10 INJECTION INTRAMUSCULAR; INTRAVENOUS EVERY 6 HOURS PRN
Status: DISCONTINUED | OUTPATIENT
Start: 2024-06-18 | End: 2024-06-18 | Stop reason: HOSPADM

## 2024-06-18 RX ORDER — HEPARIN SODIUM,PORCINE/PF 10 UNIT/ML
50 SYRINGE (ML) INTRAVENOUS AS NEEDED
Status: DISCONTINUED | OUTPATIENT
Start: 2024-06-18 | End: 2024-06-18 | Stop reason: HOSPADM

## 2024-06-18 RX ORDER — ONDANSETRON HYDROCHLORIDE 2 MG/ML
8 INJECTION, SOLUTION INTRAVENOUS ONCE
Status: COMPLETED | OUTPATIENT
Start: 2024-06-18 | End: 2024-06-18

## 2024-06-18 RX ORDER — DIPHENHYDRAMINE HYDROCHLORIDE 50 MG/ML
50 INJECTION INTRAMUSCULAR; INTRAVENOUS AS NEEDED
Status: DISCONTINUED | OUTPATIENT
Start: 2024-06-18 | End: 2024-06-18 | Stop reason: HOSPADM

## 2024-06-18 RX ORDER — EPINEPHRINE 0.3 MG/.3ML
0.3 INJECTION SUBCUTANEOUS EVERY 5 MIN PRN
Status: DISCONTINUED | OUTPATIENT
Start: 2024-06-18 | End: 2024-06-18 | Stop reason: HOSPADM

## 2024-06-18 RX ORDER — HEPARIN 100 UNIT/ML
500 SYRINGE INTRAVENOUS AS NEEDED
Status: DISCONTINUED | OUTPATIENT
Start: 2024-06-18 | End: 2024-06-18 | Stop reason: HOSPADM

## 2024-06-18 RX ORDER — FAMOTIDINE 10 MG/ML
20 INJECTION INTRAVENOUS ONCE AS NEEDED
Status: DISCONTINUED | OUTPATIENT
Start: 2024-06-18 | End: 2024-06-18 | Stop reason: HOSPADM

## 2024-06-18 ASSESSMENT — PAIN SCALES - GENERAL: PAINLEVEL: 0-NO PAIN

## 2024-06-18 NOTE — SIGNIFICANT EVENT
06/18/24 1216   Prechemo Checklist   Has the patient been in the hospital, ED, or urgent care since last date of service No   Chemo/Immuno Consent Completed and Signed Yes   Protocol/Indications Verified Yes   Confirmed to previous date/time of medication Yes   Compared to previous dose Yes   All medications are dated accurately Yes   Pregnancy Test Negative Yes   Parameters Met Yes   BSA/Weight-Height Verified Yes   Dose Calculations Verified (current, total, cumulative) Yes

## 2024-06-24 ENCOUNTER — LAB (OUTPATIENT)
Dept: LAB | Facility: CLINIC | Age: 39
End: 2024-06-24
Payer: COMMERCIAL

## 2024-06-24 DIAGNOSIS — C50.811 MALIGNANT NEOPLASM OF OVERLAPPING SITES OF RIGHT BREAST IN FEMALE, ESTROGEN RECEPTOR POSITIVE (MULTI): ICD-10-CM

## 2024-06-24 DIAGNOSIS — Z17.0 MALIGNANT NEOPLASM OF OVERLAPPING SITES OF RIGHT BREAST IN FEMALE, ESTROGEN RECEPTOR POSITIVE (MULTI): ICD-10-CM

## 2024-06-24 LAB
ALBUMIN SERPL BCP-MCNC: 4.5 G/DL (ref 3.4–5)
ALP SERPL-CCNC: 52 U/L (ref 33–110)
ALT SERPL W P-5'-P-CCNC: 42 U/L (ref 7–45)
ANION GAP SERPL CALC-SCNC: 11 MMOL/L (ref 10–20)
AST SERPL W P-5'-P-CCNC: 20 U/L (ref 9–39)
B-HCG SERPL-ACNC: <3 MIU/ML
BASOPHILS # BLD AUTO: 0.05 X10*3/UL (ref 0–0.1)
BASOPHILS NFR BLD AUTO: 1.1 %
BILIRUB SERPL-MCNC: 0.7 MG/DL (ref 0–1.2)
BUN SERPL-MCNC: 11 MG/DL (ref 6–23)
CALCIUM SERPL-MCNC: 9.4 MG/DL (ref 8.6–10.3)
CHLORIDE SERPL-SCNC: 105 MMOL/L (ref 98–107)
CO2 SERPL-SCNC: 28 MMOL/L (ref 21–32)
CREAT SERPL-MCNC: 0.82 MG/DL (ref 0.5–1.05)
EGFRCR SERPLBLD CKD-EPI 2021: >90 ML/MIN/1.73M*2
EOSINOPHIL # BLD AUTO: 0.07 X10*3/UL (ref 0–0.7)
EOSINOPHIL NFR BLD AUTO: 1.5 %
ERYTHROCYTE [DISTWIDTH] IN BLOOD BY AUTOMATED COUNT: 13.5 % (ref 11.5–14.5)
GLUCOSE SERPL-MCNC: 111 MG/DL (ref 74–99)
HCT VFR BLD AUTO: 37.1 % (ref 36–46)
HGB BLD-MCNC: 12.1 G/DL (ref 12–16)
IMM GRANULOCYTES # BLD AUTO: 0.01 X10*3/UL (ref 0–0.7)
IMM GRANULOCYTES NFR BLD AUTO: 0.2 % (ref 0–0.9)
LYMPHOCYTES # BLD AUTO: 1.11 X10*3/UL (ref 1.2–4.8)
LYMPHOCYTES NFR BLD AUTO: 23.6 %
MCH RBC QN AUTO: 33.1 PG (ref 26–34)
MCHC RBC AUTO-ENTMCNC: 32.6 G/DL (ref 32–36)
MCV RBC AUTO: 101 FL (ref 80–100)
MONOCYTES # BLD AUTO: 0.2 X10*3/UL (ref 0.1–1)
MONOCYTES NFR BLD AUTO: 4.3 %
NEUTROPHILS # BLD AUTO: 3.26 X10*3/UL (ref 1.2–7.7)
NEUTROPHILS NFR BLD AUTO: 69.3 %
NRBC BLD-RTO: ABNORMAL /100{WBCS}
PLATELET # BLD AUTO: 227 X10*3/UL (ref 150–450)
POTASSIUM SERPL-SCNC: 4.1 MMOL/L (ref 3.5–5.3)
PROT SERPL-MCNC: 6.7 G/DL (ref 6.4–8.2)
RBC # BLD AUTO: 3.66 X10*6/UL (ref 4–5.2)
SODIUM SERPL-SCNC: 140 MMOL/L (ref 136–145)
WBC # BLD AUTO: 4.7 X10*3/UL (ref 4.4–11.3)

## 2024-06-24 PROCEDURE — 84702 CHORIONIC GONADOTROPIN TEST: CPT

## 2024-06-24 PROCEDURE — 80053 COMPREHEN METABOLIC PANEL: CPT

## 2024-06-24 PROCEDURE — 85025 COMPLETE CBC W/AUTO DIFF WBC: CPT

## 2024-06-25 ENCOUNTER — INFUSION (OUTPATIENT)
Dept: HEMATOLOGY/ONCOLOGY | Facility: CLINIC | Age: 39
End: 2024-06-25
Payer: COMMERCIAL

## 2024-06-25 ENCOUNTER — OFFICE VISIT (OUTPATIENT)
Dept: HEMATOLOGY/ONCOLOGY | Facility: CLINIC | Age: 39
End: 2024-06-25
Payer: COMMERCIAL

## 2024-06-25 VITALS
WEIGHT: 158.8 LBS | HEART RATE: 98 BPM | OXYGEN SATURATION: 98 % | TEMPERATURE: 97.7 F | RESPIRATION RATE: 18 BRPM | DIASTOLIC BLOOD PRESSURE: 77 MMHG | BODY MASS INDEX: 23.52 KG/M2 | SYSTOLIC BLOOD PRESSURE: 107 MMHG

## 2024-06-25 VITALS
HEART RATE: 93 BPM | HEIGHT: 69 IN | SYSTOLIC BLOOD PRESSURE: 110 MMHG | DIASTOLIC BLOOD PRESSURE: 78 MMHG | RESPIRATION RATE: 18 BRPM | TEMPERATURE: 96.8 F | BODY MASS INDEX: 23.28 KG/M2 | WEIGHT: 157.19 LBS | OXYGEN SATURATION: 98 %

## 2024-06-25 DIAGNOSIS — Z17.0 MALIGNANT NEOPLASM OF OVERLAPPING SITES OF RIGHT BREAST IN FEMALE, ESTROGEN RECEPTOR POSITIVE (MULTI): ICD-10-CM

## 2024-06-25 DIAGNOSIS — C50.811 MALIGNANT NEOPLASM OF OVERLAPPING SITES OF RIGHT BREAST IN FEMALE, ESTROGEN RECEPTOR POSITIVE (MULTI): ICD-10-CM

## 2024-06-25 PROCEDURE — 2500000001 HC RX 250 WO HCPCS SELF ADMINISTERED DRUGS (ALT 637 FOR MEDICARE OP): Performed by: STUDENT IN AN ORGANIZED HEALTH CARE EDUCATION/TRAINING PROGRAM

## 2024-06-25 PROCEDURE — 96413 CHEMO IV INFUSION 1 HR: CPT

## 2024-06-25 PROCEDURE — 99214 OFFICE O/P EST MOD 30 MIN: CPT | Performed by: STUDENT IN AN ORGANIZED HEALTH CARE EDUCATION/TRAINING PROGRAM

## 2024-06-25 PROCEDURE — 96375 TX/PRO/DX INJ NEW DRUG ADDON: CPT | Mod: INF

## 2024-06-25 PROCEDURE — 2500000004 HC RX 250 GENERAL PHARMACY W/ HCPCS (ALT 636 FOR OP/ED): Performed by: STUDENT IN AN ORGANIZED HEALTH CARE EDUCATION/TRAINING PROGRAM

## 2024-06-25 PROCEDURE — 1036F TOBACCO NON-USER: CPT | Performed by: STUDENT IN AN ORGANIZED HEALTH CARE EDUCATION/TRAINING PROGRAM

## 2024-06-25 PROCEDURE — 99214 OFFICE O/P EST MOD 30 MIN: CPT | Mod: 25 | Performed by: STUDENT IN AN ORGANIZED HEALTH CARE EDUCATION/TRAINING PROGRAM

## 2024-06-25 RX ORDER — DEXAMETHASONE SODIUM PHOSPHATE 100 MG/10ML
10 INJECTION INTRAMUSCULAR; INTRAVENOUS ONCE
Status: COMPLETED | OUTPATIENT
Start: 2024-06-25 | End: 2024-06-25

## 2024-06-25 RX ORDER — DIPHENHYDRAMINE HCL 25 MG
50 CAPSULE ORAL ONCE
Status: COMPLETED | OUTPATIENT
Start: 2024-06-25 | End: 2024-06-25

## 2024-06-25 RX ORDER — DIPHENHYDRAMINE HYDROCHLORIDE 50 MG/ML
50 INJECTION INTRAMUSCULAR; INTRAVENOUS AS NEEDED
Status: DISCONTINUED | OUTPATIENT
Start: 2024-06-25 | End: 2024-06-25 | Stop reason: HOSPADM

## 2024-06-25 RX ORDER — PROCHLORPERAZINE EDISYLATE 5 MG/ML
10 INJECTION INTRAMUSCULAR; INTRAVENOUS EVERY 6 HOURS PRN
Status: DISCONTINUED | OUTPATIENT
Start: 2024-06-25 | End: 2024-06-25 | Stop reason: HOSPADM

## 2024-06-25 RX ORDER — PROCHLORPERAZINE MALEATE 10 MG
10 TABLET ORAL EVERY 6 HOURS PRN
Status: DISCONTINUED | OUTPATIENT
Start: 2024-06-25 | End: 2024-06-25 | Stop reason: HOSPADM

## 2024-06-25 RX ORDER — HEPARIN SODIUM,PORCINE/PF 10 UNIT/ML
50 SYRINGE (ML) INTRAVENOUS AS NEEDED
OUTPATIENT
Start: 2024-06-25

## 2024-06-25 RX ORDER — FAMOTIDINE 10 MG/ML
20 INJECTION INTRAVENOUS ONCE AS NEEDED
Status: DISCONTINUED | OUTPATIENT
Start: 2024-06-25 | End: 2024-06-25 | Stop reason: HOSPADM

## 2024-06-25 RX ORDER — HEPARIN 100 UNIT/ML
500 SYRINGE INTRAVENOUS AS NEEDED
OUTPATIENT
Start: 2024-06-25

## 2024-06-25 RX ORDER — ALBUTEROL SULFATE 0.83 MG/ML
3 SOLUTION RESPIRATORY (INHALATION) AS NEEDED
Status: DISCONTINUED | OUTPATIENT
Start: 2024-06-25 | End: 2024-06-25 | Stop reason: HOSPADM

## 2024-06-25 RX ORDER — EPINEPHRINE 0.3 MG/.3ML
0.3 INJECTION SUBCUTANEOUS EVERY 5 MIN PRN
Status: DISCONTINUED | OUTPATIENT
Start: 2024-06-25 | End: 2024-06-25 | Stop reason: HOSPADM

## 2024-06-25 RX ORDER — HEPARIN 100 UNIT/ML
500 SYRINGE INTRAVENOUS AS NEEDED
Status: DISCONTINUED | OUTPATIENT
Start: 2024-06-25 | End: 2024-06-25 | Stop reason: HOSPADM

## 2024-06-25 RX ORDER — FAMOTIDINE 10 MG/ML
20 INJECTION INTRAVENOUS ONCE
Status: COMPLETED | OUTPATIENT
Start: 2024-06-25 | End: 2024-06-25

## 2024-06-25 RX ORDER — ONDANSETRON HYDROCHLORIDE 2 MG/ML
8 INJECTION, SOLUTION INTRAVENOUS ONCE
Status: COMPLETED | OUTPATIENT
Start: 2024-06-25 | End: 2024-06-25

## 2024-06-25 ASSESSMENT — PAIN SCALES - GENERAL
PAINLEVEL: 0-NO PAIN
PAINLEVEL: 0-NO PAIN

## 2024-06-25 NOTE — SIGNIFICANT EVENT
06/25/24 1201   Prechemo Checklist   Has the patient been in the hospital, ED, or urgent care since last date of service No   Chemo/Immuno Consent Completed and Signed Yes   Protocol/Indications Verified Yes   Confirmed to previous date/time of medication Yes   Compared to previous dose Yes   All medications are dated accurately Yes   Pregnancy Test Negative Yes   Parameters Met Yes   BSA/Weight-Height Verified Yes   Dose Calculations Verified (current, total, cumulative) Yes

## 2024-06-25 NOTE — PROGRESS NOTES
Patient ID: Macy Chavez is a 38 y.o. female.    The patient presents to clinic today for her history of breast cancer.     Cancer Staging   Malignant neoplasm of overlapping sites of right breast in female, estrogen receptor positive (Multi)  Staging form: Breast, AJCC 8th Edition  - Clinical stage from 2/6/2024: Stage IIIA (cT3, cN2, cM0, G3, ER+, NJ+, HER2-) - Signed by Denise Golden MD on 2/21/2024      Diagnostic/Therapeutic History:    11/03/2023: Bl screening Mammogram: negative       12/20/2023: Right breast mass for which US done, Nonspecific dense fibroglandular tissues in the right breast in the area of reported lump. Dense fibroglandular tissue also evident throughout both breasts on screening mammogram. No discrete lesionidentified.    02/06/2024: Patient describes an increase in the size of a palpable lump in theright lower outer breast which was previously assessed in December of2023. Recent negative screening mammogram from 11/09/2023. She alsodescribes a new palpable lump in the right axilla. After being seen by her provider, a nother palpable lump is identified in the upper outer right breast.    Ultrasound of the right breast showed In the patient's area of palpable concern in the right breast at 7 o'clock, 5 cm from the nipple, there is a large vague heterogeneousmass with internal vascularity which has the appearance of dense fibroglandular tissue measuring up to 5 cm; it is visible protruding from the skin with discoloration. In the patient's area of palpable concern felt by her provider in the right breast at 10 o'clock, 8 cm from the nipple, there is an oval parallel heterogeneous mass withinternal vascularity measuring approximately 1.1 x 0.9 x 1.4 cm. 3 enlarged axillary LN were seen    02/06/2024: US guided core needle biopsy    A. BREAST, RIGHT, 7:00, 5 CM FN, CORE BIOPSY:   -- Invasive ductal carcinoma, grade 2, ER: 95%, NJ: 5-10%, HER2 negative (1+)        B. BREAST, RIGHT, 10:00, 8 CM  FN, CORE BIOPSY:   -- Invasive ductal carcinoma, grade 2-3,  ER: 95%, RI<5%, HER2 negative (1+)         C. AXILLARY LYMPH NODE, RIGHT, BIOPSY:     -- Metastatic carcinoma, see note.      02/14/2024: Biopsy-proven multicentric right breast malignancy with the total  span of up to 13.0 cm associated with bulky metastatic axillary and  abnormal internal mammary lymphadenopathy. No evidence of chest wall,  skin or nipple-areolar complex involvement.    No MRI evidence of malignancy in the left breast.    02/15/2024: staging scans: negative- short term follow up       History of Present Illness (HPI)/Interval History:    No fever, no chills, no CP, no SOB, no cough  No abdominal pain, no nausea, no vomitting  No diarrhea  No neuropathy in fingers or toes   No bony pain      PMH: none     PSH: no surgery    Allergies/meds: negative     Reproductive hx: Menarche: 12, AFLB: 27, Menopause: no, HRT: no     Family history: Maternal Grandfather: Kidney versus Liver cancer,Paternal Grandmother: Soft cell sarcoma-        Review of Systems:  14-point ROS otherwise negative, as per HPI.    Past Medical History:   Diagnosis Date    Breast cancer (Multi)     Cancer (Multi)     Other specified health status 09/29/2016    No pertinent past medical history       Past Surgical History:   Procedure Laterality Date    OTHER SURGICAL HISTORY  09/29/2016    Oral Surgery Tooth Extraction [place] Tooth    WISDOM TOOTH EXTRACTION  6/1/2000       Social History     Socioeconomic History    Marital status:      Spouse name: None    Number of children: None    Years of education: None    Highest education level: None   Occupational History    None   Tobacco Use    Smoking status: Never    Smokeless tobacco: Never   Vaping Use    Vaping status: Never Used   Substance and Sexual Activity    Alcohol use: Yes     Alcohol/week: 1.0 standard drink of alcohol     Types: 1 Glasses of wine per week     Comment: A few drinks a month    Drug use: Never     Sexual activity: Yes     Partners: Male     Birth control/protection: I.U.D.   Other Topics Concern    None   Social History Narrative    None     Social Determinants of Health     Financial Resource Strain: Not on file   Food Insecurity: Not on file   Transportation Needs: Not on file   Physical Activity: Not on file   Stress: Not on file   Social Connections: Not on file   Intimate Partner Violence: Not on file   Housing Stability: Not on file       Allergies   Allergen Reactions    Dog Dander Itching         Current Outpatient Medications:     atorvastatin (Lipitor) 20 mg tablet, Take 1 tablet (20 mg) by mouth once daily., Disp: 90 tablet, Rfl: 3    lidocaine-prilocaine (Emla) 2.5-2.5 % cream, PRN, Disp: , Rfl:     metoprolol succinate XL (Toprol-XL) 25 mg 24 hr tablet, Take 1 tablet (25 mg) by mouth once daily. Do not crush or chew., Disp: 90 tablet, Rfl: 3    prochlorperazine (Compazine) 10 mg tablet, Take 1 tablet (10 mg) by mouth every 6 hours if needed for nausea or vomiting., Disp: 30 tablet, Rfl: 2    valACYclovir (Valtrex) 500 mg tablet, Take 1 tablet (500 mg) by mouth once daily., Disp: 30 tablet, Rfl: 2     Objective    BSA: 1.87 meters squared  /77 (BP Location: Left arm, Patient Position: Sitting)   Pulse 98   Temp 36.5 °C (97.7 °F) (Temporal)   Resp 18   Wt 72 kg (158 lb 12.8 oz)   SpO2 98%   BMI 23.52 kg/m²     ECOG Performance Status: 0    GA: alert, oriented, cooperative  HEENT: anicteric sclera, well injected conjunctiva  Heart: RRR, no murmurs or gallops heard  Lung: CTAB  Abd: +BS, soft, non tender  Breast: large breast mass measuring at inferior breast measuring 4cm. Additional mass at 10 oclock- Right axillary mass . Overall good clinical response    Laboratory Data:  Lab Results   Component Value Date    WBC 4.7 06/24/2024    HGB 12.1 06/24/2024    HCT 37.1 06/24/2024     (H) 06/24/2024     06/24/2024    ANC 7.27 04/29/2024       Chemistry    Lab Results    Component Value Date/Time     06/24/2024 1318    K 4.1 06/24/2024 1318     06/24/2024 1318    CO2 28 06/24/2024 1318    BUN 11 06/24/2024 1318    CREATININE 0.82 06/24/2024 1318    Lab Results   Component Value Date/Time    CALCIUM 9.4 06/24/2024 1318    ALKPHOS 52 06/24/2024 1318    AST 20 06/24/2024 1318    ALT 42 06/24/2024 1318    BILITOT 0.7 06/24/2024 1318             Radiology:  Onco-Echo Limited (Strain and 3D)     [facility], [address] [place] [address]                 Tel [phone] and Fax [phone]    TRANSTHORACIC ECHOCARDIOGRAM REPORT       Patient Name:      SERA Ramirez Physician:    [id number] Fede Sanderson MD  Study Date:        5/22/2024            Ordering Provider:    [id number] VIPUL BRYANT  MRN/PID:           [id number]             Fellow:  Accession#:        [id number]         Nurse:  Date of Birth/Age: 1985 / 38 years Sonographer:          MARLENA Callejas RDCS  Gender:            F                    Additional Staff:  Height:            172.72 cm            Admit Date:  Weight:            68.95 kg             Admission Status:     Outpatient  BSA / BMI:         1.82 m2 / 23.11      Encounter#:           [id number]                     kg/m2                                          Department Location:  [facility] Non                                                                Invasive  Blood Pressure: 125 /76 mmHg    Study Type:    ONCO-ECHO LIMITED (STRAIN AND 3D)  Diagnosis/ICD: Encounter for monitoring cardiotoxic drug therapy-Z51.81  Indication:    Chemotherapy  CPT Code:      Echo Limited-55116; Myocardial Strain Imaging-04743; 3D Rendering                 w/o independent workstation-[id number]; Color Doppler-10867    Patient  History:  Pertinent History: Hyperlipidemia. Breast CA.    Study Detail: The following Echo studies were performed: 2D, M-Mode, Doppler and                color flow.       PHYSICIAN INTERPRETATION:  Left Ventricle: The left ventricular systolic function is normal, with an estimated ejection fraction of 55-60%. There are no regional wall motion abnormalities. The left ventricular cavity size is normal. Left ventricular diastolic filling was not assessed. Strain values are normal, which imply normal myocardial function.  Left Atrium: The left atrium is normal in size.  Right Ventricle: The right ventricle is normal in size. There is normal right ventricular global systolic function.  Right Atrium: The right atrium is normal in size.  Aortic Valve: The aortic valve is trileaflet. There is no evidence of aortic valve regurgitation.  Mitral Valve: The mitral valve is normal in structure. There is no evidence of mitral valve regurgitation.  Tricuspid Valve: The tricuspid valve is structurally normal. There is trace tricuspid regurgitation. The right ventricular systolic pressure is unable to be estimated.  Pulmonic Valve: The pulmonic valve was not assessed. Pulmonic valve regurgitation was not assessed.  Pericardium: There is no pericardial effusion noted.  Aorta: The aortic root is normal.     ONCO-CARDIOLOGY:  Vital Signs: The patients heart rate during the study was 69 beats per minute.  The patients blood pressure was 125/76 during the study.  Machine: This study was performed on the [employer] Epic.  Previous Study: The patient had a previous Onco-Cardiology echocardiogram dated  2/22/2024. The patient's previous study was performed on the [employer] Epic.       Onco-Cardiology Measurements:  Historical Measurements from Previous Study  2D EF (Biplane)                   67.3%  3D EF                               66%  Global Longitudinal Strain (GLS) -18.2%    Current Measurements  2D EF (Biplane)                    57.9%  3D EF                               62%  Global Longitudinal Strain (GLS) -20.9%    GLS Tracking Quality: Good       CONCLUSIONS:   1. Left ventricular systolic function is normal with a 55-60% estimated ejection fraction.    QUANTITATIVE DATA SUMMARY:  2D MEASUREMENTS:                           Normal Ranges:  Ao Root d:     2.85 cm   (2.0-3.7cm)  LAs:           3.20 cm   (2.7-4.0cm)  IVSd:          0.60 cm   (0.6-1.1cm)  LVPWd:         0.60 cm   (0.6-1.1cm)  LVIDd:         4.90 cm   (3.9-5.9cm)  LVIDs:         3.60 cm  LV Mass Index: 50.3 g/m2  LV % FS        26.5 %    LA VOLUME:                                Normal Ranges:  LA Vol A4C:        27.2 ml    (22+/-6mL/m2)  LA Vol A2C:        20.8 ml  LA Vol BP:         24.5 ml  LA Vol Index A4C:  15.0ml/m2  LA Vol Index A2C:  11.5 ml/m2  LA Vol Index BP:   13.5 ml/m2  LA Area A4C:       12.4 cm2  LA Area A2C:       10.5 cm2  LA Major Axis A4C: 4.8 cm  LA Major Axis A2C: 4.5 cm    LV SYSTOLIC FUNCTION BY 2D PLANIMETRY (MOD):                      Normal Ranges:  EF-A4C View: 59.9 % (>=55%)  EF-A2C View: 54.4 %  EF-Biplane:  57.9 %    LV DIASTOLIC FUNCTION:                             Normal Ranges:  MV Peak E:      1.06 m/s   (0.7-1.2 m/s)  MV Peak A:      0.58 m/s   (0.42-0.7 m/s)  E/A Ratio:      1.83       (1.0-2.2)  MV e'           0.16 m/s   (>8.0)  MV lateral e'   0.17 m/s  MV medial e'    0.15 m/s  MV A Dur:       87.00 msec  E/e' Ratio:     6.62       (<8.0)  PulmV Sys Ellis:  48.40 cm/s  PulmV Angelo Ellis: 33.80 cm/s  PulmV S/D Ellis:  1.40    MITRAL VALVE:                  Normal Ranges:  MV DT: 148 msec (150-240msec)       RIGHT VENTRICLE:  RV s' 0.13 m/s    Pulmonary Veins:  PulmV Angelo Ellis: 33.80 cm/s  PulmV S/D Ellis:  1.40  PulmV Sys Ellis:  48.40 cm/s       [id number] Fede Sanderson MD  Electronically signed on 5/25/2024 at 10:46:23 AM       ** Final (Updated) **       MR breast bilateral w contrast full protocol 02/14/2024    Narrative  Interpreted By:  Basia  Riki,  STUDY:  BI MR BREAST BILATERAL WITH CONTRAST FULL PROTOCOL;  2/14/2024 1:33 pm    ACCESSION NUMBER(S):  [id number]    ORDERING CLINICIAN:  VITOR HARO    INDICATION:  38-year-old woman status post ultrasound-guided biopsy of right  breast masses at 7 o'clock and 10 o'clock and a right axillary lymph  demonstrating grade 2 invasive ductal carcinoma at both sites with  axillary simon metastasis.    COMPARISON:  Screening mammogram 11/09/2023, right breast ultrasound 12/20/2023,  02/06/2024; ultrasound-guided biopsy 02/06/2024.    TECHNIQUE:  Using a dedicated breast coil, STIR axial and T1-weighted fat  saturation axial images of the breasts were obtained, the latter both  before and after intravenous administration of Gadolinium DTPA. On an  independent workstation, 3-D images were formulated using [employer]  including time enhancement curves, subtraction images and MIP images.    Intravenous contrast:  14 mL of Dotarem    FINDINGS:  There is asymmetric moderate bilateral background enhancement.    Extreme fibroglandular tissue.    RIGHT BREAST:  There are multiple contiguous masses and nonmass  enhancements occupying the entire lateral and central inferior right  breast including at the two sites of biopsy-proven malignancy at 7  o'clock and 10 o'clock, with a total span of 8.5 x 4.5 x 13.0 cm (AP  x TR x CC), better appreciated on the MIP and sagittal reconstruction  images. There is no involvement of the skin, nipple-areolar complex,  or pectoralis major muscle. The most anterior extent of malignancy is  located approximately 2 cm from the base of the nipple.    Within the span of malignancy, more confluent findings are as follows:    - Dominant irregular heterogeneously enhancing mass in the inferior  lateral and central inferior breast at anterior and middle depths  measuring 5.0 x 4.7 x 4.5 cm, corresponding to the site of biopsy at  7 o'clock (series 7, image 127/176).    - Irregular  heterogeneously enhancing mass in superior lateral breast  at posterior depth measuring 2.7 x 2.5 x 2.5 cm, corresponding to the  site of biopsy at 10 o'clock (series 7, image 48/176).    - More confluent nonmass enhancement in superior lateral breast at  middle to posterior depth measuring 3.8 x 2.0 x 2.5 cm (series 7,  image 73/176).    - A small satellite mass in superior lateral breast at middle depth  measuring 1.0 x 1.0 x 0.8 cm located 0.8 cm superolateral to the  dominant mass (series 7, image 108/176).    Evaluation of the axilla is limited due to incomplete coverage in the  field of view. A conglomerate of at least 3 enlarged level I right  lymph nodes associated with a biopsy clip is partially visualized  measuring at least 4.2 x 2.5 x 3.5 cm (series 6, image 1/176).    An abnormal internal mammary lymph node is seen in the 2nd  intercostal space measuring 1.5 x 1.2 x 1.6 cm    LEFT BREAST: No suspicious mass or nonmass enhancement is identified.  There are numerous scattered small benign cysts.    No axillary or internal mammary lymphadenopathy is appreciated within  limitations of limited field of view.    NON-BREAST FINDINGS:  None.    Impression  1. Biopsy-proven multicentric right breast malignancy with the total  span of up to 13.0 cm associated with bulky metastatic axillary and  abnormal internal mammary lymphadenopathy. No evidence of chest wall,  skin or nipple-areolar complex involvement.  2. No MRI evidence of malignancy in the left breast.    BI-RADS CATEGORY:  BI-RADS CATEGORY:  6 Known Biopsy-Proven Malignancy.  Recommendation:  Immediate Follow-up.  Recommended Date:  Immediate.  Laterality:  Right.    For any future breast imaging appointments, please call [phone]  ([phone]).      MACRO:  None    Signed by: Riki Flor 2/15/2024 10:43 AM  Dictation workstation:   [id number]          Assessment/Plan:    Macy is a 38 year old female in excellent health with a recent diagnosis of  right sided breast cancer. Macy felt a mass in her right breast in December that prompted diagnostic evaluation.     Ultrasound of the right breast showed In the patient's area of palpable concern in the right breast at 7 o'clock, 5 cm from the nipple, there is a large vague heterogeneousmass with internal vascularity which has the appearance of dense fibroglandular tissue measuring up to 5 cm;  In the patient's area of palpable concern felt by her provider in the right breast at 10 o'clock, 8 cm from the nipple, there is an oval parallel heterogeneous mass withinternal vascularity measuring approximately 1.1 x 0.9 x 1.4 cm. 3 enlarged axillary LN were seen    She had US guided core needle biopsy of  right breast at 7:00, 5 CM FN,  that showed invasive ductal carcinoma, grade 2, ER: 95%, MT: 5-10%, HER2 negative (1+).  And US guided core needle biopsy of the right breast at  10:00, 8 CM FN, that showed  Invasive ductal carcinoma, grade 2-3,  ER: 95%, MT<5%, HER2 negative (1+). Axillary LN consistent with metastatic carcinoma     On MRI she a  multicentric right breast malignancy with the total span of up to 13.0 cm associated with bulky metastatic axillary and abnormal internal mammary lymphadenopathy. No evidence of chest wall, skin or nipple-areolar complex involvement.     Current stage [id number] (staging scans negative for distant mets)  ECOG PS: 0    I reviewed with her the events that led to her diagnosis of breast cancer. We reviewed all the procedures and diagnostic imaging she underwent thus far. I discussed the features of her breast cancer that include the size, grade, lymph node status and  hormone receptor/ her2-jair status.      We discussed neoadjuvant systemic therapy. The goal of treatment would be to downstage her cancer, assess tumor chemosensitivity and treat potential micrometastatic disease. I would treat, given extensive of disease with anthracyclines based regimen ddAC Q2 weeks x4 followed by  taxol weekly x12     - mild drop in EF: 65% ->55-60% on her most recent echo- sees Dr Mares, does have sinus tachycardia, not clear how much is due to anxiety. She was started on low dose beta blocker.  - Elevated LDL- non pharmacological measures for now- follows with cardiology  - okay to proceed with taxol. Last chemo on 07/16/2024  - Referral back to Dr Patel    RTC early September       Denise Golden MD  Hematology and Medical Oncology  [facility]

## 2024-06-27 ENCOUNTER — APPOINTMENT (OUTPATIENT)
Dept: CARDIOLOGY | Facility: CLINIC | Age: 39
End: 2024-06-27
Payer: COMMERCIAL

## 2024-06-27 VITALS
HEIGHT: 69 IN | SYSTOLIC BLOOD PRESSURE: 114 MMHG | DIASTOLIC BLOOD PRESSURE: 86 MMHG | BODY MASS INDEX: 23.85 KG/M2 | HEART RATE: 80 BPM | WEIGHT: 161 LBS | TEMPERATURE: 98.4 F

## 2024-06-27 DIAGNOSIS — Z79.899 ENCOUNTER FOR MONITORING CARDIOTOXIC DRUG THERAPY: Primary | ICD-10-CM

## 2024-06-27 DIAGNOSIS — R06.02 SHORTNESS OF BREATH: ICD-10-CM

## 2024-06-27 DIAGNOSIS — Z51.81 ENCOUNTER FOR MONITORING CARDIOTOXIC DRUG THERAPY: Primary | ICD-10-CM

## 2024-06-27 DIAGNOSIS — C50.811 MALIGNANT NEOPLASM OF OVERLAPPING SITES OF RIGHT BREAST IN FEMALE, ESTROGEN RECEPTOR POSITIVE (MULTI): ICD-10-CM

## 2024-06-27 DIAGNOSIS — I42.7 CARDIOTOXICITY (MULTI): ICD-10-CM

## 2024-06-27 DIAGNOSIS — R07.89 CHEST TIGHTNESS: ICD-10-CM

## 2024-06-27 DIAGNOSIS — Z17.0 MALIGNANT NEOPLASM OF OVERLAPPING SITES OF RIGHT BREAST IN FEMALE, ESTROGEN RECEPTOR POSITIVE (MULTI): ICD-10-CM

## 2024-06-27 PROCEDURE — 1036F TOBACCO NON-USER: CPT | Performed by: INTERNAL MEDICINE

## 2024-06-27 PROCEDURE — 99214 OFFICE O/P EST MOD 30 MIN: CPT | Performed by: INTERNAL MEDICINE

## 2024-06-27 NOTE — PROGRESS NOTES
Patient:  Macy Chavez  YOB: 1985  MRN: [id number]       Chief Complaint/Active Symptoms:       Macy Chavez is a 38 y.o. female who returns today for cardiac follow-up.    Here for follow-up with cardiotoxicity. Prior symptoms of cough/SOB/heart racing have all resolved.  Seen as her lightheadedness.  She has 3 more weekly treatments of Taxol to go and then she has her surgery scheduled for mid August and that is both resection as well as reconstruction at the same time.  Reviewed the results of her labs and discussed the mildly elevated troponin but normal BN peptide that she had in late May and early June.  In hindsight she feels that maybe when she was here last time she had some type of either allergies or viral illness or cold as everything seemed to get better either just on its own or with starting the beta-blockers.    Cancer Diagnosis: Right breast cancer, stage IIIA, ER+, IL+, HER2 neg  Oncologist. Mallat  Treatment: DDAC 14 day cycles with paclitaxel weekly cycles. Completed 4 cycles of DDAC, now on weekly paclitaxel   Radiation: planned  Total dose 242.82mg/m2    Review of Systems   All other systems reviewed and are negative.      Objective:     Vitals:    06/27/24 0904   BP: 114/86   Pulse: 80   Temp: 36.9 °C (98.4 °F)       Vitals:    06/27/24 0904   Weight: 73 kg (161 lb)       Allergies:     Allergies   Allergen Reactions    Dog Dander Itching          Medications:     Current Outpatient Medications   Medication Instructions    atorvastatin (LIPITOR) 20 mg, oral, Daily    lidocaine-prilocaine (Emla) 2.5-2.5 % cream PRN    metoprolol succinate XL (TOPROL-XL) 25 mg, oral, Daily, Do not crush or chew.    prochlorperazine (COMPAZINE) 10 mg, oral, Every 6 hours PRN    valACYclovir (VALTREX) 500 mg, oral, Daily       Physical Examination:   GENERAL:  Well developed, well nourished, in no acute distress.  HEENT: NC AT, EOMI with anicteric sclera  NECK:  Supple, no JVD, no bruit.  LUNGS:   "Clear to auscultation bilaterally, normal respiratory effort.  HEART:  PMI is nondisplaced. RRR with normal S1 and S2, no S3, no mumur or rub. No carotid or abdominal bruits  ABDOMEN: Soft, NT, ND without palpable organomegaly or bruits  EXTREMITIES:  Warm with good color, no clubbing or cyanosis.  There is no edema noted.  PERIPHERAL VASCULAR:  Pulses present and equally palpable; 2+ throughout.  MUSCULOSKELETAL:   NEURO/PSYCH:  Alert and oriented times three with approppriate behavior and responses. Nonfocal motor examination with normal gait and ambulation  Skin: no rash or lesions on exposed skin or reported.    Lab:     CBC:   Lab Results   Component Value Date    WBC 4.7 06/24/2024    RBC 3.66 (L) 06/24/2024    HGB 12.1 06/24/2024    HCT 37.1 06/24/2024     06/24/2024        CMP:    Lab Results   Component Value Date     06/24/2024    K 4.1 06/24/2024     06/24/2024    CO2 28 06/24/2024    BUN 11 06/24/2024    CREATININE 0.82 06/24/2024    GLUCOSE 111 (H) 06/24/2024    CALCIUM 9.4 06/24/2024       Magnesium:    No results found for: \"MG\"    Lipid Profile:    Lab Results   Component Value Date    TRIG 176 (H) 06/04/2024    HDL 44.5 06/04/2024    LDLCALC 117 (H) 06/04/2024       TSH:    Lab Results   Component Value Date    TSH 1.11 01/25/2024       BNP:   Lab Results   Component Value Date    BNP 7 05/24/2024        PT/INR:    Lab Results   Component Value Date    PROTIME 11.7 02/26/2024    INR 1.0 02/26/2024       HgBA1c:    No results found for: \"HGBA1C\"    BMP:  Lab Results   Component Value Date     06/24/2024     06/18/2024     06/10/2024    K 4.1 06/24/2024    K 4.1 06/18/2024    K 4.6 06/10/2024     06/24/2024     06/18/2024     06/10/2024    CO2 28 06/24/2024    CO2 27 06/18/2024    CO2 27 06/10/2024    BUN 11 06/24/2024    BUN 9 06/18/2024    BUN 11 06/10/2024    CREATININE 0.82 06/24/2024    CREATININE 0.73 06/18/2024    CREATININE 0.76 06/10/2024 "       Cardiac Enzymes:    Lab Results   Component Value Date    TROPHS 54 (H) 06/03/2024    TROPHS 31 (H) 05/24/2024       Hepatic Function Panel:    Lab Results   Component Value Date    ALKPHOS 52 06/24/2024    ALT 42 06/24/2024    AST 20 06/24/2024    PROT 6.7 06/24/2024    BILITOT 0.7 06/24/2024         Diagnostic Studies:     Last echocardiogram was in May with an EF of 55 to 60% which was decreased from 65%    Radiology:     No orders to display     ASSESSMENT     Problem List Items Addressed This Visit       Malignant neoplasm of overlapping sites of right breast in female, estrogen receptor positive (Multi)    Encounter for monitoring cardiotoxic drug therapy - Primary    Relevant Orders    Onco-Echo Limited (Strain And 3D)    Shortness of breath    Relevant Orders    Onco-Echo Limited (Strain And 3D)    B-Type Natriuretic Peptide    Chest tightness    Cardiotoxicity (Multi)    Relevant Orders    Onco-Echo Limited (Strain And 3D)    B-Type Natriuretic Peptide    Troponin I, High Sensitivity       PLAN     1.  Right breast cancer.  Encounter for cardiotoxic drug therapy and history of cardiotoxicity and shortness of breath.  Patient's symptoms have resolved and she no longer has any form of chest tightness or shortness of breath.  Not sure what to make of the very mildly elevated troponin in the absence of any immunotherapy.  Will recheck her BN peptide and troponin prior to her next drug treatment scheduled for next week.  Will also do an echocardiogram prior to her reconstructive breast surgery getting a good assessment of her EF and strain as this may be much more difficult in the months following her breast reconstruction and actually even long-term.    Will continue the beta-blockers and if this echocardiogram shows any further deterioration in her LV function or ejection fraction/strain we will then add an ARB even though she is doing and feeling well at this time.    As she has no signs or symptoms of  heart failure I see no problem with her having her mastectomy and breast reconstruction as scheduled.  Will see her after her surgery as long as there is no problems between now and then.

## 2024-07-01 ENCOUNTER — LAB (OUTPATIENT)
Dept: LAB | Facility: CLINIC | Age: 39
End: 2024-07-01
Payer: COMMERCIAL

## 2024-07-01 DIAGNOSIS — Z17.0 MALIGNANT NEOPLASM OF OVERLAPPING SITES OF RIGHT BREAST IN FEMALE, ESTROGEN RECEPTOR POSITIVE (MULTI): ICD-10-CM

## 2024-07-01 DIAGNOSIS — R06.02 SHORTNESS OF BREATH: ICD-10-CM

## 2024-07-01 DIAGNOSIS — I42.7 CARDIOTOXICITY (MULTI): ICD-10-CM

## 2024-07-01 DIAGNOSIS — C50.811 MALIGNANT NEOPLASM OF OVERLAPPING SITES OF RIGHT BREAST IN FEMALE, ESTROGEN RECEPTOR POSITIVE (MULTI): ICD-10-CM

## 2024-07-01 LAB
ALBUMIN SERPL BCP-MCNC: 4.7 G/DL (ref 3.4–5)
ALP SERPL-CCNC: 51 U/L (ref 33–110)
ALT SERPL W P-5'-P-CCNC: 42 U/L (ref 7–45)
ANION GAP SERPL CALC-SCNC: 11 MMOL/L (ref 10–20)
AST SERPL W P-5'-P-CCNC: 23 U/L (ref 9–39)
BASOPHILS # BLD AUTO: 0.04 X10*3/UL (ref 0–0.1)
BASOPHILS NFR BLD AUTO: 1 %
BILIRUB SERPL-MCNC: 0.7 MG/DL (ref 0–1.2)
BNP SERPL-MCNC: 4 PG/ML (ref 0–99)
BUN SERPL-MCNC: 15 MG/DL (ref 6–23)
CALCIUM SERPL-MCNC: 9.8 MG/DL (ref 8.6–10.3)
CARDIAC TROPONIN I PNL SERPL HS: 6 NG/L (ref 0–34)
CHLORIDE SERPL-SCNC: 104 MMOL/L (ref 98–107)
CO2 SERPL-SCNC: 28 MMOL/L (ref 21–32)
CREAT SERPL-MCNC: 0.76 MG/DL (ref 0.5–1.05)
EGFRCR SERPLBLD CKD-EPI 2021: >90 ML/MIN/1.73M*2
EOSINOPHIL # BLD AUTO: 0.08 X10*3/UL (ref 0–0.7)
EOSINOPHIL NFR BLD AUTO: 2 %
ERYTHROCYTE [DISTWIDTH] IN BLOOD BY AUTOMATED COUNT: 13.2 % (ref 11.5–14.5)
GLUCOSE SERPL-MCNC: 109 MG/DL (ref 74–99)
HCT VFR BLD AUTO: 39.5 % (ref 36–46)
HGB BLD-MCNC: 13 G/DL (ref 12–16)
IMM GRANULOCYTES # BLD AUTO: 0.01 X10*3/UL (ref 0–0.7)
IMM GRANULOCYTES NFR BLD AUTO: 0.2 % (ref 0–0.9)
LYMPHOCYTES # BLD AUTO: 1.07 X10*3/UL (ref 1.2–4.8)
LYMPHOCYTES NFR BLD AUTO: 26.7 %
MCH RBC QN AUTO: 33.1 PG (ref 26–34)
MCHC RBC AUTO-ENTMCNC: 32.9 G/DL (ref 32–36)
MCV RBC AUTO: 101 FL (ref 80–100)
MONOCYTES # BLD AUTO: 0.2 X10*3/UL (ref 0.1–1)
MONOCYTES NFR BLD AUTO: 5 %
NEUTROPHILS # BLD AUTO: 2.61 X10*3/UL (ref 1.2–7.7)
NEUTROPHILS NFR BLD AUTO: 65.1 %
NRBC BLD-RTO: ABNORMAL /100{WBCS}
PLATELET # BLD AUTO: 217 X10*3/UL (ref 150–450)
POTASSIUM SERPL-SCNC: 4.6 MMOL/L (ref 3.5–5.3)
PROT SERPL-MCNC: 7 G/DL (ref 6.4–8.2)
RBC # BLD AUTO: 3.93 X10*6/UL (ref 4–5.2)
SODIUM SERPL-SCNC: 138 MMOL/L (ref 136–145)
WBC # BLD AUTO: 4 X10*3/UL (ref 4.4–11.3)

## 2024-07-01 PROCEDURE — 84075 ASSAY ALKALINE PHOSPHATASE: CPT

## 2024-07-01 PROCEDURE — 84484 ASSAY OF TROPONIN QUANT: CPT

## 2024-07-01 PROCEDURE — 85025 COMPLETE CBC W/AUTO DIFF WBC: CPT

## 2024-07-01 PROCEDURE — 83880 ASSAY OF NATRIURETIC PEPTIDE: CPT

## 2024-07-02 ENCOUNTER — INFUSION (OUTPATIENT)
Dept: HEMATOLOGY/ONCOLOGY | Facility: CLINIC | Age: 39
End: 2024-07-02
Payer: COMMERCIAL

## 2024-07-02 VITALS
WEIGHT: 158.73 LBS | RESPIRATION RATE: 15 BRPM | OXYGEN SATURATION: 98 % | HEART RATE: 116 BPM | DIASTOLIC BLOOD PRESSURE: 78 MMHG | SYSTOLIC BLOOD PRESSURE: 114 MMHG | BODY MASS INDEX: 23.51 KG/M2 | TEMPERATURE: 97.3 F

## 2024-07-02 DIAGNOSIS — Z17.0 MALIGNANT NEOPLASM OF OVERLAPPING SITES OF RIGHT BREAST IN FEMALE, ESTROGEN RECEPTOR POSITIVE (MULTI): ICD-10-CM

## 2024-07-02 DIAGNOSIS — C50.811 MALIGNANT NEOPLASM OF OVERLAPPING SITES OF RIGHT BREAST IN FEMALE, ESTROGEN RECEPTOR POSITIVE (MULTI): ICD-10-CM

## 2024-07-02 PROCEDURE — 96413 CHEMO IV INFUSION 1 HR: CPT

## 2024-07-02 PROCEDURE — 96375 TX/PRO/DX INJ NEW DRUG ADDON: CPT | Mod: INF

## 2024-07-02 PROCEDURE — 2500000004 HC RX 250 GENERAL PHARMACY W/ HCPCS (ALT 636 FOR OP/ED): Performed by: STUDENT IN AN ORGANIZED HEALTH CARE EDUCATION/TRAINING PROGRAM

## 2024-07-02 PROCEDURE — 2500000001 HC RX 250 WO HCPCS SELF ADMINISTERED DRUGS (ALT 637 FOR MEDICARE OP): Performed by: STUDENT IN AN ORGANIZED HEALTH CARE EDUCATION/TRAINING PROGRAM

## 2024-07-02 RX ORDER — EPINEPHRINE 0.3 MG/.3ML
0.3 INJECTION SUBCUTANEOUS EVERY 5 MIN PRN
Status: DISCONTINUED | OUTPATIENT
Start: 2024-07-02 | End: 2024-07-02 | Stop reason: HOSPADM

## 2024-07-02 RX ORDER — HEPARIN SODIUM,PORCINE/PF 10 UNIT/ML
50 SYRINGE (ML) INTRAVENOUS AS NEEDED
OUTPATIENT
Start: 2024-07-02

## 2024-07-02 RX ORDER — FAMOTIDINE 10 MG/ML
20 INJECTION INTRAVENOUS ONCE AS NEEDED
Status: DISCONTINUED | OUTPATIENT
Start: 2024-07-02 | End: 2024-07-02 | Stop reason: HOSPADM

## 2024-07-02 RX ORDER — FAMOTIDINE 10 MG/ML
20 INJECTION INTRAVENOUS ONCE
Status: COMPLETED | OUTPATIENT
Start: 2024-07-02 | End: 2024-07-02

## 2024-07-02 RX ORDER — DIPHENHYDRAMINE HYDROCHLORIDE 50 MG/ML
50 INJECTION INTRAMUSCULAR; INTRAVENOUS AS NEEDED
Status: DISCONTINUED | OUTPATIENT
Start: 2024-07-02 | End: 2024-07-02 | Stop reason: HOSPADM

## 2024-07-02 RX ORDER — ALBUTEROL SULFATE 0.83 MG/ML
3 SOLUTION RESPIRATORY (INHALATION) AS NEEDED
Status: DISCONTINUED | OUTPATIENT
Start: 2024-07-02 | End: 2024-07-02 | Stop reason: HOSPADM

## 2024-07-02 RX ORDER — HEPARIN 100 UNIT/ML
500 SYRINGE INTRAVENOUS AS NEEDED
Status: DISCONTINUED | OUTPATIENT
Start: 2024-07-02 | End: 2024-07-02 | Stop reason: HOSPADM

## 2024-07-02 RX ORDER — PROCHLORPERAZINE MALEATE 10 MG
10 TABLET ORAL EVERY 6 HOURS PRN
Status: DISCONTINUED | OUTPATIENT
Start: 2024-07-02 | End: 2024-07-02 | Stop reason: HOSPADM

## 2024-07-02 RX ORDER — PROCHLORPERAZINE EDISYLATE 5 MG/ML
10 INJECTION INTRAMUSCULAR; INTRAVENOUS EVERY 6 HOURS PRN
Status: DISCONTINUED | OUTPATIENT
Start: 2024-07-02 | End: 2024-07-02 | Stop reason: HOSPADM

## 2024-07-02 RX ORDER — ONDANSETRON HYDROCHLORIDE 2 MG/ML
8 INJECTION, SOLUTION INTRAVENOUS ONCE
Status: COMPLETED | OUTPATIENT
Start: 2024-07-02 | End: 2024-07-02

## 2024-07-02 RX ORDER — DIPHENHYDRAMINE HCL 25 MG
50 CAPSULE ORAL ONCE
Status: COMPLETED | OUTPATIENT
Start: 2024-07-02 | End: 2024-07-02

## 2024-07-02 RX ORDER — HEPARIN 100 UNIT/ML
500 SYRINGE INTRAVENOUS AS NEEDED
OUTPATIENT
Start: 2024-07-02

## 2024-07-02 RX ORDER — DEXAMETHASONE SODIUM PHOSPHATE 100 MG/10ML
10 INJECTION INTRAMUSCULAR; INTRAVENOUS ONCE
Status: COMPLETED | OUTPATIENT
Start: 2024-07-02 | End: 2024-07-02

## 2024-07-02 ASSESSMENT — PAIN SCALES - GENERAL: PAINLEVEL: 0-NO PAIN

## 2024-07-02 NOTE — SIGNIFICANT EVENT

## 2024-07-08 ENCOUNTER — TELEPHONE (OUTPATIENT)
Dept: HEMATOLOGY/ONCOLOGY | Facility: HOSPITAL | Age: 39
End: 2024-07-08
Payer: COMMERCIAL

## 2024-07-08 ENCOUNTER — TELEPHONE (OUTPATIENT)
Dept: ADMISSION | Facility: HOSPITAL | Age: 39
End: 2024-07-08
Payer: COMMERCIAL

## 2024-07-08 ENCOUNTER — LAB (OUTPATIENT)
Dept: LAB | Facility: CLINIC | Age: 39
End: 2024-07-08
Payer: COMMERCIAL

## 2024-07-08 DIAGNOSIS — C50.811 MALIGNANT NEOPLASM OF OVERLAPPING SITES OF RIGHT BREAST IN FEMALE, ESTROGEN RECEPTOR POSITIVE (MULTI): ICD-10-CM

## 2024-07-08 DIAGNOSIS — Z17.0 MALIGNANT NEOPLASM OF OVERLAPPING SITES OF RIGHT BREAST IN FEMALE, ESTROGEN RECEPTOR POSITIVE (MULTI): Primary | ICD-10-CM

## 2024-07-08 DIAGNOSIS — C50.811 MALIGNANT NEOPLASM OF OVERLAPPING SITES OF RIGHT BREAST IN FEMALE, ESTROGEN RECEPTOR POSITIVE (MULTI): Primary | ICD-10-CM

## 2024-07-08 DIAGNOSIS — Z17.0 MALIGNANT NEOPLASM OF OVERLAPPING SITES OF RIGHT BREAST IN FEMALE, ESTROGEN RECEPTOR POSITIVE (MULTI): ICD-10-CM

## 2024-07-08 LAB
ALBUMIN SERPL BCP-MCNC: 4.5 G/DL (ref 3.4–5)
ALP SERPL-CCNC: 49 U/L (ref 33–110)
ALT SERPL W P-5'-P-CCNC: 33 U/L (ref 7–45)
ANION GAP SERPL CALC-SCNC: 11 MMOL/L (ref 10–20)
AST SERPL W P-5'-P-CCNC: 16 U/L (ref 9–39)
BASOPHILS # BLD AUTO: 0.04 X10*3/UL (ref 0–0.1)
BASOPHILS NFR BLD AUTO: 0.8 %
BILIRUB SERPL-MCNC: 0.5 MG/DL (ref 0–1.2)
BUN SERPL-MCNC: 12 MG/DL (ref 6–23)
CALCIUM SERPL-MCNC: 9.4 MG/DL (ref 8.6–10.3)
CHLORIDE SERPL-SCNC: 104 MMOL/L (ref 98–107)
CO2 SERPL-SCNC: 28 MMOL/L (ref 21–32)
CREAT SERPL-MCNC: 0.77 MG/DL (ref 0.5–1.05)
EGFRCR SERPLBLD CKD-EPI 2021: >90 ML/MIN/1.73M*2
EOSINOPHIL # BLD AUTO: 0.04 X10*3/UL (ref 0–0.7)
EOSINOPHIL NFR BLD AUTO: 0.8 %
ERYTHROCYTE [DISTWIDTH] IN BLOOD BY AUTOMATED COUNT: 13 % (ref 11.5–14.5)
GLUCOSE SERPL-MCNC: 94 MG/DL (ref 74–99)
HCT VFR BLD AUTO: 36.7 % (ref 36–46)
HGB BLD-MCNC: 12.2 G/DL (ref 12–16)
IMM GRANULOCYTES # BLD AUTO: 0.01 X10*3/UL (ref 0–0.7)
IMM GRANULOCYTES NFR BLD AUTO: 0.2 % (ref 0–0.9)
LYMPHOCYTES # BLD AUTO: 1.24 X10*3/UL (ref 1.2–4.8)
LYMPHOCYTES NFR BLD AUTO: 23.7 %
MCH RBC QN AUTO: 33.2 PG (ref 26–34)
MCHC RBC AUTO-ENTMCNC: 33.2 G/DL (ref 32–36)
MCV RBC AUTO: 100 FL (ref 80–100)
MONOCYTES # BLD AUTO: 0.26 X10*3/UL (ref 0.1–1)
MONOCYTES NFR BLD AUTO: 5 %
NEUTROPHILS # BLD AUTO: 3.65 X10*3/UL (ref 1.2–7.7)
NEUTROPHILS NFR BLD AUTO: 69.5 %
NRBC BLD-RTO: ABNORMAL /100{WBCS}
PLATELET # BLD AUTO: 221 X10*3/UL (ref 150–450)
POTASSIUM SERPL-SCNC: 4 MMOL/L (ref 3.5–5.3)
PROT SERPL-MCNC: 6.7 G/DL (ref 6.4–8.2)
RBC # BLD AUTO: 3.67 X10*6/UL (ref 4–5.2)
SODIUM SERPL-SCNC: 139 MMOL/L (ref 136–145)
WBC # BLD AUTO: 5.2 X10*3/UL (ref 4.4–11.3)

## 2024-07-08 PROCEDURE — 84075 ASSAY ALKALINE PHOSPHATASE: CPT

## 2024-07-08 PROCEDURE — 85025 COMPLETE CBC W/AUTO DIFF WBC: CPT

## 2024-07-08 RX ORDER — LIDOCAINE AND PRILOCAINE 25; 25 MG/G; MG/G
1 CREAM TOPICAL ONCE
Qty: 30 G | Refills: 0 | Status: SHIPPED | OUTPATIENT
Start: 2024-07-08 | End: 2024-07-08

## 2024-07-09 ENCOUNTER — INFUSION (OUTPATIENT)
Dept: HEMATOLOGY/ONCOLOGY | Facility: CLINIC | Age: 39
End: 2024-07-09
Payer: COMMERCIAL

## 2024-07-09 VITALS
RESPIRATION RATE: 18 BRPM | SYSTOLIC BLOOD PRESSURE: 119 MMHG | HEART RATE: 85 BPM | OXYGEN SATURATION: 98 % | TEMPERATURE: 97.7 F | BODY MASS INDEX: 23.77 KG/M2 | WEIGHT: 160.5 LBS | DIASTOLIC BLOOD PRESSURE: 84 MMHG

## 2024-07-09 DIAGNOSIS — Z17.0 MALIGNANT NEOPLASM OF OVERLAPPING SITES OF RIGHT BREAST IN FEMALE, ESTROGEN RECEPTOR POSITIVE (MULTI): ICD-10-CM

## 2024-07-09 DIAGNOSIS — C50.811 MALIGNANT NEOPLASM OF OVERLAPPING SITES OF RIGHT BREAST IN FEMALE, ESTROGEN RECEPTOR POSITIVE (MULTI): ICD-10-CM

## 2024-07-09 PROCEDURE — 2500000001 HC RX 250 WO HCPCS SELF ADMINISTERED DRUGS (ALT 637 FOR MEDICARE OP): Performed by: STUDENT IN AN ORGANIZED HEALTH CARE EDUCATION/TRAINING PROGRAM

## 2024-07-09 PROCEDURE — 2500000004 HC RX 250 GENERAL PHARMACY W/ HCPCS (ALT 636 FOR OP/ED): Performed by: STUDENT IN AN ORGANIZED HEALTH CARE EDUCATION/TRAINING PROGRAM

## 2024-07-09 PROCEDURE — 96375 TX/PRO/DX INJ NEW DRUG ADDON: CPT | Mod: INF

## 2024-07-09 PROCEDURE — 96413 CHEMO IV INFUSION 1 HR: CPT

## 2024-07-09 RX ORDER — DIPHENHYDRAMINE HCL 25 MG
50 CAPSULE ORAL ONCE
Status: COMPLETED | OUTPATIENT
Start: 2024-07-09 | End: 2024-07-09

## 2024-07-09 RX ORDER — HEPARIN SODIUM,PORCINE/PF 10 UNIT/ML
50 SYRINGE (ML) INTRAVENOUS AS NEEDED
Status: DISCONTINUED | OUTPATIENT
Start: 2024-07-09 | End: 2024-07-09 | Stop reason: HOSPADM

## 2024-07-09 RX ORDER — ONDANSETRON HYDROCHLORIDE 2 MG/ML
8 INJECTION, SOLUTION INTRAVENOUS ONCE
Status: COMPLETED | OUTPATIENT
Start: 2024-07-09 | End: 2024-07-09

## 2024-07-09 RX ORDER — PROCHLORPERAZINE MALEATE 10 MG
10 TABLET ORAL EVERY 6 HOURS PRN
Status: DISCONTINUED | OUTPATIENT
Start: 2024-07-09 | End: 2024-07-09 | Stop reason: HOSPADM

## 2024-07-09 RX ORDER — HEPARIN 100 UNIT/ML
500 SYRINGE INTRAVENOUS AS NEEDED
Status: DISCONTINUED | OUTPATIENT
Start: 2024-07-09 | End: 2024-07-09 | Stop reason: HOSPADM

## 2024-07-09 RX ORDER — PROCHLORPERAZINE EDISYLATE 5 MG/ML
10 INJECTION INTRAMUSCULAR; INTRAVENOUS EVERY 6 HOURS PRN
Status: DISCONTINUED | OUTPATIENT
Start: 2024-07-09 | End: 2024-07-09 | Stop reason: HOSPADM

## 2024-07-09 RX ORDER — FAMOTIDINE 10 MG/ML
20 INJECTION INTRAVENOUS ONCE AS NEEDED
Status: DISCONTINUED | OUTPATIENT
Start: 2024-07-09 | End: 2024-07-09 | Stop reason: HOSPADM

## 2024-07-09 RX ORDER — ALBUTEROL SULFATE 0.83 MG/ML
3 SOLUTION RESPIRATORY (INHALATION) AS NEEDED
Status: DISCONTINUED | OUTPATIENT
Start: 2024-07-09 | End: 2024-07-09 | Stop reason: HOSPADM

## 2024-07-09 RX ORDER — FAMOTIDINE 10 MG/ML
20 INJECTION INTRAVENOUS ONCE
Status: COMPLETED | OUTPATIENT
Start: 2024-07-09 | End: 2024-07-09

## 2024-07-09 RX ORDER — DEXAMETHASONE SODIUM PHOSPHATE 100 MG/10ML
10 INJECTION INTRAMUSCULAR; INTRAVENOUS ONCE
Status: COMPLETED | OUTPATIENT
Start: 2024-07-09 | End: 2024-07-09

## 2024-07-09 RX ORDER — EPINEPHRINE 0.3 MG/.3ML
0.3 INJECTION SUBCUTANEOUS EVERY 5 MIN PRN
Status: DISCONTINUED | OUTPATIENT
Start: 2024-07-09 | End: 2024-07-09 | Stop reason: HOSPADM

## 2024-07-09 RX ORDER — DIPHENHYDRAMINE HYDROCHLORIDE 50 MG/ML
50 INJECTION INTRAMUSCULAR; INTRAVENOUS AS NEEDED
Status: DISCONTINUED | OUTPATIENT
Start: 2024-07-09 | End: 2024-07-09 | Stop reason: HOSPADM

## 2024-07-09 RX ORDER — HEPARIN 100 UNIT/ML
500 SYRINGE INTRAVENOUS AS NEEDED
OUTPATIENT
Start: 2024-07-09

## 2024-07-09 RX ORDER — HEPARIN SODIUM,PORCINE/PF 10 UNIT/ML
50 SYRINGE (ML) INTRAVENOUS AS NEEDED
OUTPATIENT
Start: 2024-07-09

## 2024-07-09 ASSESSMENT — PAIN SCALES - GENERAL: PAINLEVEL: 0-NO PAIN

## 2024-07-09 NOTE — SIGNIFICANT EVENT
07/09/24 1242   Prechemo Checklist   Has the patient been in the hospital, ED, or urgent care since last date of service No   Chemo/Immuno Consent Completed and Signed Yes   Protocol/Indications Verified Yes   Confirmed to previous date/time of medication Yes   Compared to previous dose Yes   All medications are dated accurately Yes   Pregnancy Test Negative Yes   Parameters Met Yes   BSA/Weight-Height Verified Yes   Dose Calculations Verified (current, total, cumulative) Yes

## 2024-07-10 ENCOUNTER — HOSPITAL ENCOUNTER (OUTPATIENT)
Dept: RADIOLOGY | Facility: CLINIC | Age: 39
Discharge: HOME | End: 2024-07-10
Payer: COMMERCIAL

## 2024-07-10 DIAGNOSIS — C50.811 MALIGNANT NEOPLASM OF OVERLAPPING SITES OF RIGHT BREAST IN FEMALE, ESTROGEN RECEPTOR POSITIVE (MULTI): ICD-10-CM

## 2024-07-10 DIAGNOSIS — Z17.0 MALIGNANT NEOPLASM OF OVERLAPPING SITES OF RIGHT BREAST IN FEMALE, ESTROGEN RECEPTOR POSITIVE (MULTI): ICD-10-CM

## 2024-07-10 PROCEDURE — 2550000001 HC RX 255 CONTRASTS: Performed by: SURGERY

## 2024-07-10 PROCEDURE — 77049 MRI BREAST C-+ W/CAD BI: CPT

## 2024-07-10 PROCEDURE — 77049 MRI BREAST C-+ W/CAD BI: CPT | Performed by: STUDENT IN AN ORGANIZED HEALTH CARE EDUCATION/TRAINING PROGRAM

## 2024-07-10 PROCEDURE — A9575 INJ GADOTERATE MEGLUMI 0.1ML: HCPCS | Performed by: SURGERY

## 2024-07-10 RX ORDER — GADOTERATE MEGLUMINE 376.9 MG/ML
15 INJECTION INTRAVENOUS
Status: COMPLETED | OUTPATIENT
Start: 2024-07-10 | End: 2024-07-10

## 2024-07-11 NOTE — PROGRESS NOTES
Discussed imaging  with left sided findings with radiology and with patient.  Patient planning on bilateral mastectomy.      Carmencita Patel, DO

## 2024-07-12 RX ORDER — CHLORHEXIDINE GLUCONATE 40 MG/ML
SOLUTION TOPICAL ONCE
Qty: 236 ML | Refills: 0 | Status: SHIPPED | OUTPATIENT
Start: 2024-07-12 | End: 2024-07-12

## 2024-07-15 ENCOUNTER — APPOINTMENT (OUTPATIENT)
Dept: PLASTIC SURGERY | Facility: CLINIC | Age: 39
End: 2024-07-15
Payer: COMMERCIAL

## 2024-07-15 ENCOUNTER — PREP FOR PROCEDURE (OUTPATIENT)
Dept: PLASTIC SURGERY | Facility: CLINIC | Age: 39
End: 2024-07-15

## 2024-07-15 VITALS
HEIGHT: 69 IN | HEART RATE: 87 BPM | DIASTOLIC BLOOD PRESSURE: 90 MMHG | SYSTOLIC BLOOD PRESSURE: 125 MMHG | WEIGHT: 160 LBS | BODY MASS INDEX: 23.7 KG/M2

## 2024-07-15 DIAGNOSIS — C50.811 MALIGNANT NEOPLASM OF OVERLAPPING SITES OF RIGHT BREAST IN FEMALE, ESTROGEN RECEPTOR POSITIVE (MULTI): ICD-10-CM

## 2024-07-15 DIAGNOSIS — Z17.0 MALIGNANT NEOPLASM OF OVERLAPPING SITES OF RIGHT BREAST IN FEMALE, ESTROGEN RECEPTOR POSITIVE (MULTI): ICD-10-CM

## 2024-07-15 DIAGNOSIS — C50.811 MALIGNANT NEOPLASM OF OVERLAPPING SITES OF RIGHT BREAST IN FEMALE, ESTROGEN RECEPTOR POSITIVE (MULTI): Primary | ICD-10-CM

## 2024-07-15 DIAGNOSIS — Z17.0 MALIGNANT NEOPLASM OF OVERLAPPING SITES OF RIGHT BREAST IN FEMALE, ESTROGEN RECEPTOR POSITIVE (MULTI): Primary | ICD-10-CM

## 2024-07-15 PROCEDURE — 99213 OFFICE O/P EST LOW 20 MIN: CPT | Performed by: SURGERY

## 2024-07-15 RX ORDER — NITROGLYCERIN 20 MG/G
OINTMENT TOPICAL
Qty: 30 G | Refills: 0 | Status: SHIPPED | OUTPATIENT
Start: 2024-07-15

## 2024-07-15 RX ORDER — SODIUM CHLORIDE 9 MG/ML
100 INJECTION, SOLUTION INTRAVENOUS CONTINUOUS
OUTPATIENT
Start: 2024-07-15

## 2024-07-15 RX ORDER — VANCOMYCIN HYDROCHLORIDE 1 G/200ML
1000 INJECTION, SOLUTION INTRAVENOUS ONCE
OUTPATIENT
Start: 2024-07-15 | End: 2024-07-15

## 2024-07-15 NOTE — PROGRESS NOTES
Department of Plastic and Reconstructive Surgery            Follow-up      Date: 7/15/2024     Subjective   Macy Chavez is a 39 y.o. female who  presents with  right breast multicentric infiltrating ductal carcinoma, spanning approximately 13 cm    She presents today for FUV after initial consultation.    She has been followed by cardiology for cardiotoxicity associated with drug therapy    IN BRIEF:  Patient had a baseline mammogram on 11/3/2023 which was negative.  She did self palpated a mass shortly thereafter, she had imaging repeated on 12/20/2023 shows benign, patient subjectively noted interval growth and repeat imaging was performed on 2/6/2024 revealing a mass 7:00, 5 cm from nipple measuring 5 cm.    There was a second area palpated at 10:00, 8 cm from nipple concerning for satellite lesion measuring 1.4 cm.  There were 3 markedly noted lymph nodes on examination.  Biopsies were performed on that same day on 2/6/2024: All positive for invasive ductal carcinoma, including the right axillary lymph node all positive for invasive ductal carcinoma      I reviewed with the patient the remainder of the his personal, medical, surgical and social history, found not to be pertinent to chief complaint. I specifically reviewed the family history with patient, found not to be pertinent to chief complaint.     Objective    NO EXAM. VIRTUAL VISIT.   From previous, clinical photography reviewed:    Focused exam of the: Bilateral breasts                                R                  L  SN:NAC             24cm            23cm  NAC:IMF            11cm           11cm         BW                    15cm            15cm        NAC diam         5.5cm             6cm               Ptosis Grade     0               0                                                       Bra Size         34DD          Assessment/Plan       Macy is a 39 y.o. female who presents for  right breast multicentric infiltrating ductal  carcinoma, spanning approximately 13 cm with a conglomerate at least 3 markedly enlarged positive lymph nodes    Patient would like to be a smaller size, ideally a C cup    Her final chemotherapy treatment is scheduled July 16th    Plan:     Bilateral nipple sparing mastectomy  Immediate Direct to Implant with structured saline implants  Bilateral nipple reinnervation with nerve allograft     I spent 30 minutes with this patient. Greater than 50% of this time was spent in the counselling and/or coordination of care of this patient.  This note was created using voice recognition software and was not corrected for typographical or grammatical errors.    Signature: Mundo Mendoza MD   Date: 7/15/2024

## 2024-07-15 NOTE — PROGRESS NOTES
BREAST SURGICAL ONCOLOGY FOLLOW UP AFTER NEOADJUVANT THERAPY    Assessment/Plan     Right breast multicentric IDC g2-3 ER 95% TN low 5-10% HER2- spanning 13cm on MRI with a conglomerate of at least 3 markedly enlarged axillary lymph node with biopsy proven metastasis and an abnormal IM node;  -s/p neoadjuvant ddAC q2 weeks x 4 followed by weekly taxol with near complete treatment response in breast with resolution of previously enlarged axillary nodes     We reviewed treatment course to date and most recent imaging.     Last cycle of chemo to be completed on 7/16/2024.    Surgical intervention to be scheduled 4-6 weeks after chemotherapy.    We discussed our plan re: axilla in seeding the previously biopsied node, mapping with dual tracer and removing any sentinel nodes and any palpably abnormal nodes.  If all sampled nodes are negative on frozen section I will not remove any additional nodes; however, if there is any residual simon disease the standard of care would be complete axillary node dissection.    Surgical plan: bilateral nipple vs skin sparing mastectomy, right axillary magseed localized sentinel lymph node biopsy, possible axillary node dissection, immediate reconstruction with neurotization for 8/14/2024.       Macy is considering her options regarding nipple sparing vs skin sparing.  We discussed the oncologic indications for preserving the NAC which she meets criteria for though if she would have more peace of mind with removal of the NAC this is possible as well.  Discussed potential options for nipple reconstruction if one has removal of the NAC including 3D nipple tattooing. Macy will consider her options and let Dr. Mendoza and myself know her intentions.    Subjective   Macy Chavez is a 39 y.o. female presents today for follow up carcinoma of the right breast after neoadjuvant therapy.    Cancer history  Pathology: ductal Grade: 2-3 ER: 95% TN: 5% HER2: negative  Size: multiple masses largest  5cm  Multifocal: Yes, describe: 13.1cm span of disease UOQ and lower breast.  Hiram involvement: Yes, describe: axillary and IMN  Systemic therapy: ddAC q2 weeks x 4, followed by weekly taxol x 12.  Genetic testing performed: Yes, describe: negative for gene mutation    Imaging response to treatment: marked response in the breast with only residual uptake in breast.  Axillary and IM nodes WNL.     Review of Systems   Constitutional symptoms: Denies generalized fatigue.  Denies weight change, fevers/chills, difficulty sleeping   Eyes: Denies double vision, glaucoma, cataracts.  Ear/nose/throat/mouth: Denies hearing changes, sore throat, sinus problems.  Cardiovascular: No chest pain.  Denies irregular heartbeat.  Denies ankle swelling.  Respiratory: No wheezing, cough, or shortness of breath.  Gastrointestinal: No abdominal pain,  No nausea/vomiting.  No indigestion/heartburn.  No change in bowel habits.  No constipation or diarrhea.   Genitourinary: No urinary incontinence.  No urinary frequency.  No painful urination.  Musculoskeletal: No bone pain, no muscle pain, no joint pain.   Integumentary: No rash. No masses.  No changes in moles.  No easy bruising.  Neurological: No headaches.  No tremors. No numbness/tingling.  Psychiatric: No anxiety. No depression.  Endocrine: No excessive thirst.  Not too hot or too cold.  Not tired or fatigued.    Hematological/lymphatic: No swollen glands or blood clotting problems.  No bruising.    Objective   Physical Exam  General: Alert and oriented x 3.  Mood and affect are appropriate.  HEENT: EOMI, PERRLA.   Neck: supple, no masses, no cervical adenopathy.  Cardiovascular: no lower extremity edema.  Pulmonary: breathing non labored on room air.  GI: Abdomen soft, no masses. No hepatomegaly or splenomegaly.  Lymph nodes: No supraclavicular or axillary adenopathy bilaterally. I cannot palpate the previously enlarged lymph nodes in the right axilla.  Musculoskeletal: Full range of  motion in the upper extremities bilaterally.  Neuro: denies dizziness, tremors    Breasts: The breasts were examined in both the seated and supine positions.    RIGHT: The nipple is everted without nipple discharge.  There are no skin changes, skin thickening, or dimpling. There are no masses palpated in the RIGHT breast. Markedly improve exam.  Faint focal area of tissue thickening lower breast at the 7:00 position.  LEFT: The nipple is everted without nipple discharge.  There are no skin changes, skin thickening, or dimpling. There are no masses palpated in the LEFT breast.       Radiology review: All images and reports were personally reviewed.       Carmencita Patel, DO

## 2024-07-16 ENCOUNTER — LAB (OUTPATIENT)
Dept: LAB | Facility: CLINIC | Age: 39
End: 2024-07-16
Payer: COMMERCIAL

## 2024-07-16 ENCOUNTER — INFUSION (OUTPATIENT)
Dept: HEMATOLOGY/ONCOLOGY | Facility: CLINIC | Age: 39
End: 2024-07-16
Payer: COMMERCIAL

## 2024-07-16 ENCOUNTER — OFFICE VISIT (OUTPATIENT)
Dept: SURGICAL ONCOLOGY | Facility: CLINIC | Age: 39
End: 2024-07-16
Payer: COMMERCIAL

## 2024-07-16 VITALS — BODY MASS INDEX: 23.7 KG/M2 | HEIGHT: 69 IN

## 2024-07-16 DIAGNOSIS — Z17.0 MALIGNANT NEOPLASM OF OVERLAPPING SITES OF RIGHT BREAST IN FEMALE, ESTROGEN RECEPTOR POSITIVE (MULTI): ICD-10-CM

## 2024-07-16 DIAGNOSIS — C50.811 MALIGNANT NEOPLASM OF OVERLAPPING SITES OF RIGHT BREAST IN FEMALE, ESTROGEN RECEPTOR POSITIVE (MULTI): ICD-10-CM

## 2024-07-16 LAB
ALBUMIN SERPL BCP-MCNC: 4.5 G/DL (ref 3.4–5)
ALP SERPL-CCNC: 50 U/L (ref 33–110)
ALT SERPL W P-5'-P-CCNC: 28 U/L (ref 7–45)
ANION GAP SERPL CALC-SCNC: 11 MMOL/L (ref 10–20)
AST SERPL W P-5'-P-CCNC: 18 U/L (ref 9–39)
BASOPHILS # BLD AUTO: 0.04 X10*3/UL (ref 0–0.1)
BASOPHILS NFR BLD AUTO: 0.9 %
BILIRUB SERPL-MCNC: 0.4 MG/DL (ref 0–1.2)
BUN SERPL-MCNC: 8 MG/DL (ref 6–23)
CALCIUM SERPL-MCNC: 9.6 MG/DL (ref 8.6–10.3)
CHLORIDE SERPL-SCNC: 106 MMOL/L (ref 98–107)
CO2 SERPL-SCNC: 28 MMOL/L (ref 21–32)
CREAT SERPL-MCNC: 0.75 MG/DL (ref 0.5–1.05)
EGFRCR SERPLBLD CKD-EPI 2021: >90 ML/MIN/1.73M*2
EOSINOPHIL # BLD AUTO: 0.09 X10*3/UL (ref 0–0.7)
EOSINOPHIL NFR BLD AUTO: 2.1 %
ERYTHROCYTE [DISTWIDTH] IN BLOOD BY AUTOMATED COUNT: 13 % (ref 11.5–14.5)
GLUCOSE SERPL-MCNC: 98 MG/DL (ref 74–99)
HCT VFR BLD AUTO: 40.8 % (ref 36–46)
HGB BLD-MCNC: 13.3 G/DL (ref 12–16)
IMM GRANULOCYTES # BLD AUTO: 0.02 X10*3/UL (ref 0–0.7)
IMM GRANULOCYTES NFR BLD AUTO: 0.5 % (ref 0–0.9)
LYMPHOCYTES # BLD AUTO: 1.18 X10*3/UL (ref 1.2–4.8)
LYMPHOCYTES NFR BLD AUTO: 27.4 %
MCH RBC QN AUTO: 33 PG (ref 26–34)
MCHC RBC AUTO-ENTMCNC: 32.6 G/DL (ref 32–36)
MCV RBC AUTO: 101 FL (ref 80–100)
MONOCYTES # BLD AUTO: 0.25 X10*3/UL (ref 0.1–1)
MONOCYTES NFR BLD AUTO: 5.8 %
NEUTROPHILS # BLD AUTO: 2.73 X10*3/UL (ref 1.2–7.7)
NEUTROPHILS NFR BLD AUTO: 63.3 %
NRBC BLD-RTO: ABNORMAL /100{WBCS}
PLATELET # BLD AUTO: 230 X10*3/UL (ref 150–450)
POTASSIUM SERPL-SCNC: 4.5 MMOL/L (ref 3.5–5.3)
PROT SERPL-MCNC: 6.8 G/DL (ref 6.4–8.2)
RBC # BLD AUTO: 4.03 X10*6/UL (ref 4–5.2)
SODIUM SERPL-SCNC: 140 MMOL/L (ref 136–145)
WBC # BLD AUTO: 4.3 X10*3/UL (ref 4.4–11.3)

## 2024-07-16 PROCEDURE — 96413 CHEMO IV INFUSION 1 HR: CPT

## 2024-07-16 PROCEDURE — 99215 OFFICE O/P EST HI 40 MIN: CPT | Performed by: SURGERY

## 2024-07-16 PROCEDURE — 84075 ASSAY ALKALINE PHOSPHATASE: CPT

## 2024-07-16 PROCEDURE — 99215 OFFICE O/P EST HI 40 MIN: CPT | Mod: 25 | Performed by: SURGERY

## 2024-07-16 PROCEDURE — 2500000004 HC RX 250 GENERAL PHARMACY W/ HCPCS (ALT 636 FOR OP/ED): Performed by: STUDENT IN AN ORGANIZED HEALTH CARE EDUCATION/TRAINING PROGRAM

## 2024-07-16 PROCEDURE — 96375 TX/PRO/DX INJ NEW DRUG ADDON: CPT | Mod: INF

## 2024-07-16 PROCEDURE — 85025 COMPLETE CBC W/AUTO DIFF WBC: CPT

## 2024-07-16 PROCEDURE — 2500000001 HC RX 250 WO HCPCS SELF ADMINISTERED DRUGS (ALT 637 FOR MEDICARE OP): Performed by: STUDENT IN AN ORGANIZED HEALTH CARE EDUCATION/TRAINING PROGRAM

## 2024-07-16 RX ORDER — HEPARIN 100 UNIT/ML
500 SYRINGE INTRAVENOUS AS NEEDED
Status: DISCONTINUED | OUTPATIENT
Start: 2024-07-16 | End: 2024-07-16 | Stop reason: HOSPADM

## 2024-07-16 RX ORDER — EPINEPHRINE 0.3 MG/.3ML
0.3 INJECTION SUBCUTANEOUS EVERY 5 MIN PRN
Status: DISCONTINUED | OUTPATIENT
Start: 2024-07-16 | End: 2024-07-16 | Stop reason: HOSPADM

## 2024-07-16 RX ORDER — FAMOTIDINE 10 MG/ML
20 INJECTION INTRAVENOUS ONCE AS NEEDED
Status: DISCONTINUED | OUTPATIENT
Start: 2024-07-16 | End: 2024-07-16 | Stop reason: HOSPADM

## 2024-07-16 RX ORDER — DIPHENHYDRAMINE HYDROCHLORIDE 50 MG/ML
50 INJECTION INTRAMUSCULAR; INTRAVENOUS AS NEEDED
Status: DISCONTINUED | OUTPATIENT
Start: 2024-07-16 | End: 2024-07-16 | Stop reason: HOSPADM

## 2024-07-16 RX ORDER — ONDANSETRON HYDROCHLORIDE 2 MG/ML
8 INJECTION, SOLUTION INTRAVENOUS ONCE
Status: COMPLETED | OUTPATIENT
Start: 2024-07-16 | End: 2024-07-16

## 2024-07-16 RX ORDER — ALBUTEROL SULFATE 0.83 MG/ML
3 SOLUTION RESPIRATORY (INHALATION) AS NEEDED
Status: DISCONTINUED | OUTPATIENT
Start: 2024-07-16 | End: 2024-07-16 | Stop reason: HOSPADM

## 2024-07-16 RX ORDER — PROCHLORPERAZINE EDISYLATE 5 MG/ML
10 INJECTION INTRAMUSCULAR; INTRAVENOUS EVERY 6 HOURS PRN
Status: DISCONTINUED | OUTPATIENT
Start: 2024-07-16 | End: 2024-07-16 | Stop reason: HOSPADM

## 2024-07-16 RX ORDER — FAMOTIDINE 10 MG/ML
20 INJECTION INTRAVENOUS ONCE
Status: COMPLETED | OUTPATIENT
Start: 2024-07-16 | End: 2024-07-16

## 2024-07-16 RX ORDER — HEPARIN SODIUM,PORCINE/PF 10 UNIT/ML
50 SYRINGE (ML) INTRAVENOUS AS NEEDED
OUTPATIENT
Start: 2024-07-16

## 2024-07-16 RX ORDER — HEPARIN SODIUM,PORCINE/PF 10 UNIT/ML
50 SYRINGE (ML) INTRAVENOUS AS NEEDED
Status: DISCONTINUED | OUTPATIENT
Start: 2024-07-16 | End: 2024-07-16 | Stop reason: HOSPADM

## 2024-07-16 RX ORDER — DIPHENHYDRAMINE HCL 25 MG
50 CAPSULE ORAL ONCE
Status: COMPLETED | OUTPATIENT
Start: 2024-07-16 | End: 2024-07-16

## 2024-07-16 RX ORDER — PROCHLORPERAZINE MALEATE 10 MG
10 TABLET ORAL EVERY 6 HOURS PRN
Status: DISCONTINUED | OUTPATIENT
Start: 2024-07-16 | End: 2024-07-16 | Stop reason: HOSPADM

## 2024-07-16 RX ORDER — DEXAMETHASONE SODIUM PHOSPHATE 10 MG/ML
10 INJECTION INTRAMUSCULAR; INTRAVENOUS ONCE
Status: COMPLETED | OUTPATIENT
Start: 2024-07-16 | End: 2024-07-16

## 2024-07-16 RX ORDER — HEPARIN 100 UNIT/ML
500 SYRINGE INTRAVENOUS AS NEEDED
OUTPATIENT
Start: 2024-07-16

## 2024-07-16 NOTE — SIGNIFICANT EVENT
07/16/24 1304   Prechemo Checklist   Has the patient been in the hospital, ED, or urgent care since last date of service No   Chemo/Immuno Consent Completed and Signed Yes   Protocol/Indications Verified Yes   Confirmed to previous date/time of medication Yes   Compared to previous dose Yes   All medications are dated accurately Yes   Pregnancy Test Negative Yes   Parameters Met Yes   BSA/Weight-Height Verified Yes   Dose Calculations Verified (current, total, cumulative) Yes

## 2024-07-25 ENCOUNTER — APPOINTMENT (OUTPATIENT)
Dept: PLASTIC SURGERY | Facility: CLINIC | Age: 39
End: 2024-07-25
Payer: COMMERCIAL

## 2024-07-26 ENCOUNTER — HOSPITAL ENCOUNTER (OUTPATIENT)
Dept: RADIOLOGY | Facility: HOSPITAL | Age: 39
Discharge: HOME | End: 2024-07-26
Payer: COMMERCIAL

## 2024-07-26 DIAGNOSIS — Z17.0 MALIGNANT NEOPLASM OF OVERLAPPING SITES OF RIGHT BREAST IN FEMALE, ESTROGEN RECEPTOR POSITIVE (MULTI): ICD-10-CM

## 2024-07-26 DIAGNOSIS — C50.811 MALIGNANT NEOPLASM OF OVERLAPPING SITES OF RIGHT BREAST IN FEMALE, ESTROGEN RECEPTOR POSITIVE (MULTI): ICD-10-CM

## 2024-07-26 PROCEDURE — A4648 IMPLANTABLE TISSUE MARKER: HCPCS

## 2024-07-26 PROCEDURE — 2500000005 HC RX 250 GENERAL PHARMACY W/O HCPCS: Performed by: RADIOLOGY

## 2024-07-26 PROCEDURE — 2780000003 HC OR 278 NO HCPCS

## 2024-07-26 PROCEDURE — 19285 PERQ DEV BREAST 1ST US IMAG: CPT | Mod: RT

## 2024-08-02 ENCOUNTER — HOSPITAL ENCOUNTER (OUTPATIENT)
Dept: CARDIOLOGY | Facility: HOSPITAL | Age: 39
Discharge: HOME | End: 2024-08-02
Payer: COMMERCIAL

## 2024-08-02 DIAGNOSIS — R06.02 SHORTNESS OF BREATH: ICD-10-CM

## 2024-08-02 DIAGNOSIS — Z79.899 ENCOUNTER FOR MONITORING CARDIOTOXIC DRUG THERAPY: ICD-10-CM

## 2024-08-02 DIAGNOSIS — Z51.81 ENCOUNTER FOR MONITORING CARDIOTOXIC DRUG THERAPY: ICD-10-CM

## 2024-08-02 DIAGNOSIS — I42.7 CARDIOTOXICITY (MULTI): ICD-10-CM

## 2024-08-02 LAB
EJECTION FRACTION APICAL 4 CHAMBER: 60
EJECTION FRACTION: 60 %
LEFT ATRIUM VOLUME AREA LENGTH INDEX BSA: 17.2 ML/M2
LEFT VENTRICLE INTERNAL DIMENSION DIASTOLE: 4.6 CM (ref 3.5–6)
LV EJECTION FRACTION BIPLANE: 60 %
MITRAL VALVE E/A RATIO: 1.05
RIGHT VENTRICLE FREE WALL PEAK S': 9.25 CM/S
TRICUSPID ANNULAR PLANE SYSTOLIC EXCURSION: 1.6 CM

## 2024-08-02 PROCEDURE — 76376 3D RENDER W/INTRP POSTPROCES: CPT

## 2024-08-02 PROCEDURE — 76376 3D RENDER W/INTRP POSTPROCES: CPT | Performed by: INTERNAL MEDICINE

## 2024-08-02 PROCEDURE — 93356 MYOCRD STRAIN IMG SPCKL TRCK: CPT | Performed by: INTERNAL MEDICINE

## 2024-08-02 PROCEDURE — 93308 TTE F-UP OR LMTD: CPT | Performed by: INTERNAL MEDICINE

## 2024-08-02 PROCEDURE — 93325 DOPPLER ECHO COLOR FLOW MAPG: CPT | Performed by: INTERNAL MEDICINE

## 2024-08-02 PROCEDURE — 93321 DOPPLER ECHO F-UP/LMTD STD: CPT | Performed by: INTERNAL MEDICINE

## 2024-08-06 RX ORDER — MULTIVITAMIN/IRON/FOLIC ACID 18MG-0.4MG
1 TABLET ORAL DAILY
COMMUNITY

## 2024-08-06 RX ORDER — LORATADINE 10 MG/1
10 TABLET ORAL DAILY
COMMUNITY

## 2024-08-06 ASSESSMENT — DUKE ACTIVITY SCORE INDEX (DASI)
CAN YOU CLIMB A FLIGHT OF STAIRS OR WALK UP A HILL: YES
DASI METS SCORE: 9.9
CAN YOU PARTICIPATE IN STRENOUS SPORTS LIKE SWIMMING, SINGLES TENNIS, FOOTBALL, BASKETBALL, OR SKIING: YES
CAN YOU WALK A BLOCK OR TWO ON LEVEL GROUND: YES
CAN YOU DO HEAVY WORK AROUND THE HOUSE LIKE SCRUBBING FLOORS OR LIFTING AND MOVING HEAVY FURNITURE: YES
CAN YOU HAVE SEXUAL RELATIONS: YES
CAN YOU DO MODERATE WORK AROUND THE HOUSE LIKE VACUUMING, SWEEPING FLOORS OR CARRYING GROCERIES: YES
CAN YOU RUN A SHORT DISTANCE: YES
CAN YOU TAKE CARE OF YOURSELF (EAT, DRESS, BATHE, OR USE TOILET): YES
TOTAL_SCORE: 58.2
CAN YOU DO YARD WORK LIKE RAKING LEAVES, WEEDING OR PUSHING A MOWER: YES
CAN YOU PARTICIPATE IN MODERATE RECREATIONAL ACTIVITIES LIKE GOLF, BOWLING, DANCING, DOUBLES TENNIS OR THROWING A BASEBALL OR FOOTBALL: YES
CAN YOU WALK INDOORS, SUCH AS AROUND YOUR HOUSE: YES
CAN YOU DO LIGHT WORK AROUND THE HOUSE LIKE DUSTING OR WASHING DISHES: YES

## 2024-08-06 ASSESSMENT — LIFESTYLE VARIABLES: SMOKING_STATUS: NONSMOKER

## 2024-08-06 ASSESSMENT — ACTIVITIES OF DAILY LIVING (ADL): ADL_SCORE: 0

## 2024-08-07 ENCOUNTER — PATIENT MESSAGE (OUTPATIENT)
Dept: SURGICAL ONCOLOGY | Facility: HOSPITAL | Age: 39
End: 2024-08-07

## 2024-08-07 ENCOUNTER — PRE-ADMISSION TESTING (OUTPATIENT)
Dept: PREADMISSION TESTING | Facility: HOSPITAL | Age: 39
End: 2024-08-07
Payer: COMMERCIAL

## 2024-08-07 ENCOUNTER — LAB (OUTPATIENT)
Dept: LAB | Facility: LAB | Age: 39
End: 2024-08-07
Payer: COMMERCIAL

## 2024-08-07 VITALS
SYSTOLIC BLOOD PRESSURE: 114 MMHG | DIASTOLIC BLOOD PRESSURE: 85 MMHG | WEIGHT: 163.8 LBS | HEIGHT: 68 IN | HEART RATE: 87 BPM | BODY MASS INDEX: 24.83 KG/M2 | RESPIRATION RATE: 16 BRPM | OXYGEN SATURATION: 97 % | TEMPERATURE: 97.5 F

## 2024-08-07 DIAGNOSIS — C50.811 MALIGNANT NEOPLASM OF OVERLAPPING SITES OF RIGHT BREAST IN FEMALE, ESTROGEN RECEPTOR POSITIVE (MULTI): ICD-10-CM

## 2024-08-07 DIAGNOSIS — Z17.0 MALIGNANT NEOPLASM OF OVERLAPPING SITES OF RIGHT BREAST IN FEMALE, ESTROGEN RECEPTOR POSITIVE (MULTI): ICD-10-CM

## 2024-08-07 LAB
ANION GAP SERPL CALC-SCNC: 12 MMOL/L (ref 10–20)
BUN SERPL-MCNC: 15 MG/DL (ref 6–23)
CALCIUM SERPL-MCNC: 10.1 MG/DL (ref 8.6–10.3)
CHLORIDE SERPL-SCNC: 101 MMOL/L (ref 98–107)
CO2 SERPL-SCNC: 29 MMOL/L (ref 21–32)
CREAT SERPL-MCNC: 0.86 MG/DL (ref 0.5–1.05)
EGFRCR SERPLBLD CKD-EPI 2021: 87 ML/MIN/1.73M*2
ERYTHROCYTE [DISTWIDTH] IN BLOOD BY AUTOMATED COUNT: 12.8 % (ref 11.5–14.5)
GLUCOSE SERPL-MCNC: 96 MG/DL (ref 74–99)
HCT VFR BLD AUTO: 42 % (ref 36–46)
HGB BLD-MCNC: 14.1 G/DL (ref 12–16)
MCH RBC QN AUTO: 32 PG (ref 26–34)
MCHC RBC AUTO-ENTMCNC: 33.6 G/DL (ref 32–36)
MCV RBC AUTO: 95 FL (ref 80–100)
NRBC BLD-RTO: 0 /100 WBCS (ref 0–0)
PLATELET # BLD AUTO: 236 X10*3/UL (ref 150–450)
POTASSIUM SERPL-SCNC: 4.4 MMOL/L (ref 3.5–5.3)
RBC # BLD AUTO: 4.41 X10*6/UL (ref 4–5.2)
SODIUM SERPL-SCNC: 138 MMOL/L (ref 136–145)
WBC # BLD AUTO: 8.1 X10*3/UL (ref 4.4–11.3)

## 2024-08-07 PROCEDURE — 80048 BASIC METABOLIC PNL TOTAL CA: CPT

## 2024-08-07 PROCEDURE — 85027 COMPLETE CBC AUTOMATED: CPT

## 2024-08-07 ASSESSMENT — ENCOUNTER SYMPTOMS
NECK NEGATIVE: 1
MUSCULOSKELETAL NEGATIVE: 1
ENDOCRINE NEGATIVE: 1
NEUROLOGICAL NEGATIVE: 1
CONSTITUTIONAL NEGATIVE: 1
EYES NEGATIVE: 1
GASTROINTESTINAL NEGATIVE: 1
RESPIRATORY NEGATIVE: 1

## 2024-08-07 ASSESSMENT — PAIN - FUNCTIONAL ASSESSMENT: PAIN_FUNCTIONAL_ASSESSMENT: 0-10

## 2024-08-07 ASSESSMENT — PAIN SCALES - GENERAL: PAINLEVEL_OUTOF10: 0 - NO PAIN

## 2024-08-07 NOTE — PREPROCEDURE INSTRUCTIONS
Medication List            Accurate as of August 7, 2024  9:57 AM. Always use your most recent med list.                atorvastatin 20 mg tablet  Commonly known as: Lipitor  Take 1 tablet (20 mg) by mouth once daily.  Medication Adjustments for Surgery: Continue until night before surgery     b complex 0.4 mg tablet  Medication Adjustments for Surgery: Stop 7 days before surgery     CLARITIN ORAL  Medication Adjustments for Surgery: Stop 1 day before surgery     lidocaine-prilocaine 2.5-2.5 % cream  Commonly known as: Emla  Notes to patient: as needed     metoprolol succinate XL 25 mg 24 hr tablet  Commonly known as: Toprol-XL  Take 1 tablet (25 mg) by mouth once daily. Do not crush or chew.  Medication Adjustments for Surgery: Take morning of surgery with sip of water, no other fluids     Nitro-Bid 2 % ointment  Generic drug: nitroglycerin  Apply layer to skin of breast and/or nipples daily after surgery. Cover with gauze. Change dressings daily.  Notes to patient: As instruced     prochlorperazine 10 mg tablet  Commonly known as: Compazine  Take 1 tablet (10 mg) by mouth every 6 hours if needed for nausea or vomiting.  Notes to patient: Not taking     valACYclovir 500 mg tablet  Commonly known as: Valtrex  Take 1 tablet (500 mg) by mouth once daily.  Medication Adjustments for Surgery: Take morning of surgery with sip of water, no other fluids                              NPO Instructions:    {NPO Instructions:[id number]}    Additional Instructions:     {[facility] AVS INSTR:[id number]}

## 2024-08-07 NOTE — PREPROCEDURE INSTRUCTIONS
Medication List            Accurate as of August 7, 2024  9:47 AM. Always use your most recent med list.                atorvastatin 20 mg tablet  Commonly known as: Lipitor  Take 1 tablet (20 mg) by mouth once daily.  Medication Adjustments for Surgery: Take morning of surgery with sip of water, no other fluids     b complex 0.4 mg tablet  Medication Adjustments for Surgery: Stop 7 days before surgery     CLARITIN ORAL  Medication Adjustments for Surgery: Stop 1 day before surgery     lidocaine-prilocaine 2.5-2.5 % cream  Commonly known as: Emla  Notes to patient:  needed     metoprolol succinate XL 25 mg 24 hr tablet  Commonly known as: Toprol-XL  Take 1 tablet (25 mg) by mouth once daily. Do not crush or chew.  Medication Adjustments for Surgery: Take morning of surgery with sip of water, no other fluids     Nitro-Bid 2 % ointment  Generic drug: nitroglycerin  Apply layer to skin of breast and/or nipples daily after surgery. Cover with gauze. Change dressings daily.     prochlorperazine 10 mg tablet  Commonly known as: Compazine  Take 1 tablet (10 mg) by mouth every 6 hours if needed for nausea or vomiting.  Medication Adjustments for Surgery: Continue until night before surgery     valACYclovir 500 mg tablet  Commonly known as: Valtrex  Take 1 tablet (500 mg) by mouth once daily.  Medication Adjustments for Surgery: Take morning of surgery with sip of water, no other fluids                          PRE-OPERATIVE INSTRUCTIONS    You will receive notification one business day prior to your procedure to confirm your arrival time. It is important that you answer your phone and/or check your messages during this time. If you do not hear from the surgery center by 5 pm. the day before your procedure, please call [phone].     Please enter the building through the Outpatient entrance and take the elevator off the lobby to the 2nd floor then check in at the Outpatient Surgery desk on the 2nd  floor.    INSTRUCTIONS:  Talk to your surgeon for instructions if you should stop your aspirin, blood thinner, or diabetes medicines.  DO NOT take any multivitamins or over the counter supplements for 7-10 days before surgery.  If not being admitted, you must have an adult immediately available to drive you home after surgery. We also highly recommend you have someone stay with you for the entire day and night of your surgery.  For children having surgery, a parent or legal guardian must accompany them to the surgery center. If this is not possible, please call [phone] to make additional arrangements.  For adults who are unable to consent or make medical decisions for themselves, a legal guardian or Power of  must accompany them to the surgery center. If this is not possible, please call [phone] to make additional arrangements.  Wear comfortable, loose fitting clothing.  All jewelry and piercings must be removed. If you are unable to remove an item or have a dermal piercing, please be sure to tell the nurse when you arrive for surgery.  Nail polish and make-up must be removed.  Avoid smoking or consuming alcohol for 24 hours before surgery.  To help prevent infection, please take a shower/bath and wash your hair the night before and/or morning of surgery (or follow other specific bathing instructions provided).    Preoperative Fasting Guidelines    Why must I stop eating and drinking near surgery time?  With sedation, food or liquid in your stomach can enter your lungs causing serious complications  Increases nausea and vomiting    When do I need to stop eating and drinking before my surgery?  Do not eat any solid food after midnight the night before your surgery/procedure unless otherwise instructed by your surgeon.   You may have up to 13.5 ounces of clear liquid until TWO hours before your instructed arrival time to the hospital.  This includes water, black tea/coffee, (no milk or cream) apple  juice, and electrolyte drinks (Gatorade).   You may chew gum until TWO hours before your surgery/procedure      If applicable, notify your surgeons office immediately of any new skin changes that occur to the surgical limb.      If you have any questions or concerns, please call Pre-Admission Testing at [phone].       Home Preoperative Antibacterial Shower with Chlorhexidine gluconate (CHG)     What is a home preoperative antibacterial shower?  This shower is a way of cleaning the skin with a germ killing solution before surgery. The solution contains chlorhexidine gluconate, commonly known as CHG. CHG is a skin cleanser with germ killing ability. Let your doctor know if you are allergic to chlorhexidine.    Why do I need to take a preoperative antibacterial shower?  Skin is not sterile. It is best to try to make your skin as free of germs as possible before surgery. Proper cleansing with a germ killing soap before surgery can lower the number of germs on your skin. This helps to reduce the risk of infection at the surgical site. Following the instructions listed below will help you prepare your skin for surgery.    How do I use the solution?  Begin using your CHG soap the night before and again the morning of your procedure.   Do not shave the day before or day of surgery.  Remove all jewelry until after surgery. Take off rings and take out all body-piercing jewelry.  Wash your face and hair with normal soap and shampoo before you use the CHG soap.  Apply the CHG solution to a clean wet washcloth. Move away from the water to avoid premature rinsing of the CHG soap as you are applying. Firmly lather your entire body from the neck down. Do not use CHG on your face, eyes, ears, or genitals.   Pay special attention to the area where your incisions will be located.  Do not scrub your skin too hard.  It is important to allow the CHG soap to sit on your skin for 3-5 minutes.  Rinse the solution off your body  completely. Do not wash with your normal soap after the CHG soap solution.  Pat yourself dry with a clean, soft towel.  Do not apply powders, lotions or deodorants as these might block how the CHG soap works.   Dress in clean clothing.  Be sure to sleep with clean, freshly laundered sheets.  Be aware that CHG can cause stains on fabric. Rinse your washcloth and other linens that have contact with CHG completely. Use only non-chlorine detergents to launder the items used.

## 2024-08-07 NOTE — H&P (VIEW-ONLY)
CPM/PAT Evaluation       Name: Macy Chavez (Macy Chavez)  /Age: 1985/39 y.o.     In-Person       Chief Complaint: breast cancer    HPI  This is a very pleasant 40 yo with Pmhx of HLD and breast cancer.  Pt had biopsy on 2024.  Biopsy + for invasive ductal carcinoma with + right lymph node.  Pt completed course of chemo on .  Plan now is for  bilateral mastectomy with right Magseed localized sentinel lymph node biopsy,  and breast reconstruction immediate with implant bilateral.    Patient denies recent fever or chills.    Past Medical History:   Diagnosis Date    Breast cancer (Multi)     Cancer (Multi)     Chest tightness 2024    Hyperlipidemia     Other specified health status 2016    No pertinent past medical history    Shortness of breath 2024       Past Surgical History:   Procedure Laterality Date    OTHER SURGICAL HISTORY  2016    Oral Surgery Tooth Extraction [facility] Tooth    WISDOM TOOTH EXTRACTION  2000       Patient  reports being sexually active and has had partner(s) who are male. She reports using the following method of birth control/protection: I.U.D..    Family History   Problem Relation Name Age of Onset    Stroke Father Jad     Other (cancer) Maternal Grandfather Sotero         liver or kidney    Cancer Paternal Grandmother Lizbeth         carcinoma of thigh    Stroke Paternal Grandmother Lizbeth     COPD Paternal Grandfather Mitchell        Allergies   Allergen Reactions    Dog Dander Itching       Prior to Admission medications    Medication Sig Start Date End Date Taking? Authorizing Provider   atorvastatin (Lipitor) 20 mg tablet Take 1 tablet (20 mg) by mouth once daily. 3/6/24 3/6/25  Zainab Larson MD   b complex 0.4 mg tablet Take 1 tablet by mouth once daily.    Historical Provider, MD   lidocaine-prilocaine (Emla) 2.5-2.5 % cream PRN 24   Historical Provider, MD   loratadine (CLARITIN ORAL) Take by mouth once daily.    Historical Provider, MD    metoprolol succinate XL (Toprol-XL) 25 mg 24 hr tablet Take 1 tablet (25 mg) by mouth once daily. Do not crush or chew. 5/24/24 5/24/25  Zainab Larson MD   nitroglycerin (Nitro-Bid) 2 % ointment Apply layer to skin of breast and/or nipples daily after surgery. Cover with gauze. Change dressings daily. 7/15/24   Mundo Mendoza MD   prochlorperazine (Compazine) 10 mg tablet Take 1 tablet (10 mg) by mouth every 6 hours if needed for nausea or vomiting.  Patient not taking: Reported on 8/6/2024 3/27/24   Denise Golden MD   valACYclovir (Valtrex) 500 mg tablet Take 1 tablet (500 mg) by mouth once daily. 5/28/24   Denise Golden MD        PAT ROS:   Constitutional:   neg    Neuro/Psych:   neg    Eyes:   neg     use of corrective lenses  Ears:   neg    Nose:   neg    Mouth:   neg    Throat:   neg    Neck:   neg    Cardio:    Recent repeat ECHO with Dr Larson currently no CP or SOB  Respiratory:   neg    Endocrine:   neg    GI:   neg    :    See HPI breast cancer  Musculoskeletal:   neg    Hematologic:   neg    Skin:   Pt has mediport on right side  neg        Physical Exam  Constitutional:       Appearance: Normal appearance.   HENT:      Head: Normocephalic and atraumatic.      Nose: Nose normal.      Mouth/Throat:      Mouth: Mucous membranes are moist.   Eyes:      Pupils: Pupils are equal, round, and reactive to light.   Cardiovascular:      Rate and Rhythm: Normal rate and regular rhythm.   Pulmonary:      Effort: Pulmonary effort is normal.      Breath sounds: Normal breath sounds.   Abdominal:      General: Bowel sounds are normal.      Palpations: Abdomen is soft.   Musculoskeletal:      Cervical back: Normal range of motion.      Comments: No LE   Skin:     General: Skin is warm and dry.   Neurological:      General: No focal deficit present.      Mental Status: She is alert and oriented to person, place, and time.   Psychiatric:         Mood and Affect: Mood normal.         Behavior: Behavior  normal.        Airway: nl ROM  Anesthesia:  Patient denies any anesthesia complications.   Teeth: intact    Lab Results   Component Value Date    GLUCOSE 96 08/07/2024    CALCIUM 10.1 08/07/2024     08/07/2024    K 4.4 08/07/2024    CO2 29 08/07/2024     08/07/2024    BUN 15 08/07/2024    CREATININE 0.86 08/07/2024     Lab Results   Component Value Date    WBC 8.1 08/07/2024    HGB 14.1 08/07/2024    HCT 42.0 08/07/2024    MCV 95 08/07/2024     08/07/2024       ECHO-8/2   CONCLUSIONS:      1. Left ventricular ejection fraction is normal, by visual estimate at 60%.   2. There is no evidence of left ventricular hypertrophy.   3. There is normal right ventricular global systolic function.   4. Normal LV global strain.   5. No comparison study available.   6. No significant valvular heart disease noted.   7. Normal chamber sizes.     Visit Vitals  /85   Pulse 87   Temp 36.4 °C (97.5 °F) (Tympanic)   Resp 16       DASI Risk Score      Flowsheet Row Most Recent Value   DASI SCORE 58.2   METS Score (Will be calculated only when all the questions are answered) 9.9          Caprini DVT Assessment      Flowsheet Row Most Recent Value   DVT Score 7   Current Status Major surgery planned, lasting 2-3 hours, Present cancer or chemotherapy   Age 40-59 years   BMI 30 or less          Modified Frailty Index      Flowsheet Row Most Recent Value   Modified Frailty Index Calculator 0          CHADS2 Stroke Risk  Current as of about an hour ago        N/A 3 to 100%: High Risk   2 to < 3%: Medium Risk   0 to < 2%: Low Risk     Last Change: N/A          This score determines the patient's risk of having a stroke if the patient has atrial fibrillation.        This score is not applicable to this patient. Components are not calculated.          Revised Cardiac Risk Index      Flowsheet Row Most Recent Value   Revised Cardiac Risk Calculator 0          Apfel Simplified Score      Flowsheet Row Most Recent Value    Apfel Simplified Score Calculator 4          Risk Analysis Index Results This Encounter         8/6/2024  1025             ORELLANA Cancer History: Patient indicates history of cancer    Total Risk Analysis Index Score Without Cancer: 10    Total Risk Analysis Index Score: 31          Stop Bang Score      Flowsheet Row Most Recent Value   Do you snore loudly? 1   Do you often feel tired or fatigued after your sleep? 0   Has anyone ever observed you stop breathing in your sleep? 0   Do you have or are you being treated for high blood pressure? 0   Recent BMI (Calculated) 23.6   Is BMI greater than 35 kg/m2? 0=No   Age older than 50 years old? 0=No   Is your neck circumference greater than 17 inches (Male) or 16 inches (Female)? 0   Gender - Male 0=No   STOP-BANG Total Score 1            Assessment and Plan: Plan for OR on 8/14 BILATERAL NIPPLE SPARING MASTECTOMY WITH RIGHT MAG SEED LOCALIZED SENTINEL LYMPH NODE BIOPSY POSSIBLE AXILLARY NODE DISSECTION [38113 (CPT®)]   CBC BMP update     Cardiotoxic drug therapy- follows with Dr Larson most recent ECHO enclosed

## 2024-08-13 ENCOUNTER — ANESTHESIA EVENT (OUTPATIENT)
Dept: OPERATING ROOM | Facility: HOSPITAL | Age: 39
End: 2024-08-13
Payer: COMMERCIAL

## 2024-08-14 ENCOUNTER — HOSPITAL ENCOUNTER (OUTPATIENT)
Dept: RADIOLOGY | Facility: HOSPITAL | Age: 39
Discharge: HOME | End: 2024-08-14
Payer: COMMERCIAL

## 2024-08-14 ENCOUNTER — ANESTHESIA (OUTPATIENT)
Dept: OPERATING ROOM | Facility: HOSPITAL | Age: 39
End: 2024-08-14
Payer: COMMERCIAL

## 2024-08-14 ENCOUNTER — HOSPITAL ENCOUNTER (OUTPATIENT)
Facility: HOSPITAL | Age: 39
Discharge: HOME | End: 2024-08-15
Attending: SURGERY | Admitting: SURGERY
Payer: COMMERCIAL

## 2024-08-14 DIAGNOSIS — C50.811 MALIGNANT NEOPLASM OF OVERLAPPING SITES OF RIGHT BREAST IN FEMALE, ESTROGEN RECEPTOR POSITIVE (MULTI): Primary | ICD-10-CM

## 2024-08-14 DIAGNOSIS — C50.811 MALIGNANT NEOPLASM OF OVERLAPPING SITES OF RIGHT BREAST IN FEMALE, ESTROGEN RECEPTOR POSITIVE (MULTI): ICD-10-CM

## 2024-08-14 DIAGNOSIS — Z17.0 MALIGNANT NEOPLASM OF OVERLAPPING SITES OF RIGHT BREAST IN FEMALE, ESTROGEN RECEPTOR POSITIVE (MULTI): Primary | ICD-10-CM

## 2024-08-14 DIAGNOSIS — G89.18 POST-OP PAIN: ICD-10-CM

## 2024-08-14 DIAGNOSIS — Z17.0 MALIGNANT NEOPLASM OF OVERLAPPING SITES OF RIGHT BREAST IN FEMALE, ESTROGEN RECEPTOR POSITIVE (MULTI): ICD-10-CM

## 2024-08-14 PROBLEM — G47.33 OSA (OBSTRUCTIVE SLEEP APNEA): Status: ACTIVE | Noted: 2024-08-14

## 2024-08-14 PROBLEM — C50.919 BREAST CANCER IN FEMALE (MULTI): Status: ACTIVE | Noted: 2024-08-14

## 2024-08-14 LAB — HCG UR QL IA.RAPID: NEGATIVE

## 2024-08-14 PROCEDURE — 2500000005 HC RX 250 GENERAL PHARMACY W/O HCPCS: Performed by: ANESTHESIOLOGIST ASSISTANT

## 2024-08-14 PROCEDURE — 19340 INSJ BREAST IMPLT SM D MAST: CPT | Performed by: SURGERY

## 2024-08-14 PROCEDURE — 2500000004 HC RX 250 GENERAL PHARMACY W/ HCPCS (ALT 636 FOR OP/ED): Performed by: PHYSICIAN ASSISTANT

## 2024-08-14 PROCEDURE — 2500000004 HC RX 250 GENERAL PHARMACY W/ HCPCS (ALT 636 FOR OP/ED): Performed by: ANESTHESIOLOGY

## 2024-08-14 PROCEDURE — 3600000004 HC OR TIME - INITIAL BASE CHARGE - PROCEDURE LEVEL FOUR: Performed by: SURGERY

## 2024-08-14 PROCEDURE — A9520 TC99 TILMANOCEPT DIAG 0.5MCI: HCPCS | Performed by: SURGERY

## 2024-08-14 PROCEDURE — 2500000001 HC RX 250 WO HCPCS SELF ADMINISTERED DRUGS (ALT 637 FOR MEDICARE OP): Performed by: SURGERY

## 2024-08-14 PROCEDURE — 2500000004 HC RX 250 GENERAL PHARMACY W/ HCPCS (ALT 636 FOR OP/ED): Performed by: SURGERY

## 2024-08-14 PROCEDURE — 38525 BIOPSY/REMOVAL LYMPH NODES: CPT | Performed by: SURGERY

## 2024-08-14 PROCEDURE — 15860 IV NJX TST VASC FLO FLAP/GRF: CPT | Performed by: SURGERY

## 2024-08-14 PROCEDURE — 7100000002 HC RECOVERY ROOM TIME - EACH INCREMENTAL 1 MINUTE: Performed by: SURGERY

## 2024-08-14 PROCEDURE — 88307 TISSUE EXAM BY PATHOLOGIST: CPT | Performed by: PATHOLOGY

## 2024-08-14 PROCEDURE — 81025 URINE PREGNANCY TEST: CPT | Performed by: SURGERY

## 2024-08-14 PROCEDURE — A19340 PR INSERT BREAST PROS IMMED AFTER EXCIS: Performed by: ANESTHESIOLOGY

## 2024-08-14 PROCEDURE — 38792 RA TRACER ID OF SENTINL NODE: CPT | Performed by: SURGERY

## 2024-08-14 PROCEDURE — 3700000001 HC GENERAL ANESTHESIA TIME - INITIAL BASE CHARGE: Performed by: SURGERY

## 2024-08-14 PROCEDURE — 88332 PATH CONSLTJ SURG EA ADD BLK: CPT | Mod: TC,SUR,STJLAB | Performed by: PATHOLOGY

## 2024-08-14 PROCEDURE — 88331 PATH CONSLTJ SURG 1 BLK 1SPC: CPT | Performed by: PATHOLOGY

## 2024-08-14 PROCEDURE — 38900 IO MAP OF SENT LYMPH NODE: CPT | Performed by: SURGERY

## 2024-08-14 PROCEDURE — A19340 PR INSERT BREAST PROS IMMED AFTER EXCIS: Performed by: ANESTHESIOLOGIST ASSISTANT

## 2024-08-14 PROCEDURE — 7100000011 HC EXTENDED STAY RECOVERY HOURLY - NURSING UNIT

## 2024-08-14 PROCEDURE — 15777 ACELLULAR DERM MATRIX IMPLT: CPT | Performed by: SURGERY

## 2024-08-14 PROCEDURE — 3600000009 HC OR TIME - EACH INCREMENTAL 1 MINUTE - PROCEDURE LEVEL FOUR: Performed by: SURGERY

## 2024-08-14 PROCEDURE — 88307 TISSUE EXAM BY PATHOLOGIST: CPT | Mod: TC,STJLAB | Performed by: SURGERY

## 2024-08-14 PROCEDURE — 2500000005 HC RX 250 GENERAL PHARMACY W/O HCPCS: Performed by: SURGERY

## 2024-08-14 PROCEDURE — 38745 REMOVE ARMPIT LYMPH NODES: CPT | Performed by: SURGERY

## 2024-08-14 PROCEDURE — 14001 TIS TRNFR TRUNK 10.1-30SQCM: CPT | Performed by: SURGERY

## 2024-08-14 PROCEDURE — 7100000001 HC RECOVERY ROOM TIME - INITIAL BASE CHARGE: Performed by: SURGERY

## 2024-08-14 PROCEDURE — 2720000007 HC OR 272 NO HCPCS: Performed by: SURGERY

## 2024-08-14 PROCEDURE — 3700000002 HC GENERAL ANESTHESIA TIME - EACH INCREMENTAL 1 MINUTE: Performed by: SURGERY

## 2024-08-14 PROCEDURE — 36590 REMOVAL TUNNELED CV CATH: CPT | Performed by: SURGERY

## 2024-08-14 PROCEDURE — 88332 PATH CONSLTJ SURG EA ADD BLK: CPT | Performed by: PATHOLOGY

## 2024-08-14 PROCEDURE — 2780000003 HC OR 278 NO HCPCS: Performed by: SURGERY

## 2024-08-14 PROCEDURE — 2500000004 HC RX 250 GENERAL PHARMACY W/ HCPCS (ALT 636 FOR OP/ED): Performed by: ANESTHESIOLOGIST ASSISTANT

## 2024-08-14 PROCEDURE — 88300 SURGICAL PATH GROSS: CPT | Performed by: PATHOLOGY

## 2024-08-14 PROCEDURE — 19303 MAST SIMPLE COMPLETE: CPT | Performed by: SURGERY

## 2024-08-14 PROCEDURE — 3430000001 HC RX 343 DIAGNOSTIC RADIOPHARMACEUTICALS: Performed by: SURGERY

## 2024-08-14 PROCEDURE — 38792 RA TRACER ID OF SENTINL NODE: CPT

## 2024-08-14 PROCEDURE — 2500000001 HC RX 250 WO HCPCS SELF ADMINISTERED DRUGS (ALT 637 FOR MEDICARE OP): Performed by: PHYSICIAN ASSISTANT

## 2024-08-14 DEVICE — ROUND, SMOOTH-SURFACE, SALINE-FILLED BREAST IMPLANT.
Type: IMPLANTABLE DEVICE | Site: BREAST | Status: FUNCTIONAL
Brand: IDEAL IMPLANT

## 2024-08-14 DEVICE — FILL KIT, UNIVERSAL, IMPLANT, MAMMARY: Type: IMPLANTABLE DEVICE | Site: BREAST | Status: NON-FUNCTIONAL

## 2024-08-14 DEVICE — SINGLE USE STERILE SALINE BREAST IMPLANT SIZER FOR NATRELLE STYLE 68 HIGH PROFILE, 550CC
Type: IMPLANTABLE DEVICE | Site: BREAST | Status: NON-FUNCTIONAL
Brand: SINGLE USE STERILE SALINE BREAST IMPLANT SIZER FOR NATRELLE STYLE 68 HP

## 2024-08-14 RX ORDER — BUPIVACAINE HYDROCHLORIDE 2.5 MG/ML
INJECTION, SOLUTION INFILTRATION; PERINEURAL AS NEEDED
Status: DISCONTINUED | OUTPATIENT
Start: 2024-08-14 | End: 2024-08-14 | Stop reason: HOSPADM

## 2024-08-14 RX ORDER — LIDOCAINE HYDROCHLORIDE 10 MG/ML
1 INJECTION INFILTRATION; PERINEURAL ONCE
Status: DISCONTINUED | OUTPATIENT
Start: 2024-08-14 | End: 2024-08-14 | Stop reason: HOSPADM

## 2024-08-14 RX ORDER — OXYCODONE HYDROCHLORIDE 5 MG/1
5 TABLET ORAL EVERY 6 HOURS PRN
Qty: 15 TABLET | Refills: 0 | Status: SHIPPED | OUTPATIENT
Start: 2024-08-14

## 2024-08-14 RX ORDER — FENTANYL CITRATE 50 UG/ML
INJECTION, SOLUTION INTRAMUSCULAR; INTRAVENOUS AS NEEDED
Status: DISCONTINUED | OUTPATIENT
Start: 2024-08-14 | End: 2024-08-14

## 2024-08-14 RX ORDER — VANCOMYCIN HYDROCHLORIDE 1 G/200ML
1000 INJECTION, SOLUTION INTRAVENOUS ONCE
Status: COMPLETED | OUTPATIENT
Start: 2024-08-14 | End: 2024-08-14

## 2024-08-14 RX ORDER — CYCLOBENZAPRINE HCL 5 MG
5 TABLET ORAL EVERY 8 HOURS PRN
Qty: 30 TABLET | Refills: 0 | Status: SHIPPED | OUTPATIENT
Start: 2024-08-14

## 2024-08-14 RX ORDER — INDOCYANINE GREEN AND WATER 25 MG
KIT INJECTION AS NEEDED
Status: DISCONTINUED | OUTPATIENT
Start: 2024-08-14 | End: 2024-08-14

## 2024-08-14 RX ORDER — LIDOCAINE HYDROCHLORIDE 10 MG/ML
0.1 INJECTION INFILTRATION; PERINEURAL ONCE
Status: DISCONTINUED | OUTPATIENT
Start: 2024-08-14 | End: 2024-08-14 | Stop reason: HOSPADM

## 2024-08-14 RX ORDER — SODIUM CHLORIDE, SODIUM LACTATE, POTASSIUM CHLORIDE, CALCIUM CHLORIDE 600; 310; 30; 20 MG/100ML; MG/100ML; MG/100ML; MG/100ML
100 INJECTION, SOLUTION INTRAVENOUS CONTINUOUS
Status: DISCONTINUED | OUTPATIENT
Start: 2024-08-14 | End: 2024-08-14 | Stop reason: HOSPADM

## 2024-08-14 RX ORDER — PHENYLEPHRINE HCL IN 0.9% NACL 1 MG/10 ML
SYRINGE (ML) INTRAVENOUS AS NEEDED
Status: DISCONTINUED | OUTPATIENT
Start: 2024-08-14 | End: 2024-08-14

## 2024-08-14 RX ORDER — SODIUM CHLORIDE, SODIUM LACTATE, POTASSIUM CHLORIDE, CALCIUM CHLORIDE 600; 310; 30; 20 MG/100ML; MG/100ML; MG/100ML; MG/100ML
100 INJECTION, SOLUTION INTRAVENOUS CONTINUOUS
Status: DISCONTINUED | OUTPATIENT
Start: 2024-08-14 | End: 2024-08-14 | Stop reason: SDUPTHER

## 2024-08-14 RX ORDER — LIDOCAINE HYDROCHLORIDE 20 MG/ML
INJECTION, SOLUTION INFILTRATION; PERINEURAL AS NEEDED
Status: DISCONTINUED | OUTPATIENT
Start: 2024-08-14 | End: 2024-08-14

## 2024-08-14 RX ORDER — MIDAZOLAM HYDROCHLORIDE 1 MG/ML
INJECTION, SOLUTION INTRAMUSCULAR; INTRAVENOUS AS NEEDED
Status: DISCONTINUED | OUTPATIENT
Start: 2024-08-14 | End: 2024-08-14

## 2024-08-14 RX ORDER — HEPARIN SODIUM 5000 [USP'U]/ML
5000 INJECTION, SOLUTION INTRAVENOUS; SUBCUTANEOUS EVERY 12 HOURS
Status: DISCONTINUED | OUTPATIENT
Start: 2024-08-14 | End: 2024-08-15 | Stop reason: HOSPADM

## 2024-08-14 RX ORDER — SODIUM CHLORIDE 9 MG/ML
100 INJECTION, SOLUTION INTRAVENOUS CONTINUOUS
Status: DISCONTINUED | OUTPATIENT
Start: 2024-08-14 | End: 2024-08-15 | Stop reason: HOSPADM

## 2024-08-14 RX ORDER — ACETAMINOPHEN 325 MG/1
650 TABLET ORAL EVERY 6 HOURS
Status: DISCONTINUED | OUTPATIENT
Start: 2024-08-14 | End: 2024-08-15 | Stop reason: HOSPADM

## 2024-08-14 RX ORDER — LIDOCAINE HYDROCHLORIDE AND EPINEPHRINE 10; 10 MG/ML; UG/ML
INJECTION, SOLUTION INFILTRATION; PERINEURAL AS NEEDED
Status: DISCONTINUED | OUTPATIENT
Start: 2024-08-14 | End: 2024-08-14 | Stop reason: HOSPADM

## 2024-08-14 RX ORDER — DOCUSATE SODIUM 100 MG/1
100 CAPSULE, LIQUID FILLED ORAL 2 TIMES DAILY PRN
Qty: 10 CAPSULE | Refills: 0 | Status: SHIPPED | OUTPATIENT
Start: 2024-08-14

## 2024-08-14 RX ORDER — ACETAMINOPHEN 325 MG/1
975 TABLET ORAL ONCE
Status: DISCONTINUED | OUTPATIENT
Start: 2024-08-14 | End: 2024-08-14 | Stop reason: HOSPADM

## 2024-08-14 RX ORDER — SODIUM CHLORIDE, SODIUM LACTATE, POTASSIUM CHLORIDE, CALCIUM CHLORIDE 600; 310; 30; 20 MG/100ML; MG/100ML; MG/100ML; MG/100ML
75 INJECTION, SOLUTION INTRAVENOUS CONTINUOUS
Status: DISCONTINUED | OUTPATIENT
Start: 2024-08-14 | End: 2024-08-15 | Stop reason: HOSPADM

## 2024-08-14 RX ORDER — ROCURONIUM BROMIDE 10 MG/ML
INJECTION, SOLUTION INTRAVENOUS AS NEEDED
Status: DISCONTINUED | OUTPATIENT
Start: 2024-08-14 | End: 2024-08-14

## 2024-08-14 RX ORDER — PROPOFOL 10 MG/ML
INJECTION, EMULSION INTRAVENOUS AS NEEDED
Status: DISCONTINUED | OUTPATIENT
Start: 2024-08-14 | End: 2024-08-14

## 2024-08-14 RX ORDER — ONDANSETRON HYDROCHLORIDE 2 MG/ML
4 INJECTION, SOLUTION INTRAVENOUS EVERY 6 HOURS PRN
Status: DISCONTINUED | OUTPATIENT
Start: 2024-08-14 | End: 2024-08-15 | Stop reason: HOSPADM

## 2024-08-14 RX ORDER — OXYCODONE HYDROCHLORIDE 5 MG/1
5 TABLET ORAL EVERY 4 HOURS PRN
Status: DISCONTINUED | OUTPATIENT
Start: 2024-08-14 | End: 2024-08-14 | Stop reason: HOSPADM

## 2024-08-14 RX ORDER — HYDROMORPHONE HYDROCHLORIDE 1 MG/ML
INJECTION, SOLUTION INTRAMUSCULAR; INTRAVENOUS; SUBCUTANEOUS AS NEEDED
Status: DISCONTINUED | OUTPATIENT
Start: 2024-08-14 | End: 2024-08-14

## 2024-08-14 RX ORDER — NALOXONE HYDROCHLORIDE 0.4 MG/ML
0.1 INJECTION, SOLUTION INTRAMUSCULAR; INTRAVENOUS; SUBCUTANEOUS EVERY 5 MIN PRN
Status: DISCONTINUED | OUTPATIENT
Start: 2024-08-14 | End: 2024-08-15 | Stop reason: HOSPADM

## 2024-08-14 RX ORDER — OXYCODONE HYDROCHLORIDE 5 MG/1
5 TABLET ORAL EVERY 4 HOURS PRN
Status: DISCONTINUED | OUTPATIENT
Start: 2024-08-14 | End: 2024-08-15 | Stop reason: HOSPADM

## 2024-08-14 RX ORDER — METHOCARBAMOL 100 MG/ML
1000 INJECTION, SOLUTION INTRAMUSCULAR; INTRAVENOUS ONCE AS NEEDED
Status: DISCONTINUED | OUTPATIENT
Start: 2024-08-14 | End: 2024-08-14 | Stop reason: HOSPADM

## 2024-08-14 RX ORDER — CEPHALEXIN 500 MG/1
500 CAPSULE ORAL 3 TIMES DAILY
Qty: 30 CAPSULE | Refills: 0 | Status: SHIPPED | OUTPATIENT
Start: 2024-08-14 | End: 2024-08-24

## 2024-08-14 RX ORDER — ONDANSETRON HYDROCHLORIDE 2 MG/ML
4 INJECTION, SOLUTION INTRAVENOUS ONCE AS NEEDED
Status: COMPLETED | OUTPATIENT
Start: 2024-08-14 | End: 2024-08-14

## 2024-08-14 RX ORDER — HYDRALAZINE HYDROCHLORIDE 20 MG/ML
5 INJECTION INTRAMUSCULAR; INTRAVENOUS EVERY 30 MIN PRN
Status: DISCONTINUED | OUTPATIENT
Start: 2024-08-14 | End: 2024-08-14 | Stop reason: HOSPADM

## 2024-08-14 RX ORDER — ALBUTEROL SULFATE 0.83 MG/ML
2.5 SOLUTION RESPIRATORY (INHALATION) ONCE AS NEEDED
Status: DISCONTINUED | OUTPATIENT
Start: 2024-08-14 | End: 2024-08-14 | Stop reason: HOSPADM

## 2024-08-14 RX ORDER — SULFAMETHOXAZOLE AND TRIMETHOPRIM 800; 160 MG/1; MG/1
1 TABLET ORAL 2 TIMES DAILY
Qty: 28 TABLET | Refills: 0 | Status: SHIPPED | OUTPATIENT
Start: 2024-08-14 | End: 2024-08-28

## 2024-08-14 RX ORDER — LABETALOL HYDROCHLORIDE 5 MG/ML
5 INJECTION, SOLUTION INTRAVENOUS ONCE AS NEEDED
Status: DISCONTINUED | OUTPATIENT
Start: 2024-08-14 | End: 2024-08-14 | Stop reason: HOSPADM

## 2024-08-14 RX ORDER — SCOLOPAMINE TRANSDERMAL SYSTEM 1 MG/1
PATCH, EXTENDED RELEASE TRANSDERMAL AS NEEDED
Status: DISCONTINUED | OUTPATIENT
Start: 2024-08-14 | End: 2024-08-14

## 2024-08-14 RX ORDER — VANCOMYCIN HYDROCHLORIDE 1 G/20ML
INJECTION, POWDER, LYOPHILIZED, FOR SOLUTION INTRAVENOUS DAILY PRN
Status: DISCONTINUED | OUTPATIENT
Start: 2024-08-14 | End: 2024-08-14

## 2024-08-14 RX ORDER — HYDROMORPHONE HYDROCHLORIDE 0.2 MG/ML
0.2 INJECTION INTRAMUSCULAR; INTRAVENOUS; SUBCUTANEOUS EVERY 5 MIN PRN
Status: DISCONTINUED | OUTPATIENT
Start: 2024-08-14 | End: 2024-08-14 | Stop reason: HOSPADM

## 2024-08-14 SDOH — SOCIAL STABILITY: SOCIAL INSECURITY: WERE YOU ABLE TO COMPLETE ALL THE BEHAVIORAL HEALTH SCREENINGS?: YES

## 2024-08-14 SDOH — SOCIAL STABILITY: SOCIAL INSECURITY: ABUSE: ADULT

## 2024-08-14 SDOH — SOCIAL STABILITY: SOCIAL INSECURITY: ARE THERE ANY APPARENT SIGNS OF INJURIES/BEHAVIORS THAT COULD BE RELATED TO ABUSE/NEGLECT?: NO

## 2024-08-14 SDOH — HEALTH STABILITY: MENTAL HEALTH: CURRENT SMOKER: 0

## 2024-08-14 SDOH — SOCIAL STABILITY: SOCIAL INSECURITY: HAVE YOU HAD THOUGHTS OF HARMING ANYONE ELSE?: NO

## 2024-08-14 SDOH — SOCIAL STABILITY: SOCIAL INSECURITY: DOES ANYONE TRY TO KEEP YOU FROM HAVING/CONTACTING OTHER FRIENDS OR DOING THINGS OUTSIDE YOUR HOME?: NO

## 2024-08-14 SDOH — SOCIAL STABILITY: SOCIAL INSECURITY: DO YOU FEEL UNSAFE GOING BACK TO THE PLACE WHERE YOU ARE LIVING?: NO

## 2024-08-14 SDOH — SOCIAL STABILITY: SOCIAL INSECURITY: HAVE YOU HAD ANY THOUGHTS OF HARMING ANYONE ELSE?: NO

## 2024-08-14 SDOH — SOCIAL STABILITY: SOCIAL INSECURITY: HAS ANYONE EVER THREATENED TO HURT YOUR FAMILY OR YOUR PETS?: NO

## 2024-08-14 SDOH — SOCIAL STABILITY: SOCIAL INSECURITY: DO YOU FEEL ANYONE HAS EXPLOITED OR TAKEN ADVANTAGE OF YOU FINANCIALLY OR OF YOUR PERSONAL PROPERTY?: NO

## 2024-08-14 SDOH — SOCIAL STABILITY: SOCIAL INSECURITY: ARE YOU OR HAVE YOU BEEN THREATENED OR ABUSED PHYSICALLY, EMOTIONALLY, OR SEXUALLY BY ANYONE?: NO

## 2024-08-14 ASSESSMENT — LIFESTYLE VARIABLES
HOW MANY STANDARD DRINKS CONTAINING ALCOHOL DO YOU HAVE ON A TYPICAL DAY: PATIENT DOES NOT DRINK
AUDIT-C TOTAL SCORE: 0
SUBSTANCE_ABUSE_PAST_12_MONTHS: NO
HOW OFTEN DO YOU HAVE A DRINK CONTAINING ALCOHOL: NEVER
PRESCIPTION_ABUSE_PAST_12_MONTHS: NO
AUDIT-C TOTAL SCORE: 0
HOW OFTEN DO YOU HAVE 6 OR MORE DRINKS ON ONE OCCASION: NEVER
SKIP TO QUESTIONS 9-10: 1

## 2024-08-14 ASSESSMENT — PAIN - FUNCTIONAL ASSESSMENT
PAIN_FUNCTIONAL_ASSESSMENT: 0-10

## 2024-08-14 ASSESSMENT — ACTIVITIES OF DAILY LIVING (ADL)
WALKS IN HOME: INDEPENDENT
LACK_OF_TRANSPORTATION: NO
BATHING: INDEPENDENT
HEARING - LEFT EAR: FUNCTIONAL
ADEQUATE_TO_COMPLETE_ADL: YES
HEARING - RIGHT EAR: FUNCTIONAL
PATIENT'S MEMORY ADEQUATE TO SAFELY COMPLETE DAILY ACTIVITIES?: YES
GROOMING: INDEPENDENT
FEEDING YOURSELF: INDEPENDENT
DRESSING YOURSELF: INDEPENDENT
JUDGMENT_ADEQUATE_SAFELY_COMPLETE_DAILY_ACTIVITIES: YES
TOILETING: INDEPENDENT

## 2024-08-14 ASSESSMENT — PAIN DESCRIPTION - DESCRIPTORS
DESCRIPTORS: ACHING;SORE

## 2024-08-14 ASSESSMENT — COGNITIVE AND FUNCTIONAL STATUS - GENERAL
DAILY ACTIVITIY SCORE: 24
MOBILITY SCORE: 24
PATIENT BASELINE BEDBOUND: NO

## 2024-08-14 ASSESSMENT — PAIN SCALES - GENERAL
PAINLEVEL_OUTOF10: 3
PAIN_LEVEL: 0
PAINLEVEL_OUTOF10: 0 - NO PAIN
PAINLEVEL_OUTOF10: 3
PAINLEVEL_OUTOF10: 3
PAINLEVEL_OUTOF10: 0 - NO PAIN
PAINLEVEL_OUTOF10: 4
PAINLEVEL_OUTOF10: 3
PAINLEVEL_OUTOF10: 3
PAINLEVEL_OUTOF10: 7

## 2024-08-14 ASSESSMENT — PATIENT HEALTH QUESTIONNAIRE - PHQ9
1. LITTLE INTEREST OR PLEASURE IN DOING THINGS: NOT AT ALL
SUM OF ALL RESPONSES TO PHQ9 QUESTIONS 1 & 2: 0
2. FEELING DOWN, DEPRESSED OR HOPELESS: NOT AT ALL

## 2024-08-14 ASSESSMENT — COLUMBIA-SUICIDE SEVERITY RATING SCALE - C-SSRS
2. HAVE YOU ACTUALLY HAD ANY THOUGHTS OF KILLING YOURSELF?: NO
1. IN THE PAST MONTH, HAVE YOU WISHED YOU WERE DEAD OR WISHED YOU COULD GO TO SLEEP AND NOT WAKE UP?: NO
6. HAVE YOU EVER DONE ANYTHING, STARTED TO DO ANYTHING, OR PREPARED TO DO ANYTHING TO END YOUR LIFE?: NO

## 2024-08-14 ASSESSMENT — PAIN DESCRIPTION - LOCATION: LOCATION: BREAST

## 2024-08-14 ASSESSMENT — PAIN DESCRIPTION - ORIENTATION: ORIENTATION: RIGHT;LEFT

## 2024-08-14 NOTE — ANESTHESIA POSTPROCEDURE EVALUATION
Patient: Macy Chavez    Procedure Summary       Date: 08/14/24 Room / Location: [facility] OR 02 / [facility]    Anesthesia Start: 0729 Anesthesia Stop: 1342    Procedures:       BILATERAL NIPPLE SPARING MASTECTOMY WITH RIGHT MAG SEED LOCALIZED SENTINEL LYMPH NODE BIOPSY POSSIBLE AXILLARY NODE DISSECTION (Bilateral: Breast)      Breast Reconstruction Immediate w/ Implant Bilateral (Bilateral: Breast)      Creation Cutaneous Flap Torso (Bilateral: Breast) Diagnosis:       Malignant neoplasm of overlapping sites of right breast in female, estrogen receptor positive (Multi)      (Malignant neoplasm of overlapping sites of right breast in female, estrogen receptor positive (Multi) [C50.811, Z17.0])    Surgeons: Carmencita Patel DO; Mundo Mendoza MD Responsible Provider: Sarkis Jane MD    Anesthesia Type: general ASA Status: 3            Anesthesia Type: general    Vitals Value Taken Time   BP 99/61 08/14/24 1345   Temp  08/14/24 1359   Pulse 96 08/14/24 1357   Resp 16 08/14/24 1357   SpO2 100 % 08/14/24 1357   Vitals shown include unfiled device data.    Anesthesia Post Evaluation    Patient location during evaluation: PACU  Patient participation: complete - patient participated  Level of consciousness: awake  Pain score: 0  Pain management: adequate  Airway patency: patent  Cardiovascular status: acceptable  Respiratory status: acceptable  Hydration status: acceptable  Postoperative Nausea and Vomiting: none      No notable events documented.

## 2024-08-14 NOTE — OP NOTE
Date: 2024  OR Location: [facility]    Name: Macy Chavez, : 1985, Age: 39 y.o., MRN: [id number], Sex: female    Diagnosis  Pre-op Diagnosis      * Malignant neoplasm of overlapping sites of right breast in female, estrogen receptor positive (Multi) [C50.811, Z17.0] Post-op Diagnosis     * Malignant neoplasm of overlapping sites of right breast in female, estrogen receptor positive (Multi) [C50.811, Z17.0]     Procedures  Right skin sparing mastectomy  Lymphatic mapping with dual tracer   Right axillary magseed localized sentinel lymph node biopsy  Right axillary node dissection  Right chest port  Left skin sparing mastectomy    Surgeons   Panel 1:     * Carmencita Patel - Primary  Panel 2:     * Mundo Mendoza - Primary  Panel 3:     * Mundo Mendoza - Primary    Resident/Fellow/Other Assistant:  Surgeons and Role:  Panel 2:     * Yvette Haney PA-C - VI First Assist    Procedure Summary  Anesthesia: General  ASA: III  Anesthesia Staff: Anesthesiologist: Sarkis Jane MD  C-AA: ROYA Roberts; ROYA Ragland  Frontline Breaker: ROYA Rainey  Estimated Blood Loss: 200mL    Circulator: Catia Mchugh RN; Germaine Liriano RN  Scrub Person: Flavio Wyndmere    Details of Procedure:    The patient was identified by name and birth date in pre-op and the surgical site was marked with the plastic surgeon.  Technetium 99 was injected in the right breast periareolar region in a 4 quadrant fashion. The patient was transferred to the operative suite and placed supine on the OR table.  A sign-in was performed verifying patient identity, procedure and laterality.  One dose of pre-operative antibiotics was given.  After induction of anesthesia, 1cc paramagnetic iron oxide dye (Magtrace) was injected in the subareolar region of the right breast and massaged for several minutes.  Uptake into the corresponding axilla was confirmed using the handheld Neoprobe and sentimag probe. The bilateral breast  and axilla were prepped and draped in the usual sterile fashion.  Just prior to incision, a time-out was performed confirming patient identity, procedure and laterality. Attention was first turned to the right breast.  A 15-blade scalpel was used to make an elliptical incision encompassing the NAC.  Flaps were raised superiorly to the clavicle and laterally toward the axilla and latissimus dorsi muscle, medially to the sternum and inferiorly to the inframammary fold.  the breast parenchyma and pectoralis fascia were dissected off the pectoralis muscle with Bovie electrocautery. The breast was marked for orientation short suture superior, long suture lateral and passed off the field to pathology.  At this point the sentinel lymph node biopsy was performed.  A handheld Neoprobe and sentimag probe were used to confirm the area of greatest uptake in the axilla and the clavipectoral fascia was incised to gain entry into the axilla.  A hot and stained node was identified and excised in its entirety.  This node also contained the magseed.  This node was positive on frozen section and thus an axillary node dissection was performed.  Dissection first progressed by freeing the edge of the pectoralis major and minor muscle from any surrounding fat and simon tissue. The interpectoral nodes were examined and found to be grossly normal.  The pectoralis muscles were then retracted medially. The median pectoral bundle was identified and preserved.  The level II simon tissue deep to the pectoralis minor was included in the dissection.  The axillary vein was identified as the superior aspect of dissection. The long thoracic nerve was identified coursing along the serratus anterior muscle and preserved. The first branch off the axillary vein was clamped, divided, and tied with 2-0 silk ties.  The thoracodorsal bundle was identified deep to this overlying the latissimus dorsi muscle and preserved.   The remaining simon tissue between  these nerves was then carefully removed, taking care to protect the nerves.  The specimen was sent to pathology.  The wound was irrigated, and hemostasis was achieved.  A 6cm x 3cm allograft was then placed over the neurovascular bundle and secured using interrupted 2-0 vicryl sutures.  I then removed the right chest port via the mastectomy incision.  I approached the posterior aspect of the port and scored the capsule surrounding the port.  The port and catheter were then removed and passed off the field to pathology for gross examination.  Next, the left mastectomy was performed.  A 15-blade scalpel was used to make an elliptical incision encompassing the NAC.  Flaps were raised superiorly to the clavicle and laterally toward the axilla and latissimus dorsi muscle, medially to the sternum and inferiorly to the inframammary fold.  the breast parenchyma and pectoralis fascia were dissected off the pectoralis muscle with Bovie electrocautery. The breast was marked for orientation short suture superior, long suture lateral and passed off the field to pathology.   Meticulous hemostasis was achieved using electrocautery and suture ligatures.  At this point, the case was turned over to the plastic surgeon for reconstruction which will be dictated as a separate report.    Axillary Lymph Node Dissection for Breast Cancer  Operation performed with curative intent Yes   Resection was performed within the boundaries of the axillary vein, chest wall (serratus anterior), and latissimus dorsi Yes   Nerves identified and preserved during dissection Long thoracic nerve, Thoracodorsal nerve, and Branches of the intercostobrachial nerves   Level III nodes were removed No       SPECIMENS:  Right skin sparing mastectomy- short suture superior, long lateral  Right axillary sentinel nodes with magseed  Right axillary contents  Right chest port  Left skin sparing mastectomy- short suture superior, long lateral    Carmencita Patel,  DO

## 2024-08-14 NOTE — NURSING NOTE
Alert and oriented. Vitals stable. Patient oriented to room with call light within reach. Patient and  educated on emptied drains, measuring drains, and setting drains after drained. Nitrobid applied to both breasts with education given to  on importance of application and how to apply. Nitrobid is at bedside with patient and  for next application. No other concerns at this time. Call light within reach.

## 2024-08-14 NOTE — OP NOTE
Plastic Surgery Operative Note       Date: 2024  OR Location: [facility]    Name: Macy Chavez : 1985, Age: 39 y.o., MRN: [id number], Sex: female    Diagnosis  Pre-op Diagnosis      * Malignant neoplasm of overlapping sites of right breast in female, estrogen receptor positive (Multi) [C50.811, Z17.0] Post-op Diagnosis     * Malignant neoplasm of overlapping sites of right breast in female, estrogen receptor positive (Multi) [C50.811, Z17.0]     Procedures  1.  Bilateral skin sparing mastectomy, right axillary lymph node dissection-Dr. Patel  2.  Bilateral direct-to-implant breast reconstruction with structured saline implant (right = 410 cc; left = 390 cc) with mesh.  3.  Bilateral 14 x 2 cm adjacent tissue transfer of abdominal tissue to reconstruct obliterated inframammary folds  4.  Bilateral injection of multiple intercostal nerves for postoperative analgesia not intraoperative pain control  5.  Injection of ICG for intraoperative flap perfusion assessment      Surgeons   Panel 1:     * Carmencita Patel - Primary  Panel 2:     * Mundo Mendoza - Primary  Panel 3:     * Mundo Mendoza - Primary    Resident/Fellow/Other Assistant:  Surgeons and Role:  Panel 2:     * Yvette Gan PA-C - VI First Assist  Given the inherent complexity of this surgery, a second surgeon or a surgically skilled physician assistant was necessary for the successful completion of this entire operative procedure. Yvette Gan served as the surgical assistant and was present for the entire operative procedure and participated in all aspects of patient care. This included transporting the patient to the operating room and entering room with the patient, assisting with preoperative positioning, first assisting throughout the entire surgical procedure by functioning as a second assistant surgeon, assisting with surgical closure, and finally accompanying the patient to recovery.      Procedure Summary  Anesthesia:  General  ASA: III  Anesthesia Staff: Anesthesiologist: Sarkis Jane MD  C-AA: ROYA Roberts; ROYA Ragland  Frontline Breaker: ROYA Rainey  Estimated Blood Loss: 100 mL  Intra-op Medications:   Administrations occurring from 0730 to 1400 on 08/14/24:   Medication Name Total Dose   lactated Ringer's infusion Cannot be calculated   vancomycin (Vancocin) 1,000 mg in dextrose 5%  mL 1 g              Anesthesia Record               Intraprocedure I/O Totals          Intake    Remifentanil Drip 0.00 mL    The total shown is the total volume documented since Anesthesia Start was filed.    lactated Ringer's infusion 1000.00 mL    Total Intake 1000 mL          Specimen:   ID Type Source Tests Collected by Time   1 : RIGHT SKIN SPARING MASTECTOMY; SHORT STITCH SUPERIOR; LONG STITCH LATERAL; Tissue BREAST MASTECTOMY RIGHT SURGICAL PATHOLOGY EXAM Carmencita Patel, DO 8/14/2024 0936   2 : RIGHT AXILLARY SENTINEL LYMPH NODE(S) WITH MAG SEED FROZEN Tissue SENTINEL LYMPH NODE BREAST RIGHT SURGICAL PATHOLOGY EXAM Carmencita Patel, DO 8/14/2024 0952   3 : RIGHT CHEST PORT GROSS ONLY Tissue HARDWARE SURGICAL PATHOLOGY EXAM Carmencita Patel, DO 8/14/2024 1012   4 : RIGHT AXILLARY CONTENTS Tissue AXILLARY LYMPH NODE DISSECTION RIGHT - BREAST SURGICAL PATHOLOGY EXAM Carmencita Patel, DO 8/14/2024 1055   5 : LEFT SKIN SPARING MASTECTOMY; SHORT STITCH SUPERIOR; LONG STITCH LATERAL; Tissue BREAST MASTECTOMY LEFT SURGICAL PATHOLOGY EXAM Carmencita Patel, DO 8/14/2024 1144        Staff:   Circulator: Catia Prajapati Person: Flavio  Circulator: Germaine Mcguire Circulator: Vincent         Drains and/or Catheters: * None in log *    Tourniquet Times:         Implants:  Implants       Type Name Action Serial No.      Breast Implant FILL KIT, UNIVERSAL, IMPLANT, MAMMARY - SN/A - [id number] Used, Not Implanted N/A      IDEAL IMPLANT Implanted [id number]      TIGR MATRIX SURGICAL MESH Implanted      Graft GRAFT, CLARIX  CORD 1K, 6.0 X 3.0CM - [id number] Implanted      Mesh SURGICAL MESH Implanted      Breast Implant [employer] Implanted [id number]      STRUCTURED BREAST IMPLANTS Implanted [id number]     Breast Implant SIZER, 68HP, 550- 590CC - [id number] Used, Not Implanted               Findings: Excellent flap perfusion bilaterally using ICG with sizer in place.    Indications: Macy Chavez is an 39 y.o. female who is having surgery for Malignant neoplasm of overlapping sites of right breast in female, estrogen receptor positive (Multi) [C50.811, Z17.0]. Patient had a baseline mammogram on 11/3/2023 which was negative.  She did self palpated a mass shortly thereafter, she had imaging repeated on 12/20/2023 shows benign, patient subjectively noted interval growth and repeat imaging was performed on 2/6/2024 revealing a mass 7:00, 5 cm from nipple measuring 5 cm.     There was a second area palpated at 10:00, 8 cm from nipple concerning for satellite lesion measuring 1.4 cm.  There were 3 markedly noted lymph nodes on examination.  Biopsies were performed on that same day on 2/6/2024: All positive for invasive ductal carcinoma, including the right axillary lymph node all positive for invasive ductal carcinoma      Macy is a 39 y.o. female who presents for  right breast multicentric infiltrating ductal carcinoma, spanning approximately 13 cm with a conglomerate at least 3 markedly enlarged positive lymph nodes     Patient would like to be a smaller size, ideally a C cup     Her final chemotherapy treatment was July 16th     Plan:      Bilateral nipple sparing mastectomy  Immediate Direct to Implant with structured saline implants            INFORMED CONSENT  Patient was told the risks, benefits, INDICATIONS, contraindications, and alternatives to the procedure. Risks include but not limited to pain, infection, bleeding, hematoma, seroma, injury to neurovascular and tendinous structures, imperfect cosmesis, mastectomy flap  necrosis, delayed wound healing, imperfect cosmesis, asymmetry, cosmetic dissatisfaction, implant failure, infection risk, we discussed that mastectomy flap necrosis risk, status and need for subsequent surgeries.     The patient demonstrated understanding of these risks and agreed to proceed with surgery.  Advance directives discussed.  Team approach explained.      The patient consented and wished to proceed with the procedure and for medical photography if needed.     PROCEDURAL PAUSE  Prior to the beginning of the procedure, the patient's correct identity, side, site, and procedure to be performed were verified.  The patient was given intravenous antibiotics prior to skin incision.     PROCEDURAL NOTE    Plastic surgery entered the operative theater after our breast colleagues had completed their portion of the mastectomy.  Please see notes for full operative details.    We inspected the mastectomy flaps and concluded that the flaps are well vascularized, and we would be able to proceed with immediate reconstruction.    We began by performing meticulous hemostasis bilaterally using unipolar bipolar cautery.  We then irrigated thoroughly with irrisept solution and normal saline, total of 3 L.    We began on the right side.  We noted that the IMF had been obliterated during mastectomy and so we began surgical reconstruction by performing advancement of the abdominal tissue, Ray flap, for reconstruction of the inframammary fold, total of 14x2 cm.  We advanced this and used 0 PDS suture to secure the Ray flap and new IMF to the periosteum of the ribs at the IMF which was marked preoperatively.        Right side mastectomy specimen weight = 505 g  We then measured the breast with and noted to be 12 cm, we obtained a saline sizer and placed this into the prepectoral space, we then performed provisional closure of the skin, and began expansion on table.  At the same time we injected ICG dye and used the spy  fluoroscopy imaging machine in order to evaluate perfusion of the mastectomy flap while performing expansion of the sizer.  We reached a size of 410 cc, and noted this to provide adequate reconstruction well maintaining excellent perfusion to the mastectomy flaps.  We thus chose this size for reconstruction.    We then obtained a 20x30 centimeter piece of TIGR mesh and performed anterior and posterior wrapping of the IDEAL implant which was filled to 410 cc, while being bathed in a Irrisept solution, alternating with Betadine. The TIGR mesh pocket was sewn together with 2-0 Vicryl suture.  We then brought the mesh/implant construct onto the prepectoral space, and tacked the mesh/implant to the pectoralis major muscle, taking care to focus the positioning medially and inferiorly.  We tacked the mesh to the pectoralis major muscle fascia with 2-0 PDS suture circumferentially in an interrupted fashion.    We then placed two 15 Telugu drains exiting posterior to the anterior axillary line, the first drain drained the IMF, the second drain drained the lateral axilla and the superior pole, these were secured with 0 silk suture.    20 cc of 0.25% marcaine mixed 1:1 with lidocaine with epinephrine was injected into multiple intercostal nerves through rib periosteum for post operative pain control and not intraoperative analgesia.    After this was completed we irrigated with 1 L of irrisept solution, draped the mastectomy flaps over the reconstruction and performed closure with 2-0 PDS in the fascia, where available, and 3-0 strata fix suture in the deep dermis, which returned back and ran in the subcuticular layer.    We then turned our attention to the contralateral side, left mastectomy specimen weight = 505 g. We noted that the IMF had been obliterated during mastectomy and so we began surgical reconstruction by performing advancement of the abdominal tissue, Ray flap, for reconstruction of the inframammary fold, total  of 14x2 cm.  We advanced this and used 0 PDS suture to secure the Ray flap and new IMF to the periosteum of the ribs at the IMF which was marked preoperatively.    We then measured the breast with and noted to be 12 cm.  We placed the sizer from the contralateral side with a size of 400 cc, and noted this to provide adequate reconstruction well maintaining excellent perfusion to the mastectomy flaps.  We thus chose this size for reconstruction.    We then obtained a 20x30 cm piece of TIGR mesh and performed anterior and posterior wrapping of the IDEAL implant which was filled to 390 cc, while being bathed in a Irrisept solution, alternating with Betadine. The TIGR mesh pocket was sewn together with 2-0 Vicryl suture.  We then brought the mesh/implant construct onto the prepectoral space, and tacked the mesh/implant to the pectoralis major muscle, taking care to focus the positioning medially and inferiorly.  We tacked the mesh to the pectoralis major muscle fascia with 2-0 PDS suture circumferentially in an interrupted fashion.    We then placed two 15 Guyanese drains exiting posterior to the anterior axillary line, the first drain drained the IMF, the second drain drained the lateral axilla and the superior pole, these were secured with 0 silk suture.    20 cc of 0.25% marcaine mixed 1:1 with epinephrine was injected into multiple intercostal nerves through rib periosteum for post operative pain control and not intraoperative analgesia.    After this was completed we irrigated with 1 L of irrisept solution, draped the mastectomy flaps over the reconstruction and performed closure with 2-0 PDS in the fascia, where available, and 3-0 strata fix suture in the deep dermis, which returned back and ran in the subcuticular layer.    At the conclusion of each side, we injected 120cc of Irriscept solution through the drains and allowed this to dwell while we continued working.  The drains were then set to bulb suction before  leaving the operating room.    Final dressings consisted of Exofin glue and mesh over incisions,  generous Nitro-Bid paste was placed over the mastectomy flaps and covered with Tegaderms, ABDs over the breast, fluffs in the axilla, and surgical bra.    The patient tolerated the procedure well and was awakened from anesthesia without any difficulties, was transferred to the patient in stable condition, all needle counts were correct.    POST OP PLAN:  Macy Chavez   will  be discharged when stable.  Discharge medications will include: narcotic pain control, antibiotics, and flexeril.  She is encouraged to shower daily.  She is instructed to place Nitro-Bid paste generously over the mastectomy flaps daily    We will plan to have postop visit in 1 week to remove the IMF drain, which will be followed by a postop visit and week 2 to remove the superior pole drain.    Mundo Mendoza MD

## 2024-08-14 NOTE — ANESTHESIA PREPROCEDURE EVALUATION
Patient: Macy Chavez    Procedure Information       Date/Time: 08/14/24 0730    Procedures:       BILATERAL NIPPLE SPARING MASTECTOMY WITH RIGHT MAG SEED LOCALIZED SENTINEL LYMPH NODE BIOPSY POSSIBLE AXILLARY NODE DISSECTION (Bilateral)      Breast Reconstruction Immediate w/ Implant Bilateral (Bilateral)      Creation Cutaneous Flap Torso (Bilateral)    Location: [facility] OR 02 / [facility]    Surgeons: Carmencita Patel DO; Mundo Mendoza MD            Relevant Problems   Anesthesia   (-) Difficult intubation   (-) Malignant hyperthermia      Cardiac  Cardiotoxicity from taxol      ID   (+) Recurrent herpes labialis      GYN   (+) Malignant neoplasm of overlapping sites of right breast in female, estrogen receptor positive (Multi)       Clinical information reviewed:   Tobacco  Allergies  Meds   Med Hx  Surg Hx   Fam Hx  Soc Hx        NPO Detail:  NPO/Void Status  Date of Last Liquid: 08/13/24  Time of Last Liquid: 2342  Date of Last Solid: 08/13/24  Time of Last Solid: 2000  Time of Last Void: 0637         Physical Exam    Airway  Mallampati: II  TM distance: >3 FB     Cardiovascular   Rhythm: regular  Rate: normal     Dental - normal exam     Pulmonary - normal exam     Abdominal - normal exam  Abdomen: soft           Anesthesia Plan    History of general anesthesia?: yes  History of complications of general anesthesia?: no    ASA 3     general     The patient is not a current smoker.    intravenous induction   Anesthetic plan and risks discussed with patient.    Plan discussed with CRNA.

## 2024-08-14 NOTE — DISCHARGE SUMMARY
Plastic Surgery  Discharge Summary  Discharge Diagnosis  Malignant neoplasm of overlapping sites of right breast in female, estrogen receptor positive (Multi)    Issues Requiring Follow-Up  1 week follow up with Dr phelps office for drain removal   2-3 week follow up with Dr Patel office     Test Results Pending At Discharge  Pending Labs       Order Current Status    Surgical Pathology Exam In process    Surgical Pathology Exam In process            Hospital Course   You presented to the hospital for surgery. You underwent bilateral mastectomy with immediate direct to implant breast reconstruction. You tolerated procedure well. You were observed overnight for post op pain control and post operative monitoring. You were discharged home in stable condition.     Pertinent Physical Exam At Time of Discharge  Physical Exam    Gen: interactive and pleasant  Head: NCAT  Eyes: EOMI, PERRLA  Mouth: MMM  Throat: trachea midline  Cor: RRR  Pulm: nonlabored breathing  Abd: s/nt/nd  Neuro: AAOx3  Ext: extremities perfused    Focused exam of the: ***      Home Medications     Medication List      START taking these medications     cephalexin 500 mg capsule; Commonly known as: Keflex; Take 1 capsule   (500 mg) by mouth 3 times a day for 10 days.   cyclobenzaprine 5 mg tablet; Commonly known as: Flexeril; Take 1 tablet   (5 mg) by mouth every 8 hours if needed for muscle spasms.   docusate sodium 100 mg capsule; Commonly known as: Colace; Take 1   capsule (100 mg) by mouth 2 times a day as needed for constipation.   oxyCODONE 5 mg immediate release tablet; Commonly known as: Roxicodone;   Take 1 tablet (5 mg) by mouth every 6 hours if needed for severe pain (7 -   10).   sulfamethoxazole-trimethoprim 800-160 mg tablet; Commonly known as:   Bactrim DS; Take 1 tablet by mouth 2 times a day for 14 days.     CONTINUE taking these medications     atorvastatin 20 mg tablet; Commonly known as: Lipitor; Take 1 tablet (20   mg) by mouth  once daily.   b complex 0.4 mg tablet   loratadine 10 mg tablet; Commonly known as: Claritin   metoprolol succinate XL 25 mg 24 hr tablet; Commonly known as:   Toprol-XL; Take 1 tablet (25 mg) by mouth once daily. Do not crush or   chew.   Nitro-Bid 2 % ointment; Generic drug: nitroglycerin; Apply layer to skin   of breast and/or nipples daily after surgery. Cover with gauze. Change   dressings daily.   prochlorperazine 10 mg tablet; Commonly known as: Compazine; Take 1   tablet (10 mg) by mouth every 6 hours if needed for nausea or vomiting.   valACYclovir 500 mg tablet; Commonly known as: Valtrex; Take 1 tablet   (500 mg) by mouth once daily.       Outpatient Follow-Up  Future Appointments   Date Time Provider Department [place]   8/22/2024 10:20 AM Mundo Mendoza MD [id number] [place]   8/27/2024 10:00 AM Carmencita Patel DO [id number] [place]   8/30/2024  9:30 AM Zainab Larson MD [id number] [place]   9/6/2024  9:20 AM Denise Golden MD [id number] [facility]       Yvette Gan PA-C

## 2024-08-14 NOTE — DISCHARGE INSTRUCTIONS
Plastic Surgery Breast Reconstruction                Post Operative Instructions    Office Phone: [phone] or contact central scheduling  After-Hours Patient line: [phone]  (Use this number for medical questions/concerns only after 4pm or on the weekends)      Activity:   -avoid activities that use your chest muscles such as pushing and pulling for 3 weeks after surgery.   -no lifting greater than 10lbs for 2 weeks after surgery  -ok for walking and ambulating    Wound/Dressing Care:   -You will have a surgical bra, wear this daily. Ok to remove to shower and then replace.   -You will have surgical drains. Strip, empty, and record output 2-3 times daily. Bring record log of drain output with you to your follow up appointment in 1 week   -your incisions will have purple surgical glue, this is ok to get wet. You may shower beginning the day after surgery. We recommend warm/hot showers 1-2 times daily. Ok for soap and water.     Pain:      -For pain control we recommend alternating between tylenol and Ibuprofen every 3 hours for the first 3-5 days after surgery. Please monitor your Tylenol (acetaminophen) intake as your prescription pain medication has 325mg of Tylenol (acetaminophen) in it, do no exceed the max daily dose of 4,000mg in 24 hours. Use prescription pain medication for severe 7/10 pain or for break through pain.      Nitrobid ointment:      -Upon showering, remove the clear dressing that is covering your breast and nipples. Shower with warm/hot water and pat dry. Apply a layer of the nitrobid ointment to the nipples and surrounding breast skin and cover with clear tegaderm or gauze. Use gloves or Qtip when applying nitrobid ointment. Lightheadedness and headache may occur with use. If symptoms are not tolerable please contact Dr. Mendoza office at [phone]. If you require medical assistance after 4pm or on the weekends please call the after hour patient line at [phone]

## 2024-08-14 NOTE — ANESTHESIA PROCEDURE NOTES
Airway  Date/Time: 8/14/2024 7:46 AM  Urgency: elective    Airway not difficult    Staffing  Performed: ROYA   Authorized by: Sarkis Jane MD    Performed by: ROYA Roberts  Patient location during procedure: OR    Indications and Patient Condition  Indications for airway management: anesthesia  Spontaneous Ventilation: absent  Sedation level: deep  Preoxygenated: yes  Patient position: sniffing  Mask difficulty assessment: 1 - vent by mask    Final Airway Details  Final airway type: endotracheal airway      Successful airway: ETT  Cuffed: yes   Successful intubation technique: direct laryngoscopy  Facilitating devices/methods: intubating stylet  Endotracheal tube insertion site: oral  Blade: Jordan  Blade size: #3  ETT size (mm): 7.0  Cormack-Lehane Classification: grade I - full view of glottis  Placement verified by: chest auscultation and capnometry   Measured from: lips  ETT to lips (cm): 21  Number of attempts at approach: 1

## 2024-08-15 VITALS
WEIGHT: 162 LBS | TEMPERATURE: 97.5 F | BODY MASS INDEX: 24.55 KG/M2 | OXYGEN SATURATION: 96 % | HEART RATE: 65 BPM | SYSTOLIC BLOOD PRESSURE: 100 MMHG | DIASTOLIC BLOOD PRESSURE: 56 MMHG | RESPIRATION RATE: 16 BRPM | HEIGHT: 68 IN

## 2024-08-15 PROCEDURE — 7100000011 HC EXTENDED STAY RECOVERY HOURLY - NURSING UNIT

## 2024-08-15 PROCEDURE — 96372 THER/PROPH/DIAG INJ SC/IM: CPT | Performed by: PHYSICIAN ASSISTANT

## 2024-08-15 PROCEDURE — 2500000004 HC RX 250 GENERAL PHARMACY W/ HCPCS (ALT 636 FOR OP/ED): Performed by: PHYSICIAN ASSISTANT

## 2024-08-15 ASSESSMENT — PAIN - FUNCTIONAL ASSESSMENT: PAIN_FUNCTIONAL_ASSESSMENT: 0-10

## 2024-08-15 ASSESSMENT — PAIN SCALES - GENERAL: PAINLEVEL_OUTOF10: 0 - NO PAIN

## 2024-08-15 NOTE — CARE PLAN
The clinical goals for the shift include Pt will remain HDS during this shift.      Problem: Pain - Adult  Goal: Verbalizes/displays adequate comfort level or baseline comfort level  Outcome: Progressing     Problem: Safety - Adult  Goal: Free from fall injury  Outcome: Progressing     Problem: Discharge Planning  Goal: Discharge to home or other facility with appropriate resources  Outcome: Progressing     Problem: Chronic Conditions and Co-morbidities  Goal: Patient's chronic conditions and co-morbidity symptoms are monitored and maintained or improved  Outcome: Progressing

## 2024-08-15 NOTE — PROGRESS NOTES
"Macy Chavez is a 39 y.o. female on day 0 of admission presenting with Malignant neoplasm of overlapping sites of right breast in female, estrogen receptor positive (Multi).    Subjective   Doing well, no acute issues.  Endorsed nausea overnight.  Increased soreness today as expected.       Objective     Physical Exam    GEN: interactive and pleasant  HEAD: NCAT  Eyes: EOMI, PERRLA  Mouth: MMM  Throat: trachea midline  Cor: RRR  Pulm: nonlabored breathing  Neuro: AAOx3  Lymph: non-edematous, no LN-opathy  EXT: extremities perfused  Pscyh: affect, mood appropriate    Focused exam of   Right breast: Soft, warm well-perfused, no sign of ballotable fluid collection.  Drains appropriate, serosanguineous higher output from the right drains over the axillary lymph node dissection performed  Left breast: Soft warm well-perfused flaps appear to be perfusing excellently.  No sign of ballotable fluid collection.  Drains serosanguineous    Last Recorded Vitals  Blood pressure 105/55, pulse 86, temperature 36.6 °C (97.9 °F), temperature source Temporal, resp. rate 16, height 1.727 m (5' 8\"), weight 73.5 kg (162 lb), SpO2 95%.  Intake/Output last 3 Shifts:  I/O last 3 completed shifts:  In: 2494.2 (33.9 mL/kg) [P.O.:480; I.V.:2014.2 (27.4 mL/kg)]  Out: 841 (11.4 mL/kg) [Urine:600 (0.2 mL/kg/hr); Drains:241]  Weight: 73.5 kg     Relevant Results                              Assessment/Plan   Assessment & Plan  Malignant neoplasm of overlapping sites of right breast in female, estrogen receptor positive (Multi)    Breast cancer in female (Multi)    Postop day 1 status post bilateral skin sparing mastectomy with immediate direct to implant breast reconstruction.  Doing well.  Will plan for discharge home today.       I spent 20 minutes in the professional and overall care of this patient.      Mundo Mendoza MD      "

## 2024-08-15 NOTE — DISCHARGE SUMMARY
Discharge Diagnosis  Malignant neoplasm of overlapping sites of right breast in female, estrogen receptor positive (Multi)    Issues Requiring Follow-Up  Pain control, drain removal    Test Results Pending At Discharge  Pending Labs       Order Current Status    Surgical Pathology Exam In process    Surgical Pathology Exam In process            Hospital Course  Admitted postop status post bilateral skin sparing mastectomy, right axilla with no dissection, bilateral immediate direct to implant breast reconstruction.    Postop day 1 was ambulating, taking p.o.  Drain output appropriate.  Examined and deemed appropriate for discharge.     Pertinent Physical Exam At Time of Discharge  Physical Exam    Home Medications     Medication List      START taking these medications     cephalexin 500 mg capsule; Commonly known as: Keflex; Take 1 capsule   (500 mg) by mouth 3 times a day for 10 days.   cyclobenzaprine 5 mg tablet; Commonly known as: Flexeril; Take 1 tablet   (5 mg) by mouth every 8 hours if needed for muscle spasms.   docusate sodium 100 mg capsule; Commonly known as: Colace; Take 1   capsule (100 mg) by mouth 2 times a day as needed for constipation.   oxyCODONE 5 mg immediate release tablet; Commonly known as: Roxicodone;   Take 1 tablet (5 mg) by mouth every 6 hours if needed for severe pain (7 -   10).   sulfamethoxazole-trimethoprim 800-160 mg tablet; Commonly known as:   Bactrim DS; Take 1 tablet by mouth 2 times a day for 14 days.     CONTINUE taking these medications     atorvastatin 20 mg tablet; Commonly known as: Lipitor; Take 1 tablet (20   mg) by mouth once daily.   b complex 0.4 mg tablet   loratadine 10 mg tablet; Commonly known as: Claritin   metoprolol succinate XL 25 mg 24 hr tablet; Commonly known as:   Toprol-XL; Take 1 tablet (25 mg) by mouth once daily. Do not crush or   chew.   Nitro-Bid 2 % ointment; Generic drug: nitroglycerin; Apply layer to skin   of breast and/or nipples daily after  surgery. Cover with gauze. Change   dressings daily.   prochlorperazine 10 mg tablet; Commonly known as: Compazine; Take 1   tablet (10 mg) by mouth every 6 hours if needed for nausea or vomiting.   valACYclovir 500 mg tablet; Commonly known as: Valtrex; Take 1 tablet   (500 mg) by mouth once daily.       Outpatient Follow-Up  Future Appointments   Date Time Provider Department [place]   8/22/2024 10:20 AM Mundo Mendoza MD [id number] [place]   8/27/2024 10:00 AM Carmencita Patel DO [id number] [place]   8/30/2024  9:30 AM Zainab Larson MD [id number] [place]   9/6/2024  9:20 AM Denise Golden MD [id number] [facility]       Mundo Mendoza MD

## 2024-08-15 NOTE — HOSPITAL COURSE
Admitted postop status post bilateral skin sparing mastectomy, right axilla with no dissection, bilateral immediate direct to implant breast reconstruction.    Postop day 1 was ambulating, taking p.o.  Drain output appropriate.  Examined and deemed appropriate for discharge.

## 2024-08-21 ENCOUNTER — TELEPHONE (OUTPATIENT)
Dept: SURGICAL ONCOLOGY | Facility: HOSPITAL | Age: 39
End: 2024-08-21
Payer: COMMERCIAL

## 2024-08-21 NOTE — TELEPHONE ENCOUNTER
Spoke with Ms. Chavez in regards to her post-op recovery.  Endorses discomfort in her right underarm, consistent with her axillary surgery.  No concerns with skin.  Pain controlled with over-the-counter Tylenol and Motrin.  Sees plastics tomorrow for first follow-up.  Confirmed she will be at her post-op appointment with Dr. Patel on 8/27/24.

## 2024-08-22 ENCOUNTER — APPOINTMENT (OUTPATIENT)
Dept: PLASTIC SURGERY | Facility: CLINIC | Age: 39
End: 2024-08-22
Payer: COMMERCIAL

## 2024-08-22 VITALS
HEIGHT: 68 IN | HEART RATE: 94 BPM | SYSTOLIC BLOOD PRESSURE: 138 MMHG | WEIGHT: 162 LBS | BODY MASS INDEX: 24.55 KG/M2 | DIASTOLIC BLOOD PRESSURE: 90 MMHG

## 2024-08-22 DIAGNOSIS — Z98.890 STATUS POST BILATERAL BREAST RECONSTRUCTION: Primary | ICD-10-CM

## 2024-08-22 PROCEDURE — 3008F BODY MASS INDEX DOCD: CPT | Performed by: SURGERY

## 2024-08-22 NOTE — PROGRESS NOTES
Division of Plastic & Reconstructive Surgery            Post OP Visit    Date: 8/22/2024         Subjective   Macy Chavez is a 39 y.o. female presents for postoperative visit status post bilateral nipple sparing mastectomy with right axillary node dissection, bilateral direct-to-implant breast reconstruction with structured saline implant (right = 410 cc; left = 390 cc) with mesh, performed on 8/14/2024      Objective    Vitals:    08/22/24 1020   BP: 138/90   Pulse: 94       Gen: interactive and pleasant  Head: NCAT  Eyes: EOMI, PERRLA  Mouth: MMM  Throat: trachea midline  Cor: RRR  Pulm: nonlabored breathing  Abd: s/nt/nd  Neuro: AAOx3  Ext: extremities perfused    Body mass index is 24.63 kg/m².      Focused exam of the: Bilateral breast  Right breast vertical incision is clean dry and intact no sign of ballotable fluid collection.  1 drain is removed  Left breast vertical incision clean dry intact, there is ecchymosis medially, all flaps are warm and well-perfused.  No sign of blottable fluid collection.  1 drain is removed today.    Assessment/Plan       Macy is a 39 y.o. female  presents for postoperative visit status post bilateral nipple sparing mastectomy with right axillary node dissection, bilateral direct-to-implant breast reconstruction with structured saline implant (right = 410 cc; left = 390 cc) with mesh, performed on 8/14/2024    Plan:   We removed 1 drain today.  She has an appointment with Dr. Patel next Tuesday, and hopefully will have the remaining drains removed at that time.    Will see her 1 month after surgery.  We discussed today that she will move onto radiation, and we will follow her throughout that.      I spent 20 minutes with this patient. Greater than 50% of this time was spent in the counselling and/or coordination of care of this patient.  This note was created using voice recognition software and was not corrected for typographical or grammatical  errors.    Signature: Mundo Mendoza MD   Date: 8/22/2024

## 2024-08-26 NOTE — PROGRESS NOTES
BREAST SURGERY POST OPERATIVE VISIT    Assessment/Plan     Right breast multicentric IDC g2-3 ER 95% MO low 5-10% HER2- spanning 13cm on MRI with a conglomerate of at least 3 markedly enlarged axillary lymph node with biopsy proven metastasis and an abnormal IM node;  -s/p neoadjuvant ddAC q2 weeks x 4 followed by weekly taxol with near complete treatment response in breast with resolution of previously enlarged axillary nodes   -pathology pending    Will call with pathology results.    I will refer to rad/onc for recommendations for adjuvant treatment.    Follow up with medical oncology.    Follow up with plastics.     Referred to OT for lymphedema prevention.    Follow up with me in 6 months for new baseline clinical breast exam.       Surya Chavez is a 39 y.o. female here for post op visit s/p bilateral skin sparing mastectomy, right axillary sentinel lymph node biopsy, completion axillary dissection, left skin sparing mastectomy.    Date of surgery: 8/14/2024  Pending    Objective   Physical Exam  General: Alert and oriented x 3.  Mood and affect are appropriate.  HEENT: EOMI, PERRLA.   Neck: supple, no masses, no cervical adenopathy.  Cardiovascular: no lower extremity edema.  Pulmonary: breathing non labored on room air.  GI: Abdomen soft, no masses. No hepatomegaly or splenomegaly.  Lymph nodes: No supraclavicular or axillary adenopathy bilaterally.  Musculoskeletal: Full range of motion in the upper extremities bilaterally.  Neuro: denies dizziness, tremors.    Breasts: The breasts were examined in both the seated and supine positions.    RIGHT: surgically absent with implant reconstruction  There are no skin changes, skin thickening, or dimpling. There are no masses palpated in the RIGHT breast.   LEFT: surgically absent with implant reconstruction. There are no skin changes, skin thickening, or dimpling. There are no masses palpated in the LEFT breast.     Radiology review: All images and  reports were personally reviewed.         Carmencita Patel, DO

## 2024-08-27 ENCOUNTER — OFFICE VISIT (OUTPATIENT)
Dept: SURGICAL ONCOLOGY | Facility: CLINIC | Age: 39
End: 2024-08-27
Payer: COMMERCIAL

## 2024-08-27 DIAGNOSIS — C50.811 MALIGNANT NEOPLASM OF OVERLAPPING SITES OF RIGHT BREAST IN FEMALE, ESTROGEN RECEPTOR POSITIVE (MULTI): ICD-10-CM

## 2024-08-27 DIAGNOSIS — Z17.0 MALIGNANT NEOPLASM OF OVERLAPPING SITES OF RIGHT BREAST IN FEMALE, ESTROGEN RECEPTOR POSITIVE (MULTI): ICD-10-CM

## 2024-08-27 PROCEDURE — 99211 OFF/OP EST MAY X REQ PHY/QHP: CPT | Performed by: SURGERY

## 2024-08-29 ENCOUNTER — APPOINTMENT (OUTPATIENT)
Dept: PLASTIC SURGERY | Facility: CLINIC | Age: 39
End: 2024-08-29
Payer: COMMERCIAL

## 2024-08-30 ENCOUNTER — APPOINTMENT (OUTPATIENT)
Dept: CARDIOLOGY | Facility: CLINIC | Age: 39
End: 2024-08-30
Payer: COMMERCIAL

## 2024-08-30 VITALS
HEIGHT: 68 IN | SYSTOLIC BLOOD PRESSURE: 106 MMHG | DIASTOLIC BLOOD PRESSURE: 76 MMHG | HEART RATE: 82 BPM | WEIGHT: 163 LBS | BODY MASS INDEX: 24.71 KG/M2

## 2024-08-30 DIAGNOSIS — C50.811 MALIGNANT NEOPLASM OF OVERLAPPING SITES OF RIGHT BREAST IN FEMALE, ESTROGEN RECEPTOR POSITIVE (MULTI): Primary | ICD-10-CM

## 2024-08-30 DIAGNOSIS — I42.7 CARDIOTOXICITY (MULTI): ICD-10-CM

## 2024-08-30 DIAGNOSIS — Z79.899 ENCOUNTER FOR MONITORING CARDIOTOXIC DRUG THERAPY: ICD-10-CM

## 2024-08-30 DIAGNOSIS — E78.00 ELEVATED LDL CHOLESTEROL LEVEL: ICD-10-CM

## 2024-08-30 DIAGNOSIS — Z17.0 MALIGNANT NEOPLASM OF OVERLAPPING SITES OF RIGHT BREAST IN FEMALE, ESTROGEN RECEPTOR POSITIVE (MULTI): Primary | ICD-10-CM

## 2024-08-30 DIAGNOSIS — Z51.81 ENCOUNTER FOR MONITORING CARDIOTOXIC DRUG THERAPY: ICD-10-CM

## 2024-08-30 PROCEDURE — 3008F BODY MASS INDEX DOCD: CPT | Performed by: INTERNAL MEDICINE

## 2024-08-30 PROCEDURE — 99214 OFFICE O/P EST MOD 30 MIN: CPT | Performed by: INTERNAL MEDICINE

## 2024-08-30 PROCEDURE — 1036F TOBACCO NON-USER: CPT | Performed by: INTERNAL MEDICINE

## 2024-08-30 ASSESSMENT — ENCOUNTER SYMPTOMS
NUMBNESS: 1
PALPITATIONS: 0
SHORTNESS OF BREATH: 0

## 2024-08-30 NOTE — PROGRESS NOTES
Patient:  Macy Chavez  YOB: 1985  MRN: [id number]       Chief Complaint/Active Symptoms:       Macy Chavez is a 39 y.o. female who returns today for cardiac follow-up.    Here for follow-up post treatment. No chest pain or shortness of breath. No dizziness or lightheadedness. No orthopnea or PND. No problems or complications with her breast reconstruction. Had double mastectomy with reconstruction done.    Cancer Diagnosis: Right breast cancer, stage IIIA, ER+, PA+, HER2 neg  Oncologist. [place]  Treatment: DDAC 14 day cycles with paclitaxel weekly cycles. Completed 4 cycles of DDAC, now on weekly paclitaxel   Radiation: planned  Total dose 242.82mg/m2    Review of Systems   Respiratory:  Negative for shortness of breath.    Cardiovascular:  Negative for chest pain, palpitations and leg swelling.   Neurological:  Positive for numbness (Surrounding bilateral breast from recent surgery).   All other systems reviewed and are negative.      Objective:     Vitals:    08/30/24 0936   BP: 106/76   Pulse: 82       Vitals:    08/30/24 0936   Weight: 73.9 kg (163 lb)       Allergies:     Allergies   Allergen Reactions    Dog Dander Itching          Medications:     Current Outpatient Medications   Medication Instructions    atorvastatin (LIPITOR) 20 mg, oral, Daily    b complex 0.4 mg tablet 1 tablet, oral, Daily    docusate sodium (COLACE) 100 mg, oral, 2 times daily PRN    loratadine (CLARITIN) 10 mg, oral, Daily    metoprolol succinate XL (TOPROL-XL) 25 mg, oral, Daily, Do not crush or chew.    oxyCODONE (ROXICODONE) 5 mg, oral, Every 6 hours PRN    prochlorperazine (COMPAZINE) 10 mg, oral, Every 6 hours PRN    valACYclovir (VALTREX) 500 mg, oral, Daily       Physical Examination:   GENERAL:  Well developed, well nourished, in no acute distress.  HEENT: NC AT, EOMI with anicteric sclera  NECK:  Supple, no JVD, no bruit.  CHEST:  Symmetric and nontender.  Recent bilateral surgery with breast reconstruction and  "mastectomy  LUNGS:  Clear to auscultation bilaterally, normal respiratory effort.  HEART:  PMI is nondisplaced. RRR with normal S1 and S2, no S3, no mumur or rub. No carotid or abdominal bruits  ABDOMEN: Soft, NT, ND without palpable organomegaly or bruits  EXTREMITIES:  Warm with good color, no clubbing or cyanosis.  There is no edema noted.  PERIPHERAL VASCULAR:  Pulses present and equally palpable; 2+ throughout.  MUSCULOSKELETAL: No significant joint or limb deformity  NEURO/PSYCH:  Alert and oriented times three with approppriate behavior and responses. Nonfocal motor examination with normal gait and ambulation  Skin: no rash or lesions on exposed skin or reported.    Lab:     CBC:   Lab Results   Component Value Date    WBC 8.1 08/07/2024    RBC 4.41 08/07/2024    HGB 14.1 08/07/2024    HCT 42.0 08/07/2024     08/07/2024        CMP:    Lab Results   Component Value Date     08/07/2024    K 4.4 08/07/2024     08/07/2024    CO2 29 08/07/2024    BUN 15 08/07/2024    CREATININE 0.86 08/07/2024    GLUCOSE 96 08/07/2024    CALCIUM 10.1 08/07/2024       Magnesium:    No results found for: \"MG\"    Lipid Profile:    Lab Results   Component Value Date    TRIG 176 (H) 06/04/2024    HDL 44.5 06/04/2024    LDLCALC 117 (H) 06/04/2024       TSH:    Lab Results   Component Value Date    TSH 1.11 01/25/2024       BNP:   Lab Results   Component Value Date    BNP 4 07/01/2024        PT/INR:    Lab Results   Component Value Date    PROTIME 11.7 02/26/2024    INR 1.0 02/26/2024       HgBA1c:    No results found for: \"HGBA1C\"    BMP:  Lab Results   Component Value Date     08/07/2024     07/16/2024     07/08/2024    K 4.4 08/07/2024    K 4.5 07/16/2024    K 4.0 07/08/2024     08/07/2024     07/16/2024     07/08/2024    CO2 29 08/07/2024    CO2 28 07/16/2024    CO2 28 07/08/2024    BUN 15 08/07/2024    BUN 8 07/16/2024    BUN 12 07/08/2024    CREATININE 0.86 08/07/2024    " CREATININE 0.75 07/16/2024    CREATININE 0.77 07/08/2024       Cardiac Enzymes:    Lab Results   Component Value Date    TROPHS 6 07/01/2024    TROPHS 54 (H) 06/03/2024    TROPHS 31 (H) 05/24/2024       Hepatic Function Panel:    Lab Results   Component Value Date    ALKPHOS 50 07/16/2024    ALT 28 07/16/2024    AST 18 07/16/2024    PROT 6.8 07/16/2024    BILITOT 0.4 07/16/2024         Diagnostic Studies:       Echocardiogram 8 2 shows an EF of 60%.  Strain was -16.    Radiology:     No orders to display     ASSESSMENT     Problem List Items Addressed This Visit       Malignant neoplasm of overlapping sites of right breast in female, estrogen receptor positive (Multi) - Primary    Encounter for monitoring cardiotoxic drug therapy    Elevated LDL cholesterol level    Cardiotoxicity (Multi)       PLAN   1.  Right breast cancer status post cardiotoxic drug therapy and cardiotoxicity.  Throughout the course of her treatment the patient has not had clinical signs of heart failure just some inappropriate sinus tachycardia and palpitations.  Her ejection fraction currently is normal.  Her global longitudinal strain has fluctuated and been up and down over the course of a series of echoes.  This recent 1 does not discuss or outline whether the quality of the strain was acceptable to use the measurement.  Because the patient has completed treatment and her EF is normal and she is asymptomatic I would recommend that she follow-up in a year with an echocardiogram.  We reviewed the signs and symptoms of congestive heart failure and when to seek earlier medical attention.  She agrees to this.  2.  Elevated LDL cholesterol.  For now would recommend diet and exercise will address this as time goes on when she needs to initiate more specific therapy.    Will see the patient in follow-up with an echo in a year sooner at any time if she notices any changes shortness of breath or worsening palpitations.

## 2024-09-02 NOTE — PROGRESS NOTES
Staff Physician: Vince Arita MD, PhD  Referring Physician: Carmencita Patel DO  Date of Service: 9/3/2024    RADIATION ONCOLOGY CONSULT NOTE    IDENTIFYING DATA:   Cancer Staging   Malignant neoplasm of overlapping sites of right breast in female, estrogen receptor positive (Multi)  Staging form: Breast, AJCC 8th Edition  - Clinical stage from 2/6/2024: Stage IIIA (cT3, cN2, cM0, G3, ER+, UT+, HER2-) - Signed by Denise Golden MD on 2/21/2024    Problem List Items Addressed This Visit       Malignant neoplasm of overlapping sites of right breast in female, estrogen receptor positive (Multi) - Primary    Relevant Orders    Referral to Radiation Oncology    Rad Onc Intent to Treat       HISTORY OF PRESENT ILLNESS:    Ms. Macy Chavez is a 39 y.o.-year-old with clinical RIGHT breast T3 N2 M0 grade 3 ER/UT positive HER2/jair negative stage IIIa invasive ductal carcinoma status post neoadjuvant chemotherapy with dose dense AC followed by weekly Taxol with a good clinical response now status post bilateral skin sparing mastectomy and right axillary lymph node dissection with final pathology pending.    Her brief oncologic history is as follows:  November 3, 2023: Bilateral screening mammogram negative    December 20, 2023: Right breast mass noted with an ultrasound performed which showed nonspecific dense fibroglandular tissue no discrete lesions identified.    February 6, 2024: Patient noted an increase in the size of the palpable lump in the right lower outer breast.  She also noted a palpable lump in the right axilla.  Ultrasound of the right breast showed a large vague heterogeneous mass with internal vascularity measuring up to 5 cm at the 7 o'clock position 5 cm from the nipple.  It is visible and protruding from the skin with discoloration.  There was a separate area at the 10 o'clock position 8 cm from the nipple that showed a heterogeneous mass with internal vascularity measuring 1.1 x 0.9 x 1.4 cm.  There  were 3 enlarged right axillary lymph nodes noted.    February 6, 2024: Ultrasound-guided core needle biopsy of the right breast mass at 7 o'clock position 5 cm from the nipple showed invasive ductal carcinoma, grade 2, ER 95% positive, ME 5 to 10% positive, HER2/jair negative.  Right breast biopsy at the 10 o'clock position 8 cm from nipple showed invasive ductal carcinoma, grade 3, ER 95% positive, ME less than 5% positive, HER2/jair negative.  Biopsy of the right axillary lymph node was consistent with metastatic carcinoma.    February 14, 2024: MRI of the bilateral breast showed biopsy-proven multicentric right breast malignancy over 13 cm associated with bulky metastatic axillary and abnormal internal mammary lymphadenopathy.  No evidence of chest wall skin or nipple areolar complex involvement.  No MRI evidence of malignancy in the left breast.    February 15, 2024: Staging scans were negative for distant metastatic disease.    February 2024: Patient was seen in multidisciplinary breast cancer clinic by Dr. Carmencita Patel and surgical oncology and Dr. Denise Golden in medical oncology.  Recommendation was for neoadjuvant chemotherapy followed by surgical resection.    March 2024: Patient began dose dense Adriamycin and Cytoxan every 2 weeks followed by weekly Taxol for 12 weeks.  Last cycle of chemotherapy was completed on July 16, 2024.      July 10, 2024: Bilateral breast MRI showed an overall excellent response to treatment with a few small residual masses and scattered areas of patchy non-mass enhancement throughout the right breast.  There is also resolution of the previously seen right axillary lymphadenopathy.    Given the good clinical and radiographic response to neoadjuvant chemotherapy bilateral mastectomy was recommended.    August 14, 2024: Bilateral skin sparing mastectomy with right axillary sentinel lymph node biopsy and completion axillary dissection with a left skin sparing mastectomy and  immediate reconstruction with Dr. Patel and Dr. Mendoza.  Final pathology is not available at the time of consultation.    Today the patient reports that she is healing well from her surgery.  We are still awaiting the pathology report.  She has increasing range of motion in her arms though the right remains tighter than the left.  She starts physical therapy and Occupational Therapy this week to improve range of motion and for lymphedema assessment.  She denies fevers, chills, night sweats, vision changes, headaches, shortness of breath, or bone pain.  See the nurses note that accompanies this for full review of systems.    PAST MEDICAL HISTORY:  Past Medical History:   Diagnosis Date    Breast cancer (Multi)     Cancer (Multi)     Chest tightness 05/24/2024    Hyperlipidemia     Other specified health status 09/29/2016    No pertinent past medical history    Shortness of breath 05/24/2024     PAST SURGICAL HISTORY:  Past Surgical History:   Procedure Laterality Date    OTHER SURGICAL HISTORY  09/29/2016    Oral Surgery Tooth Extraction Shushan Tooth    WISDOM TOOTH EXTRACTION  6/1/2000     PAST GYNECOLOGIC HISTORY: Menarche: 12, AFLB: 27, Menopause: no, HRT: no     ALLERGIES:  Allergies   Allergen Reactions    Dog Dander Itching     MEDICATIONS:    Current Outpatient Medications:     atorvastatin (Lipitor) 20 mg tablet, Take 1 tablet (20 mg) by mouth once daily., Disp: 90 tablet, Rfl: 3    b complex 0.4 mg tablet, Take 1 tablet by mouth once daily., Disp: , Rfl:     docusate sodium (Colace) 100 mg capsule, Take 1 capsule (100 mg) by mouth 2 times a day as needed for constipation., Disp: 10 capsule, Rfl: 0    loratadine (Claritin) 10 mg tablet, Take 1 tablet (10 mg) by mouth once daily., Disp: , Rfl:     metoprolol succinate XL (Toprol-XL) 25 mg 24 hr tablet, Take 1 tablet (25 mg) by mouth once daily. Do not crush or chew., Disp: 90 tablet, Rfl: 3    oxyCODONE (Roxicodone) 5 mg immediate release tablet, Take 1  tablet (5 mg) by mouth every 6 hours if needed for severe pain (7 - 10). (Patient not taking: Reported on 2024), Disp: 15 tablet, Rfl: 0    prochlorperazine (Compazine) 10 mg tablet, Take 1 tablet (10 mg) by mouth every 6 hours if needed for nausea or vomiting. (Patient not taking: Reported on 2024), Disp: 30 tablet, Rfl: 2    valACYclovir (Valtrex) 500 mg tablet, Take 1 tablet (500 mg) by mouth once daily., Disp: 30 tablet, Rfl: 2   SOCIAL HISTORY:  Social History     Tobacco Use    Smoking status: Never    Smokeless tobacco: Never   Substance Use Topics    Alcohol use: Yes     Alcohol/week: 1.0 standard drink of alcohol     Types: 1 Glasses of wine per week     Comment: A few drinks a month     FAMILY HISTORY:  Family History   Problem Relation Name Age of Onset    Stroke Father Jad     Other (cancer) Maternal Grandfather Sotero         liver or kidney    Cancer Paternal Grandmother Lizbeth         carcinoma of thigh    Stroke Paternal Grandmother Lzibeth     COPD Paternal Grandfather Mitchell    Maternal grandfather with kidney or liver cancer, paternal grandmother with cell cell sarcoma    REVIEW OF SYSTEMS:  Review of systems and pain noted in the nursing note that accompanies this encounter.    RADIATION SCREENING QUESTIONS:  Prior radiation therapy: No  Pacemaker: No  Other implantable devices: No  Connective tissue disease: No    PERFORMANCE STATUS:  Karnofsky Performance Score/ECO, Able to carry on normal activity; minor signs or symptoms of disease (ECOG equivalent 0)    PHYSICAL EXAMINATION:  There were no vitals taken for this visit.  Pain score: 1/10  General: no acute distress, engaged in conversation.   HEENT: Normocephalic, atraumatic. Extraocular movements are intact.   Neck: supple with trachea at midline  Pulmonary: Breathing comfortably on room air, no respiratory distress  Cardiovascular: no cyanosis, well-perfused  Extremities: No lower extremity edema or cyanosis. Normal range of  motion.  Skin: Without rash or obvious lesions.   Musculoskeletal: Normal range of motion. Able to raise both arms above head without issues  Neurologic: Alert and oriented x3. Cranial nerves grossly intact.   Breast: Incisions well-healed, no erythema noted on video visit.    LABORATORY AND IMAGING DATA:  Imaging: All imaging was personally reviewed and interpreted in clinic. Findings as per HPI and EMR.    Laboratory/Pathology:  All pertinent labs and pathology were personally reviewed and interpreted in clinic. Findings as per HPI and EMR.  Awaiting final path report    IMPRESSION:  Ms. Macy Chavez is a 39 y.o.-year-old with clinical RIGHT breast T3 N2 M0 grade 3 ER/IA positive HER2/jair negative stage IIIa invasive ductal carcinoma status post neoadjuvant chemotherapy with dose dense AC followed by weekly Taxol with a good clinical response now status post bilateral skin sparing mastectomy and right axillary lymph node dissection with final pathology showing residual 2.5 cm of intermediate grade invasive ductal carcinoma with associated intermediate grade DCIS but no EIC with treatment effect noted in the breast and axilla with negative surgical margins with a focally close anterior margin less than 1 mm from invasive disease but widely negative DCIS margins with 1 of 5 lymph nodes involved with macrometastasis measuring 8 mm and 1 lymph node with micrometastasis, final pathologic stage ypT2 N1a ER 95% positive, IA 5 to 10% positive, HER2/jair negative.    The diagnosis, natural history, treatment options, and prognosis were discussed. The relevant imaging and pathology were reviewed in detail. We discussed the indications for PMRT in the setting of positive lymph nodes in the context of the 2014 EBCTCG meta-analysis with caveats regarding simon burden as below. We discussed that the goal of postmastectomy radiation is to reduce the risk of locoregional recurrence in the chest wall, supraclavicular, axillary,  and/or internal mammary nodes. The [facility] and [place] trials have shown patients with node positive, tumor > 5 cm, or invasion to skin or pectoralis fascia are at high risk of recurrence, deriving not only a significant LRC benefit but also an OS benefit from PMRT. We discussed clear indications for PMRT in scenarios such as 4LN+, larger primary tumors, positive surgical margins, or inflammatory breast cancer. We also discussed that when making these indications we also consider additional clinicopathologic features which may contribute to one's risk of LRR.  We discussed that in the absence of these clear indications that multiple high risk features combined may also suggest a significant benefit from postmastectomy radiation therapy.  These independent factors include women with 1-3 lymph nodes involved, women with triple negative breast cancer, women with extensive lymphovascular space invasion, women with an extensive burden of disease or with multifocal tumors.    We also discussed our recommendation for regional simon irradiation given her extensive simon burden up front. There are at least two trials investigating the addition of regional simon irradiation in the setting of high-risk disease, MA.20 and EORTC 13634. Both of these trials included patients with node-positive disease or with high-risk node-negative disease. Both of these trials showed an improvement in disease-free survival with the addition of regional simon irradiation, although this did not impact overall survival. In the MA.20 trial, this also improved local recurrence and distant metastases. Patients with hormone-positive disease who received hormonal therapy and chemotherapy appeared to be more likely to benefit from regional simon irradiation in the EORTC 45182 trial. These trials included radiation to the level 3/undissected axilla, supraclavicular nodes, and internal mammary nodes. We discussed that approximately 14% of the  patients of the START trials received simon irradiation, and that there is an older [place] trial where patients received hypofractionated simon irradiation. Finally, we discussed the recently presented study of Radiation Fractionation on Patient Outcomes After Breast REConstruction (FABREC) trial. This 400 patient randomized trial compared standard 6-7 week radiation with a shorter 3-4 week course. Both the accelerated and standard courses of treatment were equally effective at preventing the cancer from returning and had the same level of side effects. The patients in the shorter course treatment arm experienced fewer treatment disruptions and a lower financial burden as well. Thus, we feel comfortable delivering regional simon irradiation in a hypofractionated fashion. Additionally, we discussed that long term follow up of the trials discussed above (and others) suggest that patients may even have decreased acute toxicity and improved cosmesis with hypofractionation. After a thorough discussion of the above, the patient elected for hypofractionation.     We also discussed the recently presented NSABP B-51 trial. The study enrolled 1,641 patients diagnosed with lymph node-positive, nonmetastatic breast cancer whose lymph nodes were found to be cancer-free after neoadjuvant chemotherapy and who had undergone either mastectomy or breast-conserving surgery. Patients were randomly assigned 1:1 to either the “no RNI” arm (observation after mastectomy or whole-breast irradiation after breast-conserving surgery) or the “RNI” arm (chest wall irradiation plus regional simon irradiation after mastectomy, or whole-breast irradiation plus regional simon irradiation after breast-conserving surgery).    Evaluable patients (n = 1,[phone]) had similar outcomes whether they received adjuvant regional simon irradiation or not: 91.8% of patients in the no RNI arm and 92.7% of those in the RNI arm were free of invasive breast  cancer recurrences 5 years after surgery. Distant recurrence and overall survival rates were also similar between the arms, with 93.4% of patients in each arm free from distant recurrence 5 years after surgery, and 94% of those in the no RNI arm and 93.6% of those in the RNI arm alive after 5 years.    We explained that we do not have her final path results back yet but if she had a complete response in the breast and lymph nodes we could consider postmastectomy radiation omission though patients with T3 disease or tumors over 5 cm were not eligible for B51 and that she would not be a good candidate for this radiation omission.  We did explain that if she did have a pathologic complete response this is a good prognostic sign and portends a low likelihood of disease recurrence with postmastectomy radiation.    Finally, we discussed the side effects and risk of PMRT, including the need for CT simulation, tattoo placement, and daily radiation (M-F) x 16 days. We discussed the side effects and risks of radiation, including fatigue, radiation dermatitis, and breast pain in the short-term; skin fibrosis, pigmentation, chest wall pain, damage to underlying lung, rib, or heart, and secondary malignancies in the long-term. All questions were answered to the best of our ability and informed consent was obtained.    The patient was given ample opportunity to ask questions and all questions appeared to be answered to their satisfaction. The patient expressed understanding and elected to move forward with appointments as below.     PLAN:  -Await final pathology, regardless of response patient was not eligible for NSABP B51 trial and thus given her extensive node positive disease and 13 cm of disease prior to neoadjuvant chemotherapy would recommend postmastectomy radiation therapy.  -Given the recently reported moderate hypofractionation trials would plan to treat to 42.56 Hopper in 16 fractions without bolus and without boost to  "the right chest wall and comprehensive nodes.  Will plan to simulate supine without ABC.  - CT simulation scheduled today and consent obtained    NCCN Guidelines were applicable to guide this patients treatment plan.     Vince Arita MD PhD   Professor, Radiation Oncology  9/3/2024    Over 60 minutes was spent in evaluating, counseling, and examining the patient. More than 50% of that time was spent in face to face discussion.    ADDENDUM:   Final pathology report from surgical resection is as follows.  INVASIVE CARCINOMA OF THE BREAST: RESECTION - All Specimens  SPECIMEN   Procedure  Total mastectomy   Specimen Laterality  Right   TUMOR   Tumor Site  Not specified   Histologic Type  Invasive carcinoma of no special type (ductal)   Histologic Grade (Aimee Histologic Score)     Glandular (Acinar) / Tubular Differentiation  Score 2   Nuclear Pleomorphism  Score 3   Mitotic Rate  Score 1   Overall Grade  Grade 2 (scores of 6 or 7)   Tumor Size  Greatest dimension of largest invasive focus (Millimeters): At least 25 mm   Tumor Focality  Cannot be determined   Ductal Carcinoma In Situ (DCIS)  Present     Negative for extensive intraductal component (EIC)   Architectural Patterns  Cribriform     Solid   Nuclear Grade  Grade II (intermediate)   Necrosis  Present, central (expansive \"comedo\" necrosis)   Lymphatic and / or Vascular Invasion  Present     Extensive   Dermal Lymphatic and / or Vascular Invasion  Present   Microcalcifications  Present in non-neoplastic tissue   Treatment Effect in the Breast  Probable or definite response to presurgical therapy in the invasive carcinoma   Treatment Effect in the Lymph Nodes  Probable or definite response to presurgical therapy in metastatic carcinoma   MARGINS   Margin Status for Invasive Carcinoma  All margins negative for invasive carcinoma   Distance from Invasive Carcinoma to Closest Margin  Invasive carcinoma is focally < 1mm from the blue inked anterior/superior " margin.  mm   Margin Status for DCIS  All margins negative for DCIS   Distance from DCIS to Closest Margin  Greater than: 2 mm   REGIONAL LYMPH NODES   Regional Lymph Node Status  Tumor present in regional lymph node(s)   Number of Lymph Nodes with Macrometastases  1   Number of Lymph Nodes with Micrometastases  1   Number of Lymph Nodes with Isolated Tumor Cells  0   Size of Largest [place] Metastatic Deposit  8 mm   Extranodal Extension  Not identified   Total Number of Lymph Nodes Examined (sentinel and non-sentinel)  5   Number of [place] Nodes Examined  1   pTNM CLASSIFICATION (AJCC 8th Edition)   Reporting of pT, pN, and (when applicable) pM categories is based on information available to the pathologist at the time the report is issued. As per the AJCC (Chapter 1, 8th Ed.) it is the managing physician’s responsibility to establish the final pathologic stage based upon all pertinent information, including but potentially not limited to this pathology report.   Modified Classification  y   pT Category  pT2   pN Category  pN1a   N Suffix  (sn)   Breast Biomarker Testing Performed on Previous Biopsy     Estrogen Receptor (ER) Status  Positive (greater than 10% of cells demonstrate nuclear positivity)   Percentage of Cells with Nuclear Positivity  95 %   Breast Biomarker Testing Performed on Previous Biopsy     Progesterone Receptor (PgR) Status  Positive   Percentage of Cells with Nuclear Positivity  5-10 %   Breast Biomarker Testing Performed on Previous Biopsy     HER2 (by immunohistochemistry)  Negative (Score 1+)

## 2024-09-03 ENCOUNTER — TELEPHONE (OUTPATIENT)
Dept: RADIATION ONCOLOGY | Facility: HOSPITAL | Age: 39
End: 2024-09-03
Payer: COMMERCIAL

## 2024-09-03 ENCOUNTER — HOSPITAL ENCOUNTER (OUTPATIENT)
Dept: RADIATION ONCOLOGY | Facility: CLINIC | Age: 39
Setting detail: RADIATION/ONCOLOGY SERIES
Discharge: HOME | End: 2024-09-03
Payer: COMMERCIAL

## 2024-09-03 DIAGNOSIS — Z17.0 MALIGNANT NEOPLASM OF OVERLAPPING SITES OF RIGHT BREAST IN FEMALE, ESTROGEN RECEPTOR POSITIVE (MULTI): Primary | ICD-10-CM

## 2024-09-03 DIAGNOSIS — C50.811 MALIGNANT NEOPLASM OF OVERLAPPING SITES OF RIGHT BREAST IN FEMALE, ESTROGEN RECEPTOR POSITIVE (MULTI): Primary | ICD-10-CM

## 2024-09-03 PROCEDURE — G2211 COMPLEX E/M VISIT ADD ON: HCPCS | Performed by: RADIOLOGY

## 2024-09-03 PROCEDURE — 99205 OFFICE O/P NEW HI 60 MIN: CPT | Performed by: RADIOLOGY

## 2024-09-03 ASSESSMENT — ENCOUNTER SYMPTOMS
DIARRHEA: 0
CARDIOVASCULAR NEGATIVE: 1
CONSTIPATION: 1
ENDOCRINE NEGATIVE: 1
MUSCULOSKELETAL NEGATIVE: 1
CONSTITUTIONAL NEGATIVE: 1
RESPIRATORY NEGATIVE: 1
NEUROLOGICAL NEGATIVE: 1
BLOOD IN STOOL: 0
RECTAL PAIN: 0
ABDOMINAL PAIN: 0
PSYCHIATRIC NEGATIVE: 1
HEMATOLOGIC/LYMPHATIC NEGATIVE: 1
VOMITING: 0
NAUSEA: 0
ABDOMINAL DISTENTION: 0

## 2024-09-03 NOTE — PROGRESS NOTES
Radiation Oncology Nursing Note    Prior Radiotherapy:  No  No radiation treatments to show. (Treatments may have been administered in another system.)     Current Systemic Treatment:  Yes, describe: Doxorubicin and cyclophosphamide finished July 16, 2024.     Presence of Pacemaker or ICD:  No    History of Autoimmune or Connective Tissue Disorders:  No    Pain: The patient's current pain level was assessed.  They report currently having a pain of 0 out of 10.  They feel their pain is under control without the use of pain medications.    Review of Systems:  Review of Systems   Constitutional: Negative.    HENT:   Negative for lump/mass.    Respiratory: Negative.     Cardiovascular: Negative.    Gastrointestinal:  Positive for constipation. Negative for abdominal distention, abdominal pain, blood in stool, diarrhea, nausea, rectal pain and vomiting.        Colace PRN. Last BM today   Endocrine: Negative.    Genitourinary: Negative.     Musculoskeletal: Negative.    Skin: Negative.    Neurological: Negative.    Hematological: Negative.    Psychiatric/Behavioral: Negative.

## 2024-09-05 ENCOUNTER — PATIENT MESSAGE (OUTPATIENT)
Dept: SURGICAL ONCOLOGY | Facility: CLINIC | Age: 39
End: 2024-09-05
Payer: COMMERCIAL

## 2024-09-05 ENCOUNTER — EVALUATION (OUTPATIENT)
Dept: OCCUPATIONAL THERAPY | Facility: HOSPITAL | Age: 39
End: 2024-09-05
Payer: COMMERCIAL

## 2024-09-05 DIAGNOSIS — C50.811 MALIGNANT NEOPLASM OF OVERLAPPING SITES OF RIGHT BREAST IN FEMALE, ESTROGEN RECEPTOR POSITIVE (MULTI): ICD-10-CM

## 2024-09-05 DIAGNOSIS — Z17.0 MALIGNANT NEOPLASM OF OVERLAPPING SITES OF RIGHT BREAST IN FEMALE, ESTROGEN RECEPTOR POSITIVE (MULTI): ICD-10-CM

## 2024-09-05 DIAGNOSIS — R29.898 RUE WEAKNESS: Primary | ICD-10-CM

## 2024-09-05 PROCEDURE — 97166 OT EVAL MOD COMPLEX 45 MIN: CPT | Mod: GO

## 2024-09-05 PROCEDURE — 97110 THERAPEUTIC EXERCISES: CPT | Mod: GO

## 2024-09-05 PROCEDURE — 97530 THERAPEUTIC ACTIVITIES: CPT | Mod: GO

## 2024-09-05 NOTE — PROGRESS NOTES
Occupational Therapy        Occupational Therapy Evaluation/ Re evaluation    Name: Macy Chavez  MRN: [id number]  : 1985   DATE: 24   Time Calculation  Start Time: 1445  Stop Time: 1600  Time Calculation (min): 75 min     OT Evaluation Time Entry  OT Evaluation (Moderate) Time Entry: 15  OT Therapeutic Procedures Time Entry  Therapeutic Activity Time Entry: 30  Therapeutic Exercise Time Entry: 30                   Insurance Authorization Request:   * Malignant neoplasm of overlapping sites of right breast in female, estrogen receptor positive (Multi) [C50.811, Z17.0] // Kaila confirmed 24 2:46pm  ANTHEM 50V PT OT COPAY 0 DED 0 COVERAGE 70  OOP 7150(MET) 9500(6872) NO AUTH REQ REF# [facility] R  [id number]     Precautions:  Surgery 8 14 postop status post bilateral skin sparing mastectomy, right axilla with no dissection, bilateral immediate direct to implant breast reconstruction.     Subjective   Chief complaint: s/p bilateral skin sparing mastectomy, right axillary sentinel lymph node biopsy, completion axillary dissection, left skin sparing mastectomy, per pt, also took basin around lymph node on R.     Homeliving/Social Support: Spouse, 3 children     Work: Works for [employer], will return , works from home     Meaningful Activities: Walking, lifting weights    Previous Level of Function Per Patient/Caregiver Report: I with ADL, IADL, work and leisure activities    Patient stated goals for therapy: Feel better    DOI: s/p bilateral skin sparing mastectomy, right axillary sentinel lymph node biopsy, completion axillary dissection, left skin sparing mastectomy.     Mechanism of Injury:   Right breast multicentric IDC g2-3 ER 95% GA low 5-10% HER2- spanning 13cm on MRI with a conglomerate of at least 3 markedly enlarged axillary lymph node with biopsy proven metastasis and an abnormal IM node;  -s/p neoadjuvant ddAC q2 weeks x 4 followed by weekly taxol with near complete  treatment response in breast with resolution of previously enlarged axillary nodes   -pathology pending    Objective   UE Assessment  Observation:   B mastectomy, well healing scars covered with steri strip tape, edema B axilla and underneath B breasts    Palpation:   Numbness, mild pain with palpation     Pain Assessment:  Location: Axillary area, where swelling is   1/10 at rest, 5/10 at highest  Description: Full, tender      Range of Motion (in degrees)  Shoulder AROM Right Left   Flexion 150 150   Extension Pt reports tightness, pain with this but functional  Pt reports tightness, pain with this but functional    Abduction    IR difficult  90    IR can reach bra line, RUE more difficult  90    IR can reach bra line     Elbow AROM Right Left   Flexion WFL WFL   Extension WFL WFL     Wrist AROM Right Left   Flexion WFL all     Extension     Ulnar deviation     Radial deviation     Pronation     Supination       Sensation:   Right Left   Numbness, upper back to mid dorsal upper arm  Normal      Strength:   Right Left    30 55   Lateral pinch NA    Three point pinch     Shoulder flexion     Shoulder extension     Elbow flexion     Elbow extension     Wrist flexion     Wrist extension       Treatment Performed:   RUE palm 19  15 cm up from base of MF nailbed 15.3  5 16.3  10 19.5  15 23  20 24.5  25 25.5  30 26.5  35 28.5  40 31  45 33    LUE palm 21  15 cm up from base of MF nailbed 15.5  5 15  10 18.8  15 23  20 25  25 26  30 27  35 29  40 31  45 33.5    Saumya hensley bra recommendation size medium, pt to obtain, education on sizing, wear, care, safety    Will hold scar massage until 6-8 weeks and healed    Butterfly stretch supine, as tolerated only, x 10, added to HEP, difficulty with RUE, pillow under RUE for comfort    Scap retraction x 10, TC, VC, gentle, as tolerated     OP EDUCATION:  Educated pt on role of OT, goals, POC, precautions, safety, pt demonstrates good understanding via teach back.      Assessment: Patient is a  38 y/o FM presents with c/o  s/p bilateral skin sparing mastectomy, right axillary sentinel lymph node biopsy, completion axillary dissection, left skin sparing mastectomy. Pt has a PMH of  s/p bilateral skin sparing mastectomy, right axillary sentinel lymph node biopsy, completion axillary dissection, left skin sparing mastectomy, per pt, also took basin around lymph node on R. . Upon examination patient demonstrates decreased AROM, surgical swelling, decreased strength limiting overall function. Activity limitations and participations restrictions include IADL I. Pt to benefit from outpatient OT to address deficits, maximize functional mobility and improve QOL.  The clinical presentation of this patient is evolving and their history and examination findings are consistent with a moderate complexity evaluation with good rehab potential. Patient will benefit from skilled OT to support return to all ADL/ IADL, work, and leisure activities. Pt to have mapping radiation scans 9/24, then will start radiation 2-3 weeks later, early October. Plastic surgeon visit on 9/16, will look at tape at that point and remove if still on at that point.     Plan:  2 week for 8-12 weeks. Interventions to include, ADL, IADL, therapeutic exercise, neuromuscular re education, and safety awareness. Pt demonstrates good understanding via teach back.     Goals:  1. Pt will be I with scar massage.  2. Pt will demonstrate decreased axillary and breast swelling.   3. Pt will complete MLD I consistently.   4. Pt will obtain compression garments and tolerate consistently.   5. Patient to demonstrate proper technique and competence with HEP.    Problems To Be Addressed: Knowledge of precautions, knowledge of HEP, pain and edema management, ROM/joint mobility, coordination, motor skills, strength recovery, endurance recovery, orthosis use, wear/care, precautions.     Planned Interventions include: wound care as needed,  patient education/instruction, home program instruction and review, edema control, manual therapy, motor coordination, therapeutic exercise, therapeutic activities, ADL and IADL retraining, neuromuscular re-education, dry needling, NMES, Fluidotherapy, ultrasound, Kinesiotaping, orthosis fabrication/fit training.

## 2024-09-06 ENCOUNTER — APPOINTMENT (OUTPATIENT)
Dept: HEMATOLOGY/ONCOLOGY | Facility: HOSPITAL | Age: 39
End: 2024-09-06
Payer: COMMERCIAL

## 2024-09-06 PROBLEM — R29.898 RUE WEAKNESS: Status: ACTIVE | Noted: 2024-09-06

## 2024-09-06 LAB
LAB AP ASR DISCLAIMER: NORMAL
LAB AP ASR DISCLAIMER: NORMAL
LAB AP BLOCK FOR ADDITIONAL STUDIES: NORMAL
LAB AP BLOCK FOR ADDITIONAL STUDIES: NORMAL
LABORATORY COMMENT REPORT: NORMAL
LABORATORY COMMENT REPORT: NORMAL
Lab: NORMAL
Lab: NORMAL
PATH REPORT.FINAL DX SPEC: NORMAL
PATH REPORT.FINAL DX SPEC: NORMAL
PATH REPORT.GROSS SPEC: NORMAL
PATH REPORT.GROSS SPEC: NORMAL
PATH REPORT.RELEVANT HX SPEC: NORMAL
PATH REPORT.RELEVANT HX SPEC: NORMAL
PATH REPORT.TOTAL CANCER: NORMAL
PATH REPORT.TOTAL CANCER: NORMAL
PATHOLOGY SYNOPTIC REPORT: NORMAL
PATHOLOGY SYNOPTIC REPORT: NORMAL

## 2024-09-06 PROCEDURE — 88342 IMHCHEM/IMCYTCHM 1ST ANTB: CPT | Performed by: PATHOLOGY

## 2024-09-09 DIAGNOSIS — C50.811 MALIGNANT NEOPLASM OF OVERLAPPING SITES OF RIGHT BREAST IN FEMALE, ESTROGEN RECEPTOR POSITIVE: Primary | ICD-10-CM

## 2024-09-09 DIAGNOSIS — Z17.0 MALIGNANT NEOPLASM OF OVERLAPPING SITES OF RIGHT BREAST IN FEMALE, ESTROGEN RECEPTOR POSITIVE: Primary | ICD-10-CM

## 2024-09-11 ENCOUNTER — TREATMENT (OUTPATIENT)
Dept: OCCUPATIONAL THERAPY | Facility: HOSPITAL | Age: 39
End: 2024-09-11
Payer: COMMERCIAL

## 2024-09-11 DIAGNOSIS — R29.898 RUE WEAKNESS: Primary | ICD-10-CM

## 2024-09-11 PROCEDURE — 97530 THERAPEUTIC ACTIVITIES: CPT | Mod: GO

## 2024-09-11 PROCEDURE — 97110 THERAPEUTIC EXERCISES: CPT | Mod: GO

## 2024-09-11 NOTE — PROGRESS NOTES
Occupational Therapy      Occupational Therapy Treatment  Name: Macy Chavez   MRN: [id number]   : 1985   Date:      Time Calculation  Start Time: 0900  Stop Time: 1000  Time Calculation (min): 60 min        OT Therapeutic Procedures Time Entry  Therapeutic Activity Time Entry: 15  Therapeutic Exercise Time Entry: 45                   Precautions:   Surgery 8 14 postop status post bilateral skin sparing mastectomy, right axilla with no dissection, bilateral immediate direct to implant breast reconstruction.     Current Problem:    s/p bilateral skin sparing mastectomy, right axillary sentinel lymph node biopsy, completion axillary dissection, left skin sparing mastectomy, per pt, also took basin around lymph node on R.     Visit Number:   2    Insurance Authorization Request:   * Malignant neoplasm of overlapping sites of right breast in female, estrogen receptor positive (Multi) [C50.811, Z17.0] // Kaila confirmed 24 2:46pm  ANTHEM 50V PT OT COPAY 0 DED 0 COVERAGE 70  OOP 7150(MET) 9500(7467) NO AUTH REQ REF# [facility] R  [id number]/ALL     Assessment:  Patient is a  38 y/o FM presents with c/o  s/p bilateral skin sparing mastectomy, right axillary sentinel lymph node biopsy, completion axillary dissection, left skin sparing mastectomy. Pt has a PMH of  s/p bilateral skin sparing mastectomy, right axillary sentinel lymph node biopsy, completion axillary dissection, left skin sparing mastectomy, per pt, also took basin around lymph node on R. . Upon examination patient demonstrates decreased AROM, surgical swelling, decreased strength limiting overall function. Activity limitations and participations restrictions include IADL I. Pt to benefit from outpatient OT to address deficits, maximize functional mobility and improve QOL. The clinical presentation of this patient is evolving and their history and examination findings are consistent with a moderate complexity evaluation with good rehab  potential. Patient will benefit from skilled OT to support return to all ADL/ IADL, work, and leisure activities. Pt to have mapping radiation scans 9/24, then will start radiation 2-3 weeks later, early October. Plastic surgeon visit on 9/16, surgeon will look at tape at that point and remove if still on at that point.      Plan:  Continue current POC.      Subjective   General:  Got path results back, 2.5 cm cancer left that is cancerous, margins are negative, removed 1 cancerous lymph node, 1 node removed had a trace amount of cancer    Pain Assessment:  Location: R axilla  1/10 at rest, 3/10 at highest  Description: Fullness      HEP Adherence/Understanding: Good     Objective  Observation: posture improved     RUE palm 19  15 cm up from base of MF nailbed 15.3  5 16.3  10 19.5  15 23  20 24.5  25 25.5  30 26.5  35 28.5  40 31  45 33     LUE palm 21  15 cm up from base of MF nailbed 15.5  5 15  10 18.8  15 23  20 25  25 26  30 27  35 29  40 31  45 33.5    Palpation: No pain with palpation, mild edema noted     Range of motion (in degrees)  AROM  NA     Sensory:  NA     Strength:  NA     Treatment Interventions:   All as tolerated   Education on holding scar massage until healed, 6-8 weeks following surgery 8/14/22, demos understanding via teachback  Pt arrived with prairie hugger bra, medium fit, also has small to trial, pt can sleep on L side now, R side uncomfortable   Deep breaths x 10, supine, slight pressure with inhale  Shoulder rolls x 10, VC form  Shoulder wings x 10, VC form, mirroring OT  Shoulder circles backwards, x 5 reps, 3 sets  W exercise, TC, VC form, 3 sets x 5 reps, 5 second holds   Back climb, x 5 extended, gentle holds each side, TC, VC  Supine hands behind head, x 10 extended holds, TC form, safety  Side wall crawls, x 5 extended holds, VC form, safety, 90 degrees only R arm  Forward wall crawls, x 5 extended holds, 90 degrees, TC, VC form, increased time    Tolerance of session: No  difficulty     Outcome Measures:  Quick Dash Score: at eval      OP EDUCATION:  Pt educated on POC, safety, progress, demonstrates good understanding via teach back.     Goals:   1. Pt will be I with scar massage.  2. Pt will demonstrate decreased axillary and breast swelling.   3. Pt will complete MLD I consistently.   4. Pt will obtain compression garments and tolerate consistently.   5. Patient to demonstrate proper technique and competence with HEP.

## 2024-09-12 ENCOUNTER — TUMOR BOARD CONFERENCE (OUTPATIENT)
Dept: HEMATOLOGY/ONCOLOGY | Facility: HOSPITAL | Age: 39
End: 2024-09-12
Payer: COMMERCIAL

## 2024-09-12 NOTE — TUMOR BOARD NOTE
MULTIDISCIPLINARY BREAST CANCER TUMOR BOARD CONFERENCE NOTE  Macy Chavez was presented at Breast Cancer Tumor Board Conference  Conference date: 9/12/2024  Presenting Provider(s): Dr. Carmencita Patel  Present at Conference: Medical Oncology, Radiation Oncology, Surgical Oncology, Radiology, and Pathology Representatives  Conference Review Type: Pathology Review    National Guidelines discussed: Yes    Surgical Resection: Surgery is complete.  Radiation therapy: Post mastectomy radiation indicated   Genomic Testing: no   Systemic therapy: Verzenio and endocrine therapy with ovarian ablation. Adjuvant bisphosphonate therapy post radiation.    Clinical Trial Eligible: no    Genetics: Negative    Referral Recommendations:      Cancer Staging:  Cancer Staging   Malignant neoplasm of overlapping sites of right breast in female, estrogen receptor positive (Multi)  Staging form: Breast, AJCC 8th Edition  - Clinical stage from 2/6/2024: Stage IIIA (cT3, cN2, cM0, G3, ER+, AR+, HER2-) - Signed by Denise Golden MD on 2/21/2024       Disclaimer  SCC tumor board recommendations represent the consensus opinion of physicians present at a weekly patient care conference. The treating [facility] physician is not always present, and many of the physicians formulating the recommendation have not personally seen or examined the patient under discussion. It is understood that the treating [facility] physician considers the expertise of the Tumor Board Recommendation in formulating his/her plan for the patient. However, in many situations, based on individualized patient considerations, a different plan is determined by the treating physician to be the optimal medical management.    Scribe Attestation  By signing my name below, Sameera RIZZO Scribe   attest that this documentation has been prepared under the direction and in the presence of BREAST TUMOR BOARD.

## 2024-09-13 ENCOUNTER — TREATMENT (OUTPATIENT)
Dept: OCCUPATIONAL THERAPY | Facility: HOSPITAL | Age: 39
End: 2024-09-13
Payer: COMMERCIAL

## 2024-09-13 DIAGNOSIS — R29.898 RUE WEAKNESS: Primary | ICD-10-CM

## 2024-09-13 DIAGNOSIS — N64.89 BREAST EDEMA: ICD-10-CM

## 2024-09-13 PROCEDURE — 97110 THERAPEUTIC EXERCISES: CPT | Mod: GO

## 2024-09-13 NOTE — PROGRESS NOTES
Occupational Therapy      Occupational Therapy Treatment  Name: Macy Chavez   MRN: [id number]   : 1985   Date:      Time Calculation  Start Time: 0900  Stop Time: 1000  Time Calculation (min): 60 min        OT Therapeutic Procedures Time Entry  Therapeutic Exercise Time Entry: 10                   Precautions:   Surgery 8 14 postop status post bilateral skin sparing mastectomy, right axilla with no dissection, bilateral immediate direct to implant breast reconstruction. Port was removed.     Current Problem:    s/p bilateral skin sparing mastectomy, right axillary sentinel lymph node biopsy, completion axillary dissection, left skin sparing mastectomy, per pt, also took basin around lymph node on R.     Visit Number:   3    Insurance Authorization Request:   * Malignant neoplasm of overlapping sites of right breast in female, estrogen receptor positive (Multi) [C50.811, Z17.0] // Kaila confirmed 24 2:46pm  ANTHEM 50V PT OT COPAY 0 DED 0 COVERAGE 70  OOP 7150(MET) 9500(7467) NO AUTH REQ REF# [facility] R  [id number]/ALL     Assessment:  Patient is a  38 y/o FM presents with c/o  s/p bilateral skin sparing mastectomy, right axillary sentinel lymph node biopsy, completion axillary dissection, left skin sparing mastectomy. Pt has a PMH of  s/p bilateral skin sparing mastectomy, right axillary sentinel lymph node biopsy, completion axillary dissection, left skin sparing mastectomy, per pt, also took basin around lymph node on R. . Upon examination patient demonstrates decreased AROM, surgical swelling, decreased strength limiting overall function. Activity limitations and participations restrictions include IADL I. Pt to benefit from outpatient OT to address deficits, maximize functional mobility and improve QOL. The clinical presentation of this patient is evolving and their history and examination findings are consistent with a moderate complexity evaluation with good rehab potential. Patient will  benefit from skilled OT to support return to all ADL/ IADL, work, and leisure activities. Pt to have mapping radiation scans 9/24, then will start radiation 2-3 weeks later, early October. Plastic surgeon visit on 9/16, surgeon will look at tape at that point and remove if still on at that point. Per Dr. Mendoza no ROM restrictions. Per Dr. Patel pt ok for MLD.     Plan:  Continue current POC.      Subjective   General:  Bumped L breast yesterday, painful but now resolved    Pain Assessment:  Location: R axilla  1/10 at rest, 3/10 at highest  Description: Fullness      HEP Adherence/Understanding: Good     Objective  Observation: posture improved     RUE palm 19  15 cm up from base of MF nailbed 15.3  5 16.3  10 19.5  15 23  20 24.5  25 25.5  30 26.5  35 28.5  40 31  45 33     LUE palm 21  15 cm up from base of MF nailbed 15.5  5 15  10 18.8  15 23  20 25  25 26  30 27  35 29  40 31  45 33.5    Palpation: No pain with palpation, mild edema noted     Range of motion (in degrees)  AROM  NA     Sensory:  NA     Strength:  NA     Treatment Interventions:   All as tolerated   Reviewed small compression bra, tolerating well  Education on holding scar massage until healed, 6-8 weeks following surgery 8/14/22, marcy understanding via teachback  Pt arrived with prairie hugger bra, medium fit, also has small to trial, pt can sleep on L side now, R side uncomfortable   Deep breaths x 10, supine, slight pressure with inhale  Shoulder rolls x 10, VC form  Shoulder wings x 10, VC form, mirroring OT  Abdominal breathing x 5  Clavicle MLD, behind B ear MLD  Opening L axillary lymph nodes, opened R axillary lymph nodes, built anastomoses L axillary lymph nodes to R, then completed sequence bringing fluid from R to L  Opening R inguinal  lymph nodes, opened R axillary lymph nodes, built anastomoses L axillary lymph nodes to R, then completed sequence bringing fluid from R to L.    Tolerance of session: No difficulty     Outcome  Measures:  Quick Dash Score: at eval      OP EDUCATION:  Pt educated on POC, safety, progress, demonstrates good understanding via teach back.     Goals:   1. Pt will be I with scar massage.  2. Pt will demonstrate decreased axillary and breast swelling.   3. Pt will complete MLD I consistently.   4. Pt will obtain compression garments and tolerate consistently.   5. Patient to demonstrate proper technique and competence with HEP.

## 2024-09-16 ENCOUNTER — APPOINTMENT (OUTPATIENT)
Dept: PLASTIC SURGERY | Facility: CLINIC | Age: 39
End: 2024-09-16
Payer: COMMERCIAL

## 2024-09-16 VITALS — DIASTOLIC BLOOD PRESSURE: 80 MMHG | SYSTOLIC BLOOD PRESSURE: 132 MMHG | HEART RATE: 83 BPM

## 2024-09-16 DIAGNOSIS — Z98.890 STATUS POST BILATERAL BREAST RECONSTRUCTION: Primary | ICD-10-CM

## 2024-09-16 DIAGNOSIS — G89.18 POST-OP PAIN: Primary | ICD-10-CM

## 2024-09-16 PROCEDURE — 1036F TOBACCO NON-USER: CPT | Performed by: PHYSICIAN ASSISTANT

## 2024-09-16 PROCEDURE — 99024 POSTOP FOLLOW-UP VISIT: CPT | Performed by: PHYSICIAN ASSISTANT

## 2024-09-16 RX ORDER — DICLOFENAC SODIUM 10 MG/G
GEL TOPICAL
Qty: 50 G | Refills: 1 | Status: SHIPPED | OUTPATIENT
Start: 2024-09-16

## 2024-09-16 ASSESSMENT — PAIN SCALES - GENERAL: PAINLEVEL: 0-NO PAIN

## 2024-09-16 NOTE — PROGRESS NOTES
Division of Plastic & Reconstructive Surgery            Post OP Visit    Date: 9/16/2024   DOS: 8/14/2024    Subjective   Macy Chavez is a 39 y.o. female presents for postoperative visit status post bilateral nipple sparing mastectomy with right axillary node dissection, bilateral direct-to-implant breast reconstruction with structured saline implant (right = 410 cc; left = 390 cc) with mesh, performed on 8/14/2024    She presents for 1 month POV. She is recovering well. She endorses that ROM of her arms is improving. She has recently started OT. She endorses that she is no longer having burning pain with stretching her arms. She endorses she accidentally bumped her left breast off of the couch which caused significant pain under the left breast. She denied a popping sensation of change in implant size or shape. She endorses the pain continues to be intermittent. She endorses the pain was significant enough she took pain medication 2 days ago. She endorses the pain is improving.     Objective    Vitals:    09/16/24 0803   BP: 132/80   Pulse: 83       Gen: interactive and pleasant  Head: NCAT  Eyes: EOMI, PERRLA  Mouth: MMM  Throat: trachea midline  Cor: RRR  Pulm: nonlabored breathing  Abd: s/nt/nd  Neuro: AAOx3  Ext: extremities perfused    There is no height or weight on file to calculate BMI.    Focused exam of the: Bilateral breast  Right breast vertical incision is well healed without evidence of dehiscence. There is no overlying erythema or concerns for infection.  no sign of ballotable fluid collection.  Dermabond and pernio is removed today.     Left breast vertical incision is well healed without evidence of dehiscence. There is no overlying erythema or concerns for infection.  No sign of blottable fluid collection. Dermabond and pernio is removed today.         Assessment/Plan       Macy is a 39 y.o. female  presents for postoperative visit status post bilateral nipple sparing mastectomy  with right axillary node dissection, bilateral direct-to-implant breast reconstruction with structured saline implant (right = 410 cc; left = 390 cc) with mesh, performed on 8/14/2024    She presents for 1 month POV. She is recovering well. She may begin to increase activity as tolerated. She has pain at the left IMF. There is no evidence or concerns for implant rupture. I advised Voltaren gel to the left breast fold as needed for pain. She may continue sports bras or transition to wire bras if comfortable. Ok for scar massage, ok for scar gel/tape. Follow up in 2 months for 90 day POV.       Plan:   Follow up in 2 months for 90 day POV  Ok for scar massage   Ok for topical scar gel/tape  Voltaren for left breast pain PRN  Increase activity as tolerated    I spent 20 minutes with this patient. Greater than 50% of this time was spent in the counselling and/or coordination of care of this patient.  This note was created using voice recognition software and was not corrected for typographical or grammatical errors.    Signature: Yvette Gan PA-C   Date: 9/16/2024

## 2024-09-17 NOTE — PROGRESS NOTES
Occupational Therapy        Occupational Therapy      Occupational Therapy Treatment  Name: Macy Chavez   MRN: [id number]   : 1985   Date:                                   Precautions:   Surgery  postop status post bilateral skin sparing mastectomy, right axilla with no dissection, bilateral immediate direct to implant breast reconstruction. Port was removed. Per PA LaBarge She may continue sports bras or transition to wire bras if comfortable. Ok for scar massage, ok for scar gel/tape.     Current Problem:    s/p bilateral skin sparing mastectomy, right axillary sentinel lymph node biopsy, completion axillary dissection, left skin sparing mastectomy, per pt, also took basin around lymph node on R.     Visit Number:   4    Insurance Authorization Request:   * Malignant neoplasm of overlapping sites of right breast in female, estrogen receptor positive (Multi) [C50.811, Z17.0] // Kaila confirmed 24 2:46pm  ANTHEM 50V PT OT COPAY 0 DED 0 COVERAGE 70  OOP 7150(MET) 9500(7467) NO AUTH REQ REF# [facility] R  [id number]     Assessment:  Patient is a  40 y/o FM presents with c/o  s/p bilateral skin sparing mastectomy, right axillary sentinel lymph node biopsy, completion axillary dissection, left skin sparing mastectomy. Pt has a PMH of  s/p bilateral skin sparing mastectomy, right axillary sentinel lymph node biopsy, completion axillary dissection, left skin sparing mastectomy, per pt, also took basin around lymph node on R. . Upon examination patient demonstrates decreased AROM, surgical swelling, decreased strength limiting overall function. Activity limitations and participations restrictions include IADL I. Pt to benefit from outpatient OT to address deficits, maximize functional mobility and improve QOL. The clinical presentation of this patient is evolving and their history and examination findings are consistent with a moderate complexity evaluation with good rehab potential. Patient will  benefit from skilled OT to support return to all ADL/ IADL, work, and leisure activities. Pt to have mapping radiation scans 9/24, then will start radiation 2-3 weeks later, early October. Plastic surgeon visit on 9/16, surgeon will look at tape at that point and remove if still on at that point. Per Dr. Mendoza no ROM restrictions. Per Dr. Patel pt ok for MLD.     Plan:  Continue current POC.      Subjective   General:  Bumped L breast yesterday, painful but now resolved.    Pain Assessment:  Location: R axilla  1/10 at rest, 3/10 at highest  Description: Fullness      HEP Adherence/Understanding: Good     Objective  Observation: posture improved     RUE palm 19  15 cm up from base of MF nailbed 15.3  5 16.3  10 19.5  15 23  20 24.5  25 25.5  30 26.5  35 28.5  40 31  45 33     LUE palm 21  15 cm up from base of MF nailbed 15.5  5 15  10 18.8  15 23  20 25  25 26  30 27  35 29  40 31  45 33.5    Palpation: No pain with palpation, mild edema noted     Range of motion (in degrees)  AROM  NA     Sensory:  NA     Strength:  NA     Treatment Interventions:   All as tolerated   Reviewed small compression bra, tolerating well  Plastics cleared pt for gentle scar massage, scars healing very well, gentle scar massage under R breast with Dysom, tolerated well  Pt arrived with prairie hugger bra, medium fit, also has small to trial, pt can sleep on L side now, R side improved, more comfortable   Deep breaths x 10, supine, slight pressure with inhale  Abdominal breathing x 5  Clavicle MLD, behind B ear MLD  Opening L axillary lymph nodes, opened R axillary lymph nodes, built anastomoses L axillary lymph nodes to R, then completed sequence bringing fluid from R to L  Opening R inguinal  lymph nodes, opened R axillary lymph nodes, built anastomoses L axillary lymph nodes to R, then completed sequence bringing fluid from R to L.    Tolerance of session: No difficulty     Outcome Measures:  Quick Dash Score: at eval      OP  EDUCATION:  Pt educated on POC, safety, progress, demonstrates good understanding via teach back.     Goals:   1. Pt will be I with scar massage.  2. Pt will demonstrate decreased axillary and breast swelling.   3. Pt will complete MLD I consistently.   4. Pt will obtain compression garments and tolerate consistently.   5. Patient to demonstrate proper technique and competence with HEP.

## 2024-09-18 ENCOUNTER — TREATMENT (OUTPATIENT)
Dept: OCCUPATIONAL THERAPY | Facility: HOSPITAL | Age: 39
End: 2024-09-18
Payer: COMMERCIAL

## 2024-09-18 DIAGNOSIS — N64.89 BREAST EDEMA: Primary | ICD-10-CM

## 2024-09-18 DIAGNOSIS — R29.898 RUE WEAKNESS: ICD-10-CM

## 2024-09-20 ENCOUNTER — APPOINTMENT (OUTPATIENT)
Dept: HEMATOLOGY/ONCOLOGY | Facility: HOSPITAL | Age: 39
End: 2024-09-20
Payer: COMMERCIAL

## 2024-09-20 ENCOUNTER — TREATMENT (OUTPATIENT)
Dept: OCCUPATIONAL THERAPY | Facility: HOSPITAL | Age: 39
End: 2024-09-20
Payer: COMMERCIAL

## 2024-09-20 DIAGNOSIS — R29.898 RUE WEAKNESS: ICD-10-CM

## 2024-09-20 DIAGNOSIS — N64.89 BREAST EDEMA: Primary | ICD-10-CM

## 2024-09-20 PROCEDURE — 97110 THERAPEUTIC EXERCISES: CPT | Mod: GO

## 2024-09-20 PROCEDURE — 97530 THERAPEUTIC ACTIVITIES: CPT | Mod: GO

## 2024-09-20 NOTE — PROGRESS NOTES
Occupational Therapy      Occupational Therapy Treatment  Name: Macy Chavez   MRN: [id number]   : 1985   Date:      Time Calculation  Start Time: 0900  Stop Time: 1000  Time Calculation (min): 60 min        OT Therapeutic Procedures Time Entry  Therapeutic Exercise Time Entry: 60                   Precautions:   Surgery  postop status post bilateral skin sparing mastectomy, right axilla with no dissection, bilateral immediate direct to implant breast reconstruction. Port was removed. Per PA LaBarge She may continue sports bras or transition to wire bras if comfortable. Ok for scar massage, ok for scar gel/tape.     Current Problem:    s/p bilateral skin sparing mastectomy, right axillary sentinel lymph node biopsy, completion axillary dissection, left skin sparing mastectomy, per pt, also took basin around lymph node on R.     Visit Number:   6    Insurance Authorization Request:   * Malignant neoplasm of overlapping sites of right breast in female, estrogen receptor positive (Multi) [C50.811, Z17.0] // Kaila confirmed 24 2:46pm  ANTHEM 50V PT OT COPAY 0 DED 0 COVERAGE 70  OOP 7150(MET) 9500(7467) NO AUTH REQ REF# [facility] R  [id number]/ALL     Assessment:  Patient is a  40 y/o FM presents with c/o  s/p bilateral skin sparing mastectomy, right axillary sentinel lymph node biopsy, completion axillary dissection, left skin sparing mastectomy. Pt has a PMH of  s/p bilateral skin sparing mastectomy, right axillary sentinel lymph node biopsy, completion axillary dissection, left skin sparing mastectomy, per pt, also took basin around lymph node on R. . Upon examination patient demonstrates decreased AROM, surgical swelling, decreased strength limiting overall function. Activity limitations and participations restrictions include IADL I. Pt to benefit from outpatient OT to address deficits, maximize functional mobility and improve QOL. The clinical presentation of this patient is evolving and  their history and examination findings are consistent with a moderate complexity evaluation with good rehab potential. Patient will benefit from skilled OT to support return to all ADL/ IADL, work, and leisure activities. Pt to have mapping radiation scans 9/24, then will start radiation 2-3 weeks later, early October. Plastic surgeon visit on 9/16, surgeon will look at tape at that point and remove if still on at that point. Per Dr. Mendoza no ROM restrictions. Per Dr. Patel pt ok for MLD.     Plan:  Continue current POC.      Subjective   General:  Got scar gel and cream for muscular pain    Pain Assessment:  Location: R axilla  1/10 at rest, 3/10 at highest  Description: Fullness      HEP Adherence/Understanding: Good     Objective  Observation: posture improved     RUE palm 19  15 cm up from base of MF nailbed 15.3  5 16.3  10 19.5  15 23  20 24.5  25 25.5  30 26.5  35 28.5  40 31  45 33     LUE palm 21  15 cm up from base of MF nailbed 15.5  5 15  10 18.8  15 23  20 25  25 26  30 27  35 29  40 31  45 33.5    Palpation: No pain with palpation, mild edema noted     Range of motion (in degrees)  AROM  NA     Sensory:  NA     Strength:  NA     Treatment Interventions:   All as tolerated   Access Code: [email or web address]  URL: [email or web address]  Date: 09/23/2024  Prepared by: Haylee Vila    Exercises  - Seated Scapular Retraction  - 1 x daily - 7 x weekly - 3 sets - 10 reps  - Standing Shoulder Row with Anchored Resistance  - 1 x daily - 7 x weekly - 3 sets - 10 reps  - Wall [place]  - 1 x daily - 7 x weekly - 3 sets - 10 reps  - Standing Shoulder Flexion to 90 Degrees with Dumbbells  - 1 x daily - 7 x weekly - 3 sets - 10 reps, 2 lb weight  - Shoulder Abduction with Dumbbells - Thumbs Up  - 1 x daily - 7 x weekly - 3 sets - 10 reps  - Shoulder Abduction with Dumbbells - Thumbs Up  - 1 x daily - 7 x weekly - 3 sets - 10 reps, 2 lb weight  - Standing Bent Over Single Arm Scapular Row with Table Support  - 1 x  daily - 7 x weekly - 3 sets - 10 reps, 2 lb weight    Tolerance of session: No difficulty     Outcome Measures:  Quick Dash Score: at eval      OP EDUCATION:  Pt educated on POC, safety, progress, demonstrates good understanding via teach back.     Goals:   1. Pt will be I with scar massage.  2. Pt will demonstrate decreased axillary and breast swelling.   3. Pt will complete MLD I consistently.   4. Pt will obtain compression garments and tolerate consistently.   5. Patient to demonstrate proper technique and competence with HEP.

## 2024-09-20 NOTE — PROGRESS NOTES
Occupational Therapy      Occupational Therapy Treatment  Name: Macy Chavez   MRN: [id number]   : 1985   Date:      Time Calculation  Start Time: 0900  Stop Time: 1000  Time Calculation (min): 60 min        OT Therapeutic Procedures Time Entry  Therapeutic Activity Time Entry: 30  Therapeutic Exercise Time Entry: 30                   Precautions:   Surgery  postop status post bilateral skin sparing mastectomy, right axilla with no dissection, bilateral immediate direct to implant breast reconstruction. Port was removed. Per PA LaBarge She may continue sports bras or transition to wire bras if comfortable. Ok for scar massage, ok for scar gel/tape.     Current Problem:    s/p bilateral skin sparing mastectomy, right axillary sentinel lymph node biopsy, completion axillary dissection, left skin sparing mastectomy, per pt, also took basin around lymph node on R.     Visit Number:   5    Insurance Authorization Request:   * Malignant neoplasm of overlapping sites of right breast in female, estrogen receptor positive (Multi) [C50.811, Z17.0] // Kaila confirmed 24 2:46pm  ANTHEM 50V PT OT COPAY 0 DED 0 COVERAGE 70  OOP 7150(MET) 9500(7467) NO AUTH REQ REF# [facility] R  [id number]/ALL     Assessment:  Patient is a  40 y/o FM presents with c/o  s/p bilateral skin sparing mastectomy, right axillary sentinel lymph node biopsy, completion axillary dissection, left skin sparing mastectomy. Pt has a PMH of  s/p bilateral skin sparing mastectomy, right axillary sentinel lymph node biopsy, completion axillary dissection, left skin sparing mastectomy, per pt, also took basin around lymph node on R. . Upon examination patient demonstrates decreased AROM, surgical swelling, decreased strength limiting overall function. Activity limitations and participations restrictions include IADL I. Pt to benefit from outpatient OT to address deficits, maximize functional mobility and improve QOL. The clinical presentation  of this patient is evolving and their history and examination findings are consistent with a moderate complexity evaluation with good rehab potential. Patient will benefit from skilled OT to support return to all ADL/ IADL, work, and leisure activities. Pt to have mapping radiation scans 9/24, then will start radiation 2-3 weeks later, early October. Plastic surgeon visit on 9/16, surgeon will look at tape at that point and remove if still on at that point. Per Dr. Mendoza no ROM restrictions. Per Dr. Patel pt ok for MLD.     Plan:  Continue current POC.      Subjective   General:  Bumped L breast yesterday, painful but now resolved.    Pain Assessment:  Location: R axilla  1/10 at rest, 3/10 at highest  Description: Fullness      HEP Adherence/Understanding: Good     Objective  Observation: posture improved     RUE palm 19  15 cm up from base of MF nailbed 15.3  5 16.3  10 19.5  15 23  20 24.5  25 25.5  30 26.5  35 28.5  40 31  45 33     LUE palm 21  15 cm up from base of MF nailbed 15.5  5 15  10 18.8  15 23  20 25  25 26  30 27  35 29  40 31  45 33.5    Palpation: No pain with palpation, mild edema noted     Range of motion (in degrees)  AROM  NA     Sensory:  NA     Strength:  NA     Treatment Interventions:   All as tolerated   Reviewed small compression bra, tolerating well  Shoulder rolls x 10, VC form  Shoulder wings x 10, VC form, mirroring OT  Shoulder circles backwards, x 5 reps, 3 sets  W exercise, TC, VC form, 3 sets x 5 reps, 5 second holds   Back climb, x 5 extended, gentle holds each side, TC, VC  Supine hands behind head, x 10 extended holds, TC form, safety  Side wall crawls, x 5 extended holds, VC form, safety, 90 degrees only R arm  Forward wall crawls, x 5 extended holds, 90 degrees, TC, VC form, increased time  IR/ER 2 lb weight, VC form, 3 sets x 10 reps  Seated Y's, x 10, VC form, no resistance  Plastics cleared pt for gentle scar massage, scars healing very well, gentle scar massage under R  breast with Dysom, tolerated well  Pt arrived with prairie hugger bra, medium fit, also has small to trial, pt can sleep on L side now, R side improved, more comfortable   Deep breaths x 10, supine, slight pressure with inhale  Abdominal breathing x 5  Clavicle MLD, behind B ear MLD  Opening L axillary lymph nodes, opened R axillary lymph nodes, built anastomoses L axillary lymph nodes to R, then completed sequence bringing fluid from R to L  Opening R inguinal  lymph nodes, opened R axillary lymph nodes, built anastomoses L axillary lymph nodes to R, then completed sequence bringing fluid from R to L.    Tolerance of session: No difficulty     Outcome Measures:  Quick Dash Score: at eval      OP EDUCATION:  Pt educated on POC, safety, progress, demonstrates good understanding via teach back.     Goals:   1. Pt will be I with scar massage.  2. Pt will demonstrate decreased axillary and breast swelling.   3. Pt will complete MLD I consistently.   4. Pt will obtain compression garments and tolerate consistently.   5. Patient to demonstrate proper technique and competence with HEP.

## 2024-09-23 ENCOUNTER — TREATMENT (OUTPATIENT)
Dept: OCCUPATIONAL THERAPY | Facility: HOSPITAL | Age: 39
End: 2024-09-23
Payer: COMMERCIAL

## 2024-09-23 DIAGNOSIS — R29.898 RUE WEAKNESS: ICD-10-CM

## 2024-09-23 DIAGNOSIS — N64.89 BREAST EDEMA: Primary | ICD-10-CM

## 2024-09-23 PROCEDURE — 97110 THERAPEUTIC EXERCISES: CPT | Mod: GO

## 2024-09-24 ENCOUNTER — APPOINTMENT (OUTPATIENT)
Dept: OCCUPATIONAL THERAPY | Facility: HOSPITAL | Age: 39
End: 2024-09-24
Payer: COMMERCIAL

## 2024-09-24 ENCOUNTER — HOSPITAL ENCOUNTER (OUTPATIENT)
Dept: RADIATION ONCOLOGY | Facility: CLINIC | Age: 39
Setting detail: RADIATION/ONCOLOGY SERIES
Discharge: HOME | End: 2024-09-24
Payer: COMMERCIAL

## 2024-09-24 ENCOUNTER — HOSPITAL ENCOUNTER (OUTPATIENT)
Dept: RADIOLOGY | Facility: EXTERNAL LOCATION | Age: 39
Discharge: HOME | End: 2024-09-24

## 2024-09-24 DIAGNOSIS — C50.411 MALIGNANT NEOPLASM OF UPPER-OUTER QUADRANT OF RIGHT FEMALE BREAST, UNSPECIFIED ESTROGEN RECEPTOR STATUS: ICD-10-CM

## 2024-09-24 NOTE — ADDENDUM NOTE
Encounter addended by: Vince Arita MD PhD on: 9/23/2024 8:35 PM   Actions taken: Clinical Note Signed

## 2024-09-26 DIAGNOSIS — Z17.0 MALIGNANT NEOPLASM OF OVERLAPPING SITES OF RIGHT BREAST IN FEMALE, ESTROGEN RECEPTOR POSITIVE: Primary | ICD-10-CM

## 2024-09-26 DIAGNOSIS — C50.811 MALIGNANT NEOPLASM OF OVERLAPPING SITES OF RIGHT BREAST IN FEMALE, ESTROGEN RECEPTOR POSITIVE: Primary | ICD-10-CM

## 2024-09-30 ENCOUNTER — APPOINTMENT (OUTPATIENT)
Dept: OCCUPATIONAL THERAPY | Facility: HOSPITAL | Age: 39
End: 2024-09-30
Payer: COMMERCIAL

## 2024-10-01 ENCOUNTER — HOSPITAL ENCOUNTER (OUTPATIENT)
Dept: RADIATION ONCOLOGY | Facility: CLINIC | Age: 39
Setting detail: RADIATION/ONCOLOGY SERIES
Discharge: HOME | End: 2024-10-01
Payer: COMMERCIAL

## 2024-10-01 ENCOUNTER — OFFICE VISIT (OUTPATIENT)
Dept: HEMATOLOGY/ONCOLOGY | Facility: CLINIC | Age: 39
End: 2024-10-01
Payer: COMMERCIAL

## 2024-10-01 VITALS
OXYGEN SATURATION: 98 % | WEIGHT: 162.4 LBS | DIASTOLIC BLOOD PRESSURE: 84 MMHG | TEMPERATURE: 97.3 F | RESPIRATION RATE: 18 BRPM | HEART RATE: 70 BPM | BODY MASS INDEX: 24.69 KG/M2 | SYSTOLIC BLOOD PRESSURE: 140 MMHG

## 2024-10-01 DIAGNOSIS — C50.811 MALIGNANT NEOPLASM OF OVERLAPPING SITES OF RIGHT BREAST IN FEMALE, ESTROGEN RECEPTOR POSITIVE: ICD-10-CM

## 2024-10-01 DIAGNOSIS — Z17.0 MALIGNANT NEOPLASM OF OVERLAPPING SITES OF RIGHT BREAST IN FEMALE, ESTROGEN RECEPTOR POSITIVE: ICD-10-CM

## 2024-10-01 PROCEDURE — 99214 OFFICE O/P EST MOD 30 MIN: CPT | Performed by: STUDENT IN AN ORGANIZED HEALTH CARE EDUCATION/TRAINING PROGRAM

## 2024-10-01 PROCEDURE — 77300 RADIATION THERAPY DOSE PLAN: CPT | Performed by: RADIOLOGY

## 2024-10-01 PROCEDURE — 77338 DESIGN MLC DEVICE FOR IMRT: CPT | Performed by: RADIOLOGY

## 2024-10-01 PROCEDURE — 77301 RADIOTHERAPY DOSE PLAN IMRT: CPT | Performed by: RADIOLOGY

## 2024-10-01 PROCEDURE — 1036F TOBACCO NON-USER: CPT | Performed by: STUDENT IN AN ORGANIZED HEALTH CARE EDUCATION/TRAINING PROGRAM

## 2024-10-01 ASSESSMENT — ENCOUNTER SYMPTOMS
OCCASIONAL FEELINGS OF UNSTEADINESS: 0
DEPRESSION: 0
LOSS OF SENSATION IN FEET: 0

## 2024-10-01 ASSESSMENT — PAIN SCALES - GENERAL: PAINLEVEL: 0-NO PAIN

## 2024-10-01 NOTE — PROGRESS NOTES
Patient ID: Macy Chavez is a 39 y.o. female.    The patient presents to clinic today for her history of breast cancer.     Cancer Staging   Malignant neoplasm of overlapping sites of right breast in female, estrogen receptor positive  Staging form: Breast, AJCC 8th Edition  - Clinical stage from 2/6/2024: Stage IIIA (cT3, cN2, cM0, G3, ER+, NC+, HER2-) - Signed by Denise Golden MD on 2/21/2024      Diagnostic/Therapeutic History:    11/03/2023: Bl screening Mammogram: negative       12/20/2023: Right breast mass for which US done, Nonspecific dense fibroglandular tissues in the right breast in the area of reported lump. Dense fibroglandular tissue also evident throughout both breasts on screening mammogram. No discrete lesionidentified.    02/06/2024: Patient describes an increase in the size of a palpable lump in theright lower outer breast which was previously assessed in December of2023. Recent negative screening mammogram from 11/09/2023. She alsodescribes a new palpable lump in the right axilla. After being seen by her provider, a nother palpable lump is identified in the upper outer right breast.    Ultrasound of the right breast showed In the patient's area of palpable concern in the right breast at 7 o'clock, 5 cm from the nipple, there is a large vague heterogeneousmass with internal vascularity which has the appearance of dense fibroglandular tissue measuring up to 5 cm; it is visible protruding from the skin with discoloration. In the patient's area of palpable concern felt by her provider in the right breast at 10 o'clock, 8 cm from the nipple, there is an oval parallel heterogeneous mass withinternal vascularity measuring approximately 1.1 x 0.9 x 1.4 cm. 3 enlarged axillary LN were seen    02/06/2024: US guided core needle biopsy    A. BREAST, RIGHT, 7:00, 5 CM FN, CORE BIOPSY:   -- Invasive ductal carcinoma, grade 2, ER: 95%, NC: 5-10%, HER2 negative (1+)        B. BREAST, RIGHT, 10:00, 8 CM FN, CORE  BIOPSY:   -- Invasive ductal carcinoma, grade 2-3,  ER: 95%, AL<5%, HER2 negative (1+)         C. AXILLARY LYMPH NODE, RIGHT, BIOPSY:     -- Metastatic carcinoma, see note.      02/14/2024: Biopsy-proven multicentric right breast malignancy with the total  span of up to 13.0 cm associated with bulky metastatic axillary and  abnormal internal mammary lymphadenopathy. No evidence of chest wall,  skin or nipple-areolar complex involvement.    No MRI evidence of malignancy in the left breast.    02/15/2024: staging scans: negative- short term follow up     07/16/2024: completed ddAC followed by weekly taxol    08/14/2024: Dr Patel performed a bilateral skin sparing mastectomy with right sided SLNB and completion dissection with path showing an invasive ductal carcinoma  G2 measuring 25 mm, margins negative. She 5 total lymph nodes removed with 1 involved with macromets and 1 involved with micromets; final stage [id number] ER: 95%, , AL: 5-10%, HER2 negative (1+)      History of Present Illness (HPI)/Interval History:    Overall doing okay, recovering well from chemo. Does have intermittent pain and discomfort at site of surgery,. No fever, no chills, no chest pain, no SOB, no GI side effects. Does mention weight gain since finishing chemo    PMH: none     PSH: no surgery    Allergies/meds: negative     Reproductive hx: Menarche: 12, AFLB: 27, Menopause: no, HRT: no     Family history: Maternal Grandfather: Kidney versus Liver cancer,Paternal Grandmother: Soft cell sarcoma-        Review of Systems:  14-point ROS otherwise negative, as per HPI.    Past Medical History:   Diagnosis Date    Breast cancer (Multi)     Cancer (Multi)     Chest tightness 05/24/2024    Hyperlipidemia     Other specified health status 09/29/2016    No pertinent past medical history    Shortness of breath 05/24/2024       Past Surgical History:   Procedure Laterality Date    OTHER SURGICAL HISTORY  09/29/2016    Oral Surgery Tooth Extraction  [facility]    WISDOM TOOTH EXTRACTION  6/1/2000       Social History     Socioeconomic History    Marital status:    Tobacco Use    Smoking status: Never    Smokeless tobacco: Never   Vaping Use    Vaping status: Never Used   Substance and Sexual Activity    Alcohol use: Yes     Alcohol/week: 1.0 standard drink of alcohol     Types: 1 Glasses of wine per week     Comment: A few drinks a month    Drug use: Never    Sexual activity: Yes     Partners: Male     Birth control/protection: I.U.D.     Social Determinants of Health     Financial Resource Strain: Low Risk  (8/14/2024)    Overall Financial Resource Strain (CARDIA)     Difficulty of Paying Living Expenses: Not very hard   Transportation Needs: No Transportation Needs (8/14/2024)    PRAPARE - Transportation     Lack of Transportation (Medical): No     Lack of Transportation (Non-Medical): No   Housing Stability: Low Risk  (8/14/2024)    Housing Stability Vital Sign     Unable to Pay for Housing in the Last Year: No     Number of Times Moved in the Last Year: 0     Homeless in the Last Year: No       Allergies   Allergen Reactions    Dog Dander Itching         Current Outpatient Medications:     atorvastatin (Lipitor) 20 mg tablet, Take 1 tablet (20 mg) by mouth once daily., Disp: 90 tablet, Rfl: 3    b complex 0.4 mg tablet, Take 1 tablet by mouth once daily., Disp: , Rfl:     diclofenac sodium (Voltaren Arthritis Pain) 1 % gel, Apply to breast 2-3 times daily as needed for pain, Disp: 50 g, Rfl: 1    docusate sodium (Colace) 100 mg capsule, Take 1 capsule (100 mg) by mouth 2 times a day as needed for constipation., Disp: 10 capsule, Rfl: 0    loratadine (Claritin) 10 mg tablet, Take 1 tablet (10 mg) by mouth once daily., Disp: , Rfl:     metoprolol succinate XL (Toprol-XL) 25 mg 24 hr tablet, Take 1 tablet (25 mg) by mouth once daily. Do not crush or chew., Disp: 90 tablet, Rfl: 3    oxyCODONE (Roxicodone) 5 mg immediate release tablet, Take 1 tablet  (5 mg) by mouth every 6 hours if needed for severe pain (7 - 10)., Disp: 15 tablet, Rfl: 0    prochlorperazine (Compazine) 10 mg tablet, Take 1 tablet (10 mg) by mouth every 6 hours if needed for nausea or vomiting., Disp: 30 tablet, Rfl: 2    valACYclovir (Valtrex) 500 mg tablet, Take 1 tablet (500 mg) by mouth once daily., Disp: 30 tablet, Rfl: 2     Objective    BSA: 1.88 meters squared  /84 (BP Location: Right arm, Patient Position: Sitting, BP Cuff Size: Adult) Comment (BP Location): forearm  Pulse 70   Temp 36.3 °C (97.3 °F) (Temporal)   Resp 18   Wt 73.7 kg (162 lb 6.4 oz)   SpO2 98%   BMI 24.69 kg/m²     ECOG Performance Status: 0    GA: alert, oriented, cooperative  HEENT: anicteric sclera, well injected conjunctiva  Heart: RRR, no murmurs or gallops heard  Lung: CTAB  Abd: +BS, soft, non tender  Breast: well healed breast incision, no new Lymph nodes or nodules     Laboratory Data:  Lab Results   Component Value Date    WBC 8.1 08/07/2024    HGB 14.1 08/07/2024    HCT 42.0 08/07/2024    MCV 95 08/07/2024     08/07/2024    ANC 7.27 04/29/2024       Chemistry    Lab Results   Component Value Date/Time     08/07/2024 1008    K 4.4 08/07/2024 1008     08/07/2024 1008    CO2 29 08/07/2024 1008    BUN 15 08/07/2024 1008    CREATININE 0.86 08/07/2024 1008    Lab Results   Component Value Date/Time    CALCIUM 10.1 08/07/2024 1008    ALKPHOS 50 07/16/2024 0848    AST 18 07/16/2024 0848    ALT 28 07/16/2024 0848    BILITOT 0.4 07/16/2024 0848             Radiology:  Rad Onc CT Sim Images  These images are not reportable by radiology and will not be interpreted   by  Radiologists.       MR breast bilateral w contrast full protocol 02/14/2024    Narrative  Interpreted By:  Riki Flor,  STUDY:  BI MR BREAST BILATERAL WITH CONTRAST FULL PROTOCOL;  2/14/2024 1:33 pm    ACCESSION NUMBER(S):  [id number]    ORDERING CLINICIAN:  VITOR HARO    INDICATION:  38-year-old woman status  post ultrasound-guided biopsy of right  breast masses at 7 o'clock and 10 o'clock and a right axillary lymph  demonstrating grade 2 invasive ductal carcinoma at both sites with  axillary simon metastasis.    COMPARISON:  Screening mammogram 11/09/2023, right breast ultrasound 12/20/2023,  02/06/2024; ultrasound-guided biopsy 02/06/2024.    TECHNIQUE:  Using a dedicated breast coil, STIR axial and T1-weighted fat  saturation axial images of the breasts were obtained, the latter both  before and after intravenous administration of Gadolinium DTPA. On an  independent workstation, 3-D images were formulated using [employer]  including time enhancement curves, subtraction images and MIP images.    Intravenous contrast:  14 mL of Dotarem    FINDINGS:  There is asymmetric moderate bilateral background enhancement.    Extreme fibroglandular tissue.    RIGHT BREAST:  There are multiple contiguous masses and nonmass  enhancements occupying the entire lateral and central inferior right  breast including at the two sites of biopsy-proven malignancy at 7  o'clock and 10 o'clock, with a total span of 8.5 x 4.5 x 13.0 cm (AP  x TR x CC), better appreciated on the MIP and sagittal reconstruction  images. There is no involvement of the skin, nipple-areolar complex,  or pectoralis major muscle. The most anterior extent of malignancy is  located approximately 2 cm from the base of the nipple.    Within the span of malignancy, more confluent findings are as follows:    - Dominant irregular heterogeneously enhancing mass in the inferior  lateral and central inferior breast at anterior and middle depths  measuring 5.0 x 4.7 x 4.5 cm, corresponding to the site of biopsy at  7 o'clock (series 7, image 127/176).    - Irregular heterogeneously enhancing mass in superior lateral breast  at posterior depth measuring 2.7 x 2.5 x 2.5 cm, corresponding to the  site of biopsy at 10 o'clock (series 7, image 48/176).    - More confluent nonmass  enhancement in superior lateral breast at  middle to posterior depth measuring 3.8 x 2.0 x 2.5 cm (series 7,  image 73/176).    - A small satellite mass in superior lateral breast at middle depth  measuring 1.0 x 1.0 x 0.8 cm located 0.8 cm superolateral to the  dominant mass (series 7, image 108/176).    Evaluation of the axilla is limited due to incomplete coverage in the  field of view. A conglomerate of at least 3 enlarged level I right  lymph nodes associated with a biopsy clip is partially visualized  measuring at least 4.2 x 2.5 x 3.5 cm (series 6, image 1/176).    An abnormal internal mammary lymph node is seen in the 2nd  intercostal space measuring 1.5 x 1.2 x 1.6 cm    LEFT BREAST: No suspicious mass or nonmass enhancement is identified.  There are numerous scattered small benign cysts.    No axillary or internal mammary lymphadenopathy is appreciated within  limitations of limited field of view.    NON-BREAST FINDINGS:  None.    Impression  1. Biopsy-proven multicentric right breast malignancy with the total  span of up to 13.0 cm associated with bulky metastatic axillary and  abnormal internal mammary lymphadenopathy. No evidence of chest wall,  skin or nipple-areolar complex involvement.  2. No MRI evidence of malignancy in the left breast.    BI-RADS CATEGORY:  BI-RADS CATEGORY:  6 Known Biopsy-Proven Malignancy.  Recommendation:  Immediate Follow-up.  Recommended Date:  Immediate.  Laterality:  Right.    For any future breast imaging appointments, please call [phone]).      MACRO:  None    Signed by: Riki Flor 2/15/2024 10:43 AM  Dictation workstation:   [id number]          Assessment/Plan:    Macy is a 39 year old female in excellent health with a recent diagnosis of right sided breast cancer. Macy felt a mass in her right breast in December that prompted diagnostic evaluation.     Ultrasound of the right breast showed In the patient's area of palpable concern in the right  breast at 7 o'clock, 5 cm from the nipple, there is a large vague heterogeneousmass with internal vascularity which has the appearance of dense fibroglandular tissue measuring up to 5 cm;  In the patient's area of palpable concern felt by her provider in the right breast at 10 o'clock, 8 cm from the nipple, there is an oval parallel heterogeneous mass withinternal vascularity measuring approximately 1.1 x 0.9 x 1.4 cm. 3 enlarged axillary LN were seen    She had US guided core needle biopsy of  right breast at 7:00, 5 CM FN,  that showed invasive ductal carcinoma, grade 2, ER: 95%, IN: 5-10%, HER2 negative (1+).  And US guided core needle biopsy of the right breast at  10:00, 8 CM FN, that showed  Invasive ductal carcinoma, grade 2-3,  ER: 95%, IN<5%, HER2 negative (1+). Axillary LN consistent with metastatic carcinoma     On MRI she a  multicentric right breast malignancy with the total span of up to 13.0 cm associated with bulky metastatic axillary and abnormal internal mammary lymphadenopathy. No evidence of chest wall, skin or nipple-areolar complex involvement.     Current stage [id number] (staging scans negative for distant mets)  ECOG PS: 0    I reviewed with her the events that led to her diagnosis of breast cancer. We reviewed all the procedures and diagnostic imaging she underwent thus far. I discussed the features of her breast cancer that include the size, grade, lymph node status and  hormone receptor/ her2-jair status.      We discussed neoadjuvant systemic therapy. The goal of treatment would be to downstage her cancer, assess tumor chemosensitivity and treat potential micrometastatic disease. I would treat, given extensive of disease with anthracyclines based regimen ddAC Q2 weeks x4 followed by taxol weekly x12     07/16/2024: completed ddAC followed by weekly taxol    08/14/2024: Dr Patel performed a bilateral skin sparing mastectomy with right sided SLNB and completion dissection with path showing  an invasive ductal carcinoma  G2 measuring 25 mm, margins negative. She 5 total lymph nodes removed with 1 involved with macromets and 1 involved with micromets; final stage [id number] ER: 95%, , AL: 5-10%, HER2 negative (1+)    - mild drop in EF: 65% ->55-60% on her most recent echo- saw Dr Mares, did have sinus tachycardia, not clear how much is due to anxiety. She was started on low dose beta blocker - last echo in 08/02/2024 showed EF of 60%  - Elevated LDL- non pharmacological measures for now- follows with cardiology  - plan for adjuvant radiation therapy followed by adjuvant aromatase inhibitor and ovarian suppression in addition to adjuvant verzenio for 2 years. Will also discuss adjuvant bisphosphonates next visit     RTC after radiation therapy      Denise Golden MD  Hematology and Medical Oncology  [facility]

## 2024-10-02 ENCOUNTER — APPOINTMENT (OUTPATIENT)
Dept: OCCUPATIONAL THERAPY | Facility: HOSPITAL | Age: 39
End: 2024-10-02
Payer: COMMERCIAL

## 2024-10-07 ENCOUNTER — HOSPITAL ENCOUNTER (OUTPATIENT)
Dept: RADIATION ONCOLOGY | Facility: CLINIC | Age: 39
Setting detail: RADIATION/ONCOLOGY SERIES
Discharge: HOME | End: 2024-10-07
Payer: COMMERCIAL

## 2024-10-07 PROCEDURE — 77385 HC INTENSITY-MODULATED RADIATION THERAPY (IMRT), SIMPLE: CPT | Performed by: RADIOLOGY

## 2024-10-08 ENCOUNTER — RADIATION ONCOLOGY OTV (OUTPATIENT)
Dept: RADIATION ONCOLOGY | Facility: CLINIC | Age: 39
End: 2024-10-08
Payer: COMMERCIAL

## 2024-10-08 ENCOUNTER — HOSPITAL ENCOUNTER (OUTPATIENT)
Dept: RADIATION ONCOLOGY | Facility: CLINIC | Age: 39
Setting detail: RADIATION/ONCOLOGY SERIES
Discharge: HOME | End: 2024-10-08
Payer: COMMERCIAL

## 2024-10-08 VITALS — WEIGHT: 161.6 LBS | BODY MASS INDEX: 24.57 KG/M2 | TEMPERATURE: 97.7 F

## 2024-10-08 DIAGNOSIS — C50.511 MALIGNANT NEOPLASM OF LOWER-OUTER QUADRANT OF RIGHT FEMALE BREAST: ICD-10-CM

## 2024-10-08 DIAGNOSIS — Z51.0 ENCOUNTER FOR ANTINEOPLASTIC RADIATION THERAPY: ICD-10-CM

## 2024-10-08 DIAGNOSIS — C77.3 SECONDARY AND UNSPECIFIED MALIGNANT NEOPLASM OF AXILLA AND UPPER LIMB LYMPH NODES (MULTI): ICD-10-CM

## 2024-10-08 DIAGNOSIS — C50.411 MALIGNANT NEOPLASM OF UPPER-OUTER QUADRANT OF RIGHT FEMALE BREAST: ICD-10-CM

## 2024-10-08 LAB
RAD ONC MSQ ACTUAL FRACTIONS DELIVERED: 2
RAD ONC MSQ ACTUAL SESSION DELIVERED DOSE: 266 CGRAY
RAD ONC MSQ ACTUAL TOTAL DOSE: 532 CGRAY
RAD ONC MSQ ELAPSED DAYS: 1
RAD ONC MSQ LAST DATE: NORMAL
RAD ONC MSQ PRESCRIBED FRACTIONAL DOSE: 266 CGRAY
RAD ONC MSQ PRESCRIBED NUMBER OF FRACTIONS: 16
RAD ONC MSQ PRESCRIBED TECHNIQUE: NORMAL
RAD ONC MSQ PRESCRIBED TOTAL DOSE: 4256 CGRAY
RAD ONC MSQ PRESCRIPTION PATTERN COMMENT: NORMAL
RAD ONC MSQ START DATE: NORMAL
RAD ONC MSQ TREATMENT COURSE NUMBER: 1
RAD ONC MSQ TREATMENT SITE: NORMAL

## 2024-10-08 PROCEDURE — 77385 HC INTENSITY-MODULATED RADIATION THERAPY (IMRT), SIMPLE: CPT | Performed by: RADIOLOGY

## 2024-10-08 ASSESSMENT — PAIN SCALES - GENERAL: PAINLEVEL: 0-NO PAIN

## 2024-10-08 NOTE — PROGRESS NOTES
Radiation Oncology On Treatment Visit    Patient Name:  Macy Chavez  MRN:  [id number]  :  1985    Referring Provider: No ref. provider found  Primary Care Provider: ALFONSO Valiente  Care Team: Patient Care Team:  ALFONSO Valiente as PCP - General (Internal Medicine)  Denise Golden MD as Consulting Physician (Hematology and Oncology)  Zainab Larson MD as Cardiologist (Cardiology)    Date of Service: 10/8/2024     Diagnosis:   Specialty Problems          Radiation Oncology Problems    Malignant neoplasm of overlapping sites of right breast in female, estrogen receptor positive        Breast cancer in female         Treatment Summary:  IMRT: Right Breast with lymph nodes    Treatment Period Technique Fraction Dose Fractions Total Dose   Course 1 10/7/2024-10/8/2024  (days elapsed: 1)         CW_CNI_R 10/7/2024-10/8/2024 VMAT 266 / 266 cGy  16 532 / 4,256 cGy     SUBJECTIVE: Patient tolerating radiation treatment without complaint. No new changes this week. Denies fatigue, swelling, skin changes, pain or pruritus. Reviewed skin care recommendations with patient. Toxicity as noted below.          OBJECTIVE:   Vital Signs:  Temp 36.5 °C (97.7 °F)   Wt 73.3 kg (161 lb 9.6 oz)   BMI 24.57 kg/m²     Other Pertinent Findings:     Toxicity Assessment          10/8/2024    10:00   Toxicity Assessment   Adverse Events Reviewed (WDL) Yes (Within Defined Limits)   Treatment Site Breast   Anorexia Grade 0   Dehydration Grade 0   Dermatitis Radiation Grade 0   Fatigue Grade 0   Nausea Grade 0   Pain Grade 0   Joint Range of Motion Decreased Grade 0       minor decreased range of motion   Breast Pain Grade 0       intermittent tenderness        Assessment / Plan:  The patient is tolerating radiation therapy as anticipated.  Continue per current treatment plan.       Vince Arita MD, PhD  Attending Physician

## 2024-10-09 ENCOUNTER — HOSPITAL ENCOUNTER (OUTPATIENT)
Dept: RADIATION ONCOLOGY | Facility: CLINIC | Age: 39
Setting detail: RADIATION/ONCOLOGY SERIES
Discharge: HOME | End: 2024-10-09
Payer: COMMERCIAL

## 2024-10-09 DIAGNOSIS — C77.3 SECONDARY AND UNSPECIFIED MALIGNANT NEOPLASM OF AXILLA AND UPPER LIMB LYMPH NODES (MULTI): ICD-10-CM

## 2024-10-09 DIAGNOSIS — Z51.0 ENCOUNTER FOR ANTINEOPLASTIC RADIATION THERAPY: ICD-10-CM

## 2024-10-09 DIAGNOSIS — C50.411 MALIGNANT NEOPLASM OF UPPER-OUTER QUADRANT OF RIGHT FEMALE BREAST: ICD-10-CM

## 2024-10-09 DIAGNOSIS — C50.511 MALIGNANT NEOPLASM OF LOWER-OUTER QUADRANT OF RIGHT FEMALE BREAST: ICD-10-CM

## 2024-10-09 LAB
RAD ONC MSQ ACTUAL FRACTIONS DELIVERED: 3
RAD ONC MSQ ACTUAL SESSION DELIVERED DOSE: 266 CGRAY
RAD ONC MSQ ACTUAL TOTAL DOSE: 798 CGRAY
RAD ONC MSQ ELAPSED DAYS: 2
RAD ONC MSQ LAST DATE: NORMAL
RAD ONC MSQ PRESCRIBED FRACTIONAL DOSE: 266 CGRAY
RAD ONC MSQ PRESCRIBED NUMBER OF FRACTIONS: 16
RAD ONC MSQ PRESCRIBED TECHNIQUE: NORMAL
RAD ONC MSQ PRESCRIBED TOTAL DOSE: 4256 CGRAY
RAD ONC MSQ PRESCRIPTION PATTERN COMMENT: NORMAL
RAD ONC MSQ START DATE: NORMAL
RAD ONC MSQ TREATMENT COURSE NUMBER: 1
RAD ONC MSQ TREATMENT SITE: NORMAL

## 2024-10-09 PROCEDURE — 77385 HC INTENSITY-MODULATED RADIATION THERAPY (IMRT), SIMPLE: CPT | Performed by: RADIOLOGY

## 2024-10-09 PROCEDURE — 77336 RADIATION PHYSICS CONSULT: CPT | Performed by: RADIOLOGY

## 2024-10-10 ENCOUNTER — HOSPITAL ENCOUNTER (OUTPATIENT)
Dept: RADIATION ONCOLOGY | Facility: CLINIC | Age: 39
Setting detail: RADIATION/ONCOLOGY SERIES
Discharge: HOME | End: 2024-10-10
Payer: COMMERCIAL

## 2024-10-10 DIAGNOSIS — C50.411 MALIGNANT NEOPLASM OF UPPER-OUTER QUADRANT OF RIGHT FEMALE BREAST: ICD-10-CM

## 2024-10-10 DIAGNOSIS — C50.511 MALIGNANT NEOPLASM OF LOWER-OUTER QUADRANT OF RIGHT FEMALE BREAST: ICD-10-CM

## 2024-10-10 DIAGNOSIS — Z51.0 ENCOUNTER FOR ANTINEOPLASTIC RADIATION THERAPY: ICD-10-CM

## 2024-10-10 DIAGNOSIS — C77.3 SECONDARY AND UNSPECIFIED MALIGNANT NEOPLASM OF AXILLA AND UPPER LIMB LYMPH NODES (MULTI): ICD-10-CM

## 2024-10-10 LAB
RAD ONC MSQ ACTUAL FRACTIONS DELIVERED: 4
RAD ONC MSQ ACTUAL SESSION DELIVERED DOSE: 266 CGRAY
RAD ONC MSQ ACTUAL TOTAL DOSE: 1064 CGRAY
RAD ONC MSQ ELAPSED DAYS: 3
RAD ONC MSQ LAST DATE: NORMAL
RAD ONC MSQ PRESCRIBED FRACTIONAL DOSE: 266 CGRAY
RAD ONC MSQ PRESCRIBED NUMBER OF FRACTIONS: 16
RAD ONC MSQ PRESCRIBED TECHNIQUE: NORMAL
RAD ONC MSQ PRESCRIBED TOTAL DOSE: 4256 CGRAY
RAD ONC MSQ PRESCRIPTION PATTERN COMMENT: NORMAL
RAD ONC MSQ START DATE: NORMAL
RAD ONC MSQ TREATMENT COURSE NUMBER: 1
RAD ONC MSQ TREATMENT SITE: NORMAL

## 2024-10-10 PROCEDURE — 77385 HC INTENSITY-MODULATED RADIATION THERAPY (IMRT), SIMPLE: CPT | Performed by: RADIOLOGY

## 2024-10-11 ENCOUNTER — HOSPITAL ENCOUNTER (OUTPATIENT)
Dept: RADIATION ONCOLOGY | Facility: CLINIC | Age: 39
Setting detail: RADIATION/ONCOLOGY SERIES
Discharge: HOME | End: 2024-10-11
Payer: COMMERCIAL

## 2024-10-11 DIAGNOSIS — Z51.0 ENCOUNTER FOR ANTINEOPLASTIC RADIATION THERAPY: ICD-10-CM

## 2024-10-11 DIAGNOSIS — C50.411 MALIGNANT NEOPLASM OF UPPER-OUTER QUADRANT OF RIGHT FEMALE BREAST: ICD-10-CM

## 2024-10-11 DIAGNOSIS — C77.3 SECONDARY AND UNSPECIFIED MALIGNANT NEOPLASM OF AXILLA AND UPPER LIMB LYMPH NODES (MULTI): ICD-10-CM

## 2024-10-11 DIAGNOSIS — C50.511 MALIGNANT NEOPLASM OF LOWER-OUTER QUADRANT OF RIGHT FEMALE BREAST: ICD-10-CM

## 2024-10-11 LAB
RAD ONC MSQ ACTUAL FRACTIONS DELIVERED: 5
RAD ONC MSQ ACTUAL SESSION DELIVERED DOSE: 266 CGRAY
RAD ONC MSQ ACTUAL TOTAL DOSE: 1330 CGRAY
RAD ONC MSQ ELAPSED DAYS: 4
RAD ONC MSQ LAST DATE: NORMAL
RAD ONC MSQ PRESCRIBED FRACTIONAL DOSE: 266 CGRAY
RAD ONC MSQ PRESCRIBED NUMBER OF FRACTIONS: 16
RAD ONC MSQ PRESCRIBED TECHNIQUE: NORMAL
RAD ONC MSQ PRESCRIBED TOTAL DOSE: 4256 CGRAY
RAD ONC MSQ PRESCRIPTION PATTERN COMMENT: NORMAL
RAD ONC MSQ START DATE: NORMAL
RAD ONC MSQ TREATMENT COURSE NUMBER: 1
RAD ONC MSQ TREATMENT SITE: NORMAL

## 2024-10-11 PROCEDURE — 77385 HC INTENSITY-MODULATED RADIATION THERAPY (IMRT), SIMPLE: CPT | Performed by: RADIOLOGY

## 2024-10-14 ENCOUNTER — HOSPITAL ENCOUNTER (OUTPATIENT)
Dept: RADIATION ONCOLOGY | Facility: CLINIC | Age: 39
Setting detail: RADIATION/ONCOLOGY SERIES
Discharge: HOME | End: 2024-10-14

## 2024-10-14 DIAGNOSIS — C77.3 SECONDARY AND UNSPECIFIED MALIGNANT NEOPLASM OF AXILLA AND UPPER LIMB LYMPH NODES (MULTI): ICD-10-CM

## 2024-10-14 DIAGNOSIS — C50.411 MALIGNANT NEOPLASM OF UPPER-OUTER QUADRANT OF RIGHT FEMALE BREAST: ICD-10-CM

## 2024-10-14 DIAGNOSIS — Z51.0 ENCOUNTER FOR ANTINEOPLASTIC RADIATION THERAPY: ICD-10-CM

## 2024-10-14 DIAGNOSIS — C50.511 MALIGNANT NEOPLASM OF LOWER-OUTER QUADRANT OF RIGHT FEMALE BREAST: ICD-10-CM

## 2024-10-14 LAB
RAD ONC MSQ ACTUAL FRACTIONS DELIVERED: 6
RAD ONC MSQ ACTUAL SESSION DELIVERED DOSE: 266 CGRAY
RAD ONC MSQ ACTUAL TOTAL DOSE: 1596 CGRAY
RAD ONC MSQ ELAPSED DAYS: 7
RAD ONC MSQ LAST DATE: NORMAL
RAD ONC MSQ PRESCRIBED FRACTIONAL DOSE: 266 CGRAY
RAD ONC MSQ PRESCRIBED NUMBER OF FRACTIONS: 16
RAD ONC MSQ PRESCRIBED TECHNIQUE: NORMAL
RAD ONC MSQ PRESCRIBED TOTAL DOSE: 4256 CGRAY
RAD ONC MSQ PRESCRIPTION PATTERN COMMENT: NORMAL
RAD ONC MSQ START DATE: NORMAL
RAD ONC MSQ TREATMENT COURSE NUMBER: 1
RAD ONC MSQ TREATMENT SITE: NORMAL

## 2024-10-14 PROCEDURE — 77385 HC INTENSITY-MODULATED RADIATION THERAPY (IMRT), SIMPLE: CPT | Performed by: RADIOLOGY

## 2024-10-15 ENCOUNTER — HOSPITAL ENCOUNTER (OUTPATIENT)
Dept: RADIATION ONCOLOGY | Facility: CLINIC | Age: 39
Setting detail: RADIATION/ONCOLOGY SERIES
Discharge: HOME | End: 2024-10-15

## 2024-10-15 ENCOUNTER — RADIATION ONCOLOGY OTV (OUTPATIENT)
Dept: RADIATION ONCOLOGY | Facility: CLINIC | Age: 39
End: 2024-10-15

## 2024-10-15 VITALS — BODY MASS INDEX: 24.7 KG/M2 | WEIGHT: 162.48 LBS | TEMPERATURE: 97 F

## 2024-10-15 DIAGNOSIS — C77.3 SECONDARY AND UNSPECIFIED MALIGNANT NEOPLASM OF AXILLA AND UPPER LIMB LYMPH NODES (MULTI): ICD-10-CM

## 2024-10-15 DIAGNOSIS — Z51.0 ENCOUNTER FOR ANTINEOPLASTIC RADIATION THERAPY: ICD-10-CM

## 2024-10-15 DIAGNOSIS — C50.411 MALIGNANT NEOPLASM OF UPPER-OUTER QUADRANT OF RIGHT FEMALE BREAST: ICD-10-CM

## 2024-10-15 DIAGNOSIS — C50.511 MALIGNANT NEOPLASM OF LOWER-OUTER QUADRANT OF RIGHT FEMALE BREAST: ICD-10-CM

## 2024-10-15 LAB
RAD ONC MSQ ACTUAL FRACTIONS DELIVERED: 7
RAD ONC MSQ ACTUAL SESSION DELIVERED DOSE: 266 CGRAY
RAD ONC MSQ ACTUAL TOTAL DOSE: 1862 CGRAY
RAD ONC MSQ ELAPSED DAYS: 8
RAD ONC MSQ LAST DATE: NORMAL
RAD ONC MSQ PRESCRIBED FRACTIONAL DOSE: 266 CGRAY
RAD ONC MSQ PRESCRIBED NUMBER OF FRACTIONS: 16
RAD ONC MSQ PRESCRIBED TECHNIQUE: NORMAL
RAD ONC MSQ PRESCRIBED TOTAL DOSE: 4256 CGRAY
RAD ONC MSQ PRESCRIPTION PATTERN COMMENT: NORMAL
RAD ONC MSQ START DATE: NORMAL
RAD ONC MSQ TREATMENT COURSE NUMBER: 1
RAD ONC MSQ TREATMENT SITE: NORMAL

## 2024-10-15 PROCEDURE — 77385 HC INTENSITY-MODULATED RADIATION THERAPY (IMRT), SIMPLE: CPT | Performed by: RADIOLOGY

## 2024-10-15 ASSESSMENT — PAIN SCALES - GENERAL: PAINLEVEL: 0-NO PAIN

## 2024-10-15 NOTE — PROGRESS NOTES
Radiation Oncology On Treatment Visit    Patient Name:  Macy Chavez  MRN:  [id number]  :  1985    Referring Provider: No ref. provider found  Primary Care Provider: ALFONSO Valiente  Care Team: Patient Care Team:  ALFONSO Valiente as PCP - General (Internal Medicine)  Denise Golden MD as Consulting Physician (Hematology and Oncology)  Zainab Larson MD as Cardiologist (Cardiology)    Date of Service: 10/15/2024     Diagnosis:   Specialty Problems          Radiation Oncology Problems    Malignant neoplasm of overlapping sites of right breast in female, estrogen receptor positive        Breast cancer in female         Treatment Summary:  IMRT: Right Breast with lymph nodes    Treatment Period Technique Fraction Dose Fractions Total Dose   Course 1 10/7/2024-10/18/2024  (days elapsed: 11)         CW_CNI_R 10/7/2024-10/18/2024 VMAT 266 / 266 cGy 10 / 16 2660 / 4,256 cGy     SUBJECTIVE: Patient tolerating radiation treatment without complaint. No new changes this week. Denies swelling, skin changes or pruritus. Intermittent tenderness on right side of body and fatigue. Reviewed skin care recommendations with patient. Toxicity as noted below.            OBJECTIVE:   Vital Signs:  Temp 36.1 °C (97 °F) (Temporal)   Wt 73.7 kg (162 lb 7.7 oz)   BMI 24.70 kg/m²     Other Pertinent Findings:     Toxicity Assessment          10/8/2024    10:00 10/15/2024    10:00   Toxicity Assessment   Adverse Events Reviewed (WDL) Yes (Within Defined Limits) Yes (Within Defined Limits)   Treatment Site Breast Breast   Anorexia Grade 0 Grade 0   Dehydration Grade 0 Grade 0   Dermatitis Radiation Grade 0 Grade 0   Fatigue Grade 0 Grade 1   Nausea Grade 0 Grade 0   Pain Grade 0 Grade 0   Joint Range of Motion Decreased Grade 0       minor decreased range of motion    Breast Pain Grade 0       intermittent tenderness Grade 1       increased tenderness on right side of body        Assessment / Plan:  The patient is  tolerating radiation therapy as anticipated.  Continue per current treatment plan.       Vince Arita MD, PhD  Attending Physician

## 2024-10-16 ENCOUNTER — HOSPITAL ENCOUNTER (OUTPATIENT)
Dept: RADIATION ONCOLOGY | Facility: CLINIC | Age: 39
Setting detail: RADIATION/ONCOLOGY SERIES
Discharge: HOME | End: 2024-10-16

## 2024-10-16 DIAGNOSIS — C50.511 MALIGNANT NEOPLASM OF LOWER-OUTER QUADRANT OF RIGHT FEMALE BREAST: ICD-10-CM

## 2024-10-16 DIAGNOSIS — Z51.0 ENCOUNTER FOR ANTINEOPLASTIC RADIATION THERAPY: ICD-10-CM

## 2024-10-16 DIAGNOSIS — C50.411 MALIGNANT NEOPLASM OF UPPER-OUTER QUADRANT OF RIGHT FEMALE BREAST: ICD-10-CM

## 2024-10-16 DIAGNOSIS — C77.3 SECONDARY AND UNSPECIFIED MALIGNANT NEOPLASM OF AXILLA AND UPPER LIMB LYMPH NODES (MULTI): ICD-10-CM

## 2024-10-16 LAB
RAD ONC MSQ ACTUAL FRACTIONS DELIVERED: 8
RAD ONC MSQ ACTUAL SESSION DELIVERED DOSE: 266 CGRAY
RAD ONC MSQ ACTUAL TOTAL DOSE: 2128 CGRAY
RAD ONC MSQ ELAPSED DAYS: 9
RAD ONC MSQ LAST DATE: NORMAL
RAD ONC MSQ PRESCRIBED FRACTIONAL DOSE: 266 CGRAY
RAD ONC MSQ PRESCRIBED NUMBER OF FRACTIONS: 16
RAD ONC MSQ PRESCRIBED TECHNIQUE: NORMAL
RAD ONC MSQ PRESCRIBED TOTAL DOSE: 4256 CGRAY
RAD ONC MSQ PRESCRIPTION PATTERN COMMENT: NORMAL
RAD ONC MSQ START DATE: NORMAL
RAD ONC MSQ TREATMENT COURSE NUMBER: 1
RAD ONC MSQ TREATMENT SITE: NORMAL

## 2024-10-16 PROCEDURE — 77385 HC INTENSITY-MODULATED RADIATION THERAPY (IMRT), SIMPLE: CPT | Performed by: RADIOLOGY

## 2024-10-16 PROCEDURE — 77336 RADIATION PHYSICS CONSULT: CPT | Performed by: RADIOLOGY

## 2024-10-17 ENCOUNTER — HOSPITAL ENCOUNTER (OUTPATIENT)
Dept: RADIATION ONCOLOGY | Facility: CLINIC | Age: 39
Setting detail: RADIATION/ONCOLOGY SERIES
Discharge: HOME | End: 2024-10-17

## 2024-10-17 DIAGNOSIS — C50.511 MALIGNANT NEOPLASM OF LOWER-OUTER QUADRANT OF RIGHT FEMALE BREAST: ICD-10-CM

## 2024-10-17 DIAGNOSIS — C77.3 SECONDARY AND UNSPECIFIED MALIGNANT NEOPLASM OF AXILLA AND UPPER LIMB LYMPH NODES (MULTI): ICD-10-CM

## 2024-10-17 DIAGNOSIS — Z51.0 ENCOUNTER FOR ANTINEOPLASTIC RADIATION THERAPY: ICD-10-CM

## 2024-10-17 DIAGNOSIS — C50.411 MALIGNANT NEOPLASM OF UPPER-OUTER QUADRANT OF RIGHT FEMALE BREAST: ICD-10-CM

## 2024-10-17 LAB
RAD ONC MSQ ACTUAL FRACTIONS DELIVERED: 9
RAD ONC MSQ ACTUAL SESSION DELIVERED DOSE: 266 CGRAY
RAD ONC MSQ ACTUAL TOTAL DOSE: 2394 CGRAY
RAD ONC MSQ ELAPSED DAYS: 10
RAD ONC MSQ LAST DATE: NORMAL
RAD ONC MSQ PRESCRIBED FRACTIONAL DOSE: 266 CGRAY
RAD ONC MSQ PRESCRIBED NUMBER OF FRACTIONS: 16
RAD ONC MSQ PRESCRIBED TECHNIQUE: NORMAL
RAD ONC MSQ PRESCRIBED TOTAL DOSE: 4256 CGRAY
RAD ONC MSQ PRESCRIPTION PATTERN COMMENT: NORMAL
RAD ONC MSQ START DATE: NORMAL
RAD ONC MSQ TREATMENT COURSE NUMBER: 1
RAD ONC MSQ TREATMENT SITE: NORMAL

## 2024-10-17 PROCEDURE — 77385 HC INTENSITY-MODULATED RADIATION THERAPY (IMRT), SIMPLE: CPT | Performed by: RADIOLOGY

## 2024-10-18 ENCOUNTER — HOSPITAL ENCOUNTER (OUTPATIENT)
Dept: RADIATION ONCOLOGY | Facility: CLINIC | Age: 39
Setting detail: RADIATION/ONCOLOGY SERIES
Discharge: HOME | End: 2024-10-18
Payer: COMMERCIAL

## 2024-10-18 DIAGNOSIS — C77.3 SECONDARY AND UNSPECIFIED MALIGNANT NEOPLASM OF AXILLA AND UPPER LIMB LYMPH NODES (MULTI): ICD-10-CM

## 2024-10-18 DIAGNOSIS — C50.411 MALIGNANT NEOPLASM OF UPPER-OUTER QUADRANT OF RIGHT FEMALE BREAST: ICD-10-CM

## 2024-10-18 DIAGNOSIS — C50.511 MALIGNANT NEOPLASM OF LOWER-OUTER QUADRANT OF RIGHT FEMALE BREAST: ICD-10-CM

## 2024-10-18 DIAGNOSIS — Z51.0 ENCOUNTER FOR ANTINEOPLASTIC RADIATION THERAPY: ICD-10-CM

## 2024-10-18 LAB
RAD ONC MSQ ACTUAL FRACTIONS DELIVERED: 10
RAD ONC MSQ ACTUAL SESSION DELIVERED DOSE: 266 CGRAY
RAD ONC MSQ ACTUAL TOTAL DOSE: 2660 CGRAY
RAD ONC MSQ ELAPSED DAYS: 11
RAD ONC MSQ LAST DATE: NORMAL
RAD ONC MSQ PRESCRIBED FRACTIONAL DOSE: 266 CGRAY
RAD ONC MSQ PRESCRIBED NUMBER OF FRACTIONS: 16
RAD ONC MSQ PRESCRIBED TECHNIQUE: NORMAL
RAD ONC MSQ PRESCRIBED TOTAL DOSE: 4256 CGRAY
RAD ONC MSQ PRESCRIPTION PATTERN COMMENT: NORMAL
RAD ONC MSQ START DATE: NORMAL
RAD ONC MSQ TREATMENT COURSE NUMBER: 1
RAD ONC MSQ TREATMENT SITE: NORMAL

## 2024-10-18 PROCEDURE — 77385 HC INTENSITY-MODULATED RADIATION THERAPY (IMRT), SIMPLE: CPT | Performed by: RADIOLOGY

## 2024-10-21 ENCOUNTER — HOSPITAL ENCOUNTER (OUTPATIENT)
Dept: RADIATION ONCOLOGY | Facility: CLINIC | Age: 39
Setting detail: RADIATION/ONCOLOGY SERIES
Discharge: HOME | End: 2024-10-21
Payer: COMMERCIAL

## 2024-10-21 DIAGNOSIS — C77.3 SECONDARY AND UNSPECIFIED MALIGNANT NEOPLASM OF AXILLA AND UPPER LIMB LYMPH NODES (MULTI): ICD-10-CM

## 2024-10-21 DIAGNOSIS — C50.511 MALIGNANT NEOPLASM OF LOWER-OUTER QUADRANT OF RIGHT FEMALE BREAST: ICD-10-CM

## 2024-10-21 DIAGNOSIS — C50.411 MALIGNANT NEOPLASM OF UPPER-OUTER QUADRANT OF RIGHT FEMALE BREAST: ICD-10-CM

## 2024-10-21 DIAGNOSIS — Z51.0 ENCOUNTER FOR ANTINEOPLASTIC RADIATION THERAPY: ICD-10-CM

## 2024-10-21 LAB
RAD ONC MSQ ACTUAL FRACTIONS DELIVERED: 11
RAD ONC MSQ ACTUAL SESSION DELIVERED DOSE: 266 CGRAY
RAD ONC MSQ ACTUAL TOTAL DOSE: 2926 CGRAY
RAD ONC MSQ ELAPSED DAYS: 14
RAD ONC MSQ LAST DATE: NORMAL
RAD ONC MSQ PRESCRIBED FRACTIONAL DOSE: 266 CGRAY
RAD ONC MSQ PRESCRIBED NUMBER OF FRACTIONS: 16
RAD ONC MSQ PRESCRIBED TECHNIQUE: NORMAL
RAD ONC MSQ PRESCRIBED TOTAL DOSE: 4256 CGRAY
RAD ONC MSQ PRESCRIPTION PATTERN COMMENT: NORMAL
RAD ONC MSQ START DATE: NORMAL
RAD ONC MSQ TREATMENT COURSE NUMBER: 1
RAD ONC MSQ TREATMENT SITE: NORMAL

## 2024-10-21 PROCEDURE — 77385 HC INTENSITY-MODULATED RADIATION THERAPY (IMRT), SIMPLE: CPT | Performed by: RADIOLOGY

## 2024-10-22 ENCOUNTER — HOSPITAL ENCOUNTER (OUTPATIENT)
Dept: RADIATION ONCOLOGY | Facility: CLINIC | Age: 39
Setting detail: RADIATION/ONCOLOGY SERIES
Discharge: HOME | End: 2024-10-22
Payer: COMMERCIAL

## 2024-10-22 ENCOUNTER — RADIATION ONCOLOGY OTV (OUTPATIENT)
Dept: RADIATION ONCOLOGY | Facility: CLINIC | Age: 39
End: 2024-10-22

## 2024-10-22 VITALS — BODY MASS INDEX: 24.91 KG/M2 | TEMPERATURE: 97.7 F | WEIGHT: 163.8 LBS

## 2024-10-22 DIAGNOSIS — Z51.0 ENCOUNTER FOR ANTINEOPLASTIC RADIATION THERAPY: ICD-10-CM

## 2024-10-22 DIAGNOSIS — C50.511 MALIGNANT NEOPLASM OF LOWER-OUTER QUADRANT OF RIGHT FEMALE BREAST: ICD-10-CM

## 2024-10-22 DIAGNOSIS — C50.411 MALIGNANT NEOPLASM OF UPPER-OUTER QUADRANT OF RIGHT FEMALE BREAST: ICD-10-CM

## 2024-10-22 DIAGNOSIS — C77.3 SECONDARY AND UNSPECIFIED MALIGNANT NEOPLASM OF AXILLA AND UPPER LIMB LYMPH NODES (MULTI): ICD-10-CM

## 2024-10-22 LAB
RAD ONC MSQ ACTUAL FRACTIONS DELIVERED: 12
RAD ONC MSQ ACTUAL SESSION DELIVERED DOSE: 266 CGRAY
RAD ONC MSQ ACTUAL TOTAL DOSE: 3192 CGRAY
RAD ONC MSQ ELAPSED DAYS: 15
RAD ONC MSQ LAST DATE: NORMAL
RAD ONC MSQ PRESCRIBED FRACTIONAL DOSE: 266 CGRAY
RAD ONC MSQ PRESCRIBED NUMBER OF FRACTIONS: 16
RAD ONC MSQ PRESCRIBED TECHNIQUE: NORMAL
RAD ONC MSQ PRESCRIBED TOTAL DOSE: 4256 CGRAY
RAD ONC MSQ PRESCRIPTION PATTERN COMMENT: NORMAL
RAD ONC MSQ START DATE: NORMAL
RAD ONC MSQ TREATMENT COURSE NUMBER: 1
RAD ONC MSQ TREATMENT SITE: NORMAL

## 2024-10-22 PROCEDURE — 77385 HC INTENSITY-MODULATED RADIATION THERAPY (IMRT), SIMPLE: CPT | Performed by: RADIOLOGY

## 2024-10-22 ASSESSMENT — PAIN SCALES - GENERAL: PAINLEVEL_OUTOF10: 0-NO PAIN

## 2024-10-22 NOTE — PROGRESS NOTES
Radiation Oncology On Treatment Visit    Patient Name:  Macy Chavez  MRN:  [id number]  :  1985    Referring Provider: No ref. provider found  Primary Care Provider: ALFONSO Valiente  Care Team: Patient Care Team:  ALFONSO Valiente as PCP - General (Internal Medicine)  Denise Golden MD as Consulting Physician (Hematology and Oncology)  Zainab Larson MD as Cardiologist (Cardiology)    Date of Service: 10/22/2024     Diagnosis:   Specialty Problems          Radiation Oncology Problems    Malignant neoplasm of overlapping sites of right breast in female, estrogen receptor positive        Breast cancer in female         Treatment Summary:  IMRT: Right Breast with lymph nodes    Treatment Period Technique Fraction Dose Fractions Total Dose   Course 1 10/7/2024-10/23/2024  (days elapsed: 16)         CW_CNI_R 10/7/2024-10/23/2024 VMAT 266 / 266 cGy  16 3458 / 4,256 cGy     SUBJECTIVE: Minor pruritus and small red bumps in treatment area and minor breast tenderness. Denies increased fatigue. Skin care recommendations reviewed and follow up visit to be scheduled today in 4-6 weeks.       OBJECTIVE:   Vital Signs:  Temp 36.5 °C (97.7 °F) (Temporal)   Wt 74.3 kg (163 lb 12.8 oz)   BMI 24.91 kg/m²     Other Pertinent Findings:     Toxicity Assessment          10/8/2024    10:00 10/15/2024    10:00 10/22/2024    10:00   Toxicity Assessment   Adverse Events Reviewed (WDL) Yes (Within Defined Limits) Yes (Within Defined Limits) Yes (Within Defined Limits)   Treatment Site Breast Breast Breast   Anorexia Grade 0 Grade 0 Grade 0   Dehydration Grade 0 Grade 0 Grade 0   Dermatitis Radiation Grade 0 Grade 0 Grade 1       minor pruritus, small red bumps. Clartin daily   Fatigue Grade 0 Grade 1 Grade 1   Nausea Grade 0 Grade 0 Grade 0   Pain Grade 0 Grade 0 Grade 0       minor discomfort   Joint Range of Motion Decreased Grade 0       minor decreased range of motion     Breast Pain Grade 0        intermittent tenderness Grade 1       increased tenderness on right side of body Grade 1        Assessment / Plan:  The patient is tolerating radiation therapy as anticipated.  Continue per current treatment plan.          Vince Arita MD, PhD  Attending Physician

## 2024-10-23 ENCOUNTER — HOSPITAL ENCOUNTER (OUTPATIENT)
Dept: RADIATION ONCOLOGY | Facility: CLINIC | Age: 39
Setting detail: RADIATION/ONCOLOGY SERIES
Discharge: HOME | End: 2024-10-23
Payer: COMMERCIAL

## 2024-10-23 DIAGNOSIS — C50.511 MALIGNANT NEOPLASM OF LOWER-OUTER QUADRANT OF RIGHT FEMALE BREAST: ICD-10-CM

## 2024-10-23 DIAGNOSIS — Z51.0 ENCOUNTER FOR ANTINEOPLASTIC RADIATION THERAPY: ICD-10-CM

## 2024-10-23 DIAGNOSIS — C77.3 SECONDARY AND UNSPECIFIED MALIGNANT NEOPLASM OF AXILLA AND UPPER LIMB LYMPH NODES (MULTI): ICD-10-CM

## 2024-10-23 DIAGNOSIS — C50.411 MALIGNANT NEOPLASM OF UPPER-OUTER QUADRANT OF RIGHT FEMALE BREAST: ICD-10-CM

## 2024-10-23 LAB
RAD ONC MSQ ACTUAL FRACTIONS DELIVERED: 13
RAD ONC MSQ ACTUAL SESSION DELIVERED DOSE: 266 CGRAY
RAD ONC MSQ ACTUAL TOTAL DOSE: 3458 CGRAY
RAD ONC MSQ ELAPSED DAYS: 16
RAD ONC MSQ LAST DATE: NORMAL
RAD ONC MSQ PRESCRIBED FRACTIONAL DOSE: 266 CGRAY
RAD ONC MSQ PRESCRIBED NUMBER OF FRACTIONS: 16
RAD ONC MSQ PRESCRIBED TECHNIQUE: NORMAL
RAD ONC MSQ PRESCRIBED TOTAL DOSE: 4256 CGRAY
RAD ONC MSQ PRESCRIPTION PATTERN COMMENT: NORMAL
RAD ONC MSQ START DATE: NORMAL
RAD ONC MSQ TREATMENT COURSE NUMBER: 1
RAD ONC MSQ TREATMENT SITE: NORMAL

## 2024-10-23 PROCEDURE — 77385 HC INTENSITY-MODULATED RADIATION THERAPY (IMRT), SIMPLE: CPT | Performed by: RADIOLOGY

## 2024-10-23 PROCEDURE — 77336 RADIATION PHYSICS CONSULT: CPT | Performed by: RADIOLOGY

## 2024-10-24 ENCOUNTER — HOSPITAL ENCOUNTER (OUTPATIENT)
Dept: RADIATION ONCOLOGY | Facility: CLINIC | Age: 39
Setting detail: RADIATION/ONCOLOGY SERIES
Discharge: HOME | End: 2024-10-24
Payer: COMMERCIAL

## 2024-10-24 DIAGNOSIS — Z51.0 ENCOUNTER FOR ANTINEOPLASTIC RADIATION THERAPY: ICD-10-CM

## 2024-10-24 DIAGNOSIS — C50.411 MALIGNANT NEOPLASM OF UPPER-OUTER QUADRANT OF RIGHT FEMALE BREAST: ICD-10-CM

## 2024-10-24 DIAGNOSIS — C50.511 MALIGNANT NEOPLASM OF LOWER-OUTER QUADRANT OF RIGHT FEMALE BREAST: ICD-10-CM

## 2024-10-24 DIAGNOSIS — C77.3 SECONDARY AND UNSPECIFIED MALIGNANT NEOPLASM OF AXILLA AND UPPER LIMB LYMPH NODES (MULTI): ICD-10-CM

## 2024-10-24 LAB
RAD ONC MSQ ACTUAL FRACTIONS DELIVERED: 14
RAD ONC MSQ ACTUAL SESSION DELIVERED DOSE: 266 CGRAY
RAD ONC MSQ ACTUAL TOTAL DOSE: 3724 CGRAY
RAD ONC MSQ ELAPSED DAYS: 17
RAD ONC MSQ LAST DATE: NORMAL
RAD ONC MSQ PRESCRIBED FRACTIONAL DOSE: 266 CGRAY
RAD ONC MSQ PRESCRIBED NUMBER OF FRACTIONS: 16
RAD ONC MSQ PRESCRIBED TECHNIQUE: NORMAL
RAD ONC MSQ PRESCRIBED TOTAL DOSE: 4256 CGRAY
RAD ONC MSQ PRESCRIPTION PATTERN COMMENT: NORMAL
RAD ONC MSQ START DATE: NORMAL
RAD ONC MSQ TREATMENT COURSE NUMBER: 1
RAD ONC MSQ TREATMENT SITE: NORMAL

## 2024-10-24 PROCEDURE — 77385 HC INTENSITY-MODULATED RADIATION THERAPY (IMRT), SIMPLE: CPT | Performed by: RADIOLOGY

## 2024-10-25 ENCOUNTER — HOSPITAL ENCOUNTER (OUTPATIENT)
Dept: RADIATION ONCOLOGY | Facility: CLINIC | Age: 39
Setting detail: RADIATION/ONCOLOGY SERIES
Discharge: HOME | End: 2024-10-25
Payer: COMMERCIAL

## 2024-10-25 DIAGNOSIS — Z51.0 ENCOUNTER FOR ANTINEOPLASTIC RADIATION THERAPY: ICD-10-CM

## 2024-10-25 DIAGNOSIS — C77.3 SECONDARY AND UNSPECIFIED MALIGNANT NEOPLASM OF AXILLA AND UPPER LIMB LYMPH NODES (MULTI): ICD-10-CM

## 2024-10-25 DIAGNOSIS — C50.411 MALIGNANT NEOPLASM OF UPPER-OUTER QUADRANT OF RIGHT FEMALE BREAST: ICD-10-CM

## 2024-10-25 DIAGNOSIS — C50.511 MALIGNANT NEOPLASM OF LOWER-OUTER QUADRANT OF RIGHT FEMALE BREAST: ICD-10-CM

## 2024-10-25 LAB
RAD ONC MSQ ACTUAL FRACTIONS DELIVERED: 15
RAD ONC MSQ ACTUAL SESSION DELIVERED DOSE: 266 CGRAY
RAD ONC MSQ ACTUAL TOTAL DOSE: 3990 CGRAY
RAD ONC MSQ ELAPSED DAYS: 18
RAD ONC MSQ LAST DATE: NORMAL
RAD ONC MSQ PRESCRIBED FRACTIONAL DOSE: 266 CGRAY
RAD ONC MSQ PRESCRIBED NUMBER OF FRACTIONS: 16
RAD ONC MSQ PRESCRIBED TECHNIQUE: NORMAL
RAD ONC MSQ PRESCRIBED TOTAL DOSE: 4256 CGRAY
RAD ONC MSQ PRESCRIPTION PATTERN COMMENT: NORMAL
RAD ONC MSQ START DATE: NORMAL
RAD ONC MSQ TREATMENT COURSE NUMBER: 1
RAD ONC MSQ TREATMENT SITE: NORMAL

## 2024-10-25 PROCEDURE — 77385 HC INTENSITY-MODULATED RADIATION THERAPY (IMRT), SIMPLE: CPT | Performed by: RADIOLOGY

## 2024-10-28 ENCOUNTER — DOCUMENTATION (OUTPATIENT)
Dept: RADIATION ONCOLOGY | Facility: CLINIC | Age: 39
End: 2024-10-28

## 2024-10-28 ENCOUNTER — HOSPITAL ENCOUNTER (OUTPATIENT)
Dept: RADIATION ONCOLOGY | Facility: CLINIC | Age: 39
Setting detail: RADIATION/ONCOLOGY SERIES
Discharge: HOME | End: 2024-10-28
Payer: COMMERCIAL

## 2024-10-28 DIAGNOSIS — C50.411 MALIGNANT NEOPLASM OF UPPER-OUTER QUADRANT OF RIGHT FEMALE BREAST: ICD-10-CM

## 2024-10-28 DIAGNOSIS — C50.511 MALIGNANT NEOPLASM OF LOWER-OUTER QUADRANT OF RIGHT FEMALE BREAST: ICD-10-CM

## 2024-10-28 DIAGNOSIS — C77.3 SECONDARY AND UNSPECIFIED MALIGNANT NEOPLASM OF AXILLA AND UPPER LIMB LYMPH NODES (MULTI): ICD-10-CM

## 2024-10-28 DIAGNOSIS — Z51.0 ENCOUNTER FOR ANTINEOPLASTIC RADIATION THERAPY: ICD-10-CM

## 2024-10-28 LAB
RAD ONC MSQ ACTUAL FRACTIONS DELIVERED: 16
RAD ONC MSQ ACTUAL SESSION DELIVERED DOSE: 266 CGRAY
RAD ONC MSQ ACTUAL TOTAL DOSE: 4256 CGRAY
RAD ONC MSQ ELAPSED DAYS: 21
RAD ONC MSQ LAST DATE: NORMAL
RAD ONC MSQ PRESCRIBED FRACTIONAL DOSE: 266 CGRAY
RAD ONC MSQ PRESCRIBED NUMBER OF FRACTIONS: 16
RAD ONC MSQ PRESCRIBED TECHNIQUE: NORMAL
RAD ONC MSQ PRESCRIBED TOTAL DOSE: 4256 CGRAY
RAD ONC MSQ PRESCRIPTION PATTERN COMMENT: NORMAL
RAD ONC MSQ START DATE: NORMAL
RAD ONC MSQ TREATMENT COURSE NUMBER: 1
RAD ONC MSQ TREATMENT SITE: NORMAL

## 2024-10-28 PROCEDURE — 77385 HC INTENSITY-MODULATED RADIATION THERAPY (IMRT), SIMPLE: CPT | Performed by: RADIOLOGY

## 2024-11-07 NOTE — PROGRESS NOTES
Radiation Oncology Treatment Summary    Patient Name:  Macy Chavez  MRN:  [id number]  :  1985    Referring Provider: No ref. provider found  Primary Care Provider: ALFONSO Valiente    Brief History: Macy Chavez is a 39 y.o. female with Malignant neoplasm of overlapping sites of right breast in female, estrogen receptor positive, Clinical: Stage IIIA (cT3, cN2, cM0, G3, ER+, PA+, HER2-).  The patient completed radiotherapy as outlined below.    Radiation Treatment Summary:    IMRT: Right Breast with lymph nodes    Treatment Period Technique Fraction Dose Fractions Total Dose   Course 1 10/7/2024-10/28/2024  (days elapsed: 21)         CW_CNI_R 10/7/2024-10/28/2024 VMAT 266 / 266 cGy  4256 / 4,256 cGy       Please see the patient's Mos[facility] chart for further details regarding the radiation plan, including beam energy.    Concurrent Chemotherapy:  Treatment Plans       Name Type Plan Dates Plan Provider         Active    Dose-Dense AC (DOXOrubicin / Cyclophosphamide), 14 Day Cycles followed by PACLitaxel, Weekly Cycles Oncology Treatment 3/4/2024 - Present Denise Golden MD                    CTCAE Toxicity Overview:   Toxicity Assessment          10/8/2024    10:00 10/15/2024    10:00 10/22/2024    10:00   Toxicity Assessment   Adverse Events Reviewed (WDL) Yes (Within Defined Limits) Yes (Within Defined Limits) Yes (Within Defined Limits)   Treatment Site Breast Breast Breast   Anorexia Grade 0 Grade 0 Grade 0   Dehydration Grade 0 Grade 0 Grade 0   Dermatitis Radiation Grade 0 Grade 0 Grade 1       minor pruritus, small red bumps. Clartin daily   Fatigue Grade 0 Grade 1 Grade 1   Nausea Grade 0 Grade 0 Grade 0   Pain Grade 0 Grade 0 Grade 0       minor discomfort   Joint Range of Motion Decreased Grade 0       minor decreased range of motion     Breast Pain Grade 0       intermittent tenderness Grade 1       increased tenderness on right side of body Grade 1     Patient Disposition: Follow up Gifty  Nancy OLMOS 12/17/24     Vince Arita MD, PhD  Attending Physician

## 2024-11-14 ENCOUNTER — EVALUATION (OUTPATIENT)
Dept: OCCUPATIONAL THERAPY | Facility: CLINIC | Age: 39
End: 2024-11-14
Payer: COMMERCIAL

## 2024-11-14 DIAGNOSIS — N64.89 BREAST EDEMA: Primary | ICD-10-CM

## 2024-11-14 PROCEDURE — 97530 THERAPEUTIC ACTIVITIES: CPT | Mod: GO

## 2024-11-14 PROCEDURE — 97110 THERAPEUTIC EXERCISES: CPT | Mod: GO

## 2024-11-14 NOTE — PROGRESS NOTES
Occupational Therapy      Occupational Therapy Re evaluation  Name: Macy Chavez   MRN: [id number]   : 1985   Date:      Time Calculation  Start Time: 1430  Stop Time: 1530  Time Calculation (min): 60 min        OT Therapeutic Procedures Time Entry  Therapeutic Activity Time Entry: 50  Therapeutic Exercise Time Entry: 10                   Precautions:   Surgery  postop status post bilateral skin sparing mastectomy, right axilla with no dissection, bilateral immediate direct to implant breast reconstruction. Port was removed. Per PA LaBarge She may continue sports bras or transition to wire bras if comfortable. Ok for scar massage, ok for scar gel/tape.     Current Problem:    s/p bilateral skin sparing mastectomy, right axillary sentinel lymph node biopsy, completion axillary dissection, left skin sparing mastectomy, per pt, also took basin around lymph node on R.     Visit Number:   7    Insurance Authorization Request:   * Malignant neoplasm of overlapping sites of right breast in female, estrogen receptor positive (Multi) [C50.811, Z17.0] // Kaila confirmed 24 2:46pm  ANTHEM 50V PT OT COPAY 0 DED 0 COVERAGE 70  OOP 7150(MET) 9500(7467) NO AUTH REQ REF# MC R  [id number]     Assessment:  Patient is a  38 y/o FM presents with c/o  s/p bilateral skin sparing mastectomy, right axillary sentinel lymph node biopsy, completion axillary dissection, left skin sparing mastectomy. Pt has a PMH of  s/p bilateral skin sparing mastectomy, right axillary sentinel lymph node biopsy, completion axillary dissection, left skin sparing mastectomy, per pt, also took basin around lymph node on R. . Upon examination patient demonstrates decreased AROM, surgical swelling, decreased strength limiting overall function. Activity limitations and participations restrictions include IADL I. Pt to benefit from outpatient OT to address deficits, maximize functional mobility and improve QOL. The clinical  presentation of this patient is evolving and their history and examination findings are consistent with a moderate complexity evaluation with good rehab potential. Patient will benefit from skilled OT to support return to all ADL/ IADL, work, and leisure activities. Pt to have mapping radiation scans 9/24, then will start radiation 2-3 weeks later, early October. Plastic surgeon visit on 9/16, surgeon will look at tape at that point and remove if still on at that point. Per Dr. Mendoza no ROM restrictions. Per Dr. Patel pt ok for MLD. Finished radiation end of October, 10/28/24. Seeing medical oncologist on 11/19/24, pt to make a plan with MD. May have surgery, after that per pt to remove ovaries. Pt reports itchiness after radiation. Pt feels confident with HEP, education on lymphedema prevention.     Plan:  Continue current POC.      Subjective   General:  Got scar gel and cream for muscular pain    Pain Assessment:  Location: R axilla  0/10 at rest, 0/10 at highest  Description: Fullness      HEP Adherence/Understanding: Good     Objective  Observation: posture improved     Mild redness, peeling skin R axilla    RUE palm 19  15 cm up from base of MF nailbed 15.3  5 16.3  10 19.5  15 23  20 24.5  25 25.5  30 26.5  35 28.5  40 31  45 33     LUE palm 21  15 cm up from base of MF nailbed 15.5  5 15  10 18.8  15 23  20 25  25 26  30 27  35 29  40 31  45 33.5    Palpation: No pain with palpation, mild edema noted     Range of motion (in degrees)  AROM  NA     Sensory:  NA     Strength:  NA     Treatment Interventions:   MLD opened abdominal x 10 deep breaths  Opened L axilla, opened R axilla  R to L bridge built  Opened R inguinal  R axilla to R inguinal bridge built  MLD R breast  Measured prophylactic sleeve, educated on wear, care, safety, demos understanding via teach back   Compression  15-20 mmHg  Style  Arm Sleeve with Silicone Top Band  Size  Small  Length  Long  Color  Beige  Product SKU  [id number]    Banana  stretch, built up x 2 pillows, x 1 prolonged rep, VC form, TC form, as tolerated    Tolerance of session: No difficulty     Outcome Measures:  Quick Dash Score: at eval      OP EDUCATION:  Pt educated on POC, safety, progress, demonstrates good understanding via teach back.     Goals:   1. Pt will be I with scar massage.  2. Pt will demonstrate decreased axillary and breast swelling.   3. Pt will complete MLD I consistently.   4. Pt will obtain compression garments and tolerate consistently.   5. Patient to demonstrate proper technique and competence with HEP.

## 2024-11-18 ENCOUNTER — APPOINTMENT (OUTPATIENT)
Dept: PLASTIC SURGERY | Facility: CLINIC | Age: 39
End: 2024-11-18
Payer: COMMERCIAL

## 2024-11-18 VITALS
HEIGHT: 68 IN | WEIGHT: 163 LBS | DIASTOLIC BLOOD PRESSURE: 80 MMHG | BODY MASS INDEX: 24.71 KG/M2 | HEART RATE: 80 BPM | SYSTOLIC BLOOD PRESSURE: 110 MMHG

## 2024-11-18 DIAGNOSIS — Z17.0 MALIGNANT NEOPLASM OF OVERLAPPING SITES OF RIGHT BREAST IN FEMALE, ESTROGEN RECEPTOR POSITIVE: Primary | ICD-10-CM

## 2024-11-18 DIAGNOSIS — C50.811 MALIGNANT NEOPLASM OF OVERLAPPING SITES OF RIGHT BREAST IN FEMALE, ESTROGEN RECEPTOR POSITIVE: Primary | ICD-10-CM

## 2024-11-18 DIAGNOSIS — Z98.890 STATUS POST BILATERAL BREAST RECONSTRUCTION: ICD-10-CM

## 2024-11-18 PROCEDURE — 99024 POSTOP FOLLOW-UP VISIT: CPT | Performed by: PHYSICIAN ASSISTANT

## 2024-11-18 PROCEDURE — 1036F TOBACCO NON-USER: CPT | Performed by: PHYSICIAN ASSISTANT

## 2024-11-18 PROCEDURE — 3008F BODY MASS INDEX DOCD: CPT | Performed by: PHYSICIAN ASSISTANT

## 2024-11-18 NOTE — PROGRESS NOTES
Division of Plastic & Reconstructive Surgery            Post OP Visit    Date: 11/18/2024   DOS: 8/14/2024    Subjective   Macy Chavez is a 39 y.o. female presents for postoperative visit status post bilateral nipple sparing mastectomy with right axillary node dissection, bilateral direct-to-implant breast reconstruction with structured saline implant (right = 410 cc; left = 390 cc) with mesh, performed on 8/14/2024    She presents for 90 day POV. She completed radiation therapy on 10/28/24. She notes itching of the skin following radiation. She states that she was using utterly smooth cream daily with itching however she notes she has occasionally forgotten to apply moisturizer to the skin. She has no other complaints at this time.     Objective    Vitals:    11/18/24 0941   BP: 110/80   Pulse: 80       Gen: interactive and pleasant  Head: NCAT  Eyes: EOMI, PERRLA  Mouth: MMM  Throat: trachea midline  Cor: RRR  Pulm: nonlabored breathing  Abd: s/nt/nd  Neuro: AAOx3  Ext: extremities perfused    Body mass index is 24.78 kg/m².    Focused exam of the: Bilateral breast  Right breast vertical incision is well healed without evidence of dehiscence. There is moderate radiation induced skin changes to the right breast with hyperpigmentation and peeling of the armpit. There is no tenderness of the lateral ribs in the mid-axillary region. There is mild TTP at the superior lateral capsule in the pre-axillary region. No evidence of capsular contracture. No tenderness to the rest of the reconstructed breast, pec muscle, or arm.     Left breast vertical incision is well healed without evidence of dehiscence. There is no overlying erythema or concerns for infection.  No sign of blottable fluid collection. Dermabond and pernio is removed today. There is mild superior medial hollowing noted.         Assessment/Plan       Macy is a 39 y.o. female  presents for postoperative visit status post bilateral nipple  sparing mastectomy with right axillary node dissection, bilateral direct-to-implant breast reconstruction with structured saline implant (right = 410 cc; left = 390 cc) with mesh, performed on 8/14/2024    She presents for 90 day POV. She completed radiation therapy on 10/28/24. She endorses following radiation she was using utterly smooth cream daily but notes she has switched to a general lotion. She notes peeling of the skin in the armpit. I advised adding Aquaphor for continued moisturizing of the radiated breast. I advised moisturizing 1-2 times daily as tolerated. She had mild TTP at the superior pre-axillary portion at the edge of the capsule of the right breast. This has not impeded arm ROM at this time, she was instructed to start daily massage to this area. She may continue with scar care with scar massage and silicon based scar products. I advised that we will continue to monitor for 6 months prior to discussions of revision reconstruction. She was instructed to contact our office with any concerns.       Plan:   Follow up in 2 months for skin check  May wear supportive sports bras or comfortable bras  Daily moisturizing to the radiated breast-add Aquaphor for additional skin hydration  Continue with daily scar care  Perform daily massage to the breasts-right breast with tender site at the edge of the breast before the axilla.     I spent 20 minutes with this patient. Greater than 50% of this time was spent in the counselling and/or coordination of care of this patient.  This note was created using voice recognition software and was not corrected for typographical or grammatical errors.    Signature: Yvette Gan PA-C   Date: 11/18/2024

## 2024-11-19 ENCOUNTER — LAB (OUTPATIENT)
Dept: LAB | Facility: CLINIC | Age: 39
End: 2024-11-19
Payer: COMMERCIAL

## 2024-11-19 ENCOUNTER — OFFICE VISIT (OUTPATIENT)
Dept: HEMATOLOGY/ONCOLOGY | Facility: CLINIC | Age: 39
End: 2024-11-19
Payer: COMMERCIAL

## 2024-11-19 VITALS
TEMPERATURE: 97.5 F | HEART RATE: 98 BPM | WEIGHT: 162.6 LBS | OXYGEN SATURATION: 98 % | BODY MASS INDEX: 24.72 KG/M2 | SYSTOLIC BLOOD PRESSURE: 135 MMHG | RESPIRATION RATE: 18 BRPM | DIASTOLIC BLOOD PRESSURE: 91 MMHG

## 2024-11-19 DIAGNOSIS — Z17.0 MALIGNANT NEOPLASM OF BREAST IN FEMALE, ESTROGEN RECEPTOR POSITIVE, UNSPECIFIED LATERALITY, UNSPECIFIED SITE OF BREAST: ICD-10-CM

## 2024-11-19 DIAGNOSIS — Z17.0 MALIGNANT NEOPLASM OF BREAST IN FEMALE, ESTROGEN RECEPTOR POSITIVE, UNSPECIFIED LATERALITY, UNSPECIFIED SITE OF BREAST: Primary | ICD-10-CM

## 2024-11-19 DIAGNOSIS — Z17.0 MALIGNANT NEOPLASM OF OVERLAPPING SITES OF RIGHT BREAST IN FEMALE, ESTROGEN RECEPTOR POSITIVE: ICD-10-CM

## 2024-11-19 DIAGNOSIS — C50.919 MALIGNANT NEOPLASM OF BREAST IN FEMALE, ESTROGEN RECEPTOR POSITIVE, UNSPECIFIED LATERALITY, UNSPECIFIED SITE OF BREAST: Primary | ICD-10-CM

## 2024-11-19 DIAGNOSIS — C50.919 MALIGNANT NEOPLASM OF BREAST IN FEMALE, ESTROGEN RECEPTOR POSITIVE, UNSPECIFIED LATERALITY, UNSPECIFIED SITE OF BREAST: ICD-10-CM

## 2024-11-19 DIAGNOSIS — C50.811 MALIGNANT NEOPLASM OF OVERLAPPING SITES OF RIGHT BREAST IN FEMALE, ESTROGEN RECEPTOR POSITIVE: ICD-10-CM

## 2024-11-19 LAB
ESTRADIOL SERPL-MCNC: 22 PG/ML
FSH SERPL-ACNC: 60.7 IU/L
LH SERPL-ACNC: 36.7 IU/L
TSH SERPL-ACNC: 1.35 MIU/L (ref 0.44–3.98)

## 2024-11-19 PROCEDURE — 36415 COLL VENOUS BLD VENIPUNCTURE: CPT

## 2024-11-19 PROCEDURE — 99214 OFFICE O/P EST MOD 30 MIN: CPT | Performed by: STUDENT IN AN ORGANIZED HEALTH CARE EDUCATION/TRAINING PROGRAM

## 2024-11-19 PROCEDURE — 82670 ASSAY OF TOTAL ESTRADIOL: CPT

## 2024-11-19 PROCEDURE — 1036F TOBACCO NON-USER: CPT | Performed by: STUDENT IN AN ORGANIZED HEALTH CARE EDUCATION/TRAINING PROGRAM

## 2024-11-19 PROCEDURE — 84443 ASSAY THYROID STIM HORMONE: CPT

## 2024-11-19 PROCEDURE — 83001 ASSAY OF GONADOTROPIN (FSH): CPT

## 2024-11-19 RX ORDER — EPINEPHRINE 0.3 MG/.3ML
0.3 INJECTION SUBCUTANEOUS EVERY 5 MIN PRN
OUTPATIENT
Start: 2024-12-03

## 2024-11-19 RX ORDER — ALBUTEROL SULFATE 0.83 MG/ML
3 SOLUTION RESPIRATORY (INHALATION) AS NEEDED
OUTPATIENT
Start: 2024-12-03

## 2024-11-19 RX ORDER — LIDOCAINE HYDROCHLORIDE 10 MG/ML
2 INJECTION, SOLUTION EPIDURAL; INFILTRATION; INTRACAUDAL; PERINEURAL ONCE
OUTPATIENT
Start: 2024-12-03

## 2024-11-19 RX ORDER — FAMOTIDINE 10 MG/ML
20 INJECTION INTRAVENOUS ONCE AS NEEDED
OUTPATIENT
Start: 2024-12-03

## 2024-11-19 RX ORDER — DIPHENHYDRAMINE HYDROCHLORIDE 50 MG/ML
50 INJECTION INTRAMUSCULAR; INTRAVENOUS AS NEEDED
OUTPATIENT
Start: 2024-12-03

## 2024-11-19 ASSESSMENT — PAIN SCALES - GENERAL: PAINLEVEL_OUTOF10: 0-NO PAIN

## 2024-11-19 NOTE — PROGRESS NOTES
Patient ID: Macy Chavez is a 39 y.o. female.    The patient presents to clinic today for her history of breast cancer.     Cancer Staging   Malignant neoplasm of overlapping sites of right breast in female, estrogen receptor positive  Staging form: Breast, AJCC 8th Edition  - Clinical stage from 2/6/2024: Stage IIIA (cT3, cN2, cM0, G3, ER+, SC+, HER2-) - Signed by Denise Golden MD on 2/21/2024      Diagnostic/Therapeutic History:    11/03/2023: Bl screening Mammogram: negative       12/20/2023: Right breast mass for which US done, Nonspecific dense fibroglandular tissues in the right breast in the area of reported lump. Dense fibroglandular tissue also evident throughout both breasts on screening mammogram. No discrete lesionidentified.    02/06/2024: Patient describes an increase in the size of a palpable lump in theright lower outer breast which was previously assessed in December of2023. Recent negative screening mammogram from 11/09/2023. She alsodescribes a new palpable lump in the right axilla. After being seen by her provider, a nother palpable lump is identified in the upper outer right breast.    Ultrasound of the right breast showed In the patient's area of palpable concern in the right breast at 7 o'clock, 5 cm from the nipple, there is a large vague heterogeneousmass with internal vascularity which has the appearance of dense fibroglandular tissue measuring up to 5 cm; it is visible protruding from the skin with discoloration. In the patient's area of palpable concern felt by her provider in the right breast at 10 o'clock, 8 cm from the nipple, there is an oval parallel heterogeneous mass withinternal vascularity measuring approximately 1.1 x 0.9 x 1.4 cm. 3 enlarged axillary LN were seen    02/06/2024: US guided core needle biopsy    A. BREAST, RIGHT, 7:00, 5 CM FN, CORE BIOPSY:   -- Invasive ductal carcinoma, grade 2, ER: 95%, SC: 5-10%, HER2 negative (1+)        B. BREAST, RIGHT, 10:00, 8 CM FN, CORE  BIOPSY:   -- Invasive ductal carcinoma, grade 2-3,  ER: 95%, DE<5%, HER2 negative (1+)         C. AXILLARY LYMPH NODE, RIGHT, BIOPSY:     -- Metastatic carcinoma, see note.      02/14/2024: Biopsy-proven multicentric right breast malignancy with the total  span of up to 13.0 cm associated with bulky metastatic axillary and  abnormal internal mammary lymphadenopathy. No evidence of chest wall,  skin or nipple-areolar complex involvement.    No MRI evidence of malignancy in the left breast.    02/15/2024: staging scans: negative- short term follow up     07/16/2024: completed ddAC followed by weekly taxol    08/14/2024: Dr Patel performed a bilateral skin sparing mastectomy with right sided SLNB and completion dissection with path showing an invasive ductal carcinoma  G2 measuring 25 mm, margins negative. She 5 total lymph nodes removed with 1 involved with macromets and 1 involved with micromets; final stage [id number] ER: 95%, , DE: 5-10%, HER2 negative (1+)    10/28/2024: completed adjuvant radiation therapy      History of Present Illness (HPI)/Interval History:    Overall doing okay. Does have intermittent pain and discomfort at site of surgery,. No fever, no chills, no chest pain, no SOB, no GI side effects.   Does have new onset fatigue and low back pain    PMH: none     PSH: no surgery    Allergies/meds: negative     Reproductive hx: Menarche: 12, AFLB: 27, Menopause: no, HRT: no     Family history: Maternal Grandfather: Kidney versus Liver cancer,Paternal Grandmother: Soft cell sarcoma-        Review of Systems:  14-point ROS otherwise negative, as per HPI.    Past Medical History:   Diagnosis Date    Breast cancer (Multi)     Cancer (Multi)     Chest tightness 05/24/2024    Hyperlipidemia     Other specified health status 09/29/2016    No pertinent past medical history    Shortness of breath 05/24/2024       Past Surgical History:   Procedure Laterality Date    OTHER SURGICAL HISTORY  09/29/2016    Oral  Surgery Tooth Extraction [place] Tooth    WISDOM TOOTH EXTRACTION  6/1/2000       Social History     Socioeconomic History    Marital status:    Tobacco Use    Smoking status: Never    Smokeless tobacco: Never   Vaping Use    Vaping status: Never Used   Substance and Sexual Activity    Alcohol use: Yes     Alcohol/week: 1.0 standard drink of alcohol     Types: 1 Glasses of wine per week     Comment: A few drinks a month    Drug use: Never    Sexual activity: Yes     Partners: Male     Birth control/protection: I.U.D.     Social Drivers of Health     Financial Resource Strain: Low Risk  (8/14/2024)    Overall Financial Resource Strain (CARDIA)     Difficulty of Paying Living Expenses: Not very hard   Transportation Needs: No Transportation Needs (8/14/2024)    PRAPARE - Transportation     Lack of Transportation (Medical): No     Lack of Transportation (Non-Medical): No   Housing Stability: Low Risk  (8/14/2024)    Housing Stability Vital Sign     Unable to Pay for Housing in the Last Year: No     Number of Times Moved in the Last Year: 0     Homeless in the Last Year: No       Allergies   Allergen Reactions    Dog Dander Itching         Current Outpatient Medications:     atorvastatin (Lipitor) 20 mg tablet, Take 1 tablet (20 mg) by mouth once daily., Disp: 90 tablet, Rfl: 3    b complex 0.4 mg tablet, Take 1 tablet by mouth once daily., Disp: , Rfl:     diclofenac sodium (Voltaren Arthritis Pain) 1 % gel, Apply to breast 2-3 times daily as needed for pain, Disp: 50 g, Rfl: 1    docusate sodium (Colace) 100 mg capsule, Take 1 capsule (100 mg) by mouth 2 times a day as needed for constipation., Disp: 10 capsule, Rfl: 0    loratadine (Claritin) 10 mg tablet, Take 1 tablet (10 mg) by mouth once daily., Disp: , Rfl:     metoprolol succinate XL (Toprol-XL) 25 mg 24 hr tablet, Take 1 tablet (25 mg) by mouth once daily. Do not crush or chew., Disp: 90 tablet, Rfl: 3    oxyCODONE (Roxicodone) 5 mg immediate release  tablet, Take 1 tablet (5 mg) by mouth every 6 hours if needed for severe pain (7 - 10)., Disp: 15 tablet, Rfl: 0    prochlorperazine (Compazine) 10 mg tablet, Take 1 tablet (10 mg) by mouth every 6 hours if needed for nausea or vomiting., Disp: 30 tablet, Rfl: 2    valACYclovir (Valtrex) 500 mg tablet, Take 1 tablet (500 mg) by mouth once daily., Disp: 30 tablet, Rfl: 2     Objective    BSA: 1.88 meters squared  BP (!) 135/91 (BP Location: Left arm, Patient Position: Sitting, BP Cuff Size: Adult) Comment (BP Location): forearm  Pulse 98   Temp 36.4 °C (97.5 °F) (Temporal)   Resp 18   Wt 73.8 kg (162 lb 9.6 oz)   SpO2 98%   BMI 24.72 kg/m²     ECOG Performance Status: 0    GA: alert, oriented, cooperative  HEENT: anicteric sclera, well injected conjunctiva  Heart: RRR, no murmurs or gallops heard  Lung: CTAB  Abd: +BS, soft, non tender  Breast: well healed breast incision, no new Lymph nodes or nodules     Laboratory Data:  Lab Results   Component Value Date    WBC 8.1 08/07/2024    HGB 14.1 08/07/2024    HCT 42.0 08/07/2024    MCV 95 08/07/2024     08/07/2024    ANC 7.27 04/29/2024       Chemistry    Lab Results   Component Value Date/Time     08/07/2024 1008    K 4.4 08/07/2024 1008     08/07/2024 1008    CO2 29 08/07/2024 1008    BUN 15 08/07/2024 1008    CREATININE 0.86 08/07/2024 1008    Lab Results   Component Value Date/Time    CALCIUM 10.1 08/07/2024 1008    ALKPHOS 50 07/16/2024 0848    AST 18 07/16/2024 0848    ALT 28 07/16/2024 0848    BILITOT 0.4 07/16/2024 0848             Radiology:  Rad Onc CT Sim Images  These images are not reportable by radiology and will not be interpreted   by  Radiologists.       MR breast bilateral w contrast full protocol 02/14/2024    Narrative  Interpreted By:  Riki Flor,  STUDY:  BI MR BREAST BILATERAL WITH CONTRAST FULL PROTOCOL;  2/14/2024 1:33 pm    ACCESSION NUMBER(S):  [id number]    ORDERING CLINICIAN:  VITOR  EMRICK    INDICATION:  38-year-old woman status post ultrasound-guided biopsy of right  breast masses at 7 o'clock and 10 o'clock and a right axillary lymph  demonstrating grade 2 invasive ductal carcinoma at both sites with  axillary simon metastasis.    COMPARISON:  Screening mammogram 11/09/2023, right breast ultrasound 12/20/2023,  02/06/2024; ultrasound-guided biopsy 02/06/2024.    TECHNIQUE:  Using a dedicated breast coil, STIR axial and T1-weighted fat  saturation axial images of the breasts were obtained, the latter both  before and after intravenous administration of Gadolinium DTPA. On an  independent workstation, 3-D images were formulated using [employer]  including time enhancement curves, subtraction images and MIP images.    Intravenous contrast:  14 mL of Dotarem    FINDINGS:  There is asymmetric moderate bilateral background enhancement.    Extreme fibroglandular tissue.    RIGHT BREAST:  There are multiple contiguous masses and nonmass  enhancements occupying the entire lateral and central inferior right  breast including at the two sites of biopsy-proven malignancy at 7  o'clock and 10 o'clock, with a total span of 8.5 x 4.5 x 13.0 cm (AP  x TR x CC), better appreciated on the MIP and sagittal reconstruction  images. There is no involvement of the skin, nipple-areolar complex,  or pectoralis major muscle. The most anterior extent of malignancy is  located approximately 2 cm from the base of the nipple.    Within the span of malignancy, more confluent findings are as follows:    - Dominant irregular heterogeneously enhancing mass in the inferior  lateral and central inferior breast at anterior and middle depths  measuring 5.0 x 4.7 x 4.5 cm, corresponding to the site of biopsy at  7 o'clock (series 7, image 127/176).    - Irregular heterogeneously enhancing mass in superior lateral breast  at posterior depth measuring 2.7 x 2.5 x 2.5 cm, corresponding to the  site of biopsy at 10 o'clock (series 7,  image 48/176).    - More confluent nonmass enhancement in superior lateral breast at  middle to posterior depth measuring 3.8 x 2.0 x 2.5 cm (series 7,  image 73/176).    - A small satellite mass in superior lateral breast at middle depth  measuring 1.0 x 1.0 x 0.8 cm located 0.8 cm superolateral to the  dominant mass (series 7, image 108/176).    Evaluation of the axilla is limited due to incomplete coverage in the  field of view. A conglomerate of at least 3 enlarged level I right  lymph nodes associated with a biopsy clip is partially visualized  measuring at least 4.2 x 2.5 x 3.5 cm (series 6, image 1/176).    An abnormal internal mammary lymph node is seen in the 2nd  intercostal space measuring 1.5 x 1.2 x 1.6 cm    LEFT BREAST: No suspicious mass or nonmass enhancement is identified.  There are numerous scattered small benign cysts.    No axillary or internal mammary lymphadenopathy is appreciated within  limitations of limited field of view.    NON-BREAST FINDINGS:  None.    Impression  1. Biopsy-proven multicentric right breast malignancy with the total  span of up to 13.0 cm associated with bulky metastatic axillary and  abnormal internal mammary lymphadenopathy. No evidence of chest wall,  skin or nipple-areolar complex involvement.  2. No MRI evidence of malignancy in the left breast.    BI-RADS CATEGORY:  BI-RADS CATEGORY:  6 Known Biopsy-Proven Malignancy.  Recommendation:  Immediate Follow-up.  Recommended Date:  Immediate.  Laterality:  Right.    For any future breast imaging appointments, please call [phone]  ([phone]).      MACRO:  None    Signed by: Riki Flor 2/15/2024 10:43 AM  Dictation workstation:   [id number]          Assessment/Plan:    Macy is a 39 year old female in excellent health with a recent diagnosis of right sided breast cancer. Macy felt a mass in her right breast in December that prompted diagnostic evaluation.     Ultrasound of the right breast showed In the patient's  area of palpable concern in the right breast at 7 o'clock, 5 cm from the nipple, there is a large vague heterogeneousmass with internal vascularity which has the appearance of dense fibroglandular tissue measuring up to 5 cm;  In the patient's area of palpable concern felt by her provider in the right breast at 10 o'clock, 8 cm from the nipple, there is an oval parallel heterogeneous mass withinternal vascularity measuring approximately 1.1 x 0.9 x 1.4 cm. 3 enlarged axillary LN were seen    She had US guided core needle biopsy of  right breast at 7:00, 5 CM FN,  that showed invasive ductal carcinoma, grade 2, ER: 95%, MT: 5-10%, HER2 negative (1+).  And US guided core needle biopsy of the right breast at  10:00, 8 CM FN, that showed  Invasive ductal carcinoma, grade 2-3,  ER: 95%, MT<5%, HER2 negative (1+). Axillary LN consistent with metastatic carcinoma     On MRI she a  multicentric right breast malignancy with the total span of up to 13.0 cm associated with bulky metastatic axillary and abnormal internal mammary lymphadenopathy. No evidence of chest wall, skin or nipple-areolar complex involvement.     Current stage [id number] (staging scans negative for distant mets)  ECOG PS: 0    I reviewed with her the events that led to her diagnosis of breast cancer. We reviewed all the procedures and diagnostic imaging she underwent thus far. I discussed the features of her breast cancer that include the size, grade, lymph node status and  hormone receptor/ her2-jair status.      We discussed neoadjuvant systemic therapy. The goal of treatment would be to downstage her cancer, assess tumor chemosensitivity and treat potential micrometastatic disease. I would treat, given extensive of disease with anthracyclines based regimen ddAC Q2 weeks x4 followed by taxol weekly x12     07/16/2024: completed ddAC followed by weekly taxol    08/14/2024: Dr Patel performed a bilateral skin sparing mastectomy with right sided SLNB and  completion dissection with path showing an invasive ductal carcinoma  G2 measuring 25 mm, margins negative. She 5 total lymph nodes removed with 1 involved with macromets and 1 involved with micromets; final stage [id number] ER: 95%, , ME: 5-10%, HER2 negative (1+)    -10/28/2024: completed adjuvant radiation therapy    - mild drop in EF: 65% ->55-60% on her most recent echo- saw Dr Mares, did have sinus tachycardia, not clear how much is due to anxiety. She was started on low dose beta blocker - last echo in 08/02/2024 showed EF of 60%  - Elevated LDL- non pharmacological measures for now- follows with cardiology  - plan for adjuvant aromatase inhibitor and ovarian suppression in addition to adjuvant verzenio for 2 years. Will also discuss adjuvant bisphosphonates next visit   - Start Goserlin on 12/03, 12/31, 01/28  - RTC prior to 3rd dose of Goserlin  on 01/28  - FSH, LH, Estradiol  - Staging scans because of generalized fatigue and low back pain           Denise Golden MD  Hematology and Medical Oncology  [facility]

## 2024-12-03 ENCOUNTER — INFUSION (OUTPATIENT)
Dept: HEMATOLOGY/ONCOLOGY | Facility: CLINIC | Age: 39
End: 2024-12-03
Payer: COMMERCIAL

## 2024-12-03 VITALS
OXYGEN SATURATION: 95 % | SYSTOLIC BLOOD PRESSURE: 108 MMHG | TEMPERATURE: 97.5 F | DIASTOLIC BLOOD PRESSURE: 76 MMHG | HEART RATE: 90 BPM | WEIGHT: 162.92 LBS | BODY MASS INDEX: 24.77 KG/M2 | RESPIRATION RATE: 18 BRPM

## 2024-12-03 DIAGNOSIS — C50.919 MALIGNANT NEOPLASM OF BREAST IN FEMALE, ESTROGEN RECEPTOR POSITIVE, UNSPECIFIED LATERALITY, UNSPECIFIED SITE OF BREAST: ICD-10-CM

## 2024-12-03 DIAGNOSIS — Z17.0 MALIGNANT NEOPLASM OF BREAST IN FEMALE, ESTROGEN RECEPTOR POSITIVE, UNSPECIFIED LATERALITY, UNSPECIFIED SITE OF BREAST: ICD-10-CM

## 2024-12-03 PROCEDURE — 2500000004 HC RX 250 GENERAL PHARMACY W/ HCPCS (ALT 636 FOR OP/ED): Performed by: STUDENT IN AN ORGANIZED HEALTH CARE EDUCATION/TRAINING PROGRAM

## 2024-12-03 PROCEDURE — 96372 THER/PROPH/DIAG INJ SC/IM: CPT

## 2024-12-03 PROCEDURE — 2500000004 HC RX 250 GENERAL PHARMACY W/ HCPCS (ALT 636 FOR OP/ED): Mod: JZ,JG | Performed by: STUDENT IN AN ORGANIZED HEALTH CARE EDUCATION/TRAINING PROGRAM

## 2024-12-03 PROCEDURE — 96402 CHEMO HORMON ANTINEOPL SQ/IM: CPT

## 2024-12-03 RX ORDER — LIDOCAINE HYDROCHLORIDE 10 MG/ML
2 INJECTION, SOLUTION EPIDURAL; INFILTRATION; INTRACAUDAL; PERINEURAL ONCE
Status: COMPLETED | OUTPATIENT
Start: 2024-12-03 | End: 2024-12-03

## 2024-12-03 RX ORDER — ALBUTEROL SULFATE 0.83 MG/ML
3 SOLUTION RESPIRATORY (INHALATION) AS NEEDED
OUTPATIENT
Start: 2024-12-31

## 2024-12-03 RX ORDER — DIPHENHYDRAMINE HYDROCHLORIDE 50 MG/ML
50 INJECTION INTRAMUSCULAR; INTRAVENOUS AS NEEDED
OUTPATIENT
Start: 2024-12-31

## 2024-12-03 RX ORDER — FAMOTIDINE 10 MG/ML
20 INJECTION INTRAVENOUS ONCE AS NEEDED
OUTPATIENT
Start: 2024-12-31

## 2024-12-03 RX ORDER — EPINEPHRINE 0.3 MG/.3ML
0.3 INJECTION SUBCUTANEOUS EVERY 5 MIN PRN
OUTPATIENT
Start: 2024-12-31

## 2024-12-03 RX ORDER — LIDOCAINE HYDROCHLORIDE 10 MG/ML
2 INJECTION, SOLUTION EPIDURAL; INFILTRATION; INTRACAUDAL; PERINEURAL ONCE
OUTPATIENT
Start: 2024-12-31

## 2024-12-03 ASSESSMENT — PAIN SCALES - GENERAL: PAINLEVEL_OUTOF10: 0-NO PAIN

## 2024-12-04 DIAGNOSIS — C50.919 MALIGNANT NEOPLASM OF UNSPECIFIED SITE OF UNSPECIFIED FEMALE BREAST: Primary | ICD-10-CM

## 2024-12-05 ENCOUNTER — HOSPITAL ENCOUNTER (OUTPATIENT)
Dept: RADIOLOGY | Facility: HOSPITAL | Age: 39
Discharge: HOME | End: 2024-12-05
Payer: COMMERCIAL

## 2024-12-05 ENCOUNTER — TREATMENT (OUTPATIENT)
Dept: OCCUPATIONAL THERAPY | Facility: CLINIC | Age: 39
End: 2024-12-05
Payer: COMMERCIAL

## 2024-12-05 DIAGNOSIS — N64.89 BREAST EDEMA: ICD-10-CM

## 2024-12-05 DIAGNOSIS — Z17.0 MALIGNANT NEOPLASM OF BREAST IN FEMALE, ESTROGEN RECEPTOR POSITIVE, UNSPECIFIED LATERALITY, UNSPECIFIED SITE OF BREAST: ICD-10-CM

## 2024-12-05 DIAGNOSIS — C50.919 MALIGNANT NEOPLASM OF BREAST IN FEMALE, ESTROGEN RECEPTOR POSITIVE, UNSPECIFIED LATERALITY, UNSPECIFIED SITE OF BREAST: ICD-10-CM

## 2024-12-05 DIAGNOSIS — R29.898 RUE WEAKNESS: Primary | ICD-10-CM

## 2024-12-05 PROCEDURE — 78306 BONE IMAGING WHOLE BODY: CPT

## 2024-12-05 PROCEDURE — 2550000001 HC RX 255 CONTRASTS: Performed by: STUDENT IN AN ORGANIZED HEALTH CARE EDUCATION/TRAINING PROGRAM

## 2024-12-05 PROCEDURE — A9503 TC99M MEDRONATE: HCPCS | Performed by: STUDENT IN AN ORGANIZED HEALTH CARE EDUCATION/TRAINING PROGRAM

## 2024-12-05 PROCEDURE — 97530 THERAPEUTIC ACTIVITIES: CPT | Mod: GO

## 2024-12-05 PROCEDURE — 74177 CT ABD & PELVIS W/CONTRAST: CPT

## 2024-12-05 PROCEDURE — 3430000001 HC RX 343 DIAGNOSTIC RADIOPHARMACEUTICALS: Performed by: STUDENT IN AN ORGANIZED HEALTH CARE EDUCATION/TRAINING PROGRAM

## 2024-12-05 NOTE — PROGRESS NOTES
Occupational Therapy      Occupational Therapy Treatment  Name: Macy Chavez   MRN: [id number]   : 1985   Date:          Precautions:   Surgery  postop status post bilateral skin sparing mastectomy, right axilla with no dissection, bilateral immediate direct to implant breast reconstruction. Port was removed. Per PA LaBarge She may continue sports bras or transition to wire bras if comfortable. Ok for scar massage, ok for scar gel/tape.     Current Problem:    s/p bilateral skin sparing mastectomy, right axillary sentinel lymph node biopsy, completion axillary dissection, left skin sparing mastectomy, per pt, also took basin around lymph node on R.     Visit Number:   6    Insurance Authorization Request:   * Malignant neoplasm of overlapping sites of right breast in female, estrogen receptor positive (Multi) [C50.811, Z17.0] // Kaila confirmed 24 2:46pm  ANTHEM 50V PT OT COPAY 0 DED 0 COVERAGE 70  OOP 7150(MET) 9500(2464) NO AUTH REQ REF# [facility] R  [id number]     Assessment:  Patient is a  38 y/o FM presents with c/o  s/p bilateral skin sparing mastectomy, right axillary sentinel lymph node biopsy, completion axillary dissection, left skin sparing mastectomy. Pt has a PMH of  s/p bilateral skin sparing mastectomy, right axillary sentinel lymph node biopsy, completion axillary dissection, left skin sparing mastectomy, per pt, also took basin around lymph node on R. . Upon examination patient demonstrates decreased AROM, surgical swelling, decreased strength limiting overall function. Activity limitations and participations restrictions include IADL I. Pt to benefit from outpatient OT to address deficits, maximize functional mobility and improve QOL. The clinical presentation of this patient is evolving and their history and examination findings are consistent with a moderate complexity evaluation with good rehab potential. Patient will benefit from skilled OT to support return to all  ADL/ IADL, work, and leisure activities. Pt to have mapping radiation scans 9/24, then will start radiation 2-3 weeks later, early October. Plastic surgeon visit on 9/16, surgeon will look at tape at that point and remove if still on at that point. Per Dr. Mendoza no ROM restrictions. Per Dr. Patel pt ok for MLD. Finished radiation end of October, 10/28/24. Seeing medical oncologist on 11/19/24, pt to make a plan with MD. May have surgery, after that per pt to remove ovaries. Pt reports itchiness after radiation. Pt feels confident with HEP, education on lymphedema prevention. Per plastics visit, focus on massage pre-axillary portion at the edge of the capsule of the right breast.     Plan:  Continue current POC.      Subjective   General:  Got scar gel and cream for muscular pain    Pain Assessment:  Location: R axilla  1/10 at rest, 3/10 at highest  Description: Fullness      HEP Adherence/Understanding: Good     Objective  Observation: posture improved     RUE palm 19  15 cm up from base of MF nailbed 15.3  5 16.3  10 19.5  15 23  20 24.5  25 25.5  30 26.5  35 28.5  40 31  45 33     LUE palm 21  15 cm up from base of MF nailbed 15.5  5 15  10 18.8  15 23  20 25  25 26  30 27  35 29  40 31  45 33.5    Palpation: No pain with palpation, mild edema noted     Range of motion (in degrees)  AROM  NA     Sensory:  NA     Strength:  NA     Treatment Interventions:   All as tolerated   Measured prophylactic sleeve, educated on wear, care, safety, demos, reprinted for pt, understanding via teach back   Compression  15-20 mmHg  Style  Arm Sleeve with Silicone Top Band  Size  Small  Length  Long  Color  Beige  Product SKU  [id number]  Foam rolling R axillary area as tolerated, x 7, mirroring OT, increased time, VC, TC, MFR incorporated as tolerated as well    Thoracic, cervical foam rolling axillary area as tolerated, x 7, mirroring OT, increased time, VC, TC, MFR incorporated as tolerated as well    MFR pre-axillary portion at  the edge of the capsule of the right breast as tolerated with body blade, worked on R axilla as well, increased timer, pt education, demos good understanding via teachback    Tolerance of session: No difficulty     Outcome Measures:  Quick Dash Score: at eval      OP EDUCATION:  Pt educated on POC, safety, progress, demonstrates good understanding via teach back.     Goals:   1. Pt will be I with scar massage.  2. Pt will demonstrate decreased axillary and breast swelling.   3. Pt will complete MLD I consistently.   4. Pt will obtain compression garments and tolerate consistently.   5. Patient to demonstrate proper technique and competence with HEP.

## 2024-12-17 ENCOUNTER — HOSPITAL ENCOUNTER (OUTPATIENT)
Dept: RADIATION ONCOLOGY | Facility: CLINIC | Age: 39
Setting detail: RADIATION/ONCOLOGY SERIES
Discharge: HOME | End: 2024-12-17
Payer: COMMERCIAL

## 2024-12-17 VITALS
SYSTOLIC BLOOD PRESSURE: 116 MMHG | BODY MASS INDEX: 24.87 KG/M2 | HEART RATE: 80 BPM | WEIGHT: 163.58 LBS | TEMPERATURE: 97.2 F | RESPIRATION RATE: 18 BRPM | DIASTOLIC BLOOD PRESSURE: 77 MMHG | OXYGEN SATURATION: 98 %

## 2024-12-17 DIAGNOSIS — Z17.0 MALIGNANT NEOPLASM OF OVERLAPPING SITES OF RIGHT BREAST IN FEMALE, ESTROGEN RECEPTOR POSITIVE: Primary | ICD-10-CM

## 2024-12-17 DIAGNOSIS — C50.811 MALIGNANT NEOPLASM OF OVERLAPPING SITES OF RIGHT BREAST IN FEMALE, ESTROGEN RECEPTOR POSITIVE: Primary | ICD-10-CM

## 2024-12-17 PROCEDURE — 99214 OFFICE O/P EST MOD 30 MIN: CPT | Performed by: NURSE PRACTITIONER

## 2024-12-17 ASSESSMENT — PAIN SCALES - GENERAL: PAINLEVEL_OUTOF10: 0-NO PAIN

## 2024-12-17 NOTE — PROGRESS NOTES
Radiation Oncology Follow-Up    Patient Name:  Macy Chavez  MRN:  [id number]  :  1985    Referring Provider: Carmencita Patel DO  Primary Care Provider: ALFONSO Valiente  Care Team: Patient Care Team:  ALFONSO Valienet as PCP - General (Internal Medicine)  Denise Golden MD as Consulting Physician (Hematology and Oncology)  Zainab Larson MD as Cardiologist (Cardiology)    Date of Service: 2024   39 y.o. female with Malignant neoplasm of overlapping sites of right breast in female, estrogen receptor positive, Clinical: Stage IIIA (cT3, cN2, cM0, G3, ER+, CA+, HER2-).      Diagnostic/Therapeutic History:     2023: Bl screening Mammogram: negative      2023: Right breast mass for which US done, Nonspecific dense fibroglandular tissues in the right breast in the area of reported lump. Dense fibroglandular tissue also evident throughout both breasts on screening mammogram. No discrete lesion identified.     2024: Patient describes an increase in the size of a palpable lump in the right lower outer breast which was previously assessed in 2023. Recent negative screening mammogram from 2023. She also describes a new palpable lump in the right axilla. After being seen by her provider, a nother palpable lump is identified in the upper outer right breast.     Ultrasound of the right breast showed In the patient's area of palpable concern in the right breast at 7 o'clock, 5 cm from the nipple, there is a large vague heterogeneousmass with internal vascularity which has the appearance of dense fibroglandular tissue measuring up to 5 cm; it is visible protruding from the skin with discoloration. In the patient's area of palpable concern felt by her provider in the right breast at 10 o'clock, 8 cm from the nipple, there is an oval parallel heterogeneous mass withinternal vascularity measuring approximately 1.1 x 0.9 x 1.4 cm. 3 enlarged axillary LN were  seen     02/06/2024: US guided core needle biopsy     A. BREAST, RIGHT, 7:00, 5 CM FN, CORE BIOPSY:   -- Invasive ductal carcinoma, grade 2, ER: 95%, KS: 5-10%, HER2 negative (1+)  B. BREAST, RIGHT, 10:00, 8 CM FN, CORE BIOPSY:   -- Invasive ductal carcinoma, grade 2-3,  ER: 95%, KS<5%, HER2 negative (1+)   C. AXILLARY LYMPH NODE, RIGHT, BIOPSY:     -- Metastatic carcinoma, see note.     02/14/2024: Biopsy-proven multicentric right breast malignancy with the total  span of up to 13.0 cm associated with bulky metastatic axillary and  abnormal internal mammary lymphadenopathy. No evidence of chest wall,  skin or nipple-areolar complex involvement.     No MRI evidence of malignancy in the left breast.     02/15/2024: staging scans: negative- short term follow up      07/16/2024: completed ddAC followed by weekly taxol     08/14/2024: Dr Patel performed a bilateral skin sparing mastectomy with right sided SLNB and completion dissection with right axillary node dissection, bilateral direct-to-implant breast reconstruction with structured saline implant (right = 410 cc; left = 390 cc) with mesh  with path showing an invasive ductal carcinoma  G2 measuring 25 mm, margins negative. She 5 total lymph nodes removed with 1 involved with macromets and 1 involved with micromets; final stage [id number] ER: 95%, , KS: 5-10%, HER2 negative (1+)     The patient completed radiotherapy as outlined below.     Radiation Treatment Summary:            IMRT: Right Breast with lymph nodes     Treatment Period Technique Fraction Dose Fractions Total Dose   Course 1 10/7/2024-10/28/2024  (days elapsed: 21)         CW_CNI_R 10/7/2024-10/28/2024 VMAT 266 / 266 cGy 16 / 16 4256 / 4,256 cGy     Started G[place] on 12/03; plan for adjuvant endocrine therapy with anastrozole and to start Verzenio for planned 2 yrs.     SUBJECTIVE  History of Present Illness:   Macy Chavez is here for routine radiation follow up/surveillance visit.  She is doing well  overall and energy level getting back to baseline.  Right chest wall healing well and skin intact.  She endorses some skin darkening but improving. She started goserelin injections and plan is to start anastrozole.  She is taking her children to [place] over the holidays and will start Verzenio when she returns. No swelling of right arm or difficulty with ROM.  Denies headaches, fever, chills, cough, SOB, chest pain, n/v/c/d or bony pain.      Review of Systems:    Review of Systems   All other systems reviewed and are negative.    Performance Status:   The Karnofsky performance scale today is 90, Able to carry on normal activity; minor signs or symptoms of disease (ECOG equivalent 0).      OBJECTIVE    Current Outpatient Medications:     atorvastatin (Lipitor) 20 mg tablet, Take 1 tablet (20 mg) by mouth once daily., Disp: 90 tablet, Rfl: 3    b complex 0.4 mg tablet, Take 1 tablet by mouth once daily., Disp: , Rfl:     diclofenac sodium (Voltaren Arthritis Pain) 1 % gel, Apply to breast 2-3 times daily as needed for pain, Disp: 50 g, Rfl: 1    docusate sodium (Colace) 100 mg capsule, Take 1 capsule (100 mg) by mouth 2 times a day as needed for constipation., Disp: 10 capsule, Rfl: 0    loratadine (Claritin) 10 mg tablet, Take 1 tablet (10 mg) by mouth once daily., Disp: , Rfl:     metoprolol succinate XL (Toprol-XL) 25 mg 24 hr tablet, Take 1 tablet (25 mg) by mouth once daily. Do not crush or chew., Disp: 90 tablet, Rfl: 3    oxyCODONE (Roxicodone) 5 mg immediate release tablet, Take 1 tablet (5 mg) by mouth every 6 hours if needed for severe pain (7 - 10)., Disp: 15 tablet, Rfl: 0    prochlorperazine (Compazine) 10 mg tablet, Take 1 tablet (10 mg) by mouth every 6 hours if needed for nausea or vomiting., Disp: 30 tablet, Rfl: 2    valACYclovir (Valtrex) 500 mg tablet, Take 1 tablet (500 mg) by mouth once daily., Disp: 30 tablet, Rfl: 2     Physical Exam  Vitals reviewed.   Constitutional:       Appearance: Normal  appearance.   HENT:      Head: Normocephalic and atraumatic.      Nose: Nose normal.      Mouth/Throat:      Mouth: Mucous membranes are moist.      Pharynx: Oropharynx is clear.   Eyes:      Conjunctiva/sclera: Conjunctivae normal.      Pupils: Pupils are equal, round, and reactive to light.   Cardiovascular:      Rate and Rhythm: Normal rate and regular rhythm.      Heart sounds: Normal heart sounds.   Pulmonary:      Effort: Pulmonary effort is normal.      Breath sounds: Normal breath sounds.   Chest:   Breasts:     Right: Absent.      Left: Absent.      Comments: Bilateral mastectomies with implant reconstruction.  Well healed mastectomy incisions.  Mild tanning right side. Skin intact.   Abdominal:      Palpations: Abdomen is soft.   Musculoskeletal:         General: No swelling. Normal range of motion.      Cervical back: Normal range of motion and neck supple.   Lymphadenopathy:      Cervical: No cervical adenopathy.      Upper Body:      Right upper body: No supraclavicular or axillary adenopathy.      Left upper body: No supraclavicular or axillary adenopathy.   Skin:     General: Skin is warm and dry.   Neurological:      General: No focal deficit present.      Mental Status: She is alert and oriented to person, place, and time.   Psychiatric:         Mood and Affect: Mood normal.         Behavior: Behavior normal.         RESULTS:  NM bone whole body    Result Date: 12/5/2024  Interpreted By:  Julito Osei and Hanreck James STUDY: NM BONE WHOLE BODY;  12/5/2024 1:50 pm   INDICATION: Signs/Symptoms:bone pain and generalized fatigue.   ,C50.919 Malignant neoplasm of unspecified site of unspecified female breast,Z17.0 Estrogen receptor positive status (ER+)   COMPARISON: 02/19/2024 bone scintigraphy Same day CT chest abdomen pelvis   ACCESSION NUMBER(S): [id number]   ORDERING CLINICIAN: SALUD CAMPOS   TECHNIQUE: DIVISION OF NUCLEAR MEDICINE BONE SCAN, WHOLE BODY plus REGIONAL VIEWS   The patient  received an intravenous dose of  26.5 mCi of Tc-99m MDP. Anterior and posterior images of the skeleton from skull vertex to feet were then acquired.  Additional regional skeletal images were also obtained.   FINDINGS: No focal increased radiotracer activity is seen to suggest osseous metastatic disease.   Expected, excreted activity is noted in the kidneys and bladder.       No evidence of osseous metastatic disease.   I personally reviewed the images/study and I agree with the resident Huey Quintana's findings as stated. This study was interpreted at [place], [place].   MACRO: None   Signed by: Julito Osei 12/5/2024 3:36 PM Dictation workstation:   [id number]    CT chest abdomen pelvis w IV contrast    Result Date: 12/5/2024  Interpreted By:  Manjeet Trujillo and Jiang Sirui STUDY: CT CHEST ABDOMEN PELVIS W IV CONTRAST;  12/5/2024 11:15 am   INDICATION: Signs/Symptoms:bone pain and generalized fatigue.   Per clinical notes: Patient with history of right-sided invasive ductal carcinoma status post bilateral skin sparing mastectomies in August 2024 completed adjuvant radiation therapy.   ,C50.919 Malignant neoplasm of unspecified site of unspecified female breast,Z17.0 Estrogen receptor positive status (ER+)   COMPARISON: CT 02/19/2024   ACCESSION NUMBER(S): [id number]   ORDERING CLINICIAN: SALUD CAMPOS   TECHNIQUE: CT of the chest, abdomen, and pelvis was performed.  Contiguous axial images were obtained at 3 mm slice thickness through the chest, abdomen and pelvis. Coronal and sagittal reconstructions at 3 mm slice thickness were performed. 75 ML of Omnipaque 350 was administered intravenously without immediate complication.   FINDINGS: CHEST:   LUNG/PLEURA/LARGE AIRWAYS: The trachea and central airways are patent. No endobronchial lesion. No consolidation, pleural effusion, or pneumothorax. No new suspicious or enlarging pulmonary nodules identified.    VESSELS: Aorta and pulmonary arteries are normal caliber.  No atherosclerotic changes of the aorta are identified.  No coronary artery calcifications are present.   HEART: The heart is normal in size.  No pericardial effusion   MEDIASTINUM AND JAYJAY: Postsurgical changes of a right axillary lymph node dissection. The previously noted enlarged right axillary lymph nodes are no longer seen. No new lymphadenopathy. There is residual soft tissue thickening of the right axilla as best seen on series 2, image 38. The esophagus is normal.   CHEST WALL AND LOWER NECK: Postsurgical changes of bilateral mastectomies with prepectoral implants in place. There is mild skin thickening of the right inferior breast likely representing post radiation changes. Visualized thyroid gland is unremarkable.   ABDOMEN:   LIVER: The liver is normal in size. There is diffuse hypoattenuation compatible with steatosis. No new worrisome hepatic lesions.   BILE DUCTS: The intrahepatic and extrahepatic ducts are not dilated.   GALLBLADDER: No calcified stones. No wall thickening.   PANCREAS: Unremarkable.   SPLEEN: Unremarkable.   ADRENAL GLANDS: Unremarkable.   KIDNEYS AND URETERS: The kidneys are normal in size and enhance symmetrically.  No hydroureteronephrosis or nephroureterolithiasis is identified.   PELVIS:   BLADDER: The urinary bladder appears normal without abnormal wall thickening.   REPRODUCTIVE ORGANS: The uterus is present with an intrauterine device noted. No adnexal masses.   BOWEL: The stomach is decompressed, limited for evaluation. The small and large bowel demonstrate no abnormal bowel thickening or dilatation. The appendix is not definitively visualized.     VESSELS: The abdominal aorta and IVC are unremarkable. The portal venous vasculature is patent.   PERITONEUM/RETROPERITONEUM/LYMPH NODES: No ascites or free air, no fluid collection.  No abdominopelvic lymphadenopathy is present.   BONE AND SOFT TISSUE: No suspicious  osseous lesions are identified.  A small fat containing umbilical hernia is noted. Otherwise, the abdominal wall soft tissues are unremarkable.       Breast cancer restaging scan. When compared to the prior examination dated 02/19/2024, there has been interval postsurgical changes of bilateral mastectomies with implant placement. Skin thickening of the right inferior breast likely representing post radiation changes. Postsurgical changes of a right axillary lymph node dissection. No definite evidence of new metastatic disease. Additional stable chronic and incidental findings described above.   I personally reviewed the image(s) / study and I agree with the findings as stated by Kendy Bishop MD. This study was interpreted at [place], [place].   MACRO: None   Signed by: Manjeet Trujillo 12/5/2024 3:02 PM Dictation workstation:   [id number]     ASSESSMENT:  39 y.o. female with stage IIIA right sided breast cancer s/p neoadjuvant chemotherapy followed by bilateral mastectomies with saline implant reconstruction followed by radiation to right chest wall, regional lymph nodes. Doing well post treatment. Reviewed skin care, possible late effects of treatment, possible side effects anastrozole, Verzenio and follow up plan.     PLAN:    - Follow up with Dr. Golden for adjuvant endocrine therapy and Verzenio.  - Radiation follow up in 6 mo.  Call with any questions or concerns.     Gifty Cruz CNP  [phone]

## 2024-12-31 ENCOUNTER — INFUSION (OUTPATIENT)
Dept: HEMATOLOGY/ONCOLOGY | Facility: CLINIC | Age: 39
End: 2024-12-31
Payer: COMMERCIAL

## 2024-12-31 VITALS
SYSTOLIC BLOOD PRESSURE: 127 MMHG | BODY MASS INDEX: 25.51 KG/M2 | WEIGHT: 167.77 LBS | HEART RATE: 71 BPM | OXYGEN SATURATION: 98 % | RESPIRATION RATE: 16 BRPM | TEMPERATURE: 96.4 F | DIASTOLIC BLOOD PRESSURE: 84 MMHG

## 2024-12-31 DIAGNOSIS — C50.919 MALIGNANT NEOPLASM OF BREAST IN FEMALE, ESTROGEN RECEPTOR POSITIVE, UNSPECIFIED LATERALITY, UNSPECIFIED SITE OF BREAST: ICD-10-CM

## 2024-12-31 DIAGNOSIS — Z17.0 MALIGNANT NEOPLASM OF BREAST IN FEMALE, ESTROGEN RECEPTOR POSITIVE, UNSPECIFIED LATERALITY, UNSPECIFIED SITE OF BREAST: ICD-10-CM

## 2024-12-31 PROCEDURE — 2500000004 HC RX 250 GENERAL PHARMACY W/ HCPCS (ALT 636 FOR OP/ED): Mod: JZ,JG,TB | Performed by: STUDENT IN AN ORGANIZED HEALTH CARE EDUCATION/TRAINING PROGRAM

## 2024-12-31 PROCEDURE — 96402 CHEMO HORMON ANTINEOPL SQ/IM: CPT

## 2024-12-31 PROCEDURE — 2500000004 HC RX 250 GENERAL PHARMACY W/ HCPCS (ALT 636 FOR OP/ED): Performed by: STUDENT IN AN ORGANIZED HEALTH CARE EDUCATION/TRAINING PROGRAM

## 2024-12-31 PROCEDURE — 96372 THER/PROPH/DIAG INJ SC/IM: CPT

## 2024-12-31 RX ORDER — ALBUTEROL SULFATE 0.83 MG/ML
3 SOLUTION RESPIRATORY (INHALATION) AS NEEDED
OUTPATIENT
Start: 2025-01-28

## 2024-12-31 RX ORDER — LIDOCAINE HYDROCHLORIDE 10 MG/ML
2 INJECTION, SOLUTION EPIDURAL; INFILTRATION; INTRACAUDAL; PERINEURAL ONCE
Status: COMPLETED | OUTPATIENT
Start: 2024-12-31 | End: 2024-12-31

## 2024-12-31 RX ORDER — FAMOTIDINE 10 MG/ML
20 INJECTION INTRAVENOUS ONCE AS NEEDED
OUTPATIENT
Start: 2025-01-28

## 2024-12-31 RX ORDER — EPINEPHRINE 0.3 MG/.3ML
0.3 INJECTION SUBCUTANEOUS EVERY 5 MIN PRN
OUTPATIENT
Start: 2025-01-28

## 2024-12-31 RX ORDER — DIPHENHYDRAMINE HYDROCHLORIDE 50 MG/ML
50 INJECTION INTRAMUSCULAR; INTRAVENOUS AS NEEDED
OUTPATIENT
Start: 2025-01-28

## 2024-12-31 RX ORDER — LIDOCAINE HYDROCHLORIDE 10 MG/ML
2 INJECTION, SOLUTION EPIDURAL; INFILTRATION; INTRACAUDAL; PERINEURAL ONCE
OUTPATIENT
Start: 2025-01-28

## 2024-12-31 RX ADMIN — LIDOCAINE HYDROCHLORIDE 20 MG: 10 INJECTION, SOLUTION EPIDURAL; INFILTRATION; INTRACAUDAL; PERINEURAL at 09:12

## 2024-12-31 RX ADMIN — GOSERELIN ACETATE 3.6 MG: 3.6 IMPLANT SUBCUTANEOUS at 09:12

## 2024-12-31 ASSESSMENT — PAIN SCALES - GENERAL: PAINLEVEL_OUTOF10: 0-NO PAIN

## 2025-01-06 ENCOUNTER — APPOINTMENT (OUTPATIENT)
Dept: PLASTIC SURGERY | Facility: CLINIC | Age: 40
End: 2025-01-06
Payer: COMMERCIAL

## 2025-01-06 VITALS — WEIGHT: 167 LBS | BODY MASS INDEX: 25.31 KG/M2 | HEIGHT: 68 IN

## 2025-01-06 DIAGNOSIS — T85.44XA CAPSULAR CONTRACTURE OF BREAST IMPLANT, INITIAL ENCOUNTER: Primary | ICD-10-CM

## 2025-01-06 PROCEDURE — 3008F BODY MASS INDEX DOCD: CPT | Performed by: PHYSICIAN ASSISTANT

## 2025-01-06 PROCEDURE — 99213 OFFICE O/P EST LOW 20 MIN: CPT | Performed by: PHYSICIAN ASSISTANT

## 2025-01-06 RX ORDER — MONTELUKAST SODIUM 10 MG/1
10 TABLET ORAL DAILY
Qty: 90 TABLET | Refills: 3 | Status: SHIPPED | OUTPATIENT
Start: 2025-01-06 | End: 2026-01-06

## 2025-01-06 NOTE — PROGRESS NOTES
Division of Plastic & Reconstructive Surgery            Post OP Visit    Date: 1/6/2025   DOS: 8/14/2024    Surya Chavez is a 39 y.o. female presents for postoperative visit status post bilateral nipple sparing mastectomy with right axillary node dissection, bilateral direct-to-implant breast reconstruction with structured saline implant (right = 410 cc; left = 390 cc) with mesh, performed on 8/14/2024    She presents for skin check.  She completed radiation therapy on 10/28/24. She notes improvement in her skin with moisturizers. She endorses she went with her family to [facility] and endorses she has had back pain on the right side since. This pain is at the boarder of the latissimus which she was having some pain following radiation. She has been seeing physical therapy for massage and lymphatic massage which she notes is helping. She endorses continued tightness of the right chest and arm at the upper boarder of the implant and into the arm.     Objective    There were no vitals filed for this visit.  Gen: interactive and pleasant  Head: NCAT  Eyes: EOMI, PERRLA  Mouth: MMM  Throat: trachea midline  Cor: RRR  Pulm: nonlabored breathing  Abd: s/nt/nd  Neuro: AAOx3  Ext: extremities perfused    Body mass index is 25.39 kg/m².    Focused exam of the: Bilateral breast    Right breast vertical incision is well healed without evidence of dehiscence. There is mild radiation induced skin changes to the right breast with hyperpigmentation and peeling of the armpit. There is no tenderness of the lateral ribs in the mid-axillary region. There is mild TTP at the superior lateral capsule in the pre-axillary region. No evidence of capsular contracture. No tenderness to the rest of the reconstructed breast, pec muscle, or arm. There is mild TTP along the boarder of the latissimus dorsi.     Left breast vertical incision is well healed without evidence of dehiscence. There is no overlying  erythema or concerns for infection.  No sign of blottable fluid collection. Dermabond and pernio is removed today. There is mild superior medial hollowing noted.         Assessment/Plan       Macy is a 39 y.o. female  presents for postoperative visit status post bilateral nipple sparing mastectomy with right axillary node dissection, bilateral direct-to-implant breast reconstruction with structured saline implant (right = 410 cc; left = 390 cc) with mesh, performed on 8/14/2024    She presents for follow up visit and skin check.  She completed radiation therapy on 10/28/24. She has complaints of tightness of the pec and arm with abduction of the arm. She had mild TTP at the superior pre-axillary portion at the edge of the capsule of the right breast. There is grade 1 capsular contracture. She has mild radiation induced skin changes. We discussed trial of singulair for capsular contracture. We will see her back in 2-3 months for skin check following radiation. She has complaints of superior medial hollowing bilaterally. We discussed fat grafting to the bilateral breasts to remedy this. We additionally discussed possible implant exchange to the left breast for asymmetry following breast reconstruction.     Plan:   Follow up in 2-3 months for skin check  Daily moisturizing to the radiated breast-add Aquaphor for additional skin hydration  Continue with daily scar care  Perform daily massage to the breasts-right breast with tender site at the edge of the breast before the axilla.   Trial of singulair   Discussed with attending surgeon Dr. Reji RIZZO spent 20 minutes with this patient. Greater than 50% of this time was spent in the counselling and/or coordination of care of this patient.  This note was created using voice recognition software and was not corrected for typographical or grammatical errors.    Signature: Yvette Gan PA-C   Date: 1/6/2025

## 2025-01-13 ENCOUNTER — APPOINTMENT (OUTPATIENT)
Dept: INTEGRATIVE MEDICINE | Facility: CLINIC | Age: 40
End: 2025-01-13
Payer: COMMERCIAL

## 2025-01-13 DIAGNOSIS — C50.811 MALIGNANT NEOPLASM OF OVERLAPPING SITES OF RIGHT BREAST IN FEMALE, ESTROGEN RECEPTOR POSITIVE: ICD-10-CM

## 2025-01-13 DIAGNOSIS — Z17.0 MALIGNANT NEOPLASM OF OVERLAPPING SITES OF RIGHT BREAST IN FEMALE, ESTROGEN RECEPTOR POSITIVE: ICD-10-CM

## 2025-01-13 PROCEDURE — 99205 OFFICE O/P NEW HI 60 MIN: CPT | Performed by: HOSPITALIST

## 2025-01-13 RX ORDER — VALACYCLOVIR HYDROCHLORIDE 500 MG/1
500 TABLET, FILM COATED ORAL DAILY
Qty: 30 TABLET | Refills: 2 | Status: SHIPPED | OUTPATIENT
Start: 2025-01-13

## 2025-01-13 NOTE — PROGRESS NOTES
Patient ID: Macy Chavez is a 39 y.o. female.  Referring Physician: Denise Golden MD  [address] [place] [address]  Primary Care Provider: ATA Valiente-CNP    CANCER HISTORY:   Denise Golden MD   40 yo woman with h/o 2/6/2024: Stage IIIA (cT3, cN2, cM0, G3, ER+, MA+, HER2-)   7/24 ddAC f/b T    08/14/2024: Dr Patel performed a bilateral skin sparing mastectomy with right sided SLNB and completion dissection with path showing an invasive ductal carcinoma  G2 measuring 25 mm, margins negative. She 5 total lymph nodes removed with 1 involved with macromets and 1 involved with micromets; final stage [id number] ER: 95%, , MA: 5-10%, HER2 negative (1+)     10/28/2024: completed adjuvant radiation therapy    Genetics negative    Ovarian suppression/AI/verzenio (2 yrs) - pills pending  Adj bisphosphonate    INTEGRATIVE HISTORY:  Symptoms:  Fatigue - improved now  LBP - overall improved  R capsule pain of breast - taking singulaire to help    Diet: Try to cook healthy at home  Limited fast food, pizza  Sweets  Processed foods    PA: Exercising regularly    Sleep: doing well    Stress: Managing stress - exercising helps, seeing friends and social support  Spending time with   Massages/facials  11, 9, 5 yo kids    Natural Products:    B vitamin complex - started with taxol    ROS:  no ha, visual symptoms, hearing loss  no sob, chest pain, palp  ROS o/w non contributory, please see HPI    Objective    BSA: There is no height or weight on file to calculate BSA.  There were no vitals taken for this visit.    PHYSICAL EXAM:  NAD, awake/alert  HEENT, NCAT, OP clear, no oral lesions  CTA bilat  RRR no mgr  Abd soft/nt/nd+bs  No c/c/e/ttp  Motor/sensory intact, CN 2-12 intact     RESULTS:  Lab Results   Component Value Date    WBC 8.1 08/07/2024    HGB 14.1 08/07/2024    HCT 42.0 08/07/2024     08/07/2024    CREATININE 0.86 08/07/2024    AST 18 07/16/2024       Assessment/Plan   40 yo woman with h/o  "2/6/2024: Stage IIIA (cT3, cN2, cM0, G3, ER+, OK+, HER2-)   7/24 ddAC f/b T    08/14/2024: Dr Patel performed a bilateral skin sparing mastectomy with right sided SLNB and completion dissection with path showing an invasive ductal carcinoma  G2 measuring 25 mm, margins negative. She 5 total lymph nodes removed with 1 involved with macromets and 1 involved with micromets; final stage [id number] ER: 95%, , OK: 5-10%, HER2 negative (1+)     10/28/2024: completed adjuvant radiation therapy    Ovarian suppression/AI/verzenio (2 yrs) - pills pending  Adj bisphosphonate    SYMPTOMS Experienced:  Fatigue - improved  LBP - upper back pain    Recommendations:  LIFESTYLE:  Whole Foods high fiber plant based diet   5-9 fruits/veg/day, Cruciferous Vegetables - Brussel Sprouts, Kale, Broccoli, Cauliflower  [employer] \"New American Plate\"  Organic dairy preferred  Limit sugar   Limit alcohol   Moderate soy is ok (edamame/tofu) once/day  Fiber including flax/tone seeds beneficial  Consider intermittent fasting 3-5 days per week (not eating for 14 hrs straight)   Consider fasting - water for a day or skipping a meal once/week   Consider PROLON fasting mimicking diet  Exercise 30 min/day 5 days a week, vary by balance, cardio and resistance training   Yoga  Cancer Wellness Program - 8 weeks virtual/billed insurance  VIOME - full body intelligence    Integrative Therapies:  Acupuncture ($42/session group acupuncture rate)  Massage (covered by insurance)  REIKI (Free)    Natural Products to Consider:  Turmeric Supreme (roger)  [employer] STAMETS 7  Vitamin D3 2451-4985 IU/day  Omega 3 supplementation (nordic naturals)    For Further Information:  Reading: Anticancer Living, Cancer Fighting Kitchen  Websites: Cancerchoices.org, Anticancer Lifestyle Program  Mindi: ONCIO  Podcast: Integrative Oncology Talk      Support: [employer] [employer] (exercise programs, dietitian, support groups, financial support and " services)  [employer] - passport for integrative services including vegetables/virtual wellness    Follow up: Integrative Oncology 4 months    Thank you for consulting Integrative Oncology  I spent 60 minutes in direct patient care during this clinical encounter with the patient

## 2025-01-14 NOTE — PROGRESS NOTES
"Seen in a Group Medical Visit, with agreement signed to protect patient privacy and participate in a shared medical visit to focus on assessment and implementing lifestyle medicine interventions to address modifiable risk factors of chronic disease, such as cancer, and to assist with reduction of symptoms. Confidentiality form reviewed and signed and ground rules reviewed. An interactive audio and video telecommunication system which permits real time communications between the patient (at the originating site) and provider (at the distant site) was utilized to provide this telehealth service. Verbal consent was requested and obtained on this date for a telehealth visit.      This is an 8-week intervention of people diagnosed with cancer who have mood changes, stress, fatigue, and/or pain that includes active participation, individual assessments and instruction for home practices to implement lifestyle medicine interventions.     During week 1 of 8, the focus of lifestyle medicine and mindful eating were reviewed. Interventions such as mindful eating practices including culinary demonstration and tasting, \"Eat The [place]\", sensory experience, and at-home practices were discussed, and discussion amongst participants were reviewed. During this group medical visit, I was assisted by Ashley Martin, RobinsonD and participants participated in active Cancer Wellness Program discussions and group activities.     CANCER HISTORY:   Malignant neoplasm of overlapping sites of right breast in female, estrogen receptor positive  Staging form: Breast, AJCC 8th Edition  - Clinical stage from 2/6/2024: Stage IIIA (cT3, cN2, cM0, G3, ER+, WI+, HER2-) - Signed by Denise Golden MD on 2/21/2024     Oncology provider visit - last seen 11/19/24   Integrative medicine provider visit - last seen on 1/13/25     Symptom Managed: Fatigue, anxiety, stress management     Cancer Wellness Program: Week 1 of 8     Subjective: Individual check in with " "provider:     Program Goals:   \"Learn as much as I can\"  \"Finding the best light to move forward\"     Group Medical Visit:  Review of home practice  Reflections  New concerns  Health topic discussed in group: Mindful eating  Group practice: Connect each person with a partner, text or email once during the week with your partner to check in. Practice mindful eating for one meal each day.   Reviewed home practice assignments for next week        ROS:  no HA, visual symptoms, hearing loss  no SOB, chest pain, palpitations   ROS o/w non contributory, please see HPI     Objective    BSA: There is no height or weight on file to calculate BSA.  There were no vitals taken for this visit.     PHYSICAL EXAM:  GENERAL: alert and appropriate, in no distress, well-hydrated, well-nourished and happy, smiling, interactive  SKIN: no rash noted  HEAD: normocephalic, no abnormality or lesion noted  EYES: no injection and visual acuity is grossly normal  EARS: external ears normal  NOSE: external nose normal without rhinorrhea  NECK: full ROM, no cervical LNs noted  RESPIRATORY: breathing non-labored and no grunting/flaring/retractions  NEUROLOGIC: no obvious deficit     Lab Results   Component Value Date    WBC 8.1 08/07/2024    HGB 14.1 08/07/2024    HCT 42.0 08/07/2024     08/07/2024    CHOL 197 06/04/2024    TRIG 176 (H) 06/04/2024    HDL 44.5 06/04/2024    ALT 28 07/16/2024    AST 18 07/16/2024     08/07/2024    K 4.4 08/07/2024     08/07/2024    CREATININE 0.86 08/07/2024    BUN 15 08/07/2024    CO2 29 08/07/2024    TSH 1.35 11/19/2024    INR 1.0 02/26/2024        Most recent and pertinent imaging reviewed in Epic.      Assessment/Plan   Cancer Staging   No matching staging information was found for the patient.     Symptom Managed: Fatigue, anxiety, stress management, health maintenance     Cancer Wellness Program: Week 1/8     At-home practices reviewed:  -Connect each person with a partner, text or email " once during the week with your partner to check in.   -Practice mindful eating for one meal each day.       Follow up for 8 weekly sessions to assess and treat symptoms through group medical visit.     ATA Hunter-CNP   [facility]  Integrative Oncology     60 min visit with face to face counseling in a group setting re: stress management, coping mechanisms including meditation and focus on anxiety  >50% of the visit 60 min was spent in direct face to face care with patient discussing stress management and coping strategies including meditation and mindfulness  Individual face to face check in was done during the visit in front of group. Session led by Clarissa Lopez CNP.

## 2025-01-15 ENCOUNTER — ALLIED HEALTH (OUTPATIENT)
Dept: INTEGRATIVE MEDICINE | Facility: CLINIC | Age: 40
End: 2025-01-15
Payer: COMMERCIAL

## 2025-01-15 DIAGNOSIS — Z00.00 HEALTHCARE MAINTENANCE: Primary | ICD-10-CM

## 2025-01-15 DIAGNOSIS — F41.9 ANXIETY: ICD-10-CM

## 2025-01-15 PROCEDURE — 99215 OFFICE O/P EST HI 40 MIN: CPT

## 2025-01-15 NOTE — PATIENT INSTRUCTIONS
At-home practices reviewed:  -Connect each person with a partner, text or email once during the week with your partner to check in.   -Practice mindful eating for one meal each day.       Follow up for 8 weekly sessions to assess and treat symptoms through group medical visit.

## 2025-01-16 ENCOUNTER — APPOINTMENT (OUTPATIENT)
Dept: OBSTETRICS AND GYNECOLOGY | Facility: CLINIC | Age: 40
End: 2025-01-16
Payer: COMMERCIAL

## 2025-01-16 VITALS
SYSTOLIC BLOOD PRESSURE: 104 MMHG | HEIGHT: 68 IN | DIASTOLIC BLOOD PRESSURE: 62 MMHG | WEIGHT: 168 LBS | BODY MASS INDEX: 25.46 KG/M2

## 2025-01-16 DIAGNOSIS — Z97.5 IUD (INTRAUTERINE DEVICE) IN PLACE: Primary | ICD-10-CM

## 2025-01-16 DIAGNOSIS — N89.8 VAGINAL DRYNESS: ICD-10-CM

## 2025-01-16 PROCEDURE — 1036F TOBACCO NON-USER: CPT | Performed by: STUDENT IN AN ORGANIZED HEALTH CARE EDUCATION/TRAINING PROGRAM

## 2025-01-16 PROCEDURE — 99213 OFFICE O/P EST LOW 20 MIN: CPT | Performed by: STUDENT IN AN ORGANIZED HEALTH CARE EDUCATION/TRAINING PROGRAM

## 2025-01-16 PROCEDURE — 3008F BODY MASS INDEX DOCD: CPT | Performed by: STUDENT IN AN ORGANIZED HEALTH CARE EDUCATION/TRAINING PROGRAM

## 2025-01-16 RX ORDER — LEVONORGESTREL 52 MG/1
1 INTRAUTERINE DEVICE INTRAUTERINE ONCE
COMMUNITY

## 2025-01-16 ASSESSMENT — PAIN SCALES - GENERAL: PAINLEVEL_OUTOF10: 0-NO PAIN

## 2025-01-16 NOTE — PROGRESS NOTES
Subjective   Patient ID: Macy Chavez is a 39 y.o. female who presents for Follow-up (Discuss IUD status after being Dx's with Breast CA).    Breast cancer diagnosed last year, now s/p chemoradiation and double mastectomy. Currently on Goserelin for ovarian suppression, plan for 2 years at least. Also plans to start aromatase inhibitor.     Wants to discuss LNG-IUD and if she can keep that, also wants to know more about menopause. Reports some vaginal dryness since chemotherapy. No other concerns.    Objective   Physical Exam  Constitutional:       General: She is not in acute distress.     Appearance: Normal appearance.   HENT:      Head: Normocephalic.   Cardiovascular:      Rate and Rhythm: Normal rate.   Pulmonary:      Effort: Pulmonary effort is normal. No respiratory distress.   Skin:     General: Skin is warm and dry.   Neurological:      Mental Status: She is alert and oriented to person, place, and time.   Psychiatric:         Mood and Affect: Mood normal.         Behavior: Behavior normal.         Thought Content: Thought content normal.         Judgment: Judgment normal.         Assessment/Plan   Problem List Items Addressed This Visit             ICD-10-CM    IUD (intrauterine device) in place - Primary Z97.5     LNG IUD placed 2019  Reviewed lack of definitive evidence of impact on breast cancer recurrence in the presence of LNG IUD, and recommendation for an individualized decision after discussion with this GYN provider and medical oncologist  Discussed availability of Paragard for non-hormonal contraceptive option  Will review with [facility] and notify clinic if she desires removal         Vaginal dryness N89.8     Reviewed etiology  Samples provided of lubricant, reviewed coconut oil  Discussed option of vaginal estrogen cream but recommend deferring at this time    Discussed expectations of menopause and how she is likely currently in a hypo-estrogenic state and unlikely to notice significant changes  in symptoms                 Deep Mejia MD 01/16/25 10:55 AM

## 2025-01-16 NOTE — ASSESSMENT & PLAN NOTE
Reviewed etiology  Samples provided of lubricant, reviewed coconut oil  Discussed option of vaginal estrogen cream but recommend deferring at this time    Discussed expectations of menopause and how she is likely currently in a hypo-estrogenic state and unlikely to notice significant changes in symptoms    used

## 2025-01-16 NOTE — ASSESSMENT & PLAN NOTE
LNG IUD placed 2019  Reviewed lack of definitive evidence of impact on breast cancer recurrence in the presence of LNG IUD, and recommendation for an individualized decision after discussion with this GYN provider and medical oncologist  Discussed availability of Paragard for non-hormonal contraceptive option  Will review with [facility] and notify clinic if she desires removal

## 2025-01-22 ENCOUNTER — APPOINTMENT (OUTPATIENT)
Dept: INTEGRATIVE MEDICINE | Facility: CLINIC | Age: 40
End: 2025-01-22
Payer: COMMERCIAL

## 2025-01-22 DIAGNOSIS — F41.9 ANXIETY: Primary | ICD-10-CM

## 2025-01-22 DIAGNOSIS — Z00.00 HEALTHCARE MAINTENANCE: ICD-10-CM

## 2025-01-22 PROCEDURE — 99215 OFFICE O/P EST HI 40 MIN: CPT

## 2025-01-22 NOTE — PROGRESS NOTES
"Seen in a Group Medical Visit, with agreement signed to protect patient privacy and participate in a shared medical visit to focus on assessment and implementing lifestyle medicine interventions to address modifiable risk factors of chronic disease, such as cancer, and to assist with reduction of symptoms. Confidentiality form reviewed and signed and ground rules reviewed. An interactive audio and video telecommunication system which permits real time communications between the patient (at the originating site) and provider (at the distant site) was utilized to provide this telehealth service. Verbal consent was requested and obtained on this date for a telehealth visit.     This is an 8-week intervention of people diagnosed with cancer who have mood changes, stress, fatigue, and/or pain that includes active participation, individual assessments and instruction for home practices to implement lifestyle medicine interventions.    During week 2 of 8, the focus of lifestyle medicine and mindful eating were reviewed. Interventions such as: food as medicine, digestive health, gut microbiome, and at-home practices were discussed, and discussion amongst participants were reviewed.     During this group medical visit, I was assisted by Robinson CollinsD and participants participated in active Cancer Wellness Program discussions and group activities.     CANCER HISTORY:   Malignant neoplasm of overlapping sites of right breast in female, estrogen receptor positive  Staging form: Breast, AJCC 8th Edition  - Clinical stage from 2/6/2024: Stage IIIA (cT3, cN2, cM0, G3, ER+, NM+, HER2-) - Signed by Denise Golden MD on 2/21/2024     Oncology provider visit - last seen 11/19/24   Integrative medicine provider visit - last seen on 1/13/25    Symptom Managed: Fatigue, anxiety, stress management, health maintenance, overweight     Cancer Wellness Program: Week 2 of 8    Subjective: Individual check in with provider:  Wins: \"talking " "to my family about mindful eating\", talking about the 5 senses when eating food with her children, texting with her group partner     Challenges: traveling out of town this weekend that caused poor food choices     Group Medical Visit:  Review of home practice  Reflections  New concerns  Health topic discussed in group: food as medicine, digestive health, gut microbiome  Group practice: cooking demo  Reviewed home practice assignments for next week    ROS:  no HA, visual symptoms, hearing loss  no SOB, chest pain, palpitations   ROS o/w non contributory, please see HPI    Objective    BSA: There is no height or weight on file to calculate BSA.  There were no vitals taken for this visit.    PHYSICAL EXAM:  GENERAL: alert and appropriate, in no distress, well-hydrated, well-nourished and happy, smiling, interactive  SKIN: no rash noted  HEAD: normocephalic, no abnormality or lesion noted  EYES: no injection and visual acuity is grossly normal  EARS: external ears normal  NOSE: external nose normal without rhinorrhea  NECK: full ROM, no cervical LNs noted  RESPIRATORY: breathing non-labored and no grunting/flaring/retractions  NEUROLOGIC: no obvious deficit    Lab Results   Component Value Date    WBC 8.1 08/07/2024    HGB 14.1 08/07/2024    HCT 42.0 08/07/2024     08/07/2024    CHOL 197 06/04/2024    TRIG 176 (H) 06/04/2024    HDL 44.5 06/04/2024    ALT 28 07/16/2024    AST 18 07/16/2024     08/07/2024    K 4.4 08/07/2024     08/07/2024    CREATININE 0.86 08/07/2024    BUN 15 08/07/2024    CO2 29 08/07/2024    TSH 1.35 11/19/2024    INR 1.0 02/26/2024       Assessment/Plan   Cancer Staging   No matching staging information was found for the patient.    Symptom Managed: Fatigue, anxiety, stress management, health maintenance, overweight     Cancer Wellness Program: Week 2/8    Follow up for 8 weekly sessions to assess and treat symptoms through group medical visit.    At-Home Practices:  - Connect " with partner at least once weekly for check-in  - Eat a forkful of fermented food daily for the next two weeks    ATA Hunter-CNP   [facility]  Integrative Oncology    60 min visit with face to face counseling in a group setting re: stress management, coping mechanisms including meditation and focus on anxiety  >50% of the visit 60 min was spent in direct face to face care with patient discussing stress management and coping strategies including meditation and mindfulness  Individual face to face check in was done during the visit in front of group. Session led by Clarissa Lopez CNP.

## 2025-01-22 NOTE — PATIENT INSTRUCTIONS
Follow up for 8 weekly sessions to assess and treat symptoms through group medical visit.    At-Home Practices:  - Connect with partner at least once weekly for check-in  - Eat a forkful of fermented food daily for the next two weeks

## 2025-01-28 ENCOUNTER — OFFICE VISIT (OUTPATIENT)
Dept: HEMATOLOGY/ONCOLOGY | Facility: CLINIC | Age: 40
End: 2025-01-28
Payer: COMMERCIAL

## 2025-01-28 ENCOUNTER — INFUSION (OUTPATIENT)
Dept: HEMATOLOGY/ONCOLOGY | Facility: CLINIC | Age: 40
End: 2025-01-28
Payer: COMMERCIAL

## 2025-01-28 ENCOUNTER — LAB (OUTPATIENT)
Dept: LAB | Facility: CLINIC | Age: 40
End: 2025-01-28
Payer: COMMERCIAL

## 2025-01-28 VITALS
BODY MASS INDEX: 25.48 KG/M2 | DIASTOLIC BLOOD PRESSURE: 86 MMHG | SYSTOLIC BLOOD PRESSURE: 121 MMHG | OXYGEN SATURATION: 98 % | RESPIRATION RATE: 18 BRPM | WEIGHT: 167.6 LBS | TEMPERATURE: 98.2 F | HEART RATE: 91 BPM

## 2025-01-28 DIAGNOSIS — C50.919 MALIGNANT NEOPLASM OF BREAST IN FEMALE, ESTROGEN RECEPTOR POSITIVE, UNSPECIFIED LATERALITY, UNSPECIFIED SITE OF BREAST: ICD-10-CM

## 2025-01-28 DIAGNOSIS — Z17.0 MALIGNANT NEOPLASM OF BREAST IN FEMALE, ESTROGEN RECEPTOR POSITIVE, UNSPECIFIED LATERALITY, UNSPECIFIED SITE OF BREAST: ICD-10-CM

## 2025-01-28 DIAGNOSIS — Z17.0 MALIGNANT NEOPLASM OF OVERLAPPING SITES OF RIGHT BREAST IN FEMALE, ESTROGEN RECEPTOR POSITIVE: Primary | ICD-10-CM

## 2025-01-28 DIAGNOSIS — C50.811 MALIGNANT NEOPLASM OF OVERLAPPING SITES OF RIGHT BREAST IN FEMALE, ESTROGEN RECEPTOR POSITIVE: Primary | ICD-10-CM

## 2025-01-28 DIAGNOSIS — C50.811 MALIGNANT NEOPLASM OF OVERLAPPING SITES OF RIGHT BREAST IN FEMALE, ESTROGEN RECEPTOR POSITIVE: ICD-10-CM

## 2025-01-28 DIAGNOSIS — Z17.0 MALIGNANT NEOPLASM OF OVERLAPPING SITES OF RIGHT BREAST IN FEMALE, ESTROGEN RECEPTOR POSITIVE: ICD-10-CM

## 2025-01-28 PROCEDURE — 2500000004 HC RX 250 GENERAL PHARMACY W/ HCPCS (ALT 636 FOR OP/ED): Mod: JZ,TB | Performed by: STUDENT IN AN ORGANIZED HEALTH CARE EDUCATION/TRAINING PROGRAM

## 2025-01-28 PROCEDURE — 36415 COLL VENOUS BLD VENIPUNCTURE: CPT

## 2025-01-28 PROCEDURE — 96402 CHEMO HORMON ANTINEOPL SQ/IM: CPT

## 2025-01-28 PROCEDURE — 99214 OFFICE O/P EST MOD 30 MIN: CPT | Performed by: STUDENT IN AN ORGANIZED HEALTH CARE EDUCATION/TRAINING PROGRAM

## 2025-01-28 PROCEDURE — 2500000004 HC RX 250 GENERAL PHARMACY W/ HCPCS (ALT 636 FOR OP/ED): Performed by: STUDENT IN AN ORGANIZED HEALTH CARE EDUCATION/TRAINING PROGRAM

## 2025-01-28 PROCEDURE — 83001 ASSAY OF GONADOTROPIN (FSH): CPT

## 2025-01-28 PROCEDURE — 1036F TOBACCO NON-USER: CPT | Performed by: STUDENT IN AN ORGANIZED HEALTH CARE EDUCATION/TRAINING PROGRAM

## 2025-01-28 PROCEDURE — 82670 ASSAY OF TOTAL ESTRADIOL: CPT

## 2025-01-28 PROCEDURE — 99214 OFFICE O/P EST MOD 30 MIN: CPT | Mod: 25 | Performed by: STUDENT IN AN ORGANIZED HEALTH CARE EDUCATION/TRAINING PROGRAM

## 2025-01-28 RX ORDER — LIDOCAINE HYDROCHLORIDE 10 MG/ML
2 INJECTION, SOLUTION EPIDURAL; INFILTRATION; INTRACAUDAL; PERINEURAL ONCE
Status: COMPLETED | OUTPATIENT
Start: 2025-01-28 | End: 2025-01-28

## 2025-01-28 RX ORDER — DIPHENHYDRAMINE HYDROCHLORIDE 50 MG/ML
50 INJECTION INTRAMUSCULAR; INTRAVENOUS AS NEEDED
OUTPATIENT
Start: 2025-02-25

## 2025-01-28 RX ORDER — FAMOTIDINE 10 MG/ML
20 INJECTION INTRAVENOUS ONCE AS NEEDED
OUTPATIENT
Start: 2025-02-25

## 2025-01-28 RX ORDER — ALBUTEROL SULFATE 0.83 MG/ML
3 SOLUTION RESPIRATORY (INHALATION) AS NEEDED
OUTPATIENT
Start: 2025-02-25

## 2025-01-28 RX ORDER — LIDOCAINE HYDROCHLORIDE 10 MG/ML
2 INJECTION, SOLUTION EPIDURAL; INFILTRATION; INTRACAUDAL; PERINEURAL ONCE
OUTPATIENT
Start: 2025-02-25

## 2025-01-28 RX ORDER — EPINEPHRINE 0.3 MG/.3ML
0.3 INJECTION SUBCUTANEOUS EVERY 5 MIN PRN
OUTPATIENT
Start: 2025-02-25

## 2025-01-28 RX ADMIN — GOSERELIN ACETATE 3.6 MG: 3.6 IMPLANT SUBCUTANEOUS at 11:33

## 2025-01-28 RX ADMIN — LIDOCAINE HYDROCHLORIDE 20 MG: 10 INJECTION, SOLUTION EPIDURAL; INFILTRATION; INTRACAUDAL; PERINEURAL at 11:34

## 2025-01-28 ASSESSMENT — ENCOUNTER SYMPTOMS
NEUROLOGICAL NEGATIVE: 1
ARTHRALGIAS: 0
CONSTIPATION: 1
RESPIRATORY NEGATIVE: 1
APPETITE CHANGE: 0
UNEXPECTED WEIGHT CHANGE: 1
CARDIOVASCULAR NEGATIVE: 1
ENDOCRINE NEGATIVE: 1
EYES NEGATIVE: 1

## 2025-01-28 ASSESSMENT — PAIN SCALES - GENERAL: PAINLEVEL_OUTOF10: 0-NO PAIN

## 2025-01-28 NOTE — PROGRESS NOTES
Breast Medical Oncology Clinic  Location: [facility]      BREAST CANCER DIAGNOSIS  Malignant neoplasm of overlapping sites of right breast in female, estrogen receptor positive, Clinical: Stage IIIA (cT3, cN2, cM0, G3, ER+, NV+, HER2-)       ONCOLOGIC HISTORY  11/03/2023: Bl screening Mammogram: negative         12/20/2023: Right breast mass for which US done, Nonspecific dense fibroglandular tissues in the right breast in the area of reported lump. Dense fibroglandular tissue also evident throughout both breasts on screening mammogram. No discrete lesionidentified.     02/06/2024: Patient describes an increase in the size of a palpable lump in theright lower outer breast which was previously assessed in December of2023. Recent negative screening mammogram from 11/09/2023. She alsodescribes a new palpable lump in the right axilla. After being seen by her provider, a nother palpable lump is identified in the upper outer right breast.     Ultrasound of the right breast showed In the patient's area of palpable concern in the right breast at 7 o'clock, 5 cm from the nipple, there is a large vague heterogeneousmass with internal vascularity which has the appearance of dense fibroglandular tissue measuring up to 5 cm; it is visible protruding from the skin with discoloration. In the patient's area of palpable concern felt by her provider in the right breast at 10 o'clock, 8 cm from the nipple, there is an oval parallel heterogeneous mass withinternal vascularity measuring approximately 1.1 x 0.9 x 1.4 cm. 3 enlarged axillary LN were seen     02/06/2024: US guided core needle biopsy  A. BREAST, RIGHT, 7:00, 5 CM FN, CORE BIOPSY:   -- Invasive ductal carcinoma, grade 2, ER: 95%, NV: 5-10%, HER2 negative (1+)        B. BREAST, RIGHT, 10:00, 8 CM FN, CORE BIOPSY:   -- Invasive ductal carcinoma, grade 2-3,  ER: 95%, NV<5%, HER2 negative (1+)         C. AXILLARY LYMPH NODE, RIGHT, BIOPSY:     -- Metastatic carcinoma, see  note.    02/14/2024: Biopsy-proven multicentric right breast malignancy with the total  span of up to 13.0 cm associated with bulky metastatic axillary and  abnormal internal mammary lymphadenopathy. No evidence of chest wall,  skin or nipple-areolar complex involvement.     No MRI evidence of malignancy in the left breast.     02/15/2024: staging scans: negative- short term follow up      07/16/2024: completed ddAC followed by weekly taxol     08/14/2024: Dr Patel performed a bilateral skin sparing mastectomy with right sided SLNB and completion dissection with path showing an invasive ductal carcinoma  G2 measuring 25 mm, margins negative. She 5 total lymph nodes removed with 1 involved with macromets and 1 involved with micromets; final stage [id number] ER: 95%, , MN: 5-10%, HER2 negative (1+)     10/28/2024: completed adjuvant radiation therapy     12/3/2024: Started Gosarelin shots    1/28/25: Ordered abemaciclib     CURRENT THERAPY    Gosarelin +Abemaciclib    HISTORY OF PRESENT ILLNESS    She reports she has been doing well overall since her last visit. Her energy level is at baseline. She denies any shortness of breath or coughing. She denies any nausea or vomiting. She denies any diarrhea, and reports that she is still a little constipated for which she takes colace. She mentions a sensation of bruising in the periscapular area on the right side (though some on the left), where it is extremely sensitive to light touch which has been going on for the past month.  She also has some tenderness on her left lateral chest well. She denies any additional joint pain.  She reports vaginal dryness and weight gain; she denies any mood swings or hot flushes. Last period was in March 2024.      Review of Systems   Constitutional:  Positive for unexpected weight change. Negative for appetite change.   HENT:  Negative.     Eyes: Negative.    Respiratory: Negative.     Cardiovascular: Negative.    Gastrointestinal:   Positive for constipation.   Endocrine: Negative.    Genitourinary: Negative.     Musculoskeletal:  Negative for arthralgias.   Skin: Negative.    Neurological: Negative.          Past Medical History:  has a past medical history of Breast cancer (Multi), Cancer (Multi), Chest tightness (2024), Hyperlipidemia, Other specified health status (2016), and Shortness of breath (2024).  Surgical History:   has a past surgical history that includes Other surgical history (2016) and [place] tooth extraction (2000).  Social History:   reports that she has never smoked. She has never been exposed to tobacco smoke. She has never used smokeless tobacco. She reports current alcohol use of about 1.0 standard drink of alcohol per week. She reports that she does not use drugs.  Family History:    Family History   Problem Relation Name Age of Onset    Stroke Father Jad     Other (cancer) Maternal Grandfather Sotero         liver or kidney    Cancer Paternal Grandmother Lizbeth         carcinoma of thigh    Stroke Paternal Grandmother Lizbeth     COPD Paternal Grandfather Mitchell      Family Oncology History:  Cancer-related family history includes Cancer in her paternal grandmother.      OBJECTIVE    VS / Pain:  /86 (BP Location: Left arm)   Pulse 91   Temp 36.8 °C (98.2 °F)   Resp 18   Wt 76 kg (167 lb 9.6 oz)   SpO2 98%   BMI 25.48 kg/m²   BSA: 1.91 meters squared   Pain Scale: 0    Performance Status:  The ECOG performance scale today is ECO- Fully active, able to carry on all pre-disease performance w/o restriction.    Physical Exam  Constitutional:       Appearance: Normal appearance.   HENT:      Head: Normocephalic and atraumatic.      Mouth/Throat:      Mouth: Mucous membranes are moist.   Cardiovascular:      Rate and Rhythm: Normal rate and regular rhythm.      Pulses: Normal pulses.      Heart sounds: Normal heart sounds.   Pulmonary:      Effort: Pulmonary effort is normal.      Breath  sounds: Normal breath sounds.   Abdominal:      Palpations: Abdomen is soft.   Musculoskeletal:         General: Normal range of motion.   Neurological:      Mental Status: She is alert.           Diagnostic Results   === 12/05/24 ===    CT CHEST ABDOMEN PELVIS W IV CONTRAST    - Impression -  Breast cancer restaging scan. When compared to the prior examination  dated 02/19/2024, there has been interval postsurgical changes of  bilateral mastectomies with implant placement. Skin thickening of the  right inferior breast likely representing post radiation changes.  Postsurgical changes of a right axillary lymph node dissection. No  definite evidence of new metastatic disease. Additional stable  chronic and incidental findings described above.    I personally reviewed the image(s) / study and I agree with the  findings as stated by Kendy Bishop MD. This study was interpreted at  [place], [place].    MACRO:  None    Signed by: Manjeet Trujillo 12/5/2024 3:02 PM  Dictation workstation:   [id number]   === 07/10/24 ===    BI MR BREAST BILATERAL WITH CONTRAST FULL PROTOCOL    - Impression -  Overall excellent response to treatment with a few small residual  masses and scattered areas of patchy non mass enhancement throughout  the right breast. Resolution of previously-seen right axillary  lymphadenopathy.    Suspicious enhancing mass in the lower inner left breast at anterior  depth. MRI directed ultrasound imaging is recommended for further  evaluation; if this is not seen sonographically, MRI guided biopsy is  recommended.    A few additional peripherally enhancing T2 hyperintense masses are  seen in the left breast most consistent with inflamed cysts. These  are presumed to be benign, however the lower outer left breast mass  can also be assessed at the time of MRI directed ultrasound imaging.    A message was sent to the referring practitioner at the time of this  dictation regarding these  findings using the epic critical findings  reporting system. A pre-procedure form was filled out.    BI-RADS CATEGORY:  BI-RADS Category:  4 Suspicious.  Recommendation:  MRI Directed Additional Imaging.  Recommended Date:  Immediate.  Laterality:  Left.    For any future breast imaging appointments, please call [phone]).      MACRO:  Critical Finding:  Breast Imaging Abnormality. Notification was  initiated on 7/11/2024 at 9:45 am by  Francisco Mansfield.  (**-YCF-**)  Instructions:  See Impression for specific recommendations.    Signed by: Francisco Mansfield 7/11/2024 9:45 AM  Dictation workstation:   [id number]   MR breast bilateral w contrast full protocol 07/10/2024    Narrative  Interpreted By:  Francisco Mansfield,  STUDY:  BI MR BREAST BILATERAL WITH CONTRAST FULL PROTOCOL;  7/10/2024 2:29 pm    ACCESSION NUMBER(S):  [id number]    ORDERING CLINICIAN:  MARLEE POOL    INDICATION:  Patient with known invasive ductal carcinoma of the right breast with  axillary involvement, status post neoadjuvant chemotherapy.    COMPARISON:  MRI from 02/14/2024    TECHNIQUE:  Using a dedicated breast coil, STIR axial and T1-weighted fat  saturation axial images of the breasts were obtained, the latter both  before and after intravenous administration of Gadolinium DTPA. On an  independent workstation, 3-D images were formulated using PhotoTLC  including time enhancement curves, subtraction images and MIP images.    Intravenous contrast: 15 ML of Dotarem    FINDINGS:  Density: Extreme fibroglandular tissue.    There is symmetric mild bilateral background enhancement.    RIGHT BREAST: Overall, there is a dramatic reduction in the size and  extent of enhancing masses and associated non mass enhancement in the  right breast status post chemotherapy. There is a faintly enhancing  residual mass in the lower outer right breast at middle depth at the  site of the prior index lesion measuring 7 x 8 x 7 mm on series 7,  image 123 of 170.  Patchy non mass enhancement extends superiorly from  the residual index lesion in an area spanning approximately 2.9 x 2.3  x 3.5 cm. A few additional focal enhancing masses are visualized,  which have decreased significantly in size compared to prior exam,  for example in the lateral central right breast at middle depth  measuring up to 6 mm on series 7, image 99 of 170 (previously up to  11 mm). There is patchy scattered non mass enhancement throughout the  upper-outer right breast at middle and posterior depth near the site  of prior additional biopsy measuring approximately 3.5 x 2.9 x 3.7  cm. Additional scattered areas of non mass enhancement are seen  extending throughout the right breast but overall significantly  decreased compared to prior exam.    Resolution of previously-seen right axillary lymphadenopathy with  biopsy marker seen in a now normal appearing lymph node on series 7,  image 30 of 170. No internal mammary lymphadenopathy is appreciated.    LEFT BREAST: New enhancing circumscribed mass in the lower inner left  breast at anterior depth measuring up to 5 mm on series 7, image 114  of 170. There is a surrounding area of increased T2 hyperintensity.  There are a few scattered T2 hyperintense peripherally enhancing  masses in the left breast, for example in the lower outer left breast  at middle depth on series 7, image 103 of 170 measuring up to 7 mm  and in the central left breast at posterior depth measuring up to 8  mm on series 7, image 85 of 170, most consistent with benign inflamed  cysts.    No axillary or internal mammary lymphadenopathy is appreciated.    NON-BREAST FINDINGS:  None.    Impression  Overall excellent response to treatment with a few small residual  masses and scattered areas of patchy non mass enhancement throughout  the right breast. Resolution of previously-seen right axillary  lymphadenopathy.    Suspicious enhancing mass in the lower inner left breast at  "anterior  depth. MRI directed ultrasound imaging is recommended for further  evaluation; if this is not seen sonographically, MRI guided biopsy is  recommended.    A few additional peripherally enhancing T2 hyperintense masses are  seen in the left breast most consistent with inflamed cysts. These  are presumed to be benign, however the lower outer left breast mass  can also be assessed at the time of MRI directed ultrasound imaging.    A message was sent to the referring practitioner at the time of this  dictation regarding these findings using the epic critical findings  reporting system. A pre-procedure form was filled out.    BI-RADS CATEGORY:  BI-RADS Category:  4 Suspicious.  Recommendation:  MRI Directed Additional Imaging.  Recommended Date:  Immediate.  Laterality:  Left.    For any future breast imaging appointments, please call [phone]).      MACRO:  Critical Finding:  Breast Imaging Abnormality. Notification was  initiated on 7/11/2024 at 9:45 am by  Francisco Mansfield.  (**-YCF-**)  Instructions:  See Impression for specific recommendations.    Signed by: Francisco Mansfield 7/11/2024 9:45 AM  Dictation workstation:   [id number]     No images are attached to the encounter.    LABORATORY/PATHOLOGY DATA    No visits with results within 6 Week(s) from this visit.   Latest known visit with results is:   Lab on 11/19/2024   Component Date Value Ref Range Status    Follicle Stimulating Hormone 11/19/2024 60.7  IU/L Final    Luteinizing Hormone 11/19/2024 36.7  IU/L Final    Estradiol 11/19/2024 22  pg/mL Final    Thyroid Stimulating Hormone 11/19/2024 1.35  0.44 - 3.98 mIU/L Final      No results found for: \"LABCA2\"          IMPRESSION/PLAN  Macy is a 39 year old female in excellent health with a recent diagnosis of right sided breast cancer. Macy felt a mass in her right breast in December that prompted diagnostic evaluation.      Ultrasound of the right breast showed In the patient's area of palpable concern " in the right breast at 7 o'clock, 5 cm from the nipple, there is a large vague heterogeneousmass with internal vascularity which has the appearance of dense fibroglandular tissue measuring up to 5 cm;  In the patient's area of palpable concern felt by her provider in the right breast at 10 o'clock, 8 cm from the nipple, there is an oval parallel heterogeneous mass withinternal vascularity measuring approximately 1.1 x 0.9 x 1.4 cm. 3 enlarged axillary LN were seen     She had US guided core needle biopsy of  right breast at 7:00, 5 CM FN,  that showed invasive ductal carcinoma, grade 2, ER: 95%, AZ: 5-10%, HER2 negative (1+).  And US guided core needle biopsy of the right breast at  10:00, 8 CM FN, that showed  Invasive ductal carcinoma, grade 2-3,  ER: 95%, AZ<5%, HER2 negative (1+). Axillary LN consistent with metastatic carcinoma     On MRI she a  multicentric right breast malignancy with the total span of up to 13.0 cm associated with bulky metastatic axillary and abnormal internal mammary lymphadenopathy. No evidence of chest wall, skin or nipple-areolar complex involvement.      Current stage [id number] (staging scans negative for distant mets)  ECOG PS: 0    I reviewed with her the events that led to her diagnosis of breast cancer. We reviewed all the procedures and diagnostic imaging she underwent thus far. I discussed the features of her breast cancer that include the size, grade, lymph node status and  hormone receptor/ her2-jair status.      We discussed neoadjuvant systemic therapy. The goal of treatment would be to downstage her cancer, assess tumor chemosensitivity and treat potential micrometastatic disease. I would treat, given extensive of disease with anthracyclines based regimen ddAC Q2 weeks x4 followed by taxol weekly x12      07/16/2024: completed ddAC followed by weekly taxol     08/14/2024: Dr Patel performed a bilateral skin sparing mastectomy with right sided SLNB and completion dissection  with path showing an invasive ductal carcinoma  G2 measuring 25 mm, margins negative. She 5 total lymph nodes removed with 1 involved with macromets and 1 involved with micromets; final stage [id number] ER: 95%, , ND: 5-10%, HER2 negative (1+)     -10/28/2024: completed adjuvant radiation therapy     - mild drop in EF: 65% ->55-60% on her most recent echo- saw Dr Mares, did have sinus tachycardia, not clear how much is due to anxiety. She was started on low dose beta blocker - last echo in 08/02/2024 showed EF of 60%  - Elevated LDL- non pharmacological measures for now- follows with cardiology  -  Will also discuss adjuvant bisphosphonates next visit   - Continue Gosarelin on 12/03, 12/31, 01/28. Will continue through March at least (last menstrual period in March 2024) assuming ovarian suppression   - FSH, LH, Estradiol  -Staging scans negative  - Starting abemaciclib today: sent in prescription for abemaciclib 150 mg bid to  specialty pharmacy   -Follow up in one month. Will consider starting aromatase inhibitor vs tamoxifen then    Denise Golden MD  Hematology and Medical Oncology  [facility]

## 2025-01-28 NOTE — PROGRESS NOTES
Seen in a Group Medical Visit, with agreement signed to protect patient privacy and participate in a shared medical visit to focus on assessment and implementing lifestyle medicine interventions to address modifiable risk factors of chronic disease, such as cancer, and to assist with reduction of symptoms. Confidentiality form reviewed and signed and ground rules reviewed. An interactive audio and video telecommunication system which permits real time communications between the patient (at the originating site) and provider (at the distant site) was utilized to provide this telehealth service. Verbal consent was requested and obtained on this date for a telehealth visit.     This is an 8-week intervention of people diagnosed with cancer who have mood changes, stress, fatigue, and/or pain that includes active participation, individual assessments and instruction for home practices to implement lifestyle medicine interventions.     During week 3 of 8, the focus of lifestyle medicine and mindful eating were reviewed. Interventions such as: metabolic health in cancer recovery and treatment, continuous glucose monitor (CGM) utilization, nutrition for metabolic health and wellness, and at-home practices were discussed, and discussion amongst participants were reviewed.     During this group medical visit, I was assisted by Ashely Martin, RobinsonD and participants participated in active Cancer Wellness Program discussions and group activities.     CANCER HISTORY:   Malignant neoplasm of overlapping sites of right breast in female, estrogen receptor positive  Staging form: Breast, AJCC 8th Edition  - Clinical stage from 2/6/2024: Stage IIIA (cT3, cN2, cM0, G3, ER+, AR+, HER2-) - Signed by Denise Golden MD on 2/21/2024     Oncology provider visit - last seen 11/19/24   Integrative medicine provider visit - last seen on 1/13/25    Symptom Managed: Fatigue, anxiety, stress management, health maintenance, overweight     Cancer  Wellness Program: Week 3 of 8    Subjective: Individual check in with provider:  Edgar: enjoyed checking in and texting with her partnerCatia   Challenges: caught a cold this week, not feeling well    Group Medical Visit:  Review of home practice  Reflections  New concerns  Health topic discussed in group: metabolic health in cancer recovery and treatment, continuous glucose monitor (CGM) utilization, nutrition for metabolic health and wellness  Group practice: CGM utilization, cooking demo  Reviewed home practice assignments for next week    ROS:  no HA, visual symptoms, hearing loss  no SOB, chest pain, palpitations   ROS o/w non contributory, please see HPI    Objective    BSA: There is no height or weight on file to calculate BSA.  There were no vitals taken for this visit.    PHYSICAL EXAM:  GENERAL: alert and appropriate, in no distress, well-hydrated, well-nourished and happy, smiling, interactive  SKIN: no rash noted  HEAD: normocephalic, no abnormality or lesion noted  EYES: no injection and visual acuity is grossly normal  EARS: external ears normal  NOSE: external nose normal without rhinorrhea  NECK: full ROM, no cervical LNs noted  RESPIRATORY: breathing non-labored and no grunting/flaring/retractions  NEUROLOGIC: no obvious deficit    Lab Results   Component Value Date    WBC 8.1 08/07/2024    HGB 14.1 08/07/2024    HCT 42.0 08/07/2024     08/07/2024    CHOL 197 06/04/2024    TRIG 176 (H) 06/04/2024    HDL 44.5 06/04/2024    ALT 28 07/16/2024    AST 18 07/16/2024     08/07/2024    K 4.4 08/07/2024     08/07/2024    CREATININE 0.86 08/07/2024    BUN 15 08/07/2024    CO2 29 08/07/2024    TSH 1.35 11/19/2024    INR 1.0 02/26/2024         Assessment/Plan   Cancer Staging   No matching staging information was found for the patient.    Symptom Managed: Fatigue, anxiety, stress management, health maintenance, overweight     Cancer Wellness Program: Week 3/8    Follow up for 8 weekly  sessions to assess and treat symptoms through group medical visit.    At-Home Practices:  - Connect with partner at least once weekly for check-in  - Utilize CGM for 2 weeks (prescription will be sent in)    ATA Hunter-CNP   [facility]  Integrative Oncology    60 min visit with face to face counseling in a group setting re: stress management, coping mechanisms including meditation and focus on anxiety  >50% of the visit 60 min was spent in direct face to face care with patient discussing stress management and coping strategies including meditation and mindfulness  Individual face to face check in was done during the visit in front of group. Session led by Clarissa Lopez CNP.

## 2025-01-29 ENCOUNTER — PHARMACY VISIT (OUTPATIENT)
Dept: PHARMACY | Facility: CLINIC | Age: 40
End: 2025-01-29
Payer: COMMERCIAL

## 2025-01-29 ENCOUNTER — APPOINTMENT (OUTPATIENT)
Dept: INTEGRATIVE MEDICINE | Facility: CLINIC | Age: 40
End: 2025-01-29
Payer: COMMERCIAL

## 2025-01-29 DIAGNOSIS — C50.811 MALIGNANT NEOPLASM OF OVERLAPPING SITES OF RIGHT BREAST IN FEMALE, ESTROGEN RECEPTOR POSITIVE: ICD-10-CM

## 2025-01-29 DIAGNOSIS — E88.819 INSULIN RESISTANCE: ICD-10-CM

## 2025-01-29 DIAGNOSIS — Z17.0 MALIGNANT NEOPLASM OF OVERLAPPING SITES OF RIGHT BREAST IN FEMALE, ESTROGEN RECEPTOR POSITIVE: ICD-10-CM

## 2025-01-29 DIAGNOSIS — Z00.00 HEALTHCARE MAINTENANCE: ICD-10-CM

## 2025-01-29 DIAGNOSIS — F41.9 ANXIETY: Primary | ICD-10-CM

## 2025-01-29 LAB
ESTRADIOL SERPL-MCNC: <19 PG/ML
FSH SERPL-ACNC: 3.5 IU/L
LH SERPL-ACNC: 0.1 IU/L

## 2025-01-29 PROCEDURE — RXMED WILLOW AMBULATORY MEDICATION CHARGE

## 2025-01-29 PROCEDURE — 99215 OFFICE O/P EST HI 40 MIN: CPT

## 2025-01-29 RX ORDER — BLOOD-GLUCOSE SENSOR
EACH MISCELLANEOUS
Qty: 1 EACH | Refills: 1 | Status: SHIPPED | OUTPATIENT
Start: 2025-01-29

## 2025-01-30 ENCOUNTER — TELEPHONE (OUTPATIENT)
Dept: ADMISSION | Facility: HOSPITAL | Age: 40
End: 2025-01-30

## 2025-01-30 ENCOUNTER — APPOINTMENT (OUTPATIENT)
Dept: PRIMARY CARE | Facility: CLINIC | Age: 40
End: 2025-01-30
Payer: COMMERCIAL

## 2025-01-30 VITALS
DIASTOLIC BLOOD PRESSURE: 87 MMHG | HEIGHT: 68 IN | TEMPERATURE: 98.6 F | OXYGEN SATURATION: 98 % | SYSTOLIC BLOOD PRESSURE: 115 MMHG | RESPIRATION RATE: 16 BRPM | WEIGHT: 168 LBS | HEART RATE: 86 BPM | BODY MASS INDEX: 25.46 KG/M2

## 2025-01-30 DIAGNOSIS — E78.00 ELEVATED LDL CHOLESTEROL LEVEL: ICD-10-CM

## 2025-01-30 DIAGNOSIS — Z00.00 PHYSICAL EXAM, ANNUAL: Primary | ICD-10-CM

## 2025-01-30 DIAGNOSIS — I42.7 CARDIOTOXICITY (MULTI): ICD-10-CM

## 2025-01-30 DIAGNOSIS — C50.811 MALIGNANT NEOPLASM OF OVERLAPPING SITES OF RIGHT BREAST IN FEMALE, ESTROGEN RECEPTOR POSITIVE: ICD-10-CM

## 2025-01-30 DIAGNOSIS — Z17.0 MALIGNANT NEOPLASM OF OVERLAPPING SITES OF RIGHT BREAST IN FEMALE, ESTROGEN RECEPTOR POSITIVE: ICD-10-CM

## 2025-01-30 DIAGNOSIS — E55.9 VITAMIN D DEFICIENCY: ICD-10-CM

## 2025-01-30 DIAGNOSIS — C77.3 SECONDARY AND UNSPECIFIED MALIGNANT NEOPLASM OF AXILLA AND UPPER LIMB LYMPH NODES (MULTI): ICD-10-CM

## 2025-01-30 DIAGNOSIS — Z13.220 LIPID SCREENING: ICD-10-CM

## 2025-01-30 DIAGNOSIS — R53.83 OTHER FATIGUE: ICD-10-CM

## 2025-01-30 PROCEDURE — 1036F TOBACCO NON-USER: CPT | Performed by: NURSE PRACTITIONER

## 2025-01-30 PROCEDURE — 99396 PREV VISIT EST AGE 40-64: CPT | Performed by: NURSE PRACTITIONER

## 2025-01-30 PROCEDURE — 3008F BODY MASS INDEX DOCD: CPT | Performed by: NURSE PRACTITIONER

## 2025-01-30 ASSESSMENT — ENCOUNTER SYMPTOMS
FEVER: 0
NERVOUS/ANXIOUS: 0
DYSURIA: 0
CONSTIPATION: 0
HEADACHES: 0
SHORTNESS OF BREATH: 0
RHINORRHEA: 0
SORE THROAT: 0
CHILLS: 0
FATIGUE: 0
DIARRHEA: 0

## 2025-01-30 NOTE — TELEPHONE ENCOUNTER
[facility] called to inform that [employer] requires prior authorization.  CoverMyMeds key is [id number] or phone number to complete is [phone].

## 2025-01-30 NOTE — PROGRESS NOTES
Subjective   Macy Chavez is a 39 y.o. female who presents for Annual Exam.  Patient presents for Annual Exam.    Dx with breast CA in 1/2024  Chemo last year Feb-July, Radiation in October  Surgery 8/2024    Has felt okay this year given everything, nausea was managed well with Compazine  Her kids at home adjusted well.    Seeing HemOnc, getting monthly injection for ovary suppression  Has been experiencing some weight gain since finishing Chemo in July  She will start taking Verzenio soon to prevent reoccurrence    Has Mirena IUD, no spotting since 3/2024  Getting Paragard IUD in February    No fam hx of colon cancer - start colonoscopy at 42yo  Pt feels she's more constipated than she is regular, having BM's 2-3x week    Still working full time, works from home, working well for her schedule  Supportive family at home    Denies any mood concerns  Had Breast MRI and DXA in December - normal    Seeing functional medicine as well as radiation specialist and HemOnc, considering blood work to check circulating tumor marker  Attends a cancer wellness group, meets virtually once weekly    Doesn't feel like her diet is horrible, but states she could work on the timing her meals  Tries to be active daily with light-moderate exercise. Has been making a conscious effort to work on this.    Trying acupuncture soon, has slightly limited ROM in her right arm    Drinks water and is staying hydrated        Review of Systems   Constitutional:  Negative for chills, fatigue and fever.   HENT:  Negative for rhinorrhea and sore throat.    Respiratory:  Negative for shortness of breath.    Cardiovascular:  Negative for chest pain.   Gastrointestinal:  Negative for constipation and diarrhea.   Genitourinary:  Negative for dysuria.   Neurological:  Negative for headaches.   Psychiatric/Behavioral:  The patient is not nervous/anxious.    All other systems reviewed and are negative.      Objective   Physical Exam  Constitutional:        "General: She is not in acute distress.     Appearance: She is not ill-appearing.   HENT:      Head: Normocephalic and atraumatic.      Right Ear: Tympanic membrane, ear canal and external ear normal.      Left Ear: Tympanic membrane, ear canal and external ear normal.      Nose: Nose normal.      Mouth/Throat:      Mouth: Mucous membranes are moist.      Pharynx: Oropharynx is clear.   Eyes:      Conjunctiva/sclera: Conjunctivae normal.      Pupils: Pupils are equal, round, and reactive to light.   Cardiovascular:      Rate and Rhythm: Normal rate and regular rhythm.      Pulses: Normal pulses.      Heart sounds: Normal heart sounds.   Pulmonary:      Effort: Pulmonary effort is normal. No respiratory distress.      Breath sounds: Normal breath sounds.   Abdominal:      General: Bowel sounds are normal.      Palpations: Abdomen is soft.      Tenderness: There is no abdominal tenderness.   Musculoskeletal:         General: Normal range of motion.   Skin:     General: Skin is warm and dry.   Neurological:      General: No focal deficit present.      Mental Status: She is alert and oriented to person, place, and time.   Psychiatric:         Mood and Affect: Mood normal.         Behavior: Behavior normal.         Thought Content: Thought content normal.         Judgment: Judgment normal.       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 Have fasting labs drawn, and we will call with results when available. Follow-up in 1 year, or sooner if needed. Call the office if any problems or concerns in the meantime.

## 2025-02-03 NOTE — PROGRESS NOTES
Seen in a Group Medical Visit, with agreement signed to protect patient privacy and participate in a shared medical visit to focus on assessment and implementing lifestyle medicine interventions to address modifiable risk factors of chronic disease, such as cancer, and to assist with reduction of symptoms. Confidentiality form reviewed and signed and ground rules reviewed. An interactive audio and video telecommunication system which permits real time communications between the patient (at the originating site) and provider (at the distant site) was utilized to provide this telehealth service. Verbal consent was requested and obtained on this date for a telehealth visit.     This is an 8-week intervention of people diagnosed with cancer who have mood changes, stress, fatigue, and/or pain that includes active participation, individual assessments and instruction for home practices to implement lifestyle medicine interventions.     During week 4 of 8, the focus of lifestyle medicine and mindful eating were reviewed. Interventions such as: avoidance of toxins in the home, food and environment, group discussion of alternatives, cooking demo, and at-home practices were discussed, and discussion amongst participants were reviewed.     During this group medical visit, I was assisted by Ashley Martin, PharmD and participants participated in active Cancer Wellness Program discussions and group activities.       Subjective: Individual check in with provider:  Successes: checking in with petros, wearing CGM and learning about low blood sugar levels around 2 in the morning    Challenges: daughter had the flu, not sticking to exercising plan this week    Group Medical Visit:  Review of home practice  Reflections  New concerns  Health topic discussed in group: avoidance of toxins in the home, food and environment   Group practice: cooking demo, detoxifying the home   Reviewed home practice assignments for next week    ROS:  no HA,  visual symptoms, hearing loss  no SOB, chest pain, palpitations   ROS o/w non contributory, please see HPI    Objective    BSA: There is no height or weight on file to calculate BSA.  There were no vitals taken for this visit.    PHYSICAL EXAM:  GENERAL: alert and appropriate, in no distress, well-hydrated, well-nourished and happy, smiling, interactive  SKIN: no rash noted  HEAD: normocephalic, no abnormality or lesion noted  EYES: no injection and visual acuity is grossly normal  EARS: external ears normal  NOSE: external nose normal without rhinorrhea  NECK: full ROM, no cervical LNs noted  RESPIRATORY: breathing non-labored and no grunting/flaring/retractions  NEUROLOGIC: no obvious deficit    Lab Results   Component Value Date    WBC 8.1 08/07/2024    HGB 14.1 08/07/2024    HCT 42.0 08/07/2024     08/07/2024    CHOL 197 06/04/2024    TRIG 176 (H) 06/04/2024    HDL 44.5 06/04/2024    ALT 28 07/16/2024    AST 18 07/16/2024     08/07/2024    K 4.4 08/07/2024     08/07/2024    CREATININE 0.86 08/07/2024    BUN 15 08/07/2024    CO2 29 08/07/2024    TSH 1.35 11/19/2024    INR 1.0 02/26/2024         Assessment/Plan   Cancer Staging   No matching staging information was found for the patient.    Symptom Managed: Fatigue, anxiety, stress management, health maintenance, overweight     Cancer Wellness Program: Week 4/8    Follow up for 8 weekly sessions to assess and treat symptoms through group medical visit.    At-Home Practices:  - Connect with partner at least once weekly for check-in  - Complete environmental assessment of home  - Switch out one plastic item for glass or stainless steel in your kitchen    Clarissa Lopez, APRN-CNP   [facility]  Integrative Oncology    60 min visit with face to face counseling in a group setting re: stress management, coping mechanisms including meditation and focus on anxiety  >50% of the visit 60 min was spent in direct face to face care with patient  discussing stress management and coping strategies including meditation and mindfulness  Individual face to face check in was done during the visit in front of group. Session led by Clarissa Lopez CNP.

## 2025-02-03 NOTE — PATIENT INSTRUCTIONS
Follow up for 8 weekly sessions to assess and treat symptoms through group medical visit.    At-Home Practices:  - Connect with partner at least once weekly for check-in  - Complete environmental assessment of home  - Switch out one plastic item for glass or stainless steel in your kitchen

## 2025-02-04 DIAGNOSIS — Z17.0 MALIGNANT NEOPLASM OF OVERLAPPING SITES OF RIGHT BREAST IN FEMALE, ESTROGEN RECEPTOR POSITIVE: ICD-10-CM

## 2025-02-04 DIAGNOSIS — C50.811 MALIGNANT NEOPLASM OF OVERLAPPING SITES OF RIGHT BREAST IN FEMALE, ESTROGEN RECEPTOR POSITIVE: ICD-10-CM

## 2025-02-05 ENCOUNTER — APPOINTMENT (OUTPATIENT)
Dept: INTEGRATIVE MEDICINE | Facility: CLINIC | Age: 40
End: 2025-02-05
Payer: COMMERCIAL

## 2025-02-05 DIAGNOSIS — Z00.00 HEALTHCARE MAINTENANCE: ICD-10-CM

## 2025-02-05 DIAGNOSIS — F41.9 ANXIETY: Primary | ICD-10-CM

## 2025-02-05 PROCEDURE — 99215 OFFICE O/P EST HI 40 MIN: CPT

## 2025-02-07 ENCOUNTER — APPOINTMENT (OUTPATIENT)
Dept: INTEGRATIVE MEDICINE | Facility: CLINIC | Age: 40
End: 2025-02-07
Payer: COMMERCIAL

## 2025-02-07 ENCOUNTER — TELEPHONE (OUTPATIENT)
Dept: HEMATOLOGY/ONCOLOGY | Facility: HOSPITAL | Age: 40
End: 2025-02-07

## 2025-02-07 DIAGNOSIS — M79.601 PAIN OF RIGHT UPPER EXTREMITY: Primary | ICD-10-CM

## 2025-02-07 PROCEDURE — ACUGR GROUP ACUPUNCTURE

## 2025-02-07 NOTE — PROGRESS NOTES
Group Acupuncture Visit:     Subjective   Patient ID: Macy Chavez is a 39 y.o. female who presents for Arm Pain  Px experiencing tightness and numbness along anterior and posterior right arm near Lu1 and Si9, going down latissimus. (Likely pain from post surgery). Sometimes feels bruised.   No cording      Overall feels good        Session Information  Is this acupuncture treatment being billed to the patient's insurance company: No  Visit Type: New patient  Medical History Reviewed: I have reviewed pertinent medical history in EHR, and no contraindications are present to provide treatment         Review of Systems         Provider reviewed plan for the acupuncture session, precautions and contraindications. Patient/guardian/hospital staff has given consent to treat with full understanding of what to expect during the session. Before acupuncture began, provider explained to the patient to communicate at any time if the procedure was causing discomfort past their tolerance level. Patient agreed to advise acupuncturist. The acupuncturist counseled the patient on the risks of acupuncture treatment including pain, infection, bleeding, and no relief of pain. The patient was positioned comfortably. There was no evidence of infection at the site of needle insertions.    Objective   Physical Exam              Acupuncture Treatment  Patient Position: Seated and reclining  Acupuncture Needling: Yes  Needle Guage: 32 guage /.25/ Purple seirin, 40 guage /.16/ Red seirin  Body Points: With retention  Body Points - Left: Scalp sensory 2/5, Gb34, Sp6, Li4  Body Points - Right: Lu1, Si9, S10, Si3  Auricular Points: No  Acupuncture Treatment Change: No changes  Electroacupuncture Used: No  Needle Count In: 8  Needle Count Out: 8  Needle Retention Time (min): 25 minutes  Total Face to Face Time (min): 15 minutes              Assessment/Plan

## 2025-02-11 NOTE — PATIENT INSTRUCTIONS
Follow up for 8 weekly sessions to assess and treat symptoms through group medical visit.    At-Home Practices:  -Practice Yoga (chair or regular) once this week  -Connect with your partner once this week

## 2025-02-11 NOTE — PROGRESS NOTES
Seen in a Group Medical Visit, with agreement signed to protect patient privacy and participate in a shared medical visit to focus on assessment and implementing lifestyle medicine interventions to address modifiable risk factors of chronic disease, such as cancer, and to assist with reduction of symptoms. Confidentiality form reviewed and signed and ground rules reviewed. An interactive audio and video telecommunication system which permits real time communications between the patient (at the originating site) and provider (at the distant site) was utilized to provide this telehealth service. Verbal consent was requested and obtained on this date for a telehealth visit.     This is an 8-week intervention of people diagnosed with cancer who have mood changes, stress, fatigue, and/or pain that includes active participation, individual assessments and instruction for home practices to implement lifestyle medicine interventions.     During week 5 of 8, the focus of physical activity and its impact on breast CA risk and overall health was received. Interventions such as: physical activity guidelines, integrating more physical activity during the day, chair yoga session, and at-home practices were discussed, and discussion amongst participants were reviewed.     During this group medical visit, I was assisted by Ashley Martin, PharmD and participants participated in active Cancer Wellness Program discussions and group activities.       Subjective: Individual check in with provider:  Successes:  increasing protein and feeling better, learning more with CGM readings      Challenges: dealing with pharmacy to get medications     Group Medical Visit:  Review of home practice  Reflections  New concerns  Health topic discussed in group: avoidance of toxins in the home, food and environment   Group practice: chair yoga session  Reviewed home practice assignments for next week    ROS:  no HA, visual symptoms, hearing loss  no SOB,  chest pain, palpitations   ROS o/w non contributory, please see HPI    Objective    BSA: There is no height or weight on file to calculate BSA.  There were no vitals taken for this visit.    PHYSICAL EXAM:  GENERAL: alert and appropriate, in no distress, well-hydrated, well-nourished and happy, smiling, interactive  SKIN: no rash noted  HEAD: normocephalic, no abnormality or lesion noted  EYES: no injection and visual acuity is grossly normal  EARS: external ears normal  NOSE: external nose normal without rhinorrhea  NECK: full ROM, no cervical LNs noted  RESPIRATORY: breathing non-labored and no grunting/flaring/retractions  NEUROLOGIC: no obvious deficit    Lab Results   Component Value Date    WBC 8.1 08/07/2024    HGB 14.1 08/07/2024    HCT 42.0 08/07/2024     08/07/2024    CHOL 197 06/04/2024    TRIG 176 (H) 06/04/2024    HDL 44.5 06/04/2024    ALT 28 07/16/2024    AST 18 07/16/2024     08/07/2024    K 4.4 08/07/2024     08/07/2024    CREATININE 0.86 08/07/2024    BUN 15 08/07/2024    CO2 29 08/07/2024    TSH 1.35 11/19/2024    INR 1.0 02/26/2024         Assessment/Plan   Cancer Staging   No matching staging information was found for the patient.    Symptom Managed: Fatigue, anxiety, stress management, health maintenance, overweight     Cancer Wellness Program: Week 5/8    Follow up for 8 weekly sessions to assess and treat symptoms through group medical visit.    At-Home Practices:  -Practice Yoga (chair or regular) once this week  -Connect with your partner once this week    Clarissa Lopez, APRN-CNP   [facility]  Integrative Oncology    60 min visit with face to face counseling in a group setting re: stress management, coping mechanisms including meditation and focus on anxiety  >50% of the visit 60 min was spent in direct face to face care with patient discussing stress management and coping strategies including meditation and mindfulness  Individual face to face check in  was done during the visit in front of group. Session led by Clarissa Lopez CNP.

## 2025-02-12 ENCOUNTER — APPOINTMENT (OUTPATIENT)
Dept: INTEGRATIVE MEDICINE | Facility: CLINIC | Age: 40
End: 2025-02-12
Payer: COMMERCIAL

## 2025-02-12 DIAGNOSIS — Z17.0 MALIGNANT NEOPLASM OF OVERLAPPING SITES OF RIGHT BREAST IN FEMALE, ESTROGEN RECEPTOR POSITIVE: ICD-10-CM

## 2025-02-12 DIAGNOSIS — E78.00 ELEVATED LDL CHOLESTEROL LEVEL: ICD-10-CM

## 2025-02-12 DIAGNOSIS — F41.9 ANXIETY: Primary | ICD-10-CM

## 2025-02-12 DIAGNOSIS — C50.811 MALIGNANT NEOPLASM OF OVERLAPPING SITES OF RIGHT BREAST IN FEMALE, ESTROGEN RECEPTOR POSITIVE: ICD-10-CM

## 2025-02-12 PROCEDURE — 99215 OFFICE O/P EST HI 40 MIN: CPT

## 2025-02-13 ENCOUNTER — TELEPHONE (OUTPATIENT)
Dept: HEMATOLOGY/ONCOLOGY | Facility: HOSPITAL | Age: 40
End: 2025-02-13
Payer: COMMERCIAL

## 2025-02-13 ENCOUNTER — ONCOLOGY MEDICATION OUTREACH (OUTPATIENT)
Dept: HEMATOLOGY/ONCOLOGY | Facility: CLINIC | Age: 40
End: 2025-02-13
Payer: COMMERCIAL

## 2025-02-13 DIAGNOSIS — Z17.0 MALIGNANT NEOPLASM OF OVERLAPPING SITES OF RIGHT BREAST IN FEMALE, ESTROGEN RECEPTOR POSITIVE: ICD-10-CM

## 2025-02-13 DIAGNOSIS — C50.811 MALIGNANT NEOPLASM OF OVERLAPPING SITES OF RIGHT BREAST IN FEMALE, ESTROGEN RECEPTOR POSITIVE: ICD-10-CM

## 2025-02-17 NOTE — PROGRESS NOTES
Seen in a Group Medical Visit, with agreement signed to protect patient privacy and participate in a shared medical visit to focus on assessment and implementing lifestyle medicine interventions to address modifiable risk factors of chronic disease, such as cancer, and to assist with reduction of symptoms. Confidentiality form reviewed and signed and ground rules reviewed. An interactive audio and video telecommunication system which permits real time communications between the patient (at the originating site) and provider (at the distant site) was utilized to provide this telehealth service. Verbal consent was requested and obtained on this date for a telehealth visit.     This is an 8-week intervention of people diagnosed with cancer who have mood changes, stress, fatigue, and/or pain that includes active participation, individual assessments and instruction for home practices to implement lifestyle medicine interventions.     During week 6 of 8, the focus of stress management in cancer recovery was reviewed. Interventions such as: meditation, mindfulness, gratitude practices, journaling aromatherapy, guided meditation practice, and at-home practices were discussed, and discussion amongst participants were reviewed.     During this group medical visit, I was assisted by Ashley Martin, RobinsonD and participants participated in active Cancer Wellness Program discussions and group activities.       Subjective: Individual check in with provider:  Successes: incorporating yoga twice this week, enjoyed catching up with her group members to discuss two new non-toxic substitutes for laundry detergent     Challenges: busy week    Group Medical Visit:  Review of home practice  Reflections  New concerns  Health topic discussed in group: stress management, meditation/mindfulness and gratitude practices   Group practice: guided meditation, teas for stress management   Reviewed home practice assignments for next week    ROS:  no  HA, visual symptoms, hearing loss  no SOB, chest pain, palpitations   ROS o/w non contributory, please see HPI    Objective    BSA: There is no height or weight on file to calculate BSA.  There were no vitals taken for this visit.    PHYSICAL EXAM:  GENERAL: alert and appropriate, in no distress, well-hydrated, well-nourished and happy, smiling, interactive  SKIN: no rash noted  HEAD: normocephalic, no abnormality or lesion noted  EYES: no injection and visual acuity is grossly normal  EARS: external ears normal  NOSE: external nose normal without rhinorrhea  NECK: full ROM, no cervical LNs noted  RESPIRATORY: breathing non-labored and no grunting/flaring/retractions  NEUROLOGIC: no obvious deficit    Lab Results   Component Value Date    WBC 8.1 08/07/2024    HGB 14.1 08/07/2024    HCT 42.0 08/07/2024     08/07/2024    CHOL 197 06/04/2024    TRIG 176 (H) 06/04/2024    HDL 44.5 06/04/2024    ALT 28 07/16/2024    AST 18 07/16/2024     08/07/2024    K 4.4 08/07/2024     08/07/2024    CREATININE 0.86 08/07/2024    BUN 15 08/07/2024    CO2 29 08/07/2024    TSH 1.35 11/19/2024    INR 1.0 02/26/2024         Assessment/Plan   Cancer Staging   No matching staging information was found for the patient.    Symptom Managed: Fatigue, anxiety, stress management, health maintenance, overweight     Cancer Wellness Program: Week 6/8    Follow up for 8 weekly sessions to assess and treat symptoms through group medical visit.    At-Home Practices:  -Practice meditation/mindfulness/prayer 2-5 minutes/day for the next week  -Journal 3 things you are grateful for daily for the next week  -Connect with your partner once this week    Clarissa Lopez, APRN-CNP   [facility]  Integrative Oncology    60 min visit with face to face counseling in a group setting re: stress management, coping mechanisms including meditation and focus on anxiety  >50% of the visit 60 min was spent in direct face to face care with  patient discussing stress management and coping strategies including meditation and mindfulness  Individual face to face check in was done during the visit in front of group. Session led by Clarissa Lopez CNP.

## 2025-02-17 NOTE — PATIENT INSTRUCTIONS
Follow up for 8 weekly sessions to assess and treat symptoms through group medical visit.    At-Home Practices:  -Practice meditation/mindfulness/prayer 2-5 minutes/day for the next week  -Journal 3 things you are grateful for daily for the next week  -Connect with your partner once this week

## 2025-02-18 ENCOUNTER — TELEPHONE (OUTPATIENT)
Dept: ADMISSION | Facility: HOSPITAL | Age: 40
End: 2025-02-18
Payer: COMMERCIAL

## 2025-02-18 DIAGNOSIS — Z17.0 MALIGNANT NEOPLASM OF OVERLAPPING SITES OF RIGHT BREAST IN FEMALE, ESTROGEN RECEPTOR POSITIVE: ICD-10-CM

## 2025-02-18 DIAGNOSIS — C50.811 MALIGNANT NEOPLASM OF OVERLAPPING SITES OF RIGHT BREAST IN FEMALE, ESTROGEN RECEPTOR POSITIVE: ICD-10-CM

## 2025-02-18 NOTE — TELEPHONE ENCOUNTER
[facility] pharmacy calling, they are in need of a Prior Auth for Abemaciclib (verzenio) sent to them on 2/13/25  Any questions pleas call [phone]   Reference #[id number]

## 2025-02-19 ENCOUNTER — APPOINTMENT (OUTPATIENT)
Dept: INTEGRATIVE MEDICINE | Facility: CLINIC | Age: 40
End: 2025-02-19
Payer: COMMERCIAL

## 2025-02-19 ENCOUNTER — PREP FOR PROCEDURE (OUTPATIENT)
Dept: OCCUPATIONAL MEDICINE | Facility: HOSPITAL | Age: 40
End: 2025-02-19

## 2025-02-19 DIAGNOSIS — E78.00 ELEVATED LDL CHOLESTEROL LEVEL: ICD-10-CM

## 2025-02-19 DIAGNOSIS — Z98.890 S/P BREAST RECONSTRUCTION: Primary | ICD-10-CM

## 2025-02-19 DIAGNOSIS — C50.811 MALIGNANT NEOPLASM OF OVERLAPPING SITES OF RIGHT BREAST IN FEMALE, ESTROGEN RECEPTOR POSITIVE: ICD-10-CM

## 2025-02-19 DIAGNOSIS — F41.9 ANXIETY: Primary | ICD-10-CM

## 2025-02-19 DIAGNOSIS — Z17.0 MALIGNANT NEOPLASM OF OVERLAPPING SITES OF RIGHT BREAST IN FEMALE, ESTROGEN RECEPTOR POSITIVE: ICD-10-CM

## 2025-02-19 PROCEDURE — 99215 OFFICE O/P EST HI 40 MIN: CPT

## 2025-02-19 RX ORDER — ATORVASTATIN CALCIUM 20 MG/1
20 TABLET, FILM COATED ORAL DAILY
Qty: 90 TABLET | Refills: 0 | Status: SHIPPED | OUTPATIENT
Start: 2025-02-19 | End: 2025-05-20

## 2025-02-19 RX ORDER — ATORVASTATIN CALCIUM 20 MG/1
20 TABLET, FILM COATED ORAL DAILY
Qty: 90 TABLET | Refills: 0 | Status: SHIPPED | OUTPATIENT
Start: 2025-02-19 | End: 2025-02-19 | Stop reason: SDUPTHER

## 2025-02-19 NOTE — TELEPHONE ENCOUNTER
Per  Spec: So there is no PA required, however her insurance has a quantity limit restriction for this med. She will need to fill a 14 day supply for the first 3-6 fills, and then her insurance will start covering the full month supply. [facility] will most likely need a new RX for the 14 day supply fills

## 2025-02-20 ENCOUNTER — APPOINTMENT (OUTPATIENT)
Dept: OBSTETRICS AND GYNECOLOGY | Facility: CLINIC | Age: 40
End: 2025-02-20
Payer: COMMERCIAL

## 2025-02-20 VITALS
HEIGHT: 68 IN | DIASTOLIC BLOOD PRESSURE: 60 MMHG | SYSTOLIC BLOOD PRESSURE: 100 MMHG | WEIGHT: 168 LBS | BODY MASS INDEX: 25.46 KG/M2

## 2025-02-20 DIAGNOSIS — Z30.433 ENCOUNTER FOR IUD REMOVAL AND REINSERTION: ICD-10-CM

## 2025-02-20 PROCEDURE — 58300 INSERT INTRAUTERINE DEVICE: CPT | Performed by: STUDENT IN AN ORGANIZED HEALTH CARE EDUCATION/TRAINING PROGRAM

## 2025-02-20 PROCEDURE — 58301 REMOVE INTRAUTERINE DEVICE: CPT | Performed by: STUDENT IN AN ORGANIZED HEALTH CARE EDUCATION/TRAINING PROGRAM

## 2025-02-20 ASSESSMENT — PAIN SCALES - GENERAL: PAINLEVEL_OUTOF10: 0-NO PAIN

## 2025-02-20 NOTE — PROGRESS NOTES
Patient ID: Macy Chavez is a 39 y.o. female. Here for IUD replacement.    IUD Removal    Performed by: Deep Mejia MD  Authorized by: Deep Mejia MD    Procedure: IUD removal and insertion    Consent obtained by patient, parent, or legal power of  - including discussion of procedure risks and benefits, patient questions answered, and patient education provided: yes    Reason for removal: patient request    Strings visualized: no    Cervix cleaned with: iodopovidone    Tenaculum applied to cervix: no    IUD grasped by forceps: yes    Performed with ultrasound guidance: no    IUD removed: yes    Date/Time of Removal:  2/20/2025 10:17 AM  Removed without complications: yes    IUD intact: yes    Pregnancy risk: reasonably certain the patient is not pregnant    Date/Time of Insertion:  2/20/2025 9:58 AM  Immediately prior to procedure a time out was called: yes    Pelvic exam performed: yes    Speculum placed in vagina: yes    Cervix cleaned and prepped: yes    Tenaculum/Allis/Ring Forceps applied to cervix: yes    Anesthesia used: yes    Anesthesia block type: intracervical and topical.  Local anesthetic:  Lidocaine with epinephrine  Other local anesthetic:  Topical benzocaine  Anesthetic strength (%):  1  Volume (mL):  4  Uterus sound depth (cm):  7  Cervix manually dilated: no    OSM: copper 380 square mm  Strings trimmed to (cm):  3  Patient tolerated procedure well: yes    Inserted with ultrasound guidance: no    Transvaginal sono confirmed fundal placement: no    Intended removal date: 10 years      Replacement without complication. RTC 8-12w for string check.

## 2025-02-21 ENCOUNTER — APPOINTMENT (OUTPATIENT)
Dept: LAB | Facility: CLINIC | Age: 40
End: 2025-02-21
Payer: COMMERCIAL

## 2025-02-21 LAB
ALBUMIN SERPL BCP-MCNC: 4.8 G/DL (ref 3.4–5)
ALP SERPL-CCNC: 77 U/L (ref 33–110)
ALT SERPL W P-5'-P-CCNC: 14 U/L (ref 7–45)
ANION GAP SERPL CALC-SCNC: 11 MMOL/L (ref 10–20)
AST SERPL W P-5'-P-CCNC: 12 U/L (ref 9–39)
BASOPHILS # BLD AUTO: 0.02 X10*3/UL (ref 0–0.1)
BASOPHILS NFR BLD AUTO: 0.2 %
BILIRUB SERPL-MCNC: 0.8 MG/DL (ref 0–1.2)
BUN SERPL-MCNC: 13 MG/DL (ref 6–23)
CALCIUM SERPL-MCNC: 10.1 MG/DL (ref 8.6–10.3)
CHLORIDE SERPL-SCNC: 104 MMOL/L (ref 98–107)
CO2 SERPL-SCNC: 29 MMOL/L (ref 21–32)
CREAT SERPL-MCNC: 1.1 MG/DL (ref 0.5–1.05)
EGFRCR SERPLBLD CKD-EPI 2021: 65 ML/MIN/1.73M*2
EOSINOPHIL # BLD AUTO: 0.13 X10*3/UL (ref 0–0.7)
EOSINOPHIL NFR BLD AUTO: 1.5 %
ERYTHROCYTE [DISTWIDTH] IN BLOOD BY AUTOMATED COUNT: 12.7 % (ref 11.5–14.5)
GLUCOSE SERPL-MCNC: 90 MG/DL (ref 74–99)
HCT VFR BLD AUTO: 42.8 % (ref 36–46)
HGB BLD-MCNC: 14 G/DL (ref 12–16)
IMM GRANULOCYTES # BLD AUTO: 0.01 X10*3/UL (ref 0–0.7)
IMM GRANULOCYTES NFR BLD AUTO: 0.1 % (ref 0–0.9)
LYMPHOCYTES # BLD AUTO: 0.85 X10*3/UL (ref 1.2–4.8)
LYMPHOCYTES NFR BLD AUTO: 10 %
MCH RBC QN AUTO: 31.3 PG (ref 26–34)
MCHC RBC AUTO-ENTMCNC: 32.7 G/DL (ref 32–36)
MCV RBC AUTO: 96 FL (ref 80–100)
MONOCYTES # BLD AUTO: 0.18 X10*3/UL (ref 0.1–1)
MONOCYTES NFR BLD AUTO: 2.1 %
NEUTROPHILS # BLD AUTO: 7.31 X10*3/UL (ref 1.2–7.7)
NEUTROPHILS NFR BLD AUTO: 86.1 %
NRBC BLD-RTO: ABNORMAL /100{WBCS}
PLATELET # BLD AUTO: 191 X10*3/UL (ref 150–450)
POTASSIUM SERPL-SCNC: 4.1 MMOL/L (ref 3.5–5.3)
PROT SERPL-MCNC: 7.1 G/DL (ref 6.4–8.2)
RBC # BLD AUTO: 4.48 X10*6/UL (ref 4–5.2)
SODIUM SERPL-SCNC: 140 MMOL/L (ref 136–145)
WBC # BLD AUTO: 8.5 X10*3/UL (ref 4.4–11.3)

## 2025-02-21 PROCEDURE — 80061 LIPID PANEL: CPT

## 2025-02-21 PROCEDURE — 84443 ASSAY THYROID STIM HORMONE: CPT | Performed by: NURSE PRACTITIONER

## 2025-02-21 PROCEDURE — 85025 COMPLETE CBC W/AUTO DIFF WBC: CPT | Performed by: NURSE PRACTITIONER

## 2025-02-21 PROCEDURE — 82306 VITAMIN D 25 HYDROXY: CPT | Performed by: NURSE PRACTITIONER

## 2025-02-21 PROCEDURE — 80053 COMPREHEN METABOLIC PANEL: CPT | Performed by: NURSE PRACTITIONER

## 2025-02-21 PROCEDURE — 82947 ASSAY GLUCOSE BLOOD QUANT: CPT | Mod: 59

## 2025-02-22 LAB
25(OH)D3 SERPL-MCNC: 31 NG/ML (ref 30–100)
CHOLEST SERPL-MCNC: 158 MG/DL (ref 0–199)
CHOLESTEROL/HDL RATIO: 2.4
GLUCOSE P FAST SERPL-MCNC: 83 MG/DL (ref 74–99)
HDLC SERPL-MCNC: 66.8 MG/DL
LDLC SERPL CALC-MCNC: 69 MG/DL
NON HDL CHOLESTEROL: 91 MG/DL (ref 0–149)
TRIGL SERPL-MCNC: 111 MG/DL (ref 0–149)
TSH SERPL-ACNC: 1.62 MIU/L (ref 0.44–3.98)
VLDL: 22 MG/DL (ref 0–40)

## 2025-02-25 ENCOUNTER — OFFICE VISIT (OUTPATIENT)
Dept: HEMATOLOGY/ONCOLOGY | Facility: CLINIC | Age: 40
End: 2025-02-25
Payer: COMMERCIAL

## 2025-02-25 ENCOUNTER — OFFICE VISIT (OUTPATIENT)
Dept: SURGICAL ONCOLOGY | Facility: CLINIC | Age: 40
End: 2025-02-25
Payer: COMMERCIAL

## 2025-02-25 ENCOUNTER — INFUSION (OUTPATIENT)
Dept: HEMATOLOGY/ONCOLOGY | Facility: CLINIC | Age: 40
End: 2025-02-25
Payer: COMMERCIAL

## 2025-02-25 ENCOUNTER — APPOINTMENT (OUTPATIENT)
Dept: HEMATOLOGY/ONCOLOGY | Facility: CLINIC | Age: 40
End: 2025-02-25
Payer: COMMERCIAL

## 2025-02-25 VITALS
OXYGEN SATURATION: 98 % | SYSTOLIC BLOOD PRESSURE: 114 MMHG | WEIGHT: 165.57 LBS | HEART RATE: 111 BPM | DIASTOLIC BLOOD PRESSURE: 77 MMHG | BODY MASS INDEX: 25.17 KG/M2 | TEMPERATURE: 97.5 F | RESPIRATION RATE: 18 BRPM

## 2025-02-25 DIAGNOSIS — Z17.0 MALIGNANT NEOPLASM OF OVERLAPPING SITES OF RIGHT BREAST IN FEMALE, ESTROGEN RECEPTOR POSITIVE: Primary | ICD-10-CM

## 2025-02-25 DIAGNOSIS — C50.811 MALIGNANT NEOPLASM OF OVERLAPPING SITES OF RIGHT BREAST IN FEMALE, ESTROGEN RECEPTOR POSITIVE: ICD-10-CM

## 2025-02-25 DIAGNOSIS — Z17.0 MALIGNANT NEOPLASM OF BREAST IN FEMALE, ESTROGEN RECEPTOR POSITIVE, UNSPECIFIED LATERALITY, UNSPECIFIED SITE OF BREAST: ICD-10-CM

## 2025-02-25 DIAGNOSIS — C50.919 MALIGNANT NEOPLASM OF BREAST IN FEMALE, ESTROGEN RECEPTOR POSITIVE, UNSPECIFIED LATERALITY, UNSPECIFIED SITE OF BREAST: ICD-10-CM

## 2025-02-25 DIAGNOSIS — Z17.0 MALIGNANT NEOPLASM OF OVERLAPPING SITES OF RIGHT BREAST IN FEMALE, ESTROGEN RECEPTOR POSITIVE: ICD-10-CM

## 2025-02-25 DIAGNOSIS — C50.811 MALIGNANT NEOPLASM OF OVERLAPPING SITES OF RIGHT BREAST IN FEMALE, ESTROGEN RECEPTOR POSITIVE: Primary | ICD-10-CM

## 2025-02-25 PROCEDURE — 99214 OFFICE O/P EST MOD 30 MIN: CPT | Performed by: STUDENT IN AN ORGANIZED HEALTH CARE EDUCATION/TRAINING PROGRAM

## 2025-02-25 PROCEDURE — 1036F TOBACCO NON-USER: CPT | Performed by: STUDENT IN AN ORGANIZED HEALTH CARE EDUCATION/TRAINING PROGRAM

## 2025-02-25 PROCEDURE — 99214 OFFICE O/P EST MOD 30 MIN: CPT | Performed by: SURGERY

## 2025-02-25 PROCEDURE — 96402 CHEMO HORMON ANTINEOPL SQ/IM: CPT

## 2025-02-25 PROCEDURE — 2500000004 HC RX 250 GENERAL PHARMACY W/ HCPCS (ALT 636 FOR OP/ED): Performed by: STUDENT IN AN ORGANIZED HEALTH CARE EDUCATION/TRAINING PROGRAM

## 2025-02-25 PROCEDURE — 2500000004 HC RX 250 GENERAL PHARMACY W/ HCPCS (ALT 636 FOR OP/ED): Mod: JZ,TB | Performed by: STUDENT IN AN ORGANIZED HEALTH CARE EDUCATION/TRAINING PROGRAM

## 2025-02-25 PROCEDURE — 1036F TOBACCO NON-USER: CPT | Performed by: SURGERY

## 2025-02-25 PROCEDURE — 96372 THER/PROPH/DIAG INJ SC/IM: CPT

## 2025-02-25 PROCEDURE — 99214 OFFICE O/P EST MOD 30 MIN: CPT | Mod: 25 | Performed by: STUDENT IN AN ORGANIZED HEALTH CARE EDUCATION/TRAINING PROGRAM

## 2025-02-25 RX ORDER — LIDOCAINE HYDROCHLORIDE 10 MG/ML
2 INJECTION, SOLUTION EPIDURAL; INFILTRATION; INTRACAUDAL; PERINEURAL ONCE
OUTPATIENT
Start: 2025-03-25

## 2025-02-25 RX ORDER — DIPHENHYDRAMINE HYDROCHLORIDE 50 MG/ML
50 INJECTION INTRAMUSCULAR; INTRAVENOUS AS NEEDED
OUTPATIENT
Start: 2025-03-25

## 2025-02-25 RX ORDER — FAMOTIDINE 10 MG/ML
20 INJECTION, SOLUTION INTRAVENOUS ONCE AS NEEDED
OUTPATIENT
Start: 2025-03-25

## 2025-02-25 RX ORDER — TAMOXIFEN CITRATE 20 MG/1
20 TABLET ORAL DAILY
Qty: 30 TABLET | Refills: 11 | Status: SHIPPED | OUTPATIENT
Start: 2025-02-25 | End: 2026-02-25

## 2025-02-25 RX ORDER — ALBUTEROL SULFATE 0.83 MG/ML
3 SOLUTION RESPIRATORY (INHALATION) AS NEEDED
OUTPATIENT
Start: 2025-03-25

## 2025-02-25 RX ORDER — EPINEPHRINE 0.3 MG/.3ML
0.3 INJECTION SUBCUTANEOUS EVERY 5 MIN PRN
OUTPATIENT
Start: 2025-03-25

## 2025-02-25 RX ORDER — LIDOCAINE HYDROCHLORIDE 10 MG/ML
2 INJECTION, SOLUTION EPIDURAL; INFILTRATION; INTRACAUDAL; PERINEURAL ONCE
Status: COMPLETED | OUTPATIENT
Start: 2025-02-25 | End: 2025-02-25

## 2025-02-25 RX ADMIN — GOSERELIN ACETATE 3.6 MG: 3.6 IMPLANT SUBCUTANEOUS at 10:59

## 2025-02-25 RX ADMIN — LIDOCAINE HYDROCHLORIDE 20 MG: 10 INJECTION, SOLUTION EPIDURAL; INFILTRATION; INTRACAUDAL; PERINEURAL at 10:59

## 2025-02-25 ASSESSMENT — PAIN SCALES - GENERAL: PAINLEVEL_OUTOF10: 0-NO PAIN

## 2025-02-25 NOTE — PROGRESS NOTES
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  There is no evidence of local recurrence.    Continue follow up with medical oncology as scheduled.    Has revision scheduled with Dr. Mendoza in November.    Will follow up in the breast center with me in 1 year for clinical breast exam or sooner if there are any breast concerns.      Subjective   Macy Chavez is a 39 y.o. female presents today for follow up carcinoma of the right breast.     Treatment history  Surgery: 8/14/2024 bilateral skin sparing mastectomy right magseed sentinel lymph node biopsy -> alnd.  Radiation: PMRT and comprehensive simon radiation 10/7 - 10/28/2024  Systemic therapy: neoadjuvant dd AC -> weekly paclitaxel      She's completed radiation.  Recently started on verzenio.  She has noted some diarrhea recently.    Planning revision with Dr. Mendoza in November.    Working with a lymphatic massage specialist.     Review of Systems   Constitutional symptoms: Denies generalized fatigue.  Denies weight change, fevers/chills, difficulty sleeping   Eyes: Denies double vision, glaucoma, cataracts.  Ear/nose/throat/mouth: Denies hearing changes, sore throat, sinus problems.  Cardiovascular: No chest pain.  Denies irregular heartbeat.  Denies ankle swelling.  Respiratory: No wheezing, cough, or shortness of breath.  Gastrointestinal: No abdominal pain,  No nausea/vomiting.  No indigestion/heartburn.  No change in bowel habits.  No constipation or diarrhea.   Genitourinary: No urinary incontinence.  No urinary frequency.  No painful urination.  Musculoskeletal: No bone pain, no muscle pain, no joint pain.   Integumentary: No rash. No masses.  No changes in moles.  No easy  bruising.  Neurological: No headaches.  No tremors. No numbness/tingling.  Psychiatric: No anxiety. No depression.  Endocrine: No excessive thirst.  Not too hot or too cold.  Not tired or fatigued.    Hematological/lymphatic: No swollen glands or blood clotting problems.  No bruising.    Objective   Physical Exam  General: Alert and oriented x 3.  Mood and affect are appropriate.  HEENT: EOMI, PERRLA.   Neck: supple, no masses, no cervical adenopathy.  Cardiovascular: no lower extremity edema.  Pulmonary: breathing non labored on room air.  GI: Abdomen soft, no masses. No hepatomegaly or splenomegaly.  Lymph nodes: No supraclavicular or axillary adenopathy bilaterally.  Musculoskeletal: Full range of motion in the upper extremities bilaterally.  Neuro: denies dizziness, tremors    Breasts: The breasts were examined in both the seated and supine positions.    RIGHT: surgically absent with implant reconstruction.  Minor radiation changes. Incision well healed. No masses or suspicious skin changes.  LEFT: surgically absent with implant reconstruction.  Incision well healed. No masses.      Radiology review: All images and reports were personally reviewed.         Carmencita Patel DO

## 2025-02-25 NOTE — PROGRESS NOTES
Breast Medical Oncology Clinic  Location: [facility]      BREAST CANCER DIAGNOSIS  Malignant neoplasm of overlapping sites of right breast in female, estrogen receptor positive, Clinical: Stage IIIA (cT3, cN2, cM0, G3, ER+, SC+, HER2-)       ONCOLOGIC HISTORY  11/03/2023: Bl screening Mammogram: negative         12/20/2023: Right breast mass for which US done, Nonspecific dense fibroglandular tissues in the right breast in the area of reported lump. Dense fibroglandular tissue also evident throughout both breasts on screening mammogram. No discrete lesionidentified.     02/06/2024: Patient describes an increase in the size of a palpable lump in theright lower outer breast which was previously assessed in December of2023. Recent negative screening mammogram from 11/09/2023. She alsodescribes a new palpable lump in the right axilla. After being seen by her provider, a nother palpable lump is identified in the upper outer right breast.     Ultrasound of the right breast showed In the patient's area of palpable concern in the right breast at 7 o'clock, 5 cm from the nipple, there is a large vague heterogeneousmass with internal vascularity which has the appearance of dense fibroglandular tissue measuring up to 5 cm; it is visible protruding from the skin with discoloration. In the patient's area of palpable concern felt by her provider in the right breast at 10 o'clock, 8 cm from the nipple, there is an oval parallel heterogeneous mass withinternal vascularity measuring approximately 1.1 x 0.9 x 1.4 cm. 3 enlarged axillary LN were seen     02/06/2024: US guided core needle biopsy  A. BREAST, RIGHT, 7:00, 5 CM FN, CORE BIOPSY:   -- Invasive ductal carcinoma, grade 2, ER: 95%, SC: 5-10%, HER2 negative (1+)        B. BREAST, RIGHT, 10:00, 8 CM FN, CORE BIOPSY:   -- Invasive ductal carcinoma, grade 2-3,  ER: 95%, SC<5%, HER2 negative (1+)         C. AXILLARY LYMPH NODE, RIGHT, BIOPSY:     -- Metastatic carcinoma, see  note.    02/14/2024: Biopsy-proven multicentric right breast malignancy with the total  span of up to 13.0 cm associated with bulky metastatic axillary and  abnormal internal mammary lymphadenopathy. No evidence of chest wall,  skin or nipple-areolar complex involvement.     No MRI evidence of malignancy in the left breast.     02/15/2024: staging scans: negative- short term follow up      07/16/2024: completed ddAC followed by weekly taxol     08/14/2024: Dr Patel performed a bilateral skin sparing mastectomy with right sided SLNB and completion dissection with path showing an invasive ductal carcinoma  G2 measuring 25 mm, margins negative. She 5 total lymph nodes removed with 1 involved with macromets and 1 involved with micromets; final stage [id number] ER: 95%, , MA: 5-10%, HER2 negative (1+)     10/28/2024: completed adjuvant radiation therapy     12/3/2024: Started Gosarelin shots    1/28/25: Ordered abemaciclib     CURRENT THERAPY    Gosarelin +Abemaciclib    HISTORY OF PRESENT ILLNESS    Does have diarrhea- at least once a day. Intermittent nausea- no vomitting, no fever, no chills.Appetite is okay. Energy is okay  Does have moments of emotional lability             Past Medical History:  has a past medical history of Breast cancer (Multi), Cancer (Multi), Chest tightness (05/24/2024), Hyperlipidemia, Other specified health status (09/29/2016), and Shortness of breath (05/24/2024).  Surgical History:   has a past surgical history that includes Other surgical history (09/29/2016) and Whipple tooth extraction (6/1/2000).  Social History:   reports that she has never smoked. She has never been exposed to tobacco smoke. She has never used smokeless tobacco. She reports current alcohol use of about 1.0 standard drink of alcohol per week. She reports that she does not use drugs.  Family History:    Family History   Problem Relation Name Age of Onset    Stroke Father Jad     Other (cancer) Maternal Grandfather Sotero          liver or kidney    Cancer Paternal Grandmother Lizbeth         carcinoma of thigh    Stroke Paternal Grandmother Lizbeth     COPD Paternal Grandfather Mitchell      Family Oncology History:  Cancer-related family history includes Cancer in her paternal grandmother.      OBJECTIVE    VS / Pain:  /77 (Patient Position: Sitting, BP Cuff Size: Adult)   Pulse (!) 111   Temp 36.4 °C (97.5 °F) (Temporal)   Resp 18   Wt 75.1 kg (165 lb 9.1 oz)   SpO2 98%   BMI 25.17 kg/m²   BSA: 1.9 meters squared   Pain Scale: 0    Performance Status:  The ECOG performance scale today is ECO- Fully active, able to carry on all pre-disease performance w/o restriction.    GA: alert, oriented, cooperative  HEENT: anicteric sclera, well injected conjunctiva  Heart: RRR, no murmurs   Lung: CTAB  Abd: +BS, soft, non tender   Ext Peripheral pulses positive, warm extremities      Diagnostic Results   === 24 ===    CT CHEST ABDOMEN PELVIS W IV CONTRAST    - Impression -  Breast cancer restaging scan. When compared to the prior examination  dated 2024, there has been interval postsurgical changes of  bilateral mastectomies with implant placement. Skin thickening of the  right inferior breast likely representing post radiation changes.  Postsurgical changes of a right axillary lymph node dissection. No  definite evidence of new metastatic disease. Additional stable  chronic and incidental findings described above.    I personally reviewed the image(s) / study and I agree with the  findings as stated by Kendy Bishop MD. This study was interpreted at  [facility], [place], [place].    MACRO:  None    Signed by: Manjeet Trujillo 2024 3:02 PM  Dictation workstation:   [id number]   === 07/10/24 ===    BI MR BREAST BILATERAL WITH CONTRAST FULL PROTOCOL    - Impression -  Overall excellent response to treatment with a few small residual  masses and scattered areas of patchy non mass enhancement throughout  the  right breast. Resolution of previously-seen right axillary  lymphadenopathy.    Suspicious enhancing mass in the lower inner left breast at anterior  depth. MRI directed ultrasound imaging is recommended for further  evaluation; if this is not seen sonographically, MRI guided biopsy is  recommended.    A few additional peripherally enhancing T2 hyperintense masses are  seen in the left breast most consistent with inflamed cysts. These  are presumed to be benign, however the lower outer left breast mass  can also be assessed at the time of MRI directed ultrasound imaging.    A message was sent to the referring practitioner at the time of this  dictation regarding these findings using the epic critical findings  reporting system. A pre-procedure form was filled out.    BI-RADS CATEGORY:  BI-RADS Category:  4 Suspicious.  Recommendation:  MRI Directed Additional Imaging.  Recommended Date:  Immediate.  Laterality:  Left.    For any future breast imaging appointments, please call [phone]).      MACRO:  Critical Finding:  Breast Imaging Abnormality. Notification was  initiated on 7/11/2024 at 9:45 am by  Francisco Mansfield.  (**-YCF-**)  Instructions:  See Impression for specific recommendations.    Signed by: Francisco Mansfield 7/11/2024 9:45 AM  Dictation workstation:   [id number]   MR breast bilateral w contrast full protocol 07/10/2024    Narrative  Interpreted By:  Francisco Mansfield,  STUDY:  BI MR BREAST BILATERAL WITH CONTRAST FULL PROTOCOL;  7/10/2024 2:29 pm    ACCESSION NUMBER(S):  [id number]    ORDERING CLINICIAN:  MARLEE POOL    INDICATION:  Patient with known invasive ductal carcinoma of the right breast with  axillary involvement, status post neoadjuvant chemotherapy.    COMPARISON:  MRI from 02/14/2024    TECHNIQUE:  Using a dedicated breast coil, STIR axial and T1-weighted fat  saturation axial images of the breasts were obtained, the latter both  before and after intravenous administration of Gadolinium  DTPA. On an  independent workstation, 3-D images were formulated using [employer]  including time enhancement curves, subtraction images and MIP images.    Intravenous contrast: 15 ML of Dotarem    FINDINGS:  Density: Extreme fibroglandular tissue.    There is symmetric mild bilateral background enhancement.    RIGHT BREAST: Overall, there is a dramatic reduction in the size and  extent of enhancing masses and associated non mass enhancement in the  right breast status post chemotherapy. There is a faintly enhancing  residual mass in the lower outer right breast at middle depth at the  site of the prior index lesion measuring 7 x 8 x 7 mm on series 7,  image 123 of 170. Patchy non mass enhancement extends superiorly from  the residual index lesion in an area spanning approximately 2.9 x 2.3  x 3.5 cm. A few additional focal enhancing masses are visualized,  which have decreased significantly in size compared to prior exam,  for example in the lateral central right breast at middle depth  measuring up to 6 mm on series 7, image 99 of 170 (previously up to  11 mm). There is patchy scattered non mass enhancement throughout the  upper-outer right breast at middle and posterior depth near the site  of prior additional biopsy measuring approximately 3.5 x 2.9 x 3.7  cm. Additional scattered areas of non mass enhancement are seen  extending throughout the right breast but overall significantly  decreased compared to prior exam.    Resolution of previously-seen right axillary lymphadenopathy with  biopsy marker seen in a now normal appearing lymph node on series 7,  image 30 of 170. No internal mammary lymphadenopathy is appreciated.    LEFT BREAST: New enhancing circumscribed mass in the lower inner left  breast at anterior depth measuring up to 5 mm on series 7, image 114  of 170. There is a surrounding area of increased T2 hyperintensity.  There are a few scattered T2 hyperintense peripherally enhancing  masses in the left  breast, for example in the lower outer left breast  at middle depth on series 7, image 103 of 170 measuring up to 7 mm  and in the central left breast at posterior depth measuring up to 8  mm on series 7, image 85 of 170, most consistent with benign inflamed  cysts.    No axillary or internal mammary lymphadenopathy is appreciated.    NON-BREAST FINDINGS:  None.    Impression  Overall excellent response to treatment with a few small residual  masses and scattered areas of patchy non mass enhancement throughout  the right breast. Resolution of previously-seen right axillary  lymphadenopathy.    Suspicious enhancing mass in the lower inner left breast at anterior  depth. MRI directed ultrasound imaging is recommended for further  evaluation; if this is not seen sonographically, MRI guided biopsy is  recommended.    A few additional peripherally enhancing T2 hyperintense masses are  seen in the left breast most consistent with inflamed cysts. These  are presumed to be benign, however the lower outer left breast mass  can also be assessed at the time of MRI directed ultrasound imaging.    A message was sent to the referring practitioner at the time of this  dictation regarding these findings using the epic critical findings  reporting system. A pre-procedure form was filled out.    BI-RADS CATEGORY:  BI-RADS Category:  4 Suspicious.  Recommendation:  MRI Directed Additional Imaging.  Recommended Date:  Immediate.  Laterality:  Left.    For any future breast imaging appointments, please call [phone]).      MACRO:  Critical Finding:  Breast Imaging Abnormality. Notification was  initiated on 7/11/2024 at 9:45 am by  Francisco Mansfield.  (**-YCF-**)  Instructions:  See Impression for specific recommendations.    Signed by: Francisco Mansfield 7/11/2024 9:45 AM  Dictation workstation:   [id number]     No images are attached to the encounter.    LABORATORY/PATHOLOGY DATA    Office Visit on 01/30/2025   Component Date Value Ref  Range Status    Vitamin D, 25-Hydroxy, Total 02/21/2025 31  30 - 100 ng/mL Final    Thyroid Stimulating Hormone 02/21/2025 1.62  0.44 - 3.98 mIU/L Final    WBC 02/21/2025 8.5  4.4 - 11.3 x10*3/uL Final    nRBC 02/21/2025    Final    RBC 02/21/2025 4.48  4.00 - 5.20 x10*6/uL Final    Hemoglobin 02/21/2025 14.0  12.0 - 16.0 g/dL Final    Hematocrit 02/21/2025 42.8  36.0 - 46.0 % Final    MCV 02/21/2025 96  80 - 100 fL Final    MCH 02/21/2025 31.3  26.0 - 34.0 pg Final    MCHC 02/21/2025 32.7  32.0 - 36.0 g/dL Final    RDW 02/21/2025 12.7  11.5 - 14.5 % Final    Platelets 02/21/2025 191  150 - 450 x10*3/uL Final    Neutrophils % 02/21/2025 86.1  40.0 - 80.0 % Final    Immature Granulocytes %, Automated 02/21/2025 0.1  0.0 - 0.9 % Final    Lymphocytes % 02/21/2025 10.0  13.0 - 44.0 % Final    Monocytes % 02/21/2025 2.1  2.0 - 10.0 % Final    Eosinophils % 02/21/2025 1.5  0.0 - 6.0 % Final    Basophils % 02/21/2025 0.2  0.0 - 2.0 % Final    Neutrophils Absolute 02/21/2025 7.31  1.20 - 7.70 x10*3/uL Final    Immature Granulocytes Absolute, Au* 02/21/2025 0.01  0.00 - 0.70 x10*3/uL Final    Lymphocytes Absolute 02/21/2025 0.85 (L)  1.20 - 4.80 x10*3/uL Final    Monocytes Absolute 02/21/2025 0.18  0.10 - 1.00 x10*3/uL Final    Eosinophils Absolute 02/21/2025 0.13  0.00 - 0.70 x10*3/uL Final    Basophils Absolute 02/21/2025 0.02  0.00 - 0.10 x10*3/uL Final    Glucose 02/21/2025 90  74 - 99 mg/dL Final    Sodium 02/21/2025 140  136 - 145 mmol/L Final    Potassium 02/21/2025 4.1  3.5 - 5.3 mmol/L Final    Chloride 02/21/2025 104  98 - 107 mmol/L Final    Bicarbonate 02/21/2025 29  21 - 32 mmol/L Final    Anion Gap 02/21/2025 11  10 - 20 mmol/L Final    Urea Nitrogen 02/21/2025 13  6 - 23 mg/dL Final    Creatinine 02/21/2025 1.10 (H)  0.50 - 1.05 mg/dL Final    eGFR 02/21/2025 65  >60 mL/min/1.73m*2 Final    Calcium 02/21/2025 10.1  8.6 - 10.3 mg/dL Final    Albumin 02/21/2025 4.8  3.4 - 5.0 g/dL Final    Alkaline Phosphatase  "02/21/2025 77  33 - 110 U/L Final    Total Protein 02/21/2025 7.1  6.4 - 8.2 g/dL Final    AST 02/21/2025 12  9 - 39 U/L Final    Bilirubin, Total 02/21/2025 0.8  0.0 - 1.2 mg/dL Final    ALT 02/21/2025 14  7 - 45 U/L Final    Cholesterol 02/21/2025 158  0 - 199 mg/dL Final    HDL-Cholesterol 02/21/2025 66.8  mg/dL Final    Cholesterol/HDL Ratio 02/21/2025 2.4   Final    LDL Calculated 02/21/2025 69  <=99 mg/dL Final    VLDL 02/21/2025 22  0 - 40 mg/dL Final    Triglycerides 02/21/2025 111  0 - 149 mg/dL Final    Non HDL Cholesterol 02/21/2025 91  0 - 149 mg/dL Final   [facility] on 01/29/2025   Component Date Value Ref Range Status    Glucose, Fasting 02/21/2025 83  74 - 99 mg/dL Final   Lab on 01/28/2025   Component Date Value Ref Range Status    Follicle Stimulating Hormone 01/28/2025 3.5  IU/L Final    Luteinizing Hormone 01/28/2025 0.1  IU/L Final    Estradiol 01/28/2025 <19  pg/mL Final      No results found for: \"LABCA2\"          IMPRESSION/PLAN  Macy is a 39 year old female in excellent health with a recent diagnosis of right sided breast cancer. Macy felt a mass in her right breast in December that prompted diagnostic evaluation.      Ultrasound of the right breast showed In the patient's area of palpable concern in the right breast at 7 o'clock, 5 cm from the nipple, there is a large vague heterogeneousmass with internal vascularity which has the appearance of dense fibroglandular tissue measuring up to 5 cm;  In the patient's area of palpable concern felt by her provider in the right breast at 10 o'clock, 8 cm from the nipple, there is an oval parallel heterogeneous mass withinternal vascularity measuring approximately 1.1 x 0.9 x 1.4 cm. 3 enlarged axillary LN were seen     She had US guided core needle biopsy of  right breast at 7:00, 5 CM FN,  that showed invasive ductal carcinoma, grade 2, ER: 95%, GA: 5-10%, HER2 negative (1+).  And US guided core needle biopsy of the right breast at  10:00, 8 " CM FN, that showed  Invasive ductal carcinoma, grade 2-3,  ER: 95%, WI<5%, HER2 negative (1+). Axillary LN consistent with metastatic carcinoma     On MRI she a  multicentric right breast malignancy with the total span of up to 13.0 cm associated with bulky metastatic axillary and abnormal internal mammary lymphadenopathy. No evidence of chest wall, skin or nipple-areolar complex involvement.      Current stage bT3T2V4 (staging scans negative for distant mets)  ECOG PS: 0    I reviewed with her the events that led to her diagnosis of breast cancer. We reviewed all the procedures and diagnostic imaging she underwent thus far. I discussed the features of her breast cancer that include the size, grade, lymph node status and  hormone receptor/ her2-jair status.      We discussed neoadjuvant systemic therapy. The goal of treatment would be to downstage her cancer, assess tumor chemosensitivity and treat potential micrometastatic disease. I would treat, given extensive of disease with anthracyclines based regimen ddAC Q2 weeks x4 followed by taxol weekly x12      07/16/2024: completed ddAC followed by weekly taxol     08/14/2024: Dr Patel performed a bilateral skin sparing mastectomy with right sided SLNB and completion dissection with path showing an invasive ductal carcinoma  G2 measuring 25 mm, margins negative. She 5 total lymph nodes removed with 1 involved with macromets and 1 involved with micromets; final stage [id number] ER: 95%, , WI: 5-10%, HER2 negative (1+)     -10/28/2024: completed adjuvant radiation therapy     - mild drop in EF: 65% ->55-60% on her most recent echo- saw Dr Mares, did have sinus tachycardia, not clear how much is due to anxiety. She was started on low dose beta blocker - last echo in 08/02/2024 showed EF of 60%  - Elevated LDL- non pharmacological measures for now- follows with cardiology  -  Will also discuss adjuvant bisphosphonates next visit   - Continue Gosarelin on 12/03, 12/31,  01/28. Will continue through March at least (last menstrual period in March 2024)-  - FSH, LH, Estradiol: FSH: 3.5, estradiol<19  -Staging scans negative  - decrease abemaciclib to 100 mg bid   -On Goserelin, will start tamoxifen 20 mg daily  - asking about cT DNA-discussed that It is not done outside of clinical trials but she would like to know. Will set this up for next visit   RTC In 1M    Denise Golden MD  Hematology and Medical Oncology  [facility]

## 2025-02-26 ENCOUNTER — APPOINTMENT (OUTPATIENT)
Dept: INTEGRATIVE MEDICINE | Facility: CLINIC | Age: 40
End: 2025-02-26
Payer: COMMERCIAL

## 2025-02-26 NOTE — PATIENT INSTRUCTIONS
Complete 2-week Continuous Glucose Monitoring (CGM) as prescribed. Complete fasting glucose and fasting insulin blood work. Once both of these steps are completed, a 30-min follow up can be scheduled to review findings. Call the office at [phone] to schedule.    At-Home Practices:  -Find ways to integrate as many helpful tips or habits into your lifestyle as you see fit   -Review over previous newsletters for more information

## 2025-02-26 NOTE — PROGRESS NOTES
Seen in a Group Medical Visit, with agreement signed to protect patient privacy and participate in a shared medical visit to focus on assessment and implementing lifestyle medicine interventions to address modifiable risk factors of chronic disease, such as cancer, and to assist with reduction of symptoms. Confidentiality form reviewed and signed and ground rules reviewed. An interactive audio and video telecommunication system which permits real time communications between the patient (at the originating site) and provider (at the distant site) was utilized to provide this telehealth service. Verbal consent was requested and obtained on this date for a telehealth visit.     This is an 8-week intervention of people diagnosed with cancer who have mood changes, stress, fatigue, and/or pain that includes active participation, individual assessments and instruction for home practices to implement lifestyle medicine interventions.     During week 8 of 8, the focus of social connection and its impact on wellness. Interventions such as: expanding social network, volunteer work, engaging with others outside of social media, and at-home practices were discussed, and discussion amongst participants were reviewed.     During this group medical visit, I was assisted by Ashley Martin, RobinsonD and participants participated in active Cancer Wellness Program discussions and group activities.       Subjective: Individual check in with provider:  Successes: arrived to today's visit     Challenges: struggling with getting to bed earlier after children are asleep, traveling for daughter's cheerleading which is making food options difficult    Group Medical Visit:  Review of home practice  Reflections  New concerns  Health topic discussed in group: definition of loneliness and impacts on health, expanding social network   Group practice: cooking demo - favorite recipes from group participants   Reviewed how to obtain CGM and complete  fasting insulin and glucose blood work, as well as how to schedule 30-min follow up with myself to review results    ROS:  no HA, visual symptoms, hearing loss  no SOB, chest pain, palpitations   ROS o/w non contributory, please see HPI    Objective    BSA: There is no height or weight on file to calculate BSA.  There were no vitals taken for this visit.    PHYSICAL EXAM:  GENERAL: alert and appropriate, in no distress, well-hydrated, well-nourished and happy, smiling, interactive  SKIN: no rash noted  HEAD: normocephalic, no abnormality or lesion noted  EYES: no injection and visual acuity is grossly normal  EARS: external ears normal  NOSE: external nose normal without rhinorrhea  NECK: full ROM, no cervical LNs noted  RESPIRATORY: breathing non-labored and no grunting/flaring/retractions  NEUROLOGIC: no obvious deficit    Lab Results   Component Value Date    WBC 8.5 02/21/2025    HGB 14.0 02/21/2025    HCT 42.8 02/21/2025     02/21/2025    CHOL 158 02/21/2025    TRIG 111 02/21/2025    HDL 66.8 02/21/2025    ALT 14 02/21/2025    AST 12 02/21/2025     02/21/2025    K 4.1 02/21/2025     02/21/2025    CREATININE 1.10 (H) 02/21/2025    BUN 13 02/21/2025    CO2 29 02/21/2025    TSH 1.62 02/21/2025    INR 1.0 02/26/2024    GLUF 83 02/21/2025         Assessment/Plan   Cancer Staging   No matching staging information was found for the patient.    Symptom Managed: Fatigue, anxiety, stress management, health maintenance, overweight     Cancer Wellness Program: Week 8/8    Complete 2-week Continuous Glucose Monitoring (CGM) as prescribed. Complete fasting glucose and fasting insulin blood work. Once both of these steps are completed, a 30-min follow up can be scheduled to review findings. Call the office at [phone] to schedule.    At-Home Practices:  -Find ways to integrate as many helpful tips or habits into your lifestyle as you see fit   -Review over previous newsletters for more information      ATA Hunter-CNP   [facility]  Integrative Oncology    60 min visit with face to face counseling in a group setting re: stress management, coping mechanisms including meditation and focus on anxiety  >50% of the visit 60 min was spent in direct face to face care with patient discussing stress management and coping strategies including meditation and mindfulness  Individual face to face check in was done during the visit in front of group. Session led by Clarissa Lopez CNP.

## 2025-03-05 ENCOUNTER — APPOINTMENT (OUTPATIENT)
Dept: INTEGRATIVE MEDICINE | Facility: CLINIC | Age: 40
End: 2025-03-05
Payer: COMMERCIAL

## 2025-03-05 DIAGNOSIS — R53.83 FATIGUE, UNSPECIFIED TYPE: Primary | ICD-10-CM

## 2025-03-05 DIAGNOSIS — F41.9 ANXIETY: ICD-10-CM

## 2025-03-05 PROCEDURE — 99215 OFFICE O/P EST HI 40 MIN: CPT

## 2025-03-24 NOTE — PROGRESS NOTES
Breast Medical Oncology Clinic  Location: [place]      BREAST CANCER DIAGNOSIS  Malignant neoplasm of overlapping sites of right breast in female, estrogen receptor positive, Clinical: Stage IIIA (cT3, cN2, cM0, G3, ER+, WV+, HER2-)       ONCOLOGIC HISTORY  11/03/2023: Bl screening Mammogram: negative         12/20/2023: Right breast mass for which US done, Nonspecific dense fibroglandular tissues in the right breast in the area of reported lump. Dense fibroglandular tissue also evident throughout both breasts on screening mammogram. No discrete lesionidentified.     02/06/2024: Patient describes an increase in the size of a palpable lump in theright lower outer breast which was previously assessed in December of2023. Recent negative screening mammogram from 11/09/2023. She alsodescribes a new palpable lump in the right axilla. After being seen by her provider, a nother palpable lump is identified in the upper outer right breast.     Ultrasound of the right breast showed In the patient's area of palpable concern in the right breast at 7 o'clock, 5 cm from the nipple, there is a large vague heterogeneousmass with internal vascularity which has the appearance of dense fibroglandular tissue measuring up to 5 cm; it is visible protruding from the skin with discoloration. In the patient's area of palpable concern felt by her provider in the right breast at 10 o'clock, 8 cm from the nipple, there is an oval parallel heterogeneous mass withinternal vascularity measuring approximately 1.1 x 0.9 x 1.4 cm. 3 enlarged axillary LN were seen     02/06/2024: US guided core needle biopsy  A. BREAST, RIGHT, 7:00, 5 CM FN, CORE BIOPSY:   -- Invasive ductal carcinoma, grade 2, ER: 95%, WV: 5-10%, HER2 negative (1+)        B. BREAST, RIGHT, 10:00, 8 CM FN, CORE BIOPSY:   -- Invasive ductal carcinoma, grade 2-3,  ER: 95%, WV<5%, HER2 negative (1+)         C. AXILLARY LYMPH NODE, RIGHT, BIOPSY:     -- Metastatic carcinoma, see  note.    02/14/2024: Biopsy-proven multicentric right breast malignancy with the total  span of up to 13.0 cm associated with bulky metastatic axillary and  abnormal internal mammary lymphadenopathy. No evidence of chest wall,  skin or nipple-areolar complex involvement.     No MRI evidence of malignancy in the left breast.     02/15/2024: staging scans: negative- short term follow up      07/16/2024: completed ddAC followed by weekly taxol     08/14/2024: Dr Patel performed a bilateral skin sparing mastectomy with right sided SLNB and completion dissection with path showing an invasive ductal carcinoma  G2 measuring 25 mm, margins negative. She 5 total lymph nodes removed with 1 involved with macromets and 1 involved with micromets; final stage [id number] ER: 95%, , CA: 5-10%, HER2 negative (1+)     10/28/2024: completed adjuvant radiation therapy     12/3/2024: Started Gosarelin shots    1/28/25: Ordered abemaciclib     CURRENT THERAPY    Gosarelin +Abemaciclib    HISTORY OF PRESENT ILLNESS  Ms. Chavez presents today for follow-up, treatment and surveillance. She is compliant on tamoxifen and verzenio 100 mg BID. The dose reduction has helped her diarrhea. Maybe still have diarrhea once a week. A little more nauseated the last two weeks, took compazine 1-2 times with resolution.     She denies any new breast cancer concerns.     She denies any chest pain or breathing issues.     She denies any vision changes or headache issues, dizziness, loss of balance or falls.     She denies any new or unexplained bone aches or pains.    She denies any skin lesions or masses.    She reports a normal appetite.    A little more tired, but able to do ADLs. Very busy right now. Sleeping well at night. Exercise does make her feel better.     Past Medical History:  has a past medical history of Breast cancer (Multi), Cancer (Multi), Chest tightness (05/24/2024), Hyperlipidemia, Other specified health status (09/29/2016), and Shortness  of breath (2024).  Surgical History:   has a past surgical history that includes Other surgical history (2016) and Curtice tooth extraction (2000).  Social History:   reports that she has never smoked. She has never been exposed to tobacco smoke. She has never used smokeless tobacco. She reports current alcohol use of about 1.0 standard drink of alcohol per week. She reports that she does not use drugs.  Family History:    Family History   Problem Relation Name Age of Onset    Stroke Father Jad     Other (cancer) Maternal Grandfather Sotero         liver or kidney    Cancer Paternal Grandmother Lizbeth         carcinoma of thigh    Stroke Paternal Grandmother Lizbeth     COPD Paternal Grandfather Mitchell      Family Oncology History:  Cancer-related family history includes Cancer in her paternal grandmother.      OBJECTIVE    VS / Pain:  /85 (BP Location: Left arm, Patient Position: Sitting, BP Cuff Size: Adult)   Pulse 94   Temp 36.2 °C (97.2 °F) (Temporal)   Resp 18   Wt 74.9 kg (165 lb 1.6 oz)   SpO2 98%   BMI 25.10 kg/m²   BSA: 1.9 meters squared   Pain Scale: 0    Performance Status:  The ECOG performance scale today is ECO- Fully active, able to carry on all pre-disease performance w/o restriction.    Physical Exam  Vitals reviewed.   Constitutional:       General: She is not in acute distress.     Appearance: She is not toxic-appearing.   HENT:      Mouth/Throat:      Mouth: Mucous membranes are moist.      Pharynx: Oropharynx is clear.   Eyes:      General: No scleral icterus.     Extraocular Movements: Extraocular movements intact.      Conjunctiva/sclera: Conjunctivae normal.   Cardiovascular:      Rate and Rhythm: Normal rate and regular rhythm.   Pulmonary:      Effort: Pulmonary effort is normal. No respiratory distress.   Chest:      Comments: Deferred today   Musculoskeletal:         General: No swelling or tenderness.   Skin:     General: Skin is warm and dry.      Coloration:  Skin is not jaundiced.   Neurological:      General: No focal deficit present.      Mental Status: She is alert and oriented to person, place, and time.   Psychiatric:         Mood and Affect: Mood normal.         Behavior: Behavior normal.         Thought Content: Thought content normal.         Judgment: Judgment normal.          Diagnostic Results   === 12/05/24 ===    CT CHEST ABDOMEN PELVIS W IV CONTRAST    - Impression -  Breast cancer restaging scan. When compared to the prior examination  dated 02/19/2024, there has been interval postsurgical changes of  bilateral mastectomies with implant placement. Skin thickening of the  right inferior breast likely representing post radiation changes.  Postsurgical changes of a right axillary lymph node dissection. No  definite evidence of new metastatic disease. Additional stable  chronic and incidental findings described above.    I personally reviewed the image(s) / study and I agree with the  findings as stated by Kendy Bishop MD. This study was interpreted at  [place], [place].    MACRO:  None    Signed by: Manjeet Trujillo 12/5/2024 3:02 PM  Dictation workstation:   [id number]   === 07/10/24 ===    BI MR BREAST BILATERAL WITH CONTRAST FULL PROTOCOL    - Impression -  Overall excellent response to treatment with a few small residual  masses and scattered areas of patchy non mass enhancement throughout  the right breast. Resolution of previously-seen right axillary  lymphadenopathy.    Suspicious enhancing mass in the lower inner left breast at anterior  depth. MRI directed ultrasound imaging is recommended for further  evaluation; if this is not seen sonographically, MRI guided biopsy is  recommended.    A few additional peripherally enhancing T2 hyperintense masses are  seen in the left breast most consistent with inflamed cysts. These  are presumed to be benign, however the lower outer left breast mass  can also be assessed at the time  of MRI directed ultrasound imaging.    A message was sent to the referring practitioner at the time of this  dictation regarding these findings using the epic critical findings  reporting system. A pre-procedure form was filled out.    BI-RADS CATEGORY:  BI-RADS Category:  4 Suspicious.  Recommendation:  MRI Directed Additional Imaging.  Recommended Date:  Immediate.  Laterality:  Left.    For any future breast imaging appointments, please call [phone]).      MACRO:  Critical Finding:  Breast Imaging Abnormality. Notification was  initiated on 7/11/2024 at 9:45 am by  Francisco Mansfield.  (**-YCF-**)  Instructions:  See Impression for specific recommendations.    Signed by: Francisco Mansfield 7/11/2024 9:45 AM  Dictation workstation:   [id number]   MR breast bilateral w contrast full protocol 07/10/2024    Narrative  Interpreted By:  Francisco Mansfield,  STUDY:  BI MR BREAST BILATERAL WITH CONTRAST FULL PROTOCOL;  7/10/2024 2:29 pm    ACCESSION NUMBER(S):  [id number]    ORDERING CLINICIAN:  MARLEE POOL    INDICATION:  Patient with known invasive ductal carcinoma of the right breast with  axillary involvement, status post neoadjuvant chemotherapy.    COMPARISON:  MRI from 02/14/2024    TECHNIQUE:  Using a dedicated breast coil, STIR axial and T1-weighted fat  saturation axial images of the breasts were obtained, the latter both  before and after intravenous administration of Gadolinium DTPA. On an  independent workstation, 3-D images were formulated using [employer]  including time enhancement curves, subtraction images and MIP images.    Intravenous contrast: 15 ML of Dotarem    FINDINGS:  Density: Extreme fibroglandular tissue.    There is symmetric mild bilateral background enhancement.    RIGHT BREAST: Overall, there is a dramatic reduction in the size and  extent of enhancing masses and associated non mass enhancement in the  right breast status post chemotherapy. There is a faintly enhancing  residual mass in the  lower outer right breast at middle depth at the  site of the prior index lesion measuring 7 x 8 x 7 mm on series 7,  image 123 of 170. Patchy non mass enhancement extends superiorly from  the residual index lesion in an area spanning approximately 2.9 x 2.3  x 3.5 cm. A few additional focal enhancing masses are visualized,  which have decreased significantly in size compared to prior exam,  for example in the lateral central right breast at middle depth  measuring up to 6 mm on series 7, image 99 of 170 (previously up to  11 mm). There is patchy scattered non mass enhancement throughout the  upper-outer right breast at middle and posterior depth near the site  of prior additional biopsy measuring approximately 3.5 x 2.9 x 3.7  cm. Additional scattered areas of non mass enhancement are seen  extending throughout the right breast but overall significantly  decreased compared to prior exam.    Resolution of previously-seen right axillary lymphadenopathy with  biopsy marker seen in a now normal appearing lymph node on series 7,  image 30 of 170. No internal mammary lymphadenopathy is appreciated.    LEFT BREAST: New enhancing circumscribed mass in the lower inner left  breast at anterior depth measuring up to 5 mm on series 7, image 114  of 170. There is a surrounding area of increased T2 hyperintensity.  There are a few scattered T2 hyperintense peripherally enhancing  masses in the left breast, for example in the lower outer left breast  at middle depth on series 7, image 103 of 170 measuring up to 7 mm  and in the central left breast at posterior depth measuring up to 8  mm on series 7, image 85 of 170, most consistent with benign inflamed  cysts.    No axillary or internal mammary lymphadenopathy is appreciated.    NON-BREAST FINDINGS:  None.    Impression  Overall excellent response to treatment with a few small residual  masses and scattered areas of patchy non mass enhancement throughout  the right breast.  Resolution of previously-seen right axillary  lymphadenopathy.    Suspicious enhancing mass in the lower inner left breast at anterior  depth. MRI directed ultrasound imaging is recommended for further  evaluation; if this is not seen sonographically, MRI guided biopsy is  recommended.    A few additional peripherally enhancing T2 hyperintense masses are  seen in the left breast most consistent with inflamed cysts. These  are presumed to be benign, however the lower outer left breast mass  can also be assessed at the time of MRI directed ultrasound imaging.    A message was sent to the referring practitioner at the time of this  dictation regarding these findings using the epic critical findings  reporting system. A pre-procedure form was filled out.    BI-RADS CATEGORY:  BI-RADS Category:  4 Suspicious.  Recommendation:  MRI Directed Additional Imaging.  Recommended Date:  Immediate.  Laterality:  Left.    For any future breast imaging appointments, please call [phone]).      MACRO:  Critical Finding:  Breast Imaging Abnormality. Notification was  initiated on 7/11/2024 at 9:45 am by  Francisco Mansfield.  (**-YCF-**)  Instructions:  See Impression for specific recommendations.    Signed by: Francisco Mansfield 7/11/2024 9:45 AM  Dictation workstation:   [id number]     No images are attached to the encounter.    LABORATORY/PATHOLOGY DATA    Lab on 03/25/2025   Component Date Value Ref Range Status    WBC 03/25/2025 4.1 (L)  4.4 - 11.3 x10*3/uL Final    nRBC 03/25/2025    Final    RBC 03/25/2025 4.20  4.00 - 5.20 x10*6/uL Final    Hemoglobin 03/25/2025 13.4  12.0 - 16.0 g/dL Final    Hematocrit 03/25/2025 40.5  36.0 - 46.0 % Final    MCV 03/25/2025 96  80 - 100 fL Final    MCH 03/25/2025 31.9  26.0 - 34.0 pg Final    MCHC 03/25/2025 33.1  32.0 - 36.0 g/dL Final    RDW 03/25/2025 13.8  11.5 - 14.5 % Final    Platelets 03/25/2025 252  150 - 450 x10*3/uL Final    Neutrophils % 03/25/2025 73.2  40.0 - 80.0 % Final     "Immature Granulocytes %, Automated 03/25/2025 0.2  0.0 - 0.9 % Final    Lymphocytes % 03/25/2025 20.0  13.0 - 44.0 % Final    Monocytes % 03/25/2025 4.9  2.0 - 10.0 % Final    Eosinophils % 03/25/2025 1.0  0.0 - 6.0 % Final    Basophils % 03/25/2025 0.7  0.0 - 2.0 % Final    Neutrophils Absolute 03/25/2025 2.99  1.20 - 7.70 x10*3/uL Final    Immature Granulocytes Absolute, Au* 03/25/2025 0.01  0.00 - 0.70 x10*3/uL Final    Lymphocytes Absolute 03/25/2025 0.82 (L)  1.20 - 4.80 x10*3/uL Final    Monocytes Absolute 03/25/2025 0.20  0.10 - 1.00 x10*3/uL Final    Eosinophils Absolute 03/25/2025 0.04  0.00 - 0.70 x10*3/uL Final    Basophils Absolute 03/25/2025 0.03  0.00 - 0.10 x10*3/uL Final    Glucose 03/25/2025 111 (H)  74 - 99 mg/dL Final    Sodium 03/25/2025 139  136 - 145 mmol/L Final    Potassium 03/25/2025 3.8  3.5 - 5.3 mmol/L Final    Chloride 03/25/2025 103  98 - 107 mmol/L Final    Bicarbonate 03/25/2025 28  21 - 32 mmol/L Final    Anion Gap 03/25/2025 12  10 - 20 mmol/L Final    Urea Nitrogen 03/25/2025 15  6 - 23 mg/dL Final    Creatinine 03/25/2025 1.12 (H)  0.50 - 1.05 mg/dL Final    eGFR 03/25/2025 63  >60 mL/min/1.73m*2 Final    Calcium 03/25/2025 10.3  8.6 - 10.3 mg/dL Final    Albumin 03/25/2025 4.8  3.4 - 5.0 g/dL Final    Alkaline Phosphatase 03/25/2025 73  33 - 110 U/L Final    Total Protein 03/25/2025 7.5  6.4 - 8.2 g/dL Final    AST 03/25/2025 14  9 - 39 U/L Final    Bilirubin, Total 03/25/2025 0.5  0.0 - 1.2 mg/dL Final    ALT 03/25/2025 12  7 - 45 U/L Final    RBC Morphology 03/25/2025 No significant RBC morphology present   Final      No results found for: \"LABCA2\"          IMPRESSION/PLAN  Macy is a 39 year old female in excellent health with a recent diagnosis of right sided breast cancer. Macy felt a mass in her right breast in December that prompted diagnostic evaluation.      Ultrasound of the right breast showed In the patient's area of palpable concern in the right breast at 7 o'clock, " 5 cm from the nipple, there is a large vague heterogeneousmass with internal vascularity which has the appearance of dense fibroglandular tissue measuring up to 5 cm;  In the patient's area of palpable concern felt by her provider in the right breast at 10 o'clock, 8 cm from the nipple, there is an oval parallel heterogeneous mass withinternal vascularity measuring approximately 1.1 x 0.9 x 1.4 cm. 3 enlarged axillary LN were seen     She had US guided core needle biopsy of  right breast at 7:00, 5 CM FN,  that showed invasive ductal carcinoma, grade 2, ER: 95%, AL: 5-10%, HER2 negative (1+).  And US guided core needle biopsy of the right breast at  10:00, 8 CM FN, that showed  Invasive ductal carcinoma, grade 2-3,  ER: 95%, AL<5%, HER2 negative (1+). Axillary LN consistent with metastatic carcinoma     On MRI she a  multicentric right breast malignancy with the total span of up to 13.0 cm associated with bulky metastatic axillary and abnormal internal mammary lymphadenopathy. No evidence of chest wall, skin or nipple-areolar complex involvement.      Current stage [id number] (staging scans negative for distant mets)  ECOG PS: 0    I reviewed with her the events that led to her diagnosis of breast cancer. We reviewed all the procedures and diagnostic imaging she underwent thus far. I discussed the features of her breast cancer that include the size, grade, lymph node status and  hormone receptor/ her2-jair status.      We discussed neoadjuvant systemic therapy. The goal of treatment would be to downstage her cancer, assess tumor chemosensitivity and treat potential micrometastatic disease. I would treat, given extensive of disease with anthracyclines based regimen ddAC Q2 weeks x4 followed by taxol weekly x12      07/16/2024: completed ddAC followed by weekly taxol     08/14/2024: Dr Patel performed a bilateral skin sparing mastectomy with right sided SLNB and completion dissection with path showing an invasive ductal  carcinoma  G2 measuring 25 mm, margins negative. She 5 total lymph nodes removed with 1 involved with macromets and 1 involved with micromets; final stage [id number] ER: 95%, , WA: 5-10%, HER2 negative (1+)     -10/28/2024: completed adjuvant radiation therapy     - mild drop in EF: 65% ->55-60% on her most recent echo- saw Dr Mares, did have sinus tachycardia, not clear how much is due to anxiety. She was started on low dose beta blocker - last echo in 08/02/2024 showed EF of 60%  - Elevated LDL- follows with cardiology - on lipitor   -  Will also discuss adjuvant bisphosphonates next visit   - Continue Gosarelin for now. Consider updating hormone labs at future visit   - FSH, LH, Estradiol: FSH: 3.5, estradiol<19  - Continue abemaciclib to 100 mg bid, labs look good today   - On Goserelin q monthly   - Tamoxifen 20 mg daily  - Would like to proceed with ctDNA testing after long discussion about interpretation of test and possible results and implications.   - RTC in 3 months with labs q monthly     Masha Sadler PA-C   Hematology and Medical Oncology  [facility]

## 2025-03-25 ENCOUNTER — LAB (OUTPATIENT)
Dept: LAB | Facility: CLINIC | Age: 40
End: 2025-03-25
Payer: COMMERCIAL

## 2025-03-25 ENCOUNTER — OFFICE VISIT (OUTPATIENT)
Dept: HEMATOLOGY/ONCOLOGY | Facility: CLINIC | Age: 40
End: 2025-03-25
Payer: COMMERCIAL

## 2025-03-25 ENCOUNTER — INFUSION (OUTPATIENT)
Dept: HEMATOLOGY/ONCOLOGY | Facility: CLINIC | Age: 40
End: 2025-03-25
Payer: COMMERCIAL

## 2025-03-25 VITALS
OXYGEN SATURATION: 98 % | TEMPERATURE: 97.2 F | DIASTOLIC BLOOD PRESSURE: 85 MMHG | BODY MASS INDEX: 25.1 KG/M2 | HEART RATE: 94 BPM | WEIGHT: 165.1 LBS | SYSTOLIC BLOOD PRESSURE: 125 MMHG | RESPIRATION RATE: 18 BRPM

## 2025-03-25 DIAGNOSIS — Z17.0 MALIGNANT NEOPLASM OF OVERLAPPING SITES OF RIGHT BREAST IN FEMALE, ESTROGEN RECEPTOR POSITIVE: ICD-10-CM

## 2025-03-25 DIAGNOSIS — C50.919 MALIGNANT NEOPLASM OF BREAST IN FEMALE, ESTROGEN RECEPTOR POSITIVE, UNSPECIFIED LATERALITY, UNSPECIFIED SITE OF BREAST: ICD-10-CM

## 2025-03-25 DIAGNOSIS — E78.00 ELEVATED LDL CHOLESTEROL LEVEL: ICD-10-CM

## 2025-03-25 DIAGNOSIS — Z17.0 MALIGNANT NEOPLASM OF BREAST IN FEMALE, ESTROGEN RECEPTOR POSITIVE, UNSPECIFIED LATERALITY, UNSPECIFIED SITE OF BREAST: ICD-10-CM

## 2025-03-25 DIAGNOSIS — C50.811 MALIGNANT NEOPLASM OF OVERLAPPING SITES OF RIGHT BREAST IN FEMALE, ESTROGEN RECEPTOR POSITIVE: ICD-10-CM

## 2025-03-25 LAB
ALBUMIN SERPL BCP-MCNC: 4.8 G/DL (ref 3.4–5)
ALP SERPL-CCNC: 73 U/L (ref 33–110)
ALT SERPL W P-5'-P-CCNC: 12 U/L (ref 7–45)
ANION GAP SERPL CALC-SCNC: 12 MMOL/L (ref 10–20)
AST SERPL W P-5'-P-CCNC: 14 U/L (ref 9–39)
BASOPHILS # BLD AUTO: 0.03 X10*3/UL (ref 0–0.1)
BASOPHILS NFR BLD AUTO: 0.7 %
BILIRUB SERPL-MCNC: 0.5 MG/DL (ref 0–1.2)
BUN SERPL-MCNC: 15 MG/DL (ref 6–23)
CALCIUM SERPL-MCNC: 10.3 MG/DL (ref 8.6–10.3)
CHLORIDE SERPL-SCNC: 103 MMOL/L (ref 98–107)
CO2 SERPL-SCNC: 28 MMOL/L (ref 21–32)
CREAT SERPL-MCNC: 1.12 MG/DL (ref 0.5–1.05)
EGFRCR SERPLBLD CKD-EPI 2021: 63 ML/MIN/1.73M*2
EOSINOPHIL # BLD AUTO: 0.04 X10*3/UL (ref 0–0.7)
EOSINOPHIL NFR BLD AUTO: 1 %
ERYTHROCYTE [DISTWIDTH] IN BLOOD BY AUTOMATED COUNT: 13.8 % (ref 11.5–14.5)
GLUCOSE SERPL-MCNC: 111 MG/DL (ref 74–99)
HCT VFR BLD AUTO: 40.5 % (ref 36–46)
HGB BLD-MCNC: 13.4 G/DL (ref 12–16)
IMM GRANULOCYTES # BLD AUTO: 0.01 X10*3/UL (ref 0–0.7)
IMM GRANULOCYTES NFR BLD AUTO: 0.2 % (ref 0–0.9)
LYMPHOCYTES # BLD AUTO: 0.82 X10*3/UL (ref 1.2–4.8)
LYMPHOCYTES NFR BLD AUTO: 20 %
MCH RBC QN AUTO: 31.9 PG (ref 26–34)
MCHC RBC AUTO-ENTMCNC: 33.1 G/DL (ref 32–36)
MCV RBC AUTO: 96 FL (ref 80–100)
MONOCYTES # BLD AUTO: 0.2 X10*3/UL (ref 0.1–1)
MONOCYTES NFR BLD AUTO: 4.9 %
NEUTROPHILS # BLD AUTO: 2.99 X10*3/UL (ref 1.2–7.7)
NEUTROPHILS NFR BLD AUTO: 73.2 %
NRBC BLD-RTO: ABNORMAL /100{WBCS}
PLATELET # BLD AUTO: 252 X10*3/UL (ref 150–450)
POTASSIUM SERPL-SCNC: 3.8 MMOL/L (ref 3.5–5.3)
PROT SERPL-MCNC: 7.5 G/DL (ref 6.4–8.2)
RBC # BLD AUTO: 4.2 X10*6/UL (ref 4–5.2)
RBC MORPH BLD: NORMAL
SODIUM SERPL-SCNC: 139 MMOL/L (ref 136–145)
WBC # BLD AUTO: 4.1 X10*3/UL (ref 4.4–11.3)

## 2025-03-25 PROCEDURE — 36415 COLL VENOUS BLD VENIPUNCTURE: CPT

## 2025-03-25 PROCEDURE — 85025 COMPLETE CBC W/AUTO DIFF WBC: CPT

## 2025-03-25 PROCEDURE — 99215 OFFICE O/P EST HI 40 MIN: CPT | Mod: 25

## 2025-03-25 PROCEDURE — 2500000004 HC RX 250 GENERAL PHARMACY W/ HCPCS (ALT 636 FOR OP/ED): Performed by: STUDENT IN AN ORGANIZED HEALTH CARE EDUCATION/TRAINING PROGRAM

## 2025-03-25 PROCEDURE — 80053 COMPREHEN METABOLIC PANEL: CPT

## 2025-03-25 PROCEDURE — 82378 CARCINOEMBRYONIC ANTIGEN: CPT

## 2025-03-25 PROCEDURE — 1036F TOBACCO NON-USER: CPT

## 2025-03-25 PROCEDURE — 2500000004 HC RX 250 GENERAL PHARMACY W/ HCPCS (ALT 636 FOR OP/ED): Mod: JZ,TB | Performed by: STUDENT IN AN ORGANIZED HEALTH CARE EDUCATION/TRAINING PROGRAM

## 2025-03-25 PROCEDURE — 86300 IMMUNOASSAY TUMOR CA 15-3: CPT

## 2025-03-25 PROCEDURE — 96372 THER/PROPH/DIAG INJ SC/IM: CPT

## 2025-03-25 PROCEDURE — 96402 CHEMO HORMON ANTINEOPL SQ/IM: CPT

## 2025-03-25 PROCEDURE — 99215 OFFICE O/P EST HI 40 MIN: CPT

## 2025-03-25 RX ORDER — FAMOTIDINE 10 MG/ML
20 INJECTION, SOLUTION INTRAVENOUS ONCE AS NEEDED
OUTPATIENT
Start: 2025-04-22

## 2025-03-25 RX ORDER — EPINEPHRINE 0.3 MG/.3ML
0.3 INJECTION SUBCUTANEOUS EVERY 5 MIN PRN
OUTPATIENT
Start: 2025-04-22

## 2025-03-25 RX ORDER — ATORVASTATIN CALCIUM 20 MG/1
20 TABLET, FILM COATED ORAL DAILY
Qty: 90 TABLET | Refills: 1 | Status: SHIPPED | OUTPATIENT
Start: 2025-03-25

## 2025-03-25 RX ORDER — LIDOCAINE HYDROCHLORIDE 10 MG/ML
2 INJECTION, SOLUTION EPIDURAL; INFILTRATION; INTRACAUDAL; PERINEURAL ONCE
OUTPATIENT
Start: 2025-04-22

## 2025-03-25 RX ORDER — LIDOCAINE HYDROCHLORIDE 10 MG/ML
2 INJECTION, SOLUTION EPIDURAL; INFILTRATION; INTRACAUDAL; PERINEURAL ONCE
Status: COMPLETED | OUTPATIENT
Start: 2025-03-25 | End: 2025-03-25

## 2025-03-25 RX ORDER — ALBUTEROL SULFATE 0.83 MG/ML
3 SOLUTION RESPIRATORY (INHALATION) AS NEEDED
OUTPATIENT
Start: 2025-04-22

## 2025-03-25 RX ORDER — DIPHENHYDRAMINE HYDROCHLORIDE 50 MG/ML
50 INJECTION, SOLUTION INTRAMUSCULAR; INTRAVENOUS AS NEEDED
OUTPATIENT
Start: 2025-04-22

## 2025-03-25 RX ADMIN — LIDOCAINE HYDROCHLORIDE 20 MG: 10 INJECTION, SOLUTION EPIDURAL; INFILTRATION; INTRACAUDAL; PERINEURAL at 12:06

## 2025-03-25 RX ADMIN — GOSERELIN ACETATE 3.6 MG: 3.6 IMPLANT SUBCUTANEOUS at 12:06

## 2025-03-25 ASSESSMENT — PAIN SCALES - GENERAL: PAINLEVEL_OUTOF10: 0-NO PAIN

## 2025-03-26 LAB
CANCER AG27-29 SERPL-ACNC: 25.3 U/ML (ref 0–38.6)
CEA SERPL-MCNC: 2.1 UG/L

## 2025-04-07 ENCOUNTER — APPOINTMENT (OUTPATIENT)
Dept: PLASTIC SURGERY | Facility: CLINIC | Age: 40
End: 2025-04-07
Payer: COMMERCIAL

## 2025-04-07 VITALS — HEART RATE: 76 BPM | SYSTOLIC BLOOD PRESSURE: 118 MMHG | DIASTOLIC BLOOD PRESSURE: 85 MMHG

## 2025-04-07 DIAGNOSIS — T85.44XA CAPSULAR CONTRACTURE OF BREAST IMPLANT, INITIAL ENCOUNTER: Primary | ICD-10-CM

## 2025-04-07 DIAGNOSIS — Z98.890 S/P BREAST RECONSTRUCTION: ICD-10-CM

## 2025-04-07 PROCEDURE — 99213 OFFICE O/P EST LOW 20 MIN: CPT | Performed by: PHYSICIAN ASSISTANT

## 2025-04-07 NOTE — PROGRESS NOTES
Division of Plastic & Reconstructive Surgery            Post OP Visit    Date: 4/7/2025   DOS: 8/14/2024    Subjective   Macy Chavez is a 39 y.o. female presents for postoperative visit status post bilateral nipple sparing mastectomy with right axillary node dissection, bilateral direct-to-implant breast reconstruction with structured saline implant (right = 410 cc; left = 390 cc) with mesh, performed on 8/14/2024    She presents for skin check. She is 6 months post radiation. She completed radiation therapy on 10/28/24. She notes improvement in her skin with moisturizers. She has complaints of continued cording and soreness of the left arm, pec, and flank.     Objective    Vitals:    04/07/25 0937   BP: 118/85   Pulse: 76     Gen: interactive and pleasant  Head: NCAT  Eyes: EOMI, PERRLA  Mouth: MMM  Throat: trachea midline  Cor: RRR  Pulm: nonlabored breathing  Abd: s/nt/nd  Neuro: AAOx3  Ext: extremities perfused    There is no height or weight on file to calculate BMI.    Focused exam of the: Bilateral breast    Right breast vertical incision is well healed without evidence of dehiscence. There is mild radiation induced skin changes to the right breast with hyperpigmentation. There is mild TTP at the superior lateral capsule in the pre-axillary region. No evidence of capsular contracture. There is continued mild TTP along the boarder of the latissimus dorsi. There is superior and medial hollowing noted.     Left breast vertical incision is well healed without evidence of dehiscence. There is no overlying erythema or concerns for infection. There is superior medial hollowing noted.         Assessment/Plan       Macy is a 39 y.o. female  presents for postoperative visit status post bilateral nipple sparing mastectomy with right axillary node dissection, bilateral direct-to-implant breast reconstruction with structured saline implant (right = 410 cc; left = 390 cc) with mesh, performed on  8/14/2024    She presents for follow up visit and skin check.  She completed radiation therapy on 10/28/24. She is 6 months post radiation. She is scheduled for revision surgery with Dr. Mendoza in November. She is to continue conservative management of cording with PT/OT. She is to continue with skin mositurizer to the radiated breast. We will see her at pre-operative visit.     Plan:   Follow up with Dr. Mendoza for pre-op visit    I spent 20 minutes with this patient. Greater than 50% of this time was spent in the counselling and/or coordination of care of this patient.  This note was created using voice recognition software and was not corrected for typographical or grammatical errors.    Signature: Yvette Gan PA-C   Date: 4/7/2025

## 2025-04-16 DIAGNOSIS — C50.811 MALIGNANT NEOPLASM OF OVERLAPPING SITES OF RIGHT BREAST IN FEMALE, ESTROGEN RECEPTOR POSITIVE: Primary | ICD-10-CM

## 2025-04-16 DIAGNOSIS — Z17.0 MALIGNANT NEOPLASM OF OVERLAPPING SITES OF RIGHT BREAST IN FEMALE, ESTROGEN RECEPTOR POSITIVE: Primary | ICD-10-CM

## 2025-04-16 RX ORDER — PROCHLORPERAZINE MALEATE 10 MG
10 TABLET ORAL EVERY 6 HOURS PRN
Qty: 30 TABLET | Refills: 2 | Status: SHIPPED | OUTPATIENT
Start: 2025-04-16

## 2025-04-22 ENCOUNTER — LAB (OUTPATIENT)
Dept: LAB | Facility: CLINIC | Age: 40
End: 2025-04-22
Payer: COMMERCIAL

## 2025-04-22 ENCOUNTER — INFUSION (OUTPATIENT)
Dept: HEMATOLOGY/ONCOLOGY | Facility: CLINIC | Age: 40
End: 2025-04-22
Payer: COMMERCIAL

## 2025-04-22 VITALS
WEIGHT: 168.98 LBS | OXYGEN SATURATION: 98 % | SYSTOLIC BLOOD PRESSURE: 118 MMHG | TEMPERATURE: 98.1 F | HEIGHT: 69 IN | HEART RATE: 85 BPM | RESPIRATION RATE: 17 BRPM | BODY MASS INDEX: 25.03 KG/M2 | DIASTOLIC BLOOD PRESSURE: 84 MMHG

## 2025-04-22 DIAGNOSIS — C50.919 MALIGNANT NEOPLASM OF BREAST IN FEMALE, ESTROGEN RECEPTOR POSITIVE, UNSPECIFIED LATERALITY, UNSPECIFIED SITE OF BREAST: ICD-10-CM

## 2025-04-22 DIAGNOSIS — Z17.0 MALIGNANT NEOPLASM OF BREAST IN FEMALE, ESTROGEN RECEPTOR POSITIVE, UNSPECIFIED LATERALITY, UNSPECIFIED SITE OF BREAST: ICD-10-CM

## 2025-04-22 DIAGNOSIS — C50.919 MALIGNANT NEOPLASM OF BREAST IN FEMALE, ESTROGEN RECEPTOR POSITIVE, UNSPECIFIED LATERALITY, UNSPECIFIED SITE OF BREAST: Primary | ICD-10-CM

## 2025-04-22 DIAGNOSIS — C50.811 MALIGNANT NEOPLASM OF OVERLAPPING SITES OF RIGHT BREAST IN FEMALE, ESTROGEN RECEPTOR POSITIVE: ICD-10-CM

## 2025-04-22 DIAGNOSIS — Z17.0 MALIGNANT NEOPLASM OF BREAST IN FEMALE, ESTROGEN RECEPTOR POSITIVE, UNSPECIFIED LATERALITY, UNSPECIFIED SITE OF BREAST: Primary | ICD-10-CM

## 2025-04-22 DIAGNOSIS — Z17.0 MALIGNANT NEOPLASM OF OVERLAPPING SITES OF RIGHT BREAST IN FEMALE, ESTROGEN RECEPTOR POSITIVE: ICD-10-CM

## 2025-04-22 LAB
ALBUMIN SERPL BCP-MCNC: 4.4 G/DL (ref 3.4–5)
ALP SERPL-CCNC: 67 U/L (ref 33–110)
ALT SERPL W P-5'-P-CCNC: 12 U/L (ref 7–45)
ANION GAP SERPL CALC-SCNC: 11 MMOL/L (ref 10–20)
AST SERPL W P-5'-P-CCNC: 12 U/L (ref 9–39)
BASOPHILS # BLD AUTO: 0.03 X10*3/UL (ref 0–0.1)
BASOPHILS NFR BLD AUTO: 0.7 %
BILIRUB SERPL-MCNC: 0.6 MG/DL (ref 0–1.2)
BUN SERPL-MCNC: 14 MG/DL (ref 6–23)
CALCIUM SERPL-MCNC: 9.8 MG/DL (ref 8.6–10.3)
CHLORIDE SERPL-SCNC: 104 MMOL/L (ref 98–107)
CO2 SERPL-SCNC: 27 MMOL/L (ref 21–32)
CREAT SERPL-MCNC: 1.03 MG/DL (ref 0.5–1.05)
EGFRCR SERPLBLD CKD-EPI 2021: 70 ML/MIN/1.73M*2
EOSINOPHIL # BLD AUTO: 0.06 X10*3/UL (ref 0–0.7)
EOSINOPHIL NFR BLD AUTO: 1.3 %
ERYTHROCYTE [DISTWIDTH] IN BLOOD BY AUTOMATED COUNT: 14.4 % (ref 11.5–14.5)
GLUCOSE SERPL-MCNC: 98 MG/DL (ref 74–99)
HCT VFR BLD AUTO: 37.7 % (ref 36–46)
HGB BLD-MCNC: 12.6 G/DL (ref 12–16)
IMM GRANULOCYTES # BLD AUTO: 0.01 X10*3/UL (ref 0–0.7)
IMM GRANULOCYTES NFR BLD AUTO: 0.2 % (ref 0–0.9)
LYMPHOCYTES # BLD AUTO: 0.88 X10*3/UL (ref 1.2–4.8)
LYMPHOCYTES NFR BLD AUTO: 19.5 %
MCH RBC QN AUTO: 32.3 PG (ref 26–34)
MCHC RBC AUTO-ENTMCNC: 33.4 G/DL (ref 32–36)
MCV RBC AUTO: 97 FL (ref 80–100)
MONOCYTES # BLD AUTO: 0.24 X10*3/UL (ref 0.1–1)
MONOCYTES NFR BLD AUTO: 5.3 %
NEUTROPHILS # BLD AUTO: 3.29 X10*3/UL (ref 1.2–7.7)
NEUTROPHILS NFR BLD AUTO: 73 %
NRBC BLD-RTO: ABNORMAL /100{WBCS}
PLATELET # BLD AUTO: 195 X10*3/UL (ref 150–450)
POTASSIUM SERPL-SCNC: 3.9 MMOL/L (ref 3.5–5.3)
PROT SERPL-MCNC: 6.7 G/DL (ref 6.4–8.2)
RBC # BLD AUTO: 3.9 X10*6/UL (ref 4–5.2)
SODIUM SERPL-SCNC: 138 MMOL/L (ref 136–145)
WBC # BLD AUTO: 4.5 X10*3/UL (ref 4.4–11.3)

## 2025-04-22 PROCEDURE — 36415 COLL VENOUS BLD VENIPUNCTURE: CPT

## 2025-04-22 PROCEDURE — 96372 THER/PROPH/DIAG INJ SC/IM: CPT

## 2025-04-22 PROCEDURE — 2500000004 HC RX 250 GENERAL PHARMACY W/ HCPCS (ALT 636 FOR OP/ED): Mod: JZ | Performed by: STUDENT IN AN ORGANIZED HEALTH CARE EDUCATION/TRAINING PROGRAM

## 2025-04-22 PROCEDURE — 96402 CHEMO HORMON ANTINEOPL SQ/IM: CPT

## 2025-04-22 PROCEDURE — 85025 COMPLETE CBC W/AUTO DIFF WBC: CPT

## 2025-04-22 PROCEDURE — 2500000004 HC RX 250 GENERAL PHARMACY W/ HCPCS (ALT 636 FOR OP/ED): Mod: JZ,TB | Performed by: STUDENT IN AN ORGANIZED HEALTH CARE EDUCATION/TRAINING PROGRAM

## 2025-04-22 PROCEDURE — 84075 ASSAY ALKALINE PHOSPHATASE: CPT

## 2025-04-22 RX ORDER — EPINEPHRINE 0.3 MG/.3ML
0.3 INJECTION SUBCUTANEOUS EVERY 5 MIN PRN
OUTPATIENT
Start: 2025-05-20

## 2025-04-22 RX ORDER — ALBUTEROL SULFATE 0.83 MG/ML
3 SOLUTION RESPIRATORY (INHALATION) AS NEEDED
OUTPATIENT
Start: 2025-05-20

## 2025-04-22 RX ORDER — LIDOCAINE HYDROCHLORIDE 10 MG/ML
2 INJECTION, SOLUTION EPIDURAL; INFILTRATION; INTRACAUDAL; PERINEURAL ONCE
OUTPATIENT
Start: 2025-05-20

## 2025-04-22 RX ORDER — LIDOCAINE HYDROCHLORIDE 10 MG/ML
2 INJECTION, SOLUTION EPIDURAL; INFILTRATION; INTRACAUDAL; PERINEURAL ONCE
Status: COMPLETED | OUTPATIENT
Start: 2025-04-22 | End: 2025-04-22

## 2025-04-22 RX ORDER — FAMOTIDINE 10 MG/ML
20 INJECTION, SOLUTION INTRAVENOUS ONCE AS NEEDED
OUTPATIENT
Start: 2025-05-20

## 2025-04-22 RX ORDER — GLUCOSAM/CHONDRO/HERB 149/HYAL 750-100 MG
2 TABLET ORAL DAILY
COMMUNITY

## 2025-04-22 RX ORDER — CHOLECALCIFEROL (VITAMIN D3) 50 MCG
50 TABLET ORAL DAILY
COMMUNITY

## 2025-04-22 RX ORDER — DIPHENHYDRAMINE HYDROCHLORIDE 50 MG/ML
50 INJECTION, SOLUTION INTRAMUSCULAR; INTRAVENOUS AS NEEDED
OUTPATIENT
Start: 2025-05-20

## 2025-04-22 RX ADMIN — LIDOCAINE HYDROCHLORIDE 20 MG: 10 INJECTION, SOLUTION EPIDURAL; INFILTRATION; INTRACAUDAL; PERINEURAL at 12:05

## 2025-04-22 RX ADMIN — GOSERELIN ACETATE 3.6 MG: 3.6 IMPLANT SUBCUTANEOUS at 12:06

## 2025-04-22 ASSESSMENT — PAIN SCALES - GENERAL: PAINLEVEL_OUTOF10: 0-NO PAIN

## 2025-05-02 ENCOUNTER — TELEPHONE (OUTPATIENT)
Dept: HEMATOLOGY/ONCOLOGY | Facility: HOSPITAL | Age: 40
End: 2025-05-02
Payer: COMMERCIAL

## 2025-05-02 NOTE — TELEPHONE ENCOUNTER
Reason for Conversation  No chief complaint on file.  Background     Call to let her know that signatera is ordered and patient can go to [facility] and have labs drawn  Disposition   No disposition on file.  SABIHA left

## 2025-05-02 NOTE — TELEPHONE ENCOUNTER
Pt left a voicemail on the RN triage line because she believes she had signatera drawn last week.  She recalls the person who melani her blood obtaining a kit for the specimen.

## 2025-05-09 ENCOUNTER — APPOINTMENT (OUTPATIENT)
Dept: LAB | Facility: HOSPITAL | Age: 40
End: 2025-05-09
Payer: COMMERCIAL

## 2025-05-09 ENCOUNTER — LAB (OUTPATIENT)
Dept: LAB | Facility: CLINIC | Age: 40
End: 2025-05-09
Payer: COMMERCIAL

## 2025-05-09 DIAGNOSIS — C77.3 SECONDARY AND UNSPECIFIED MALIGNANT NEOPLASM OF AXILLA AND UPPER LIMB LYMPH NODES: Primary | ICD-10-CM

## 2025-05-09 DIAGNOSIS — C50.811 MALIGNANT NEOPLASM OF OVERLAPPING SITES OF RIGHT BREAST IN FEMALE, ESTROGEN RECEPTOR POSITIVE: ICD-10-CM

## 2025-05-09 DIAGNOSIS — C77.3 SECONDARY AND UNSPECIFIED MALIGNANT NEOPLASM OF AXILLA AND UPPER LIMB LYMPH NODES: ICD-10-CM

## 2025-05-09 DIAGNOSIS — Z17.0 MALIGNANT NEOPLASM OF OVERLAPPING SITES OF RIGHT BREAST IN FEMALE, ESTROGEN RECEPTOR POSITIVE: ICD-10-CM

## 2025-05-09 PROCEDURE — 36415 COLL VENOUS BLD VENIPUNCTURE: CPT

## 2025-05-09 RX ORDER — PROCHLORPERAZINE MALEATE 10 MG
10 TABLET ORAL EVERY 6 HOURS PRN
Qty: 30 TABLET | Refills: 2 | Status: SHIPPED | OUTPATIENT
Start: 2025-05-09

## 2025-05-14 ENCOUNTER — APPOINTMENT (OUTPATIENT)
Dept: INTEGRATIVE MEDICINE | Facility: CLINIC | Age: 40
End: 2025-05-14
Payer: COMMERCIAL

## 2025-05-14 DIAGNOSIS — C50.811 MALIGNANT NEOPLASM OF OVERLAPPING SITES OF RIGHT BREAST IN FEMALE, ESTROGEN RECEPTOR POSITIVE: ICD-10-CM

## 2025-05-14 DIAGNOSIS — M79.601 PAIN OF RIGHT UPPER EXTREMITY: ICD-10-CM

## 2025-05-14 DIAGNOSIS — Z17.0 MALIGNANT NEOPLASM OF OVERLAPPING SITES OF RIGHT BREAST IN FEMALE, ESTROGEN RECEPTOR POSITIVE: ICD-10-CM

## 2025-05-14 DIAGNOSIS — R53.83 FATIGUE, UNSPECIFIED TYPE: ICD-10-CM

## 2025-05-14 DIAGNOSIS — Z00.00 HEALTHCARE MAINTENANCE: ICD-10-CM

## 2025-05-14 DIAGNOSIS — E88.819 INSULIN RESISTANCE: Primary | ICD-10-CM

## 2025-05-14 DIAGNOSIS — E78.00 ELEVATED LDL CHOLESTEROL LEVEL: ICD-10-CM

## 2025-05-14 DIAGNOSIS — F41.9 ANXIETY: ICD-10-CM

## 2025-05-14 LAB
COMMENTS - MP RESULT TYPE: NORMAL
SCAN RESULT: NORMAL

## 2025-05-14 PROCEDURE — 99214 OFFICE O/P EST MOD 30 MIN: CPT | Performed by: HOSPITALIST

## 2025-05-14 RX ORDER — VALACYCLOVIR HYDROCHLORIDE 500 MG/1
500 TABLET, FILM COATED ORAL DAILY
Qty: 30 TABLET | Refills: 2 | Status: SHIPPED | OUTPATIENT
Start: 2025-05-14

## 2025-05-14 NOTE — PROGRESS NOTES
Patient ID: Macy Chavez is a 39 y.o. female.  Referring Physician: No referring provider defined for this encounter.  Primary Care Provider: ATA Valiente-CNP    CANCER HISTORY:   Denise Golden MD   38 yo woman with h/o 2/6/2024: Stage IIIA (cT3, cN2, cM0, G3, ER+, AL+, HER2-)   7/24 ddAC f/b T     08/14/2024: Dr Patel performed a bilateral skin sparing mastectomy with right sided SLNB and completion dissection with path showing an invasive ductal carcinoma  G2 measuring 25 mm, margins negative. She 5 total lymph nodes removed with 1 involved with macromets and 1 involved with micromets; final stage [id number] ER: 95%, , AL: 5-10%, HER2 negative (1+)     10/28/2024: completed adjuvant radiation therapy     Genetics negative     Ovarian suppression/hormonal therapy/verzenio (2 yrs) - currently on verzenio/tamoxifen  Adj bisphosphonate   Dose reduced verzenio - less diarrhea     INTEGRATIVE HISTORY:  Symptoms:  Fatigue - improved now  LBP - travelled to [place] - air travel causes back and arm pain  R capsule pain of breast - taking singulaire to help     Diet: Try to cook healthy at home  Limited fast food, pizza  Sweets  Processed foods  Gained 15 lbs, was kind of sudden     PA: Exercising regularly, walks    Sleep: doing well     Stress: Managing stress - exercising helps, seeing friends and social support  Spending time with   Massages/facials  11, 9, 7 yo kids  Building back stress - doing normal things again     Natural Products:    Turmeric Supreme (roger)  Host Defense STAMETS 7  Vitamin D3 0471-8209 IU/day  Omega 3 supplementation (nordic naturals)    ROS:  no ha, visual symptoms, hearing loss  no sob, chest pain, palp  ROS o/w non contributory, please see HPI    Objective    BSA: There is no height or weight on file to calculate BSA.  There were no vitals taken for this visit.    PHYSICAL EXAM:  An interactive audio and video telecommunication system which permits real time communications  "between the patient (at the originating site) and provider (at the distant site) was utilized to provide this telehealth service.    Verbal consent was requested and obtained on this date for a telehealth visit.       RESULTS:  Lab Results   Component Value Date    WBC 4.5 04/22/2025    HGB 12.6 04/22/2025    HCT 37.7 04/22/2025     04/22/2025    CREATININE 1.03 04/22/2025    AST 12 04/22/2025    CEA 2.1 03/25/2025     Assessment/Plan   Cancer Staging   Malignant neoplasm of overlapping sites of right breast in female, estrogen receptor positive  Staging form: Breast, AJCC 8th Edition  - Clinical stage from 2/6/2024: Stage IIIA (cT3, cN2, cM0, G3, ER+, AR+, HER2-) - Signed by Denise Golden MD on 2/21/2024    40 yo woman with h/o 2/6/2024: Stage IIIA (cT3, cN2, cM0, G3, ER+, AR+, HER2-)   7/24 ddAC f/b T     08/14/2024: Dr Patel performed a bilateral skin sparing mastectomy with right sided SLNB and completion dissection with path showing an invasive ductal carcinoma  G2 measuring 25 mm, margins negative. She 5 total lymph nodes removed with 1 involved with macromets and 1 involved with micromets; final stage [id number] ER: 95%, , AR: 5-10%, HER2 negative (1+)     10/28/2024: completed adjuvant radiation therapy     Ovarian suppression/Cortes/verzenio (2 yrs)   Adj bisphosphonate     SYMPTOMS Experienced:  Fatigue - improved  LBP - upper back pain, worse with travel     Recommendations:  LIFESTYLE:  Whole Foods high fiber plant based diet   5-9 fruits/veg/day, Cruciferous Vegetables - Brussel Sprouts, Kale, Broccoli, Cauliflower  [employer] \"New American Plate\"  Organic dairy preferred  Limit sugar              Limit alcohol   Moderate soy is ok (edamame/tofu) once/day  Fiber including flax/tone seeds beneficial  Consider intermittent fasting 3-5 days per week (not eating for 14 hrs straight)              Consider fasting - water for a day or skipping a meal once/week              " Consider PROLON fasting mimicking diet  Exercise 30 min/day 5 days a week, vary by balance, cardio and resistance training              Yoga - OT in [place]   Cancer Wellness Program - 8 weeks virtual/billed insurance - done  Would consider goal of losing 1-2 lbs/month to goal of 155 lbs baseline  VIOME - full body intelligence     Integrative Therapies:  Acupuncture ($42/session group acupuncture rate) - went once and did not want to resume  Massage (covered by insurance) - utilizing with [facility]  REIKI (Free)     Natural Products to Consider:  Turmeric Supreme (roger)  Host Defense STAMETS 7  Vitamin D3 7425-5180 IU/day  Omega 3 supplementation (nordic naturals)     For Further Information:  Reading: Anticancer Living, Cancer Fighting Kitchen  Websites: Cancerchotesthub.org, Anticancer Lifestyle Program  Mindi: ONCIO  Podcast: Integrative Oncology Talk     Support: [facility] (exercise programs, dietitian, support groups, financial support and services)  [employer] - passport for integrative services including vegetables/virtual wellness     Follow up: Integrative Oncology prn     Thank you for consulting Integrative Oncology  I spent 30 minutes in direct patient care during this clinical encounter with the patient    Thank you for consulting me in for this patient  Boris Giraldo MD

## 2025-05-20 ENCOUNTER — INFUSION (OUTPATIENT)
Dept: HEMATOLOGY/ONCOLOGY | Facility: CLINIC | Age: 40
End: 2025-05-20
Payer: COMMERCIAL

## 2025-05-20 ENCOUNTER — LAB (OUTPATIENT)
Dept: LAB | Facility: CLINIC | Age: 40
End: 2025-05-20
Payer: COMMERCIAL

## 2025-05-20 VITALS
RESPIRATION RATE: 18 BRPM | BODY MASS INDEX: 25.2 KG/M2 | WEIGHT: 171.52 LBS | DIASTOLIC BLOOD PRESSURE: 65 MMHG | TEMPERATURE: 97.9 F | HEART RATE: 119 BPM | SYSTOLIC BLOOD PRESSURE: 116 MMHG | OXYGEN SATURATION: 98 %

## 2025-05-20 DIAGNOSIS — C50.811 MALIGNANT NEOPLASM OF OVERLAPPING SITES OF RIGHT BREAST IN FEMALE, ESTROGEN RECEPTOR POSITIVE: ICD-10-CM

## 2025-05-20 DIAGNOSIS — C50.919 MALIGNANT NEOPLASM OF BREAST IN FEMALE, ESTROGEN RECEPTOR POSITIVE, UNSPECIFIED LATERALITY, UNSPECIFIED SITE OF BREAST: ICD-10-CM

## 2025-05-20 DIAGNOSIS — Z17.0 MALIGNANT NEOPLASM OF BREAST IN FEMALE, ESTROGEN RECEPTOR POSITIVE, UNSPECIFIED LATERALITY, UNSPECIFIED SITE OF BREAST: ICD-10-CM

## 2025-05-20 DIAGNOSIS — Z17.0 MALIGNANT NEOPLASM OF OVERLAPPING SITES OF RIGHT BREAST IN FEMALE, ESTROGEN RECEPTOR POSITIVE: ICD-10-CM

## 2025-05-20 LAB
ALBUMIN SERPL BCP-MCNC: 4.5 G/DL (ref 3.4–5)
ALP SERPL-CCNC: 66 U/L (ref 33–110)
ALT SERPL W P-5'-P-CCNC: 12 U/L (ref 7–45)
ANION GAP SERPL CALC-SCNC: 9 MMOL/L (ref 10–20)
AST SERPL W P-5'-P-CCNC: 14 U/L (ref 9–39)
BASOPHILS # BLD AUTO: 0.05 X10*3/UL (ref 0–0.1)
BASOPHILS NFR BLD AUTO: 0.8 %
BILIRUB SERPL-MCNC: 0.5 MG/DL (ref 0–1.2)
BUN SERPL-MCNC: 15 MG/DL (ref 6–23)
CALCIUM SERPL-MCNC: 9.7 MG/DL (ref 8.6–10.3)
CHLORIDE SERPL-SCNC: 104 MMOL/L (ref 98–107)
CO2 SERPL-SCNC: 29 MMOL/L (ref 21–32)
CREAT SERPL-MCNC: 1.09 MG/DL (ref 0.5–1.05)
EGFRCR SERPLBLD CKD-EPI 2021: 66 ML/MIN/1.73M*2
EOSINOPHIL # BLD AUTO: 0.05 X10*3/UL (ref 0–0.7)
EOSINOPHIL NFR BLD AUTO: 0.8 %
ERYTHROCYTE [DISTWIDTH] IN BLOOD BY AUTOMATED COUNT: 13.8 % (ref 11.5–14.5)
GLUCOSE SERPL-MCNC: 91 MG/DL (ref 74–99)
HCT VFR BLD AUTO: 39.2 % (ref 36–46)
HGB BLD-MCNC: 13 G/DL (ref 12–16)
IMM GRANULOCYTES # BLD AUTO: 0.01 X10*3/UL (ref 0–0.7)
IMM GRANULOCYTES NFR BLD AUTO: 0.2 % (ref 0–0.9)
LYMPHOCYTES # BLD AUTO: 0.7 X10*3/UL (ref 1.2–4.8)
LYMPHOCYTES NFR BLD AUTO: 11.1 %
MCH RBC QN AUTO: 32.9 PG (ref 26–34)
MCHC RBC AUTO-ENTMCNC: 33.2 G/DL (ref 32–36)
MCV RBC AUTO: 99 FL (ref 80–100)
MONOCYTES # BLD AUTO: 0.26 X10*3/UL (ref 0.1–1)
MONOCYTES NFR BLD AUTO: 4.1 %
NEUTROPHILS # BLD AUTO: 5.21 X10*3/UL (ref 1.2–7.7)
NEUTROPHILS NFR BLD AUTO: 83 %
NRBC BLD-RTO: ABNORMAL /100{WBCS}
PLATELET # BLD AUTO: 189 X10*3/UL (ref 150–450)
POTASSIUM SERPL-SCNC: 4.3 MMOL/L (ref 3.5–5.3)
PROT SERPL-MCNC: 7.2 G/DL (ref 6.4–8.2)
RBC # BLD AUTO: 3.95 X10*6/UL (ref 4–5.2)
SODIUM SERPL-SCNC: 138 MMOL/L (ref 136–145)
WBC # BLD AUTO: 6.3 X10*3/UL (ref 4.4–11.3)

## 2025-05-20 PROCEDURE — 82565 ASSAY OF CREATININE: CPT

## 2025-05-20 PROCEDURE — 96372 THER/PROPH/DIAG INJ SC/IM: CPT

## 2025-05-20 PROCEDURE — 2500000004 HC RX 250 GENERAL PHARMACY W/ HCPCS (ALT 636 FOR OP/ED): Mod: JZ,TB | Performed by: STUDENT IN AN ORGANIZED HEALTH CARE EDUCATION/TRAINING PROGRAM

## 2025-05-20 PROCEDURE — 96402 CHEMO HORMON ANTINEOPL SQ/IM: CPT

## 2025-05-20 PROCEDURE — 36415 COLL VENOUS BLD VENIPUNCTURE: CPT

## 2025-05-20 PROCEDURE — 2500000004 HC RX 250 GENERAL PHARMACY W/ HCPCS (ALT 636 FOR OP/ED): Mod: JZ | Performed by: STUDENT IN AN ORGANIZED HEALTH CARE EDUCATION/TRAINING PROGRAM

## 2025-05-20 PROCEDURE — 85025 COMPLETE CBC W/AUTO DIFF WBC: CPT

## 2025-05-20 RX ORDER — FAMOTIDINE 10 MG/ML
20 INJECTION, SOLUTION INTRAVENOUS ONCE AS NEEDED
OUTPATIENT
Start: 2025-06-17

## 2025-05-20 RX ORDER — LIDOCAINE HYDROCHLORIDE 10 MG/ML
2 INJECTION, SOLUTION EPIDURAL; INFILTRATION; INTRACAUDAL; PERINEURAL ONCE
OUTPATIENT
Start: 2025-06-17

## 2025-05-20 RX ORDER — DIPHENHYDRAMINE HYDROCHLORIDE 50 MG/ML
50 INJECTION, SOLUTION INTRAMUSCULAR; INTRAVENOUS AS NEEDED
OUTPATIENT
Start: 2025-06-17

## 2025-05-20 RX ORDER — ALBUTEROL SULFATE 0.83 MG/ML
3 SOLUTION RESPIRATORY (INHALATION) AS NEEDED
OUTPATIENT
Start: 2025-06-17

## 2025-05-20 RX ORDER — LIDOCAINE HYDROCHLORIDE 10 MG/ML
2 INJECTION, SOLUTION EPIDURAL; INFILTRATION; INTRACAUDAL; PERINEURAL ONCE
Status: COMPLETED | OUTPATIENT
Start: 2025-05-20 | End: 2025-05-20

## 2025-05-20 RX ORDER — EPINEPHRINE 0.3 MG/.3ML
0.3 INJECTION SUBCUTANEOUS EVERY 5 MIN PRN
OUTPATIENT
Start: 2025-06-17

## 2025-05-20 RX ADMIN — GOSERELIN ACETATE 3.6 MG: 3.6 IMPLANT SUBCUTANEOUS at 11:15

## 2025-05-20 RX ADMIN — LIDOCAINE HYDROCHLORIDE 20 MG: 10 INJECTION, SOLUTION EPIDURAL; INFILTRATION; INTRACAUDAL; PERINEURAL at 11:15

## 2025-05-20 ASSESSMENT — PAIN SCALES - GENERAL: PAINLEVEL_OUTOF10: 0-NO PAIN

## 2025-05-29 ENCOUNTER — APPOINTMENT (OUTPATIENT)
Dept: OBSTETRICS AND GYNECOLOGY | Facility: CLINIC | Age: 40
End: 2025-05-29
Payer: COMMERCIAL

## 2025-05-30 LAB
COMMENTS - MP RESULT TYPE: NORMAL
SCAN RESULT: NORMAL

## 2025-06-02 ENCOUNTER — APPOINTMENT (OUTPATIENT)
Dept: OBSTETRICS AND GYNECOLOGY | Facility: CLINIC | Age: 40
End: 2025-06-02
Payer: COMMERCIAL

## 2025-06-02 VITALS
HEIGHT: 69 IN | DIASTOLIC BLOOD PRESSURE: 62 MMHG | SYSTOLIC BLOOD PRESSURE: 118 MMHG | BODY MASS INDEX: 25.33 KG/M2 | WEIGHT: 171 LBS

## 2025-06-02 DIAGNOSIS — F52.0 HYPOACTIVE SEXUAL DESIRE DISORDER: ICD-10-CM

## 2025-06-02 DIAGNOSIS — Z97.5 IUD (INTRAUTERINE DEVICE) IN PLACE: Primary | ICD-10-CM

## 2025-06-02 PROCEDURE — 99214 OFFICE O/P EST MOD 30 MIN: CPT | Performed by: STUDENT IN AN ORGANIZED HEALTH CARE EDUCATION/TRAINING PROGRAM

## 2025-06-02 PROCEDURE — 1036F TOBACCO NON-USER: CPT | Performed by: STUDENT IN AN ORGANIZED HEALTH CARE EDUCATION/TRAINING PROGRAM

## 2025-06-02 PROCEDURE — 3008F BODY MASS INDEX DOCD: CPT | Performed by: STUDENT IN AN ORGANIZED HEALTH CARE EDUCATION/TRAINING PROGRAM

## 2025-06-02 PROCEDURE — 99459 PELVIC EXAMINATION: CPT | Performed by: STUDENT IN AN ORGANIZED HEALTH CARE EDUCATION/TRAINING PROGRAM

## 2025-06-02 RX ORDER — BUPROPION HYDROCHLORIDE 150 MG/1
TABLET ORAL
Qty: 180 TABLET | Refills: 3 | Status: CANCELLED | OUTPATIENT
Start: 2025-06-02 | End: 2026-06-09

## 2025-06-02 ASSESSMENT — PAIN SCALES - GENERAL: PAINLEVEL_OUTOF10: 0-NO PAIN

## 2025-06-02 NOTE — PROGRESS NOTES
Subjective   Macy is a 39 y.o.  female presenting for IUD Check.    Doing well with IUD. No bleeding or other concerns. Cannot feel strings.    Concerned about decreased libido- about 2 months now. Started estrogen blocking meds in January, noticed in March. Feels like a switch was flipped.    Labs reviewed - 2025 CMP, CBC wnl. 2025 FSH/LH wnl, E2 undetectable.    History of Stage IIIA ER+ right breast cancer, s/p chemoradiation. Currently on tamoxifen.       Objective   Vitals:    25 1144   BP: 118/62     Physical Exam  Vitals reviewed. Exam conducted with a chaperone present.   Constitutional:       Appearance: Normal appearance.   HENT:      Head: Normocephalic.   Cardiovascular:      Rate and Rhythm: Normal rate and regular rhythm.   Pulmonary:      Effort: Pulmonary effort is normal. No respiratory distress.   Abdominal:      General: There is no distension.      Palpations: Abdomen is soft. There is no mass.      Tenderness: There is no abdominal tenderness. There is no guarding or rebound.   Genitourinary:     Comments: Normal appearing external female genitalia. Vulva without lesions. Vaginal mucosa normal appearing with physiologic discharge and no lesions. Cervix normal appearing without lesions. IUD strings at os 3cm long    Skin:     General: Skin is warm and dry.   Neurological:      General: No focal deficit present.      Mental Status: She is alert.   Psychiatric:         Mood and Affect: Mood normal.         Behavior: Behavior normal.         Thought Content: Thought content normal.         Judgment: Judgment normal.           Assessment/Plan   Macy is a 39 y.o.  female presenting for management of the following:    Problem List Items Addressed This Visit       IUD (intrauterine device) in place - Primary    Current Assessment & Plan   Strings visualized, no concerns         Hypoactive sexual desire disorder    Current Assessment & Plan   Reviewed the role of estrogen in  libido  Discussed management options- limited due to estrogen blockade in the setting of Stage IIIA ER+ breast cancer  Wellbutrin with possible interaction with tamoxifen- recommend against at this time  Can consider flibanserin- reviewed limited data with success. Discussed risks, benefits. Wishes to try.  Sent to [facility] pharmacy to increase likelihood of approval- notified that insurance coverage can be difficult  Reviewed common side effects of medication and to avoid taking with alcohol. Recommend dosing at bedtime.         Relevant Medications    flibanserin 100 mg tablet        Deep Mejia MD  , Department of Obstetrics and Gynecology  [facility]

## 2025-06-02 NOTE — ASSESSMENT & PLAN NOTE
Reviewed the role of estrogen in libido  Discussed management options- limited due to estrogen blockade in the setting of Stage IIIA ER+ breast cancer  Wellbutrin with possible interaction with tamoxifen- recommend against at this time  Can consider flibanserin- reviewed limited data with success. Discussed risks, benefits. Wishes to try.  Sent to [facility] pharmacy to increase likelihood of approval- notified that insurance coverage can be difficult  Reviewed common side effects of medication and to avoid taking with alcohol. Recommend dosing at bedtime.

## 2025-06-03 ENCOUNTER — TELEPHONE (OUTPATIENT)
Dept: HEMATOLOGY/ONCOLOGY | Facility: CLINIC | Age: 40
End: 2025-06-03
Payer: COMMERCIAL

## 2025-06-03 DIAGNOSIS — Z17.0 MALIGNANT NEOPLASM OF OVERLAPPING SITES OF RIGHT BREAST IN FEMALE, ESTROGEN RECEPTOR POSITIVE: Primary | ICD-10-CM

## 2025-06-03 DIAGNOSIS — C50.811 MALIGNANT NEOPLASM OF OVERLAPPING SITES OF RIGHT BREAST IN FEMALE, ESTROGEN RECEPTOR POSITIVE: Primary | ICD-10-CM

## 2025-06-03 NOTE — TELEPHONE ENCOUNTER
Called pt to discuss positive Signatura results. Although NCCN guidelines are not entirely defined recommendation is to get staging imaging to assess for any gross disease. CT CAP and Bone scan ordered. If negative, would recheck signatura on 3 month intervals for ~ 2 years. She is in agreement with plan. No further questions or concerns at this time

## 2025-06-06 LAB — SCAN RESULT: NORMAL

## 2025-06-09 ENCOUNTER — PATIENT MESSAGE (OUTPATIENT)
Dept: HEMATOLOGY/ONCOLOGY | Facility: CLINIC | Age: 40
End: 2025-06-09
Payer: COMMERCIAL

## 2025-06-10 DIAGNOSIS — C50.811 MALIGNANT NEOPLASM OF OVERLAPPING SITES OF RIGHT BREAST IN FEMALE, ESTROGEN RECEPTOR POSITIVE: ICD-10-CM

## 2025-06-10 DIAGNOSIS — Z17.0 MALIGNANT NEOPLASM OF OVERLAPPING SITES OF RIGHT BREAST IN FEMALE, ESTROGEN RECEPTOR POSITIVE: ICD-10-CM

## 2025-06-10 NOTE — TELEPHONE ENCOUNTER
Requested Prescriptions     Signed Prescriptions Disp Refills    abemaciclib (Verzenio) 100 mg tablet 60 tablet 2     Sig: Take 1 tablet (100 mg total) by mouth 2 times a day.  Swallow whole.     Authorizing Provider: SALUD CAMPOS     Ordering User: EAV BELLO

## 2025-06-15 DIAGNOSIS — Z79.899 ENCOUNTER FOR MONITORING CARDIOTOXIC DRUG THERAPY: ICD-10-CM

## 2025-06-15 DIAGNOSIS — R00.0 SINUS TACHYCARDIA: ICD-10-CM

## 2025-06-15 DIAGNOSIS — I42.7 CARDIOTOXICITY (MULTI): ICD-10-CM

## 2025-06-15 DIAGNOSIS — Z51.81 ENCOUNTER FOR MONITORING CARDIOTOXIC DRUG THERAPY: ICD-10-CM

## 2025-06-17 ENCOUNTER — LAB (OUTPATIENT)
Dept: LAB | Facility: CLINIC | Age: 40
End: 2025-06-17
Payer: COMMERCIAL

## 2025-06-17 ENCOUNTER — INFUSION (OUTPATIENT)
Dept: HEMATOLOGY/ONCOLOGY | Facility: CLINIC | Age: 40
End: 2025-06-17
Payer: COMMERCIAL

## 2025-06-17 ENCOUNTER — HOSPITAL ENCOUNTER (OUTPATIENT)
Dept: RADIATION ONCOLOGY | Facility: CLINIC | Age: 40
Setting detail: RADIATION/ONCOLOGY SERIES
Discharge: HOME | End: 2025-06-17
Payer: COMMERCIAL

## 2025-06-17 VITALS
SYSTOLIC BLOOD PRESSURE: 114 MMHG | RESPIRATION RATE: 18 BRPM | BODY MASS INDEX: 25.49 KG/M2 | DIASTOLIC BLOOD PRESSURE: 77 MMHG | HEART RATE: 74 BPM | OXYGEN SATURATION: 99 % | WEIGHT: 172.62 LBS | TEMPERATURE: 96.6 F

## 2025-06-17 DIAGNOSIS — C50.919 MALIGNANT NEOPLASM OF BREAST IN FEMALE, ESTROGEN RECEPTOR POSITIVE, UNSPECIFIED LATERALITY, UNSPECIFIED SITE OF BREAST: ICD-10-CM

## 2025-06-17 DIAGNOSIS — Z17.0 MALIGNANT NEOPLASM OF OVERLAPPING SITES OF RIGHT BREAST IN FEMALE, ESTROGEN RECEPTOR POSITIVE: Primary | ICD-10-CM

## 2025-06-17 DIAGNOSIS — C77.3 SECONDARY AND UNSPECIFIED MALIGNANT NEOPLASM OF AXILLA AND UPPER LIMB LYMPH NODES: ICD-10-CM

## 2025-06-17 DIAGNOSIS — I89.0 LYMPHEDEMA: ICD-10-CM

## 2025-06-17 DIAGNOSIS — Z17.0 MALIGNANT NEOPLASM OF BREAST IN FEMALE, ESTROGEN RECEPTOR POSITIVE, UNSPECIFIED LATERALITY, UNSPECIFIED SITE OF BREAST: ICD-10-CM

## 2025-06-17 DIAGNOSIS — C50.811 MALIGNANT NEOPLASM OF OVERLAPPING SITES OF RIGHT BREAST IN FEMALE, ESTROGEN RECEPTOR POSITIVE: Primary | ICD-10-CM

## 2025-06-17 DIAGNOSIS — C50.811 MALIGNANT NEOPLASM OF OVERLAPPING SITES OF RIGHT BREAST IN FEMALE, ESTROGEN RECEPTOR POSITIVE: ICD-10-CM

## 2025-06-17 DIAGNOSIS — Z17.0 MALIGNANT NEOPLASM OF OVERLAPPING SITES OF RIGHT BREAST IN FEMALE, ESTROGEN RECEPTOR POSITIVE: ICD-10-CM

## 2025-06-17 DIAGNOSIS — Z98.890 S/P BREAST RECONSTRUCTION: ICD-10-CM

## 2025-06-17 LAB
ALBUMIN SERPL BCP-MCNC: 4.3 G/DL (ref 3.4–5)
ALP SERPL-CCNC: 65 U/L (ref 33–110)
ALT SERPL W P-5'-P-CCNC: 18 U/L (ref 7–45)
ANION GAP SERPL CALC-SCNC: 13 MMOL/L (ref 10–20)
AST SERPL W P-5'-P-CCNC: 18 U/L (ref 9–39)
BASOPHILS # BLD AUTO: 0.03 X10*3/UL (ref 0–0.1)
BASOPHILS NFR BLD AUTO: 0.7 %
BILIRUB SERPL-MCNC: 0.5 MG/DL (ref 0–1.2)
BUN SERPL-MCNC: 14 MG/DL (ref 6–23)
CALCIUM SERPL-MCNC: 9.5 MG/DL (ref 8.6–10.3)
CHLORIDE SERPL-SCNC: 104 MMOL/L (ref 98–107)
CO2 SERPL-SCNC: 27 MMOL/L (ref 21–32)
CREAT SERPL-MCNC: 1.04 MG/DL (ref 0.5–1.05)
EGFRCR SERPLBLD CKD-EPI 2021: 69 ML/MIN/1.73M*2
EOSINOPHIL # BLD AUTO: 0.1 X10*3/UL (ref 0–0.7)
EOSINOPHIL NFR BLD AUTO: 2.5 %
ERYTHROCYTE [DISTWIDTH] IN BLOOD BY AUTOMATED COUNT: 12.6 % (ref 11.5–14.5)
GLUCOSE SERPL-MCNC: 104 MG/DL (ref 74–99)
HCT VFR BLD AUTO: 38.6 % (ref 36–46)
HGB BLD-MCNC: 12.5 G/DL (ref 12–16)
IMM GRANULOCYTES # BLD AUTO: 0 X10*3/UL (ref 0–0.7)
IMM GRANULOCYTES NFR BLD AUTO: 0 % (ref 0–0.9)
LYMPHOCYTES # BLD AUTO: 0.88 X10*3/UL (ref 1.2–4.8)
LYMPHOCYTES NFR BLD AUTO: 21.6 %
MCH RBC QN AUTO: 33.3 PG (ref 26–34)
MCHC RBC AUTO-ENTMCNC: 32.4 G/DL (ref 32–36)
MCV RBC AUTO: 103 FL (ref 80–100)
MONOCYTES # BLD AUTO: 0.26 X10*3/UL (ref 0.1–1)
MONOCYTES NFR BLD AUTO: 6.4 %
NEUTROPHILS # BLD AUTO: 2.81 X10*3/UL (ref 1.2–7.7)
NEUTROPHILS NFR BLD AUTO: 68.8 %
NRBC BLD-RTO: ABNORMAL /100{WBCS}
PLATELET # BLD AUTO: 192 X10*3/UL (ref 150–450)
POTASSIUM SERPL-SCNC: 4.5 MMOL/L (ref 3.5–5.3)
PROT SERPL-MCNC: 6.8 G/DL (ref 6.4–8.2)
RBC # BLD AUTO: 3.75 X10*6/UL (ref 4–5.2)
SODIUM SERPL-SCNC: 139 MMOL/L (ref 136–145)
WBC # BLD AUTO: 4.1 X10*3/UL (ref 4.4–11.3)

## 2025-06-17 PROCEDURE — 2500000004 HC RX 250 GENERAL PHARMACY W/ HCPCS (ALT 636 FOR OP/ED): Mod: JZ,TB | Performed by: STUDENT IN AN ORGANIZED HEALTH CARE EDUCATION/TRAINING PROGRAM

## 2025-06-17 PROCEDURE — 36415 COLL VENOUS BLD VENIPUNCTURE: CPT

## 2025-06-17 PROCEDURE — 96402 CHEMO HORMON ANTINEOPL SQ/IM: CPT

## 2025-06-17 PROCEDURE — 85025 COMPLETE CBC W/AUTO DIFF WBC: CPT

## 2025-06-17 PROCEDURE — 99214 OFFICE O/P EST MOD 30 MIN: CPT | Performed by: NURSE PRACTITIONER

## 2025-06-17 PROCEDURE — 80053 COMPREHEN METABOLIC PANEL: CPT

## 2025-06-17 PROCEDURE — 96372 THER/PROPH/DIAG INJ SC/IM: CPT

## 2025-06-17 PROCEDURE — 2500000004 HC RX 250 GENERAL PHARMACY W/ HCPCS (ALT 636 FOR OP/ED): Performed by: STUDENT IN AN ORGANIZED HEALTH CARE EDUCATION/TRAINING PROGRAM

## 2025-06-17 RX ORDER — FAMOTIDINE 10 MG/ML
20 INJECTION, SOLUTION INTRAVENOUS ONCE AS NEEDED
OUTPATIENT
Start: 2025-07-15

## 2025-06-17 RX ORDER — HEPARIN SODIUM,PORCINE/PF 10 UNIT/ML
50 SYRINGE (ML) INTRAVENOUS AS NEEDED
OUTPATIENT
Start: 2025-06-17

## 2025-06-17 RX ORDER — ALBUTEROL SULFATE 0.83 MG/ML
3 SOLUTION RESPIRATORY (INHALATION) AS NEEDED
OUTPATIENT
Start: 2025-07-15

## 2025-06-17 RX ORDER — LIDOCAINE HYDROCHLORIDE 10 MG/ML
2 INJECTION, SOLUTION EPIDURAL; INFILTRATION; INTRACAUDAL; PERINEURAL ONCE
Status: COMPLETED | OUTPATIENT
Start: 2025-06-17 | End: 2025-06-17

## 2025-06-17 RX ORDER — EPINEPHRINE 0.3 MG/.3ML
0.3 INJECTION SUBCUTANEOUS EVERY 5 MIN PRN
OUTPATIENT
Start: 2025-07-15

## 2025-06-17 RX ORDER — LIDOCAINE HYDROCHLORIDE 10 MG/ML
2 INJECTION, SOLUTION EPIDURAL; INFILTRATION; INTRACAUDAL; PERINEURAL ONCE
OUTPATIENT
Start: 2025-07-15

## 2025-06-17 RX ORDER — DIPHENHYDRAMINE HYDROCHLORIDE 50 MG/ML
50 INJECTION, SOLUTION INTRAMUSCULAR; INTRAVENOUS AS NEEDED
OUTPATIENT
Start: 2025-07-15

## 2025-06-17 RX ORDER — HEPARIN 100 UNIT/ML
500 SYRINGE INTRAVENOUS AS NEEDED
OUTPATIENT
Start: 2025-06-17

## 2025-06-17 RX ADMIN — GOSERELIN ACETATE 3.6 MG: 3.6 IMPLANT SUBCUTANEOUS at 10:50

## 2025-06-17 RX ADMIN — LIDOCAINE HYDROCHLORIDE 20 MG: 10 INJECTION, SOLUTION EPIDURAL; INFILTRATION; INTRACAUDAL; PERINEURAL at 10:49

## 2025-06-17 ASSESSMENT — PAIN SCALES - GENERAL: PAINLEVEL_OUTOF10: 0-NO PAIN

## 2025-06-17 ASSESSMENT — PATIENT HEALTH QUESTIONNAIRE - PHQ9
1. LITTLE INTEREST OR PLEASURE IN DOING THINGS: NOT AT ALL
SUM OF ALL RESPONSES TO PHQ9 QUESTIONS 1 AND 2: 0
2. FEELING DOWN, DEPRESSED OR HOPELESS: NOT AT ALL

## 2025-06-17 NOTE — PROGRESS NOTES
Radiation Oncology Follow-Up    Patient Name:  Macy Chavez  MRN:  [id number]  :  1985    Referring Provider: Carmencita Patel DO  Primary Care Provider: ALFONSO Valiente  Care Team: Patient Care Team:  ALFONSO Valiente as PCP - General (Internal Medicine)  Denise Golden MD as Consulting Physician (Hematology and Oncology)  Zainab Larson MD as Cardiologist (Cardiology)  Boris Giraldo MD as Consulting Physician (Hematology and Oncology)  ALFONSO Ordoñez as Nurse Practitioner (Integrative Medicine)    Date of Service: 2025   39 y.o. female with Malignant neoplasm of overlapping sites of right breast in female, estrogen receptor positive, Clinical: Stage IIIA (cT3, cN2, cM0, G3, ER+, LA+, HER2-).      Diagnostic/Therapeutic History:     2023: Bl screening Mammogram: negative      2023: Right breast mass for which US done, Nonspecific dense fibroglandular tissues in the right breast in the area of reported lump. Dense fibroglandular tissue also evident throughout both breasts on screening mammogram. No discrete lesion identified.     2024: Patient describes an increase in the size of a palpable lump in the right lower outer breast which was previously assessed in 2023. Recent negative screening mammogram from 2023. She also describes a new palpable lump in the right axilla. After being seen by her provider, a nother palpable lump is identified in the upper outer right breast.     Ultrasound of the right breast showed In the patient's area of palpable concern in the right breast at 7 o'clock, 5 cm from the nipple, there is a large vague heterogeneousmass with internal vascularity which has the appearance of dense fibroglandular tissue measuring up to 5 cm; it is visible protruding from the skin with discoloration. In the patient's area of palpable concern felt by her provider in the right breast at 10 o'clock, 8 cm from the nipple,  there is an oval parallel heterogeneous mass withinternal vascularity measuring approximately 1.1 x 0.9 x 1.4 cm. 3 enlarged axillary LN were seen     02/06/2024: US guided core needle biopsy     A. BREAST, RIGHT, 7:00, 5 CM FN, CORE BIOPSY:   -- Invasive ductal carcinoma, grade 2, ER: 95%, RI: 5-10%, HER2 negative (1+)  B. BREAST, RIGHT, 10:00, 8 CM FN, CORE BIOPSY:   -- Invasive ductal carcinoma, grade 2-3,  ER: 95%, RI<5%, HER2 negative (1+)   C. AXILLARY LYMPH NODE, RIGHT, BIOPSY:     -- Metastatic carcinoma, see note.     02/14/2024: Biopsy-proven multicentric right breast malignancy with the total  span of up to 13.0 cm associated with bulky metastatic axillary and  abnormal internal mammary lymphadenopathy. No evidence of chest wall,  skin or nipple-areolar complex involvement.     No MRI evidence of malignancy in the left breast.     02/15/2024: staging scans: negative- short term follow up      07/16/2024: completed ddAC followed by weekly taxol     08/14/2024: Dr Patel performed a bilateral skin sparing mastectomy with right sided SLNB and completion dissection with right axillary node dissection, bilateral direct-to-implant breast reconstruction with structured saline implant (right = 410 cc; left = 390 cc) with mesh  with path showing an invasive ductal carcinoma  G2 measuring 25 mm, margins negative. She 5 total lymph nodes removed with 1 involved with macromets and 1 involved with micromets; final stage [id number] ER: 95%, , RI: 5-10%, HER2 negative (1+)     The patient completed radiotherapy as outlined below.     Radiation Treatment Summary:            IMRT: Right Breast with lymph nodes     Treatment Period Technique Fraction Dose Fractions Total Dose   Course 1 10/7/2024-10/28/2024  (days elapsed: 21)         CW_CNI_R 10/7/2024-10/28/2024 VMAT 266 / 266 cGy 16 / 16 4256 / 4,256 cGy     Started Goserlin on 12/03 2/2025 started abemaciclib; endocrine therapy initiated with  tamoxifen    SUBJECTIVE  History of Present Illness:   Macy Chavez is here for routine radiation follow up/surveillance visit.  She is doing well overall and energy level baseline.  Right chest wall well healed and skin intact.  She continues goserelin injections, tamoxifen and also abemaciclib. She was having diarrhea on 150 mg BID of abemaciclib and dose reduced to 100 mg BID with resolution of diarrhea. She now has mild lymphedema of right arm extending into hand but no difficulty with ROM.  She also endorses cording. She wears a sleeve only for flying and has worn it for a few trips to [place] recently.  She started having swelling a few weeks ago. She is having some revision of implant reconstruction in November. Denies headaches, fever, chills, cough, SOB, chest pain, n/v/c/d or bony pain.      Review of Systems:    Review of Systems   All other systems reviewed and are negative.    Performance Status:   The Karnofsky performance scale today is 90, Able to carry on normal activity; minor signs or symptoms of disease (ECOG equivalent 0).      OBJECTIVE    Current Outpatient Medications:     abemaciclib (Verzenio) 100 mg tablet, Take 1 tablet (100 mg total) by mouth 2 times a day.  Swallow whole., Disp: 60 tablet, Rfl: 2    atorvastatin (Lipitor) 20 mg tablet, Take 1 tablet (20 mg) by mouth once daily., Disp: 90 tablet, Rfl: 1    cholecalciferol (Vitamin D3) 50 mcg (2,000 units) tablet, Take 1 tablet (50 mcg) by mouth once daily., Disp: , Rfl:     copper (PARAGARD T 380A UTRN), by intrauterine route., Disp: , Rfl:     docusate sodium (Colace) 100 mg capsule, Take 1 capsule (100 mg) by mouth 2 times a day as needed for constipation., Disp: 10 capsule, Rfl: 0    flibanserin 100 mg tablet, Take 100 mg by mouth once daily., Disp: 30 tablet, Rfl: 11    metoprolol succinate XL (Toprol-XL) 25 mg 24 hr tablet, Take 1 tablet (25 mg) by mouth once daily. Do not crush or chew., Disp: 90 tablet, Rfl: 3    omega  3-dha-epa-fish oil 1,000 (120-180) mg capsule, Take 2 capsules (2,000 mg) by mouth once daily., Disp: , Rfl:     prochlorperazine (Compazine) 10 mg tablet, Take 1 tablet (10 mg) by mouth every 6 hours if needed for nausea., Disp: 30 tablet, Rfl: 2    tamoxifen (Nolvadex) 20 mg tablet, Take 1 tablet (20 mg total) by mouth once daily.  Take with water or any other nonalcoholic drink with or without food at around the same time(s) every day., Disp: 30 tablet, Rfl: 11    TURMERIC ORAL, Take 1 tablet by mouth once daily., Disp: , Rfl:     UNABLE TO FIND, Take 2 tablets by mouth once daily. Med Name: Sandra 7, Disp: , Rfl:     valACYclovir (Valtrex) 500 mg tablet, Take 1 tablet (500 mg) by mouth once daily., Disp: 30 tablet, Rfl: 2     Physical Exam  Vitals reviewed.   Constitutional:       Appearance: Normal appearance.   HENT:      Head: Normocephalic and atraumatic.      Nose: Nose normal.      Mouth/Throat:      Mouth: Mucous membranes are moist.      Pharynx: Oropharynx is clear.   Eyes:      Conjunctiva/sclera: Conjunctivae normal.      Pupils: Pupils are equal, round, and reactive to light.   Cardiovascular:      Rate and Rhythm: Normal rate and regular rhythm.      Heart sounds: Normal heart sounds.   Pulmonary:      Effort: Pulmonary effort is normal.      Breath sounds: Normal breath sounds.   Chest:   Breasts:     Right: Absent.      Left: Absent.      Comments: Bilateral mastectomies with implant reconstruction.  Well healed mastectomy incisions.  Mild residual tanning right side. Skin intact.   Abdominal:      Palpations: Abdomen is soft.   Musculoskeletal:         General: Swelling (Mild right arm lymphedema extending into hand) present. Normal range of motion.      Cervical back: Normal range of motion and neck supple.   Lymphadenopathy:      Cervical: No cervical adenopathy.      Upper Body:      Right upper body: No supraclavicular or axillary adenopathy.      Left upper body: No supraclavicular or  axillary adenopathy.   Skin:     General: Skin is warm and dry.   Neurological:      General: No focal deficit present.      Mental Status: She is alert and oriented to person, place, and time.   Psychiatric:         Mood and Affect: Mood normal.         Behavior: Behavior normal.         RESULTS:  NM bone whole body    Result Date: 12/5/2024  Interpreted By:  Julito Osei and Hanreck James STUDY: NM BONE WHOLE BODY;  12/5/2024 1:50 pm   INDICATION: Signs/Symptoms:bone pain and generalized fatigue.   ,C50.919 Malignant neoplasm of unspecified site of unspecified female breast,Z17.0 Estrogen receptor positive status (ER+)   COMPARISON: 02/19/2024 bone scintigraphy Same day CT chest abdomen pelvis   ACCESSION NUMBER(S): [id number]   ORDERING CLINICIAN: SALUD CAMPOS   TECHNIQUE: DIVISION OF NUCLEAR MEDICINE BONE SCAN, WHOLE BODY plus REGIONAL VIEWS   The patient received an intravenous dose of  26.5 mCi of Tc-99m MDP. Anterior and posterior images of the skeleton from skull vertex to feet were then acquired.  Additional regional skeletal images were also obtained.   FINDINGS: No focal increased radiotracer activity is seen to suggest osseous metastatic disease.   Expected, excreted activity is noted in the kidneys and bladder.       No evidence of osseous metastatic disease.   I personally reviewed the images/study and I agree with the resident Huey Quintana's findings as stated. This study was interpreted at [facility], [place], [place].   MACRO: None   Signed by: Julito Osei 12/5/2024 3:36 PM Dictation workstation:   [id number]    CT chest abdomen pelvis w IV contrast    Result Date: 12/5/2024  Interpreted By:  Manjeet Trujillo and Jiang Sirui STUDY: CT CHEST ABDOMEN PELVIS W IV CONTRAST;  12/5/2024 11:15 am   INDICATION: Signs/Symptoms:bone pain and generalized fatigue.   Per clinical notes: Patient with history of right-sided invasive ductal carcinoma status post  bilateral skin sparing mastectomies in August 2024 completed adjuvant radiation therapy.   ,C50.919 Malignant neoplasm of unspecified site of unspecified female breast,Z17.0 Estrogen receptor positive status (ER+)   COMPARISON: CT 02/19/2024   ACCESSION NUMBER(S): [id number]   ORDERING CLINICIAN: SALUD CAMPOS   TECHNIQUE: CT of the chest, abdomen, and pelvis was performed.  Contiguous axial images were obtained at 3 mm slice thickness through the chest, abdomen and pelvis. Coronal and sagittal reconstructions at 3 mm slice thickness were performed. 75 ML of Omnipaque 350 was administered intravenously without immediate complication.   FINDINGS: CHEST:   LUNG/PLEURA/LARGE AIRWAYS: The trachea and central airways are patent. No endobronchial lesion. No consolidation, pleural effusion, or pneumothorax. No new suspicious or enlarging pulmonary nodules identified.   VESSELS: Aorta and pulmonary arteries are normal caliber.  No atherosclerotic changes of the aorta are identified.  No coronary artery calcifications are present.   HEART: The heart is normal in size.  No pericardial effusion   MEDIASTINUM AND JAYJAY: Postsurgical changes of a right axillary lymph node dissection. The previously noted enlarged right axillary lymph nodes are no longer seen. No new lymphadenopathy. There is residual soft tissue thickening of the right axilla as best seen on series 2, image 38. The esophagus is normal.   CHEST WALL AND LOWER NECK: Postsurgical changes of bilateral mastectomies with prepectoral implants in place. There is mild skin thickening of the right inferior breast likely representing post radiation changes. Visualized thyroid gland is unremarkable.   ABDOMEN:   LIVER: The liver is normal in size. There is diffuse hypoattenuation compatible with steatosis. No new worrisome hepatic lesions.   BILE DUCTS: The intrahepatic and extrahepatic ducts are not dilated.   GALLBLADDER: No calcified stones. No wall thickening.    PANCREAS: Unremarkable.   SPLEEN: Unremarkable.   ADRENAL GLANDS: Unremarkable.   KIDNEYS AND URETERS: The kidneys are normal in size and enhance symmetrically.  No hydroureteronephrosis or nephroureterolithiasis is identified.   PELVIS:   BLADDER: The urinary bladder appears normal without abnormal wall thickening.   REPRODUCTIVE ORGANS: The uterus is present with an intrauterine device noted. No adnexal masses.   BOWEL: The stomach is decompressed, limited for evaluation. The small and large bowel demonstrate no abnormal bowel thickening or dilatation. The appendix is not definitively visualized.     VESSELS: The abdominal aorta and IVC are unremarkable. The portal venous vasculature is patent.   PERITONEUM/RETROPERITONEUM/LYMPH NODES: No ascites or free air, no fluid collection.  No abdominopelvic lymphadenopathy is present.   BONE AND SOFT TISSUE: No suspicious osseous lesions are identified.  A small fat containing umbilical hernia is noted. Otherwise, the abdominal wall soft tissues are unremarkable.       Breast cancer restaging scan. When compared to the prior examination dated 02/19/2024, there has been interval postsurgical changes of bilateral mastectomies with implant placement. Skin thickening of the right inferior breast likely representing post radiation changes. Postsurgical changes of a right axillary lymph node dissection. No definite evidence of new metastatic disease. Additional stable chronic and incidental findings described above.   I personally reviewed the image(s) / study and I agree with the findings as stated by Kendy Bishop MD. This study was interpreted at [facility], [place], [place].   MACRO: None   Signed by: Manjeet Trujillo 12/5/2024 3:02 PM Dictation workstation:   [id number]     ASSESSMENT:  39 y.o. female with stage IIIA right sided breast cancer s/p neoadjuvant chemotherapy followed by bilateral mastectomies with saline implant reconstruction followed by  radiation to right chest wall, regional lymph nodes. Doing well post treatment. Now with lymphedema and cording right arm, hand.    PLAN:    - Follow up with Dr. Golden for adjuvant endocrine therapy, goserelin and Verzenio.  - Scheduled for restaging scans 6/19/25  - Referral to physical medicine rehab for lymphedema eval/treat  - Radiation follow up in 6 mo.  Call with any questions or concerns.     Gifty Cruz CNP  [phone]

## 2025-06-19 ENCOUNTER — HOSPITAL ENCOUNTER (OUTPATIENT)
Dept: RADIOLOGY | Facility: HOSPITAL | Age: 40
Discharge: HOME | End: 2025-06-19
Payer: COMMERCIAL

## 2025-06-19 ENCOUNTER — APPOINTMENT (OUTPATIENT)
Dept: OBSTETRICS AND GYNECOLOGY | Facility: CLINIC | Age: 40
End: 2025-06-19
Payer: COMMERCIAL

## 2025-06-19 DIAGNOSIS — C50.811 MALIGNANT NEOPLASM OF OVERLAPPING SITES OF RIGHT BREAST IN FEMALE, ESTROGEN RECEPTOR POSITIVE: ICD-10-CM

## 2025-06-19 DIAGNOSIS — Z17.0 MALIGNANT NEOPLASM OF OVERLAPPING SITES OF RIGHT BREAST IN FEMALE, ESTROGEN RECEPTOR POSITIVE: ICD-10-CM

## 2025-06-19 PROCEDURE — 2550000001 HC RX 255 CONTRASTS

## 2025-06-19 PROCEDURE — 78306 BONE IMAGING WHOLE BODY: CPT

## 2025-06-19 PROCEDURE — 74177 CT ABD & PELVIS W/CONTRAST: CPT

## 2025-06-19 RX ADMIN — IOHEXOL 75 ML: 350 INJECTION, SOLUTION INTRAVENOUS at 09:16

## 2025-06-20 DIAGNOSIS — R00.0 SINUS TACHYCARDIA: ICD-10-CM

## 2025-06-20 DIAGNOSIS — Z51.81 ENCOUNTER FOR MONITORING CARDIOTOXIC DRUG THERAPY: ICD-10-CM

## 2025-06-20 DIAGNOSIS — Z79.899 ENCOUNTER FOR MONITORING CARDIOTOXIC DRUG THERAPY: ICD-10-CM

## 2025-06-20 DIAGNOSIS — I42.7 CARDIOTOXICITY (MULTI): ICD-10-CM

## 2025-06-20 RX ORDER — METOPROLOL SUCCINATE 25 MG/1
25 TABLET, EXTENDED RELEASE ORAL DAILY
Qty: 90 TABLET | Refills: 1 | Status: SHIPPED | OUTPATIENT
Start: 2025-06-20 | End: 2026-06-20

## 2025-06-20 RX ORDER — METOPROLOL SUCCINATE 25 MG/1
25 TABLET, EXTENDED RELEASE ORAL DAILY
Qty: 90 TABLET | Refills: 3 | OUTPATIENT
Start: 2025-06-20

## 2025-06-25 ENCOUNTER — APPOINTMENT (OUTPATIENT)
Dept: HEMATOLOGY/ONCOLOGY | Facility: CLINIC | Age: 40
End: 2025-06-25
Payer: COMMERCIAL

## 2025-06-26 ENCOUNTER — TELEPHONE (OUTPATIENT)
Dept: HEMATOLOGY/ONCOLOGY | Facility: HOSPITAL | Age: 40
End: 2025-06-26
Payer: COMMERCIAL

## 2025-06-26 DIAGNOSIS — Z17.0 MALIGNANT NEOPLASM OF BREAST IN FEMALE, ESTROGEN RECEPTOR POSITIVE, UNSPECIFIED LATERALITY, UNSPECIFIED SITE OF BREAST: Primary | ICD-10-CM

## 2025-06-26 DIAGNOSIS — C50.919 MALIGNANT NEOPLASM OF BREAST IN FEMALE, ESTROGEN RECEPTOR POSITIVE, UNSPECIFIED LATERALITY, UNSPECIFIED SITE OF BREAST: Primary | ICD-10-CM

## 2025-06-26 DIAGNOSIS — R97.8 ABNORMAL TUMOR MARKERS: ICD-10-CM

## 2025-06-26 NOTE — TELEPHONE ENCOUNTER
Called pt to discuss the following results:     CT chest abdomen pelvis w IV contrast  Narrative: Interpreted By:  Darlene Davalos,   STUDY:  CT CHEST ABDOMEN PELVIS W IV CONTRAST;  6/19/2025 9:32 am      INDICATION:  Signs/Symptoms:hx of [id number] hormone + breast cancer with positive  ctDNA.      ,C50.811 Malignant neoplasm of overlapping sites of right female  breast,Z17.0 Estrogen receptor positive status (ER+)      COMPARISON:  CT chest abdomen pelvis 12/05/2024 and 02/19/2024  Bone scan 06/19/2025      ACCESSION NUMBER(S):  [id number]      ORDERING CLINICIAN:  DUSTIN BRADFORD      TECHNIQUE:  CT of the chest, abdomen, and pelvis was performed.  Contiguous axial  images were obtained at 3 mm slice thickness through the chest,  abdomen and pelvis. Coronal and sagittal reconstructions at 3 mm  slice thickness were performed. 75 ML of Omnipaque 350 was  administered intravenously without immediate complication.      FINDINGS:  CHEST:      LUNG/PLEURA/LARGE AIRWAYS:  No consolidation, pulmonary edema, pleural effusion or pneumothorax.  Trachea and right and left main bronchi are patent.  No concerning lung nodule.      VESSELS:  Aorta and pulmonary arteries are normal caliber.  No significant coronary artery calcifications.      HEART:  The heart is normal in size.  No pericardial effusion.      MEDIASTINUM AND JAYJAY:  No suspicious internal mammary, retropectoral, mediastinal, or  axillary lymph nodes. Surgical clips noted in the right axilla.  No significant abnormality of the esophagus.      CHEST WALL AND LOWER NECK:  Postprocedural changes of bilateral mastectomy with bilateral breast  implant placement. There is stable skin thickening in the right  inferior breast, compatible with postradiation and/or postprocedural  changes. The visualized thyroid gland appears unremarkable.      ABDOMEN:      LIVER:  The liver is normal in size. Focal hypoattenuation is seen adjacent  to the falciform ligament, likely focal  hepatic steatosis. No  suspicious lesion is evident.      BILE DUCTS:  No biliary dilatation.      GALLBLADDER:  No calcified stones. No wall thickening.      PANCREAS:  The pancreas appears unremarkable without evidence of ductal  dilatation or masses.      SPLEEN:  The spleen is normal in size. Splenule noted.      ADRENAL GLANDS:  No adrenal nodule or thickening.      KIDNEYS AND URETERS:  The kidneys are normal in size and enhance symmetrically.  No hydroureteronephrosis or nephroureterolithiasis.      PELVIS:      BLADDER:  Within normal limits.      REPRODUCTIVE ORGANS:  IUD noted.      BOWEL:  The stomach is unremarkable.  The small and large bowel are normal in caliber and demonstrate no  wall thickening. The appendix is not definitely visualized. There is  however no pericecal stranding or fluid.      VESSELS:  No aneurysmal dilatation of the abdominal aorta.  The IVC appears normal.  Portal vein, splenic vein, and SMV are patent.      PERITONEUM/RETROPERITONEUM/LYMPH NODES:  No ascites or fluid collection.  No peritoneal nodularity or deposits.  The retroperitoneum is unremarkable.  No abdominopelvic lymphadenopathy is present.      BONE AND SOFT TISSUE:  No suspicious osseous lesions are identified.  No soft tissue masses in the abdominal wall.      Impression: IDC breast cancer restaging scan, in comparison to prior CT from  December 2024.      Positive Signatera noted, however no new site of disease is evident  in the chest, abdomen and pelvis. Signatera may detect  residual/recurrent disease before it is visible on CT. Further  investigation with PET-CT and/or liver MRI could be considered, as  liver is a frequent site of IDC breast cancer metastasis. Otherwise,  short-term follow-up CT in 8-12 weeks is recommended.      Evaluation for osseous metastatic disease to be correlated with  concurrent bone scan, reported separately.      MACRO:  None      Signed by: Darlene Davalos 6/21/2025 8:53 AM  Dictation  workstation:   [id number]   Study Result    Narrative & Impression   Interpreted By:  Leatha Rodriguez,  and Prakash Byrd   STUDY:  NM BONE WHOLE BODY;  6/19/2025 3:04 pm      INDICATION:  Signs/Symptoms:hx of [id number] hormone + breast cancer with positive  ctDNA.      ,C50.811 Malignant neoplasm of overlapping sites of right female  breast,Z17.0 Estrogen receptor positive status (ER+)      COMPARISON:  Whole-body bone scan 12/05/2024.      ACCESSION NUMBER(S):  [id number]      ORDERING CLINICIAN:  DUSTIN BRADFORD      TECHNIQUE:  DIVISION OF NUCLEAR MEDICINE  BONE SCAN, WHOLE BODY plus REGIONAL VIEWS      The patient received an intravenous dose of  23.6 mCi of Tc-99m MDP.  Anterior and posterior images of the skeleton from skull vertex to  feet were then acquired.  Additional regional skeletal images were  also obtained.      FINDINGS:  No focus of increased radiotracer uptake in the axial or appendicular  skeleton suggestive of osseous metastatic disease.      Expected, excreted activity is noted in the kidneys and bladder.      Increased radiotracer uptake is seen in the shoulders, elbows,  wrists, hips, knees, ankles, and bilateral midfoot, most consistent  with an arthritic pattern.      Heterogenous radiotracer uptake along the thoracolumbar spine and  bilateral sacroiliac joints consistent with degenerative changes.      IMPRESSION:  1. No evidence of osseous metastatic disease.      I personally reviewed the images/study and I agree with the findings  as stated by Resident Dr. Rochelle Randall MD. This study was  interpreted at [place], [place].      MACRO:  None      Signed by: Leatha Rodriguez 6/20/2025 10:34 AM  Dictation workstation:   [id number]     Plan discussed with Dr. Golden as well. 1 time PET scan now and repeat imaging in 12 weeks if negative with repeat signatura. No further questions at this time

## 2025-07-01 ENCOUNTER — OFFICE VISIT (OUTPATIENT)
Dept: HEMATOLOGY/ONCOLOGY | Facility: CLINIC | Age: 40
End: 2025-07-01
Payer: COMMERCIAL

## 2025-07-01 ENCOUNTER — LAB (OUTPATIENT)
Dept: LAB | Facility: CLINIC | Age: 40
End: 2025-07-01
Payer: COMMERCIAL

## 2025-07-01 VITALS
WEIGHT: 170.7 LBS | BODY MASS INDEX: 25.21 KG/M2 | OXYGEN SATURATION: 98 % | HEART RATE: 76 BPM | TEMPERATURE: 97 F | DIASTOLIC BLOOD PRESSURE: 93 MMHG | SYSTOLIC BLOOD PRESSURE: 132 MMHG | RESPIRATION RATE: 16 BRPM

## 2025-07-01 DIAGNOSIS — Z17.0 MALIGNANT NEOPLASM OF OVERLAPPING SITES OF RIGHT BREAST IN FEMALE, ESTROGEN RECEPTOR POSITIVE: ICD-10-CM

## 2025-07-01 DIAGNOSIS — C50.811 MALIGNANT NEOPLASM OF OVERLAPPING SITES OF RIGHT BREAST IN FEMALE, ESTROGEN RECEPTOR POSITIVE: ICD-10-CM

## 2025-07-01 PROCEDURE — 83002 ASSAY OF GONADOTROPIN (LH): CPT

## 2025-07-01 PROCEDURE — 99214 OFFICE O/P EST MOD 30 MIN: CPT | Performed by: STUDENT IN AN ORGANIZED HEALTH CARE EDUCATION/TRAINING PROGRAM

## 2025-07-01 PROCEDURE — 82670 ASSAY OF TOTAL ESTRADIOL: CPT

## 2025-07-01 PROCEDURE — 1036F TOBACCO NON-USER: CPT | Performed by: STUDENT IN AN ORGANIZED HEALTH CARE EDUCATION/TRAINING PROGRAM

## 2025-07-01 PROCEDURE — 36415 COLL VENOUS BLD VENIPUNCTURE: CPT

## 2025-07-01 ASSESSMENT — PAIN SCALES - GENERAL: PAINLEVEL_OUTOF10: 0-NO PAIN

## 2025-07-01 NOTE — PROGRESS NOTES
Breast Medical Oncology Clinic  Location: [facility]      BREAST CANCER DIAGNOSIS  Malignant neoplasm of overlapping sites of right breast in female, estrogen receptor positive, Clinical: Stage IIIA (cT3, cN2, cM0, G3, ER+, WI+, HER2-)       ONCOLOGIC HISTORY  11/03/2023: Bl screening Mammogram: negative         12/20/2023: Right breast mass for which US done, Nonspecific dense fibroglandular tissues in the right breast in the area of reported lump. Dense fibroglandular tissue also evident throughout both breasts on screening mammogram. No discrete lesionidentified.     02/06/2024: Patient describes an increase in the size of a palpable lump in theright lower outer breast which was previously assessed in December of2023. Recent negative screening mammogram from 11/09/2023. She alsodescribes a new palpable lump in the right axilla. After being seen by her provider, a nother palpable lump is identified in the upper outer right breast.     Ultrasound of the right breast showed In the patient's area of palpable concern in the right breast at 7 o'clock, 5 cm from the nipple, there is a large vague heterogeneousmass with internal vascularity which has the appearance of dense fibroglandular tissue measuring up to 5 cm; it is visible protruding from the skin with discoloration. In the patient's area of palpable concern felt by her provider in the right breast at 10 o'clock, 8 cm from the nipple, there is an oval parallel heterogeneous mass withinternal vascularity measuring approximately 1.1 x 0.9 x 1.4 cm. 3 enlarged axillary LN were seen     02/06/2024: US guided core needle biopsy  A. BREAST, RIGHT, 7:00, 5 CM FN, CORE BIOPSY:   -- Invasive ductal carcinoma, grade 2, ER: 95%, WI: 5-10%, HER2 negative (1+)        B. BREAST, RIGHT, 10:00, 8 CM FN, CORE BIOPSY:   -- Invasive ductal carcinoma, grade 2-3,  ER: 95%, WI<5%, HER2 negative (1+)         C. AXILLARY LYMPH NODE, RIGHT, BIOPSY:     -- Metastatic carcinoma, see  note.    02/14/2024: Biopsy-proven multicentric right breast malignancy with the total  span of up to 13.0 cm associated with bulky metastatic axillary and  abnormal internal mammary lymphadenopathy. No evidence of chest wall,  skin or nipple-areolar complex involvement.     No MRI evidence of malignancy in the left breast.     02/15/2024: staging scans: negative- short term follow up      07/16/2024: completed ddAC followed by weekly taxol     08/14/2024: Dr Patel performed a bilateral skin sparing mastectomy with right sided SLNB and completion dissection with path showing an invasive ductal carcinoma  G2 measuring 25 mm, margins negative. She 5 total lymph nodes removed with 1 involved with macromets and 1 involved with micromets; final stage [id number] ER: 95%, , IL: 5-10%, HER2 negative (1+)     10/28/2024: completed adjuvant radiation therapy     12/3/2024: Started Gosarelin shots    1/28/25: Ordered abemaciclib     CURRENT THERAPY    Gosarelin +Abemaciclib    HISTORY OF PRESENT ILLNESS    No  nausea- no vomitting, no fever, no chills.Appetite is okay. Energy is okay  Overall doing okay  Does have right upper extremity swelling            Past Medical History:  has a past medical history of Breast cancer, Cancer (Multi), Chest tightness (05/24/2024), Hyperlipidemia, Other specified health status (09/29/2016), and Shortness of breath (05/24/2024).  Surgical History:   has a past surgical history that includes Other surgical history (09/29/2016) and Mil[place] tooth extraction (6/1/2000).  Social History:   reports that she has never smoked. She has never been exposed to tobacco smoke. She has never used smokeless tobacco. She reports current alcohol use of about 1.0 standard drink of alcohol per week. She reports that she does not use drugs.  Family History:    Family History   Problem Relation Name Age of Onset    Stroke Father Jad     Other (cancer) Maternal Grandfather Sotero         liver or kidney    Cancer  Paternal Grandmother Lizbeth         carcinoma of thigh    Stroke Paternal Grandmother Lizbeth     COPD Paternal Grandfather Mitchell      Family Oncology History:  Cancer-related family history includes Cancer in her paternal grandmother.      OBJECTIVE    VS / Pain:  BP (!) 132/93 (BP Location: Left arm, Patient Position: Sitting)   Pulse 76   Temp 36.1 °C (97 °F) (Temporal)   Resp 16   Wt 77.4 kg (170 lb 11.2 oz)   SpO2 98%   BMI 25.21 kg/m²   BSA: 1.94 meters squared   Pain Scale: 0    Performance Status:  The ECOG performance scale today is ECO- Fully active, able to carry on all pre-disease performance w/o restriction.    GA: alert, oriented, cooperative  HEENT: anicteric sclera, well injected conjunctiva  Heart: RRR, no murmurs   Lung: CTAB  Abd: +BS, soft, non tender   Ext Peripheral pulses positive, warm extremities      Diagnostic Results   === 24 ===    CT CHEST ABDOMEN PELVIS W IV CONTRAST    - Impression -  Breast cancer restaging scan. When compared to the prior examination  dated 2024, there has been interval postsurgical changes of  bilateral mastectomies with implant placement. Skin thickening of the  right inferior breast likely representing post radiation changes.  Postsurgical changes of a right axillary lymph node dissection. No  definite evidence of new metastatic disease. Additional stable  chronic and incidental findings described above.    I personally reviewed the image(s) / study and I agree with the  findings as stated by Kendy Bishop MD. This study was interpreted at  [facility], [place], [place].    MACRO:  None    Signed by: Manjeet Trujillo 2024 3:02 PM  Dictation workstation:   [id number]   === 07/10/24 ===    BI MR BREAST BILATERAL WITH CONTRAST FULL PROTOCOL    - Impression -  Overall excellent response to treatment with a few small residual  masses and scattered areas of patchy non mass enhancement throughout  the right breast. Resolution of  previously-seen right axillary  lymphadenopathy.    Suspicious enhancing mass in the lower inner left breast at anterior  depth. MRI directed ultrasound imaging is recommended for further  evaluation; if this is not seen sonographically, MRI guided biopsy is  recommended.    A few additional peripherally enhancing T2 hyperintense masses are  seen in the left breast most consistent with inflamed cysts. These  are presumed to be benign, however the lower outer left breast mass  can also be assessed at the time of MRI directed ultrasound imaging.    A message was sent to the referring practitioner at the time of this  dictation regarding these findings using the epic critical findings  reporting system. A pre-procedure form was filled out.    BI-RADS CATEGORY:  BI-RADS Category:  4 Suspicious.  Recommendation:  MRI Directed Additional Imaging.  Recommended Date:  Immediate.  Laterality:  Left.    For any future breast imaging appointments, please call [phone] ([phone]).      MACRO:  Critical Finding:  Breast Imaging Abnormality. Notification was  initiated on 7/11/2024 at 9:45 am by  Francisco Mansfield.  (**-YCF-**)  Instructions:  See Impression for specific recommendations.    Signed by: Francisco Mansfield 7/11/2024 9:45 AM  Dictation workstation:   [id number]   MR breast bilateral w contrast full protocol 07/10/2024    Narrative  Interpreted By:  Francisco Mansfield,  STUDY:  BI MR BREAST BILATERAL WITH CONTRAST FULL PROTOCOL;  7/10/2024 2:29 pm    ACCESSION NUMBER(S):  [id number]    ORDERING CLINICIAN:  MARLEE POOL    INDICATION:  Patient with known invasive ductal carcinoma of the right breast with  axillary involvement, status post neoadjuvant chemotherapy.    COMPARISON:  MRI from 02/14/2024    TECHNIQUE:  Using a dedicated breast coil, STIR axial and T1-weighted fat  saturation axial images of the breasts were obtained, the latter both  before and after intravenous administration of Gadolinium DTPA. On an  independent  workstation, 3-D images were formulated using [employer]  including time enhancement curves, subtraction images and MIP images.    Intravenous contrast: 15 ML of Dotarem    FINDINGS:  Density: Extreme fibroglandular tissue.    There is symmetric mild bilateral background enhancement.    RIGHT BREAST: Overall, there is a dramatic reduction in the size and  extent of enhancing masses and associated non mass enhancement in the  right breast status post chemotherapy. There is a faintly enhancing  residual mass in the lower outer right breast at middle depth at the  site of the prior index lesion measuring 7 x 8 x 7 mm on series 7,  image 123 of 170. Patchy non mass enhancement extends superiorly from  the residual index lesion in an area spanning approximately 2.9 x 2.3  x 3.5 cm. A few additional focal enhancing masses are visualized,  which have decreased significantly in size compared to prior exam,  for example in the lateral central right breast at middle depth  measuring up to 6 mm on series 7, image 99 of 170 (previously up to  11 mm). There is patchy scattered non mass enhancement throughout the  upper-outer right breast at middle and posterior depth near the site  of prior additional biopsy measuring approximately 3.5 x 2.9 x 3.7  cm. Additional scattered areas of non mass enhancement are seen  extending throughout the right breast but overall significantly  decreased compared to prior exam.    Resolution of previously-seen right axillary lymphadenopathy with  biopsy marker seen in a now normal appearing lymph node on series 7,  image 30 of 170. No internal mammary lymphadenopathy is appreciated.    LEFT BREAST: New enhancing circumscribed mass in the lower inner left  breast at anterior depth measuring up to 5 mm on series 7, image 114  of 170. There is a surrounding area of increased T2 hyperintensity.  There are a few scattered T2 hyperintense peripherally enhancing  masses in the left breast, for example in  the lower outer left breast  at middle depth on series 7, image 103 of 170 measuring up to 7 mm  and in the central left breast at posterior depth measuring up to 8  mm on series 7, image 85 of 170, most consistent with benign inflamed  cysts.    No axillary or internal mammary lymphadenopathy is appreciated.    NON-BREAST FINDINGS:  None.    Impression  Overall excellent response to treatment with a few small residual  masses and scattered areas of patchy non mass enhancement throughout  the right breast. Resolution of previously-seen right axillary  lymphadenopathy.    Suspicious enhancing mass in the lower inner left breast at anterior  depth. MRI directed ultrasound imaging is recommended for further  evaluation; if this is not seen sonographically, MRI guided biopsy is  recommended.    A few additional peripherally enhancing T2 hyperintense masses are  seen in the left breast most consistent with inflamed cysts. These  are presumed to be benign, however the lower outer left breast mass  can also be assessed at the time of MRI directed ultrasound imaging.    A message was sent to the referring practitioner at the time of this  dictation regarding these findings using the epic critical findings  reporting system. A pre-procedure form was filled out.    BI-RADS CATEGORY:  BI-RADS Category:  4 Suspicious.  Recommendation:  MRI Directed Additional Imaging.  Recommended Date:  Immediate.  Laterality:  Left.    For any future breast imaging appointments, please call [phone]).      MACRO:  Critical Finding:  Breast Imaging Abnormality. Notification was  initiated on 7/11/2024 at 9:45 am by  Francisco Mansfield.  (**-YCF-**)  Instructions:  See Impression for specific recommendations.    Signed by: Francisco Mansfield 7/11/2024 9:45 AM  Dictation workstation:   [id number]     No images are attached to the encounter.    LABORATORY/PATHOLOGY DATA    Lab on 06/17/2025   Component Date Value Ref Range Status    WBC 06/17/2025  4.1 (L)  4.4 - 11.3 x10*3/uL Final    nRBC 06/17/2025    Final    RBC 06/17/2025 3.75 (L)  4.00 - 5.20 x10*6/uL Final    Hemoglobin 06/17/2025 12.5  12.0 - 16.0 g/dL Final    Hematocrit 06/17/2025 38.6  36.0 - 46.0 % Final    MCV 06/17/2025 103 (H)  80 - 100 fL Final    MCH 06/17/2025 33.3  26.0 - 34.0 pg Final    MCHC 06/17/2025 32.4  32.0 - 36.0 g/dL Final    RDW 06/17/2025 12.6  11.5 - 14.5 % Final    Platelets 06/17/2025 192  150 - 450 x10*3/uL Final    Neutrophils % 06/17/2025 68.8  40.0 - 80.0 % Final    Immature Granulocytes %, Automated 06/17/2025 0.0  0.0 - 0.9 % Final    Lymphocytes % 06/17/2025 21.6  13.0 - 44.0 % Final    Monocytes % 06/17/2025 6.4  2.0 - 10.0 % Final    Eosinophils % 06/17/2025 2.5  0.0 - 6.0 % Final    Basophils % 06/17/2025 0.7  0.0 - 2.0 % Final    Neutrophils Absolute 06/17/2025 2.81  1.20 - 7.70 x10*3/uL Final    Immature Granulocytes Absolute, Au* 06/17/2025 0.00  0.00 - 0.70 x10*3/uL Final    Lymphocytes Absolute 06/17/2025 0.88 (L)  1.20 - 4.80 x10*3/uL Final    Monocytes Absolute 06/17/2025 0.26  0.10 - 1.00 x10*3/uL Final    Eosinophils Absolute 06/17/2025 0.10  0.00 - 0.70 x10*3/uL Final    Basophils Absolute 06/17/2025 0.03  0.00 - 0.10 x10*3/uL Final    Glucose 06/17/2025 104 (H)  74 - 99 mg/dL Final    Sodium 06/17/2025 139  136 - 145 mmol/L Final    Potassium 06/17/2025 4.5  3.5 - 5.3 mmol/L Final    Chloride 06/17/2025 104  98 - 107 mmol/L Final    Bicarbonate 06/17/2025 27  21 - 32 mmol/L Final    Anion Gap 06/17/2025 13  10 - 20 mmol/L Final    Urea Nitrogen 06/17/2025 14  6 - 23 mg/dL Final    Creatinine 06/17/2025 1.04  0.50 - 1.05 mg/dL Final    eGFR 06/17/2025 69  >60 mL/min/1.73m*2 Final    Calcium 06/17/2025 9.5  8.6 - 10.3 mg/dL Final    Albumin 06/17/2025 4.3  3.4 - 5.0 g/dL Final    Alkaline Phosphatase 06/17/2025 65  33 - 110 U/L Final    Total Protein 06/17/2025 6.8  6.4 - 8.2 g/dL Final    AST 06/17/2025 18  9 - 39 U/L Final    Bilirubin, Total 06/17/2025  0.5  0.0 - 1.2 mg/dL Final    ALT 06/17/2025 18  7 - 45 U/L Final      Lab Results   Component Value Date    LABCA2 25.3 03/25/2025             IMPRESSION/PLAN  Macy is a 39 year old female in excellent health with a recent diagnosis of right sided breast cancer. Macy felt a mass in her right breast in December that prompted diagnostic evaluation.      Ultrasound of the right breast showed In the patient's area of palpable concern in the right breast at 7 o'clock, 5 cm from the nipple, there is a large vague heterogeneousmass with internal vascularity which has the appearance of dense fibroglandular tissue measuring up to 5 cm;  In the patient's area of palpable concern felt by her provider in the right breast at 10 o'clock, 8 cm from the nipple, there is an oval parallel heterogeneous mass withinternal vascularity measuring approximately 1.1 x 0.9 x 1.4 cm. 3 enlarged axillary LN were seen     She had US guided core needle biopsy of  right breast at 7:00, 5 CM FN,  that showed invasive ductal carcinoma, grade 2, ER: 95%, MS: 5-10%, HER2 negative (1+).  And US guided core needle biopsy of the right breast at  10:00, 8 CM FN, that showed  Invasive ductal carcinoma, grade 2-3,  ER: 95%, MS<5%, HER2 negative (1+). Axillary LN consistent with metastatic carcinoma     On MRI she a  multicentric right breast malignancy with the total span of up to 13.0 cm associated with bulky metastatic axillary and abnormal internal mammary lymphadenopathy. No evidence of chest wall, skin or nipple-areolar complex involvement.      Current stage [id number] (staging scans negative for distant mets)  ECOG PS: 0    I reviewed with her the events that led to her diagnosis of breast cancer. We reviewed all the procedures and diagnostic imaging she underwent thus far. I discussed the features of her breast cancer that include the size, grade, lymph node status and  hormone receptor/ her2-jair status.      We discussed neoadjuvant systemic  therapy. The goal of treatment would be to downstage her cancer, assess tumor chemosensitivity and treat potential micrometastatic disease. I would treat, given extensive of disease with anthracyclines based regimen ddAC Q2 weeks x4 followed by taxol weekly x12      07/16/2024: completed ddAC followed by weekly taxol     08/14/2024: Dr Patel performed a bilateral skin sparing mastectomy with right sided SLNB and completion dissection with path showing an invasive ductal carcinoma  G2 measuring 25 mm, margins negative. She 5 total lymph nodes removed with 1 involved with macromets and 1 involved with micromets; final stage [id number] ER: 95%, , KY: 5-10%, HER2 negative (1+)     -10/28/2024: completed adjuvant radiation therapy     - mild drop in EF: 65% ->55-60% on her most recent echo- saw Dr Mares, did have sinus tachycardia, not clear how much is due to anxiety. She was started on low dose beta blocker - last echo in 08/02/2024 showed EF of 60%  - Elevated LDL- non pharmacological measures for now- follows with cardiology  - was on Goserelin on 12/03, 12/31, 01/28. Continued  through March at least (last menstrual period in March 2024)- Estradiol<19  - decreased abemaciclib to 100 mg bid   -On Goserelin,  and tamoxifen 20 mg daily-   - since her LMP > 1 year ago, and estradiol< 19- repeat today (07/01/2025): if  labs in menopausal range today menopause and we will stop GnrH agonist and tamoxifen and switch to AI: reached out to Ob-Gyn to check on her menopausal status too  - Macy asked about cT DNA-discussed that It is not done outside of clinical trials but she would like to know.  And ordered and  + ctDNA so ordered staging scans that were negative   - PET scan ordered per report   RTC In 3M with Dr Olman Golden MD  Hematology and Medical Oncology  [facility]

## 2025-07-02 LAB
ESTRADIOL SERPL-MCNC: <19 PG/ML
FSH SERPL-ACNC: 1.4 IU/L
LH SERPL-ACNC: 0.4 IU/L

## 2025-07-07 ENCOUNTER — APPOINTMENT (OUTPATIENT)
Dept: RADIOLOGY | Facility: HOSPITAL | Age: 40
End: 2025-07-07
Payer: COMMERCIAL

## 2025-07-07 DIAGNOSIS — Z17.0 MALIGNANT NEOPLASM OF OVERLAPPING SITES OF RIGHT BREAST IN FEMALE, ESTROGEN RECEPTOR POSITIVE: ICD-10-CM

## 2025-07-07 DIAGNOSIS — C50.811 MALIGNANT NEOPLASM OF OVERLAPPING SITES OF RIGHT BREAST IN FEMALE, ESTROGEN RECEPTOR POSITIVE: ICD-10-CM

## 2025-07-07 PROCEDURE — 77080 DXA BONE DENSITY AXIAL: CPT | Performed by: INTERNAL MEDICINE

## 2025-07-07 PROCEDURE — 77080 DXA BONE DENSITY AXIAL: CPT

## 2025-07-15 ENCOUNTER — INFUSION (OUTPATIENT)
Dept: HEMATOLOGY/ONCOLOGY | Facility: CLINIC | Age: 40
End: 2025-07-15
Payer: COMMERCIAL

## 2025-07-15 ENCOUNTER — APPOINTMENT (OUTPATIENT)
Dept: LAB | Facility: CLINIC | Age: 40
End: 2025-07-15
Payer: COMMERCIAL

## 2025-07-15 VITALS
DIASTOLIC BLOOD PRESSURE: 83 MMHG | OXYGEN SATURATION: 97 % | HEART RATE: 88 BPM | WEIGHT: 169.86 LBS | TEMPERATURE: 97.2 F | SYSTOLIC BLOOD PRESSURE: 116 MMHG | BODY MASS INDEX: 25.08 KG/M2 | RESPIRATION RATE: 16 BRPM

## 2025-07-15 DIAGNOSIS — Z17.0 MALIGNANT NEOPLASM OF BREAST IN FEMALE, ESTROGEN RECEPTOR POSITIVE, UNSPECIFIED LATERALITY, UNSPECIFIED SITE OF BREAST: ICD-10-CM

## 2025-07-15 DIAGNOSIS — C50.919 MALIGNANT NEOPLASM OF BREAST IN FEMALE, ESTROGEN RECEPTOR POSITIVE, UNSPECIFIED LATERALITY, UNSPECIFIED SITE OF BREAST: ICD-10-CM

## 2025-07-15 PROCEDURE — 2500000004 HC RX 250 GENERAL PHARMACY W/ HCPCS (ALT 636 FOR OP/ED): Performed by: STUDENT IN AN ORGANIZED HEALTH CARE EDUCATION/TRAINING PROGRAM

## 2025-07-15 PROCEDURE — 2500000004 HC RX 250 GENERAL PHARMACY W/ HCPCS (ALT 636 FOR OP/ED): Mod: JZ,TB | Performed by: STUDENT IN AN ORGANIZED HEALTH CARE EDUCATION/TRAINING PROGRAM

## 2025-07-15 PROCEDURE — 96402 CHEMO HORMON ANTINEOPL SQ/IM: CPT

## 2025-07-15 RX ORDER — LIDOCAINE HYDROCHLORIDE 10 MG/ML
2 INJECTION, SOLUTION EPIDURAL; INFILTRATION; INTRACAUDAL; PERINEURAL ONCE
Status: COMPLETED | OUTPATIENT
Start: 2025-07-15 | End: 2025-07-15

## 2025-07-15 RX ORDER — ALBUTEROL SULFATE 0.83 MG/ML
3 SOLUTION RESPIRATORY (INHALATION) AS NEEDED
Status: DISCONTINUED | OUTPATIENT
Start: 2025-07-15 | End: 2025-07-15 | Stop reason: HOSPADM

## 2025-07-15 RX ORDER — LIDOCAINE HYDROCHLORIDE 10 MG/ML
2 INJECTION, SOLUTION EPIDURAL; INFILTRATION; INTRACAUDAL; PERINEURAL ONCE
OUTPATIENT
Start: 2025-08-12

## 2025-07-15 RX ORDER — DIPHENHYDRAMINE HYDROCHLORIDE 50 MG/ML
50 INJECTION, SOLUTION INTRAMUSCULAR; INTRAVENOUS AS NEEDED
OUTPATIENT
Start: 2025-08-12

## 2025-07-15 RX ORDER — EPINEPHRINE 0.3 MG/.3ML
0.3 INJECTION SUBCUTANEOUS EVERY 5 MIN PRN
OUTPATIENT
Start: 2025-08-12

## 2025-07-15 RX ORDER — DIPHENHYDRAMINE HYDROCHLORIDE 50 MG/ML
50 INJECTION, SOLUTION INTRAMUSCULAR; INTRAVENOUS AS NEEDED
Status: DISCONTINUED | OUTPATIENT
Start: 2025-07-15 | End: 2025-07-15 | Stop reason: HOSPADM

## 2025-07-15 RX ORDER — EPINEPHRINE 0.3 MG/.3ML
0.3 INJECTION SUBCUTANEOUS EVERY 5 MIN PRN
Status: DISCONTINUED | OUTPATIENT
Start: 2025-07-15 | End: 2025-07-15 | Stop reason: HOSPADM

## 2025-07-15 RX ORDER — FAMOTIDINE 10 MG/ML
20 INJECTION, SOLUTION INTRAVENOUS ONCE AS NEEDED
Status: DISCONTINUED | OUTPATIENT
Start: 2025-07-15 | End: 2025-07-15 | Stop reason: HOSPADM

## 2025-07-15 RX ORDER — FAMOTIDINE 10 MG/ML
20 INJECTION, SOLUTION INTRAVENOUS ONCE AS NEEDED
OUTPATIENT
Start: 2025-08-12

## 2025-07-15 RX ORDER — ALBUTEROL SULFATE 0.83 MG/ML
3 SOLUTION RESPIRATORY (INHALATION) AS NEEDED
OUTPATIENT
Start: 2025-08-12

## 2025-07-15 RX ADMIN — LIDOCAINE HYDROCHLORIDE 20 MG: 10 INJECTION, SOLUTION EPIDURAL; INFILTRATION; INTRACAUDAL; PERINEURAL at 09:11

## 2025-07-15 RX ADMIN — GOSERELIN ACETATE 3.6 MG: 3.6 IMPLANT SUBCUTANEOUS at 09:11

## 2025-07-15 ASSESSMENT — PAIN SCALES - GENERAL: PAINLEVEL_OUTOF10: 0-NO PAIN

## 2025-07-16 ENCOUNTER — HOSPITAL ENCOUNTER (OUTPATIENT)
Dept: RADIOLOGY | Facility: CLINIC | Age: 40
Discharge: HOME | End: 2025-07-16
Payer: COMMERCIAL

## 2025-07-16 ENCOUNTER — TELEPHONE (OUTPATIENT)
Dept: HEMATOLOGY/ONCOLOGY | Facility: HOSPITAL | Age: 40
End: 2025-07-16
Payer: COMMERCIAL

## 2025-07-16 DIAGNOSIS — Z17.0 MALIGNANT NEOPLASM OF OVERLAPPING SITES OF RIGHT BREAST IN FEMALE, ESTROGEN RECEPTOR POSITIVE: ICD-10-CM

## 2025-07-16 DIAGNOSIS — C50.811 MALIGNANT NEOPLASM OF OVERLAPPING SITES OF RIGHT BREAST IN FEMALE, ESTROGEN RECEPTOR POSITIVE: Primary | ICD-10-CM

## 2025-07-16 DIAGNOSIS — Z17.0 MALIGNANT NEOPLASM OF OVERLAPPING SITES OF RIGHT BREAST IN FEMALE, ESTROGEN RECEPTOR POSITIVE: Primary | ICD-10-CM

## 2025-07-16 DIAGNOSIS — C50.811 MALIGNANT NEOPLASM OF OVERLAPPING SITES OF RIGHT BREAST IN FEMALE, ESTROGEN RECEPTOR POSITIVE: ICD-10-CM

## 2025-07-16 PROCEDURE — 78815 PET IMAGE W/CT SKULL-THIGH: CPT | Mod: PI

## 2025-07-16 PROCEDURE — A9552 F18 FDG: HCPCS | Performed by: STUDENT IN AN ORGANIZED HEALTH CARE EDUCATION/TRAINING PROGRAM

## 2025-07-16 PROCEDURE — 3430000001 HC RX 343 DIAGNOSTIC RADIOPHARMACEUTICALS: Performed by: STUDENT IN AN ORGANIZED HEALTH CARE EDUCATION/TRAINING PROGRAM

## 2025-07-16 RX ORDER — FLUDEOXYGLUCOSE F 18 200 MCI/ML
13.9 INJECTION, SOLUTION INTRAVENOUS
Status: COMPLETED | OUTPATIENT
Start: 2025-07-16 | End: 2025-07-16

## 2025-07-16 RX ADMIN — FLUDEOXYGLUCOSE F 18 13.9 MILLICURIE: 200 INJECTION, SOLUTION INTRAVENOUS at 10:27

## 2025-07-16 NOTE — TELEPHONE ENCOUNTER
Macy called in to confirm what PET scan she needed. She is scheduled for a PET scan today - skull to thigh, but she just received a call from scheduling stating she needs a whole body PET (there is an active order). She is questioning if she should have PET today or if she needs the whole body PET instead. Informed her I will reach out to team to confirm and call her back with information. Patient verbalized understanding and agreement with the plan. Message routed to team to please advise.   Shantelle RUFFINN, RN CMSRN

## 2025-07-16 NOTE — TELEPHONE ENCOUNTER
I called Macy back and relayed that per Dr. Golden the scan today is correct. We cancelled out the full body and we have placed an order for a repeat PET in 12 weeks. If the one today is negative we may not need the one in 12 weeks. Patient verbalized understanding and agreement with the plan.  Shantelle RUFFINN, RN CMSRN

## 2025-07-23 ENCOUNTER — TELEPHONE (OUTPATIENT)
Dept: ADMISSION | Facility: HOSPITAL | Age: 40
End: 2025-07-23
Payer: COMMERCIAL

## 2025-07-23 ENCOUNTER — TELEPHONE (OUTPATIENT)
Dept: HEMATOLOGY/ONCOLOGY | Facility: CLINIC | Age: 40
End: 2025-07-23
Payer: COMMERCIAL

## 2025-07-23 DIAGNOSIS — C50.811 MALIGNANT NEOPLASM OF OVERLAPPING SITES OF RIGHT BREAST IN FEMALE, ESTROGEN RECEPTOR POSITIVE: Primary | ICD-10-CM

## 2025-07-23 DIAGNOSIS — Z17.0 MALIGNANT NEOPLASM OF OVERLAPPING SITES OF RIGHT BREAST IN FEMALE, ESTROGEN RECEPTOR POSITIVE: Primary | ICD-10-CM

## 2025-07-23 NOTE — TELEPHONE ENCOUNTER
Pt called asking if Dr. Golden or Masha could call her to review her PET scan from 7/16 and bone density from 7/7. She said the results are in MyChart, but she has not opened them yet. She would like a call to discuss. Message sent to the team.

## 2025-07-23 NOTE — TELEPHONE ENCOUNTER
PET scan done showed: Multiple areas of FDG avid osseous disease involving predominantly in the pelvis and sacrum is concerning for osseous metastatic disease.    Ordered MRIs and biopsy of bone lesion    Macy was agreable

## 2025-07-23 NOTE — TELEPHONE ENCOUNTER
Per my direct messages with Dr. Golden will get a bone biopsy and MRIs of hips and pelvis. Will follow up as results come back

## 2025-07-25 ENCOUNTER — APPOINTMENT (OUTPATIENT)
Dept: PHYSICAL MEDICINE AND REHAB | Facility: CLINIC | Age: 40
End: 2025-07-25
Payer: COMMERCIAL

## 2025-07-25 VITALS — RESPIRATION RATE: 20 BRPM | DIASTOLIC BLOOD PRESSURE: 80 MMHG | HEART RATE: 103 BPM | SYSTOLIC BLOOD PRESSURE: 114 MMHG

## 2025-07-25 DIAGNOSIS — C50.811 MALIGNANT NEOPLASM OF OVERLAPPING SITES OF RIGHT BREAST IN FEMALE, ESTROGEN RECEPTOR POSITIVE: ICD-10-CM

## 2025-07-25 DIAGNOSIS — I89.0 LYMPHEDEMA OF ARM: Primary | ICD-10-CM

## 2025-07-25 DIAGNOSIS — Z17.0 MALIGNANT NEOPLASM OF OVERLAPPING SITES OF RIGHT BREAST IN FEMALE, ESTROGEN RECEPTOR POSITIVE: ICD-10-CM

## 2025-07-25 PROCEDURE — 99204 OFFICE O/P NEW MOD 45 MIN: CPT | Performed by: PHYSICAL MEDICINE & REHABILITATION

## 2025-07-25 RX ORDER — ACETAMINOPHEN 500 MG
1000 TABLET ORAL EVERY 6 HOURS PRN
COMMUNITY

## 2025-07-25 ASSESSMENT — PAIN SCALES - GENERAL: PAINLEVEL_OUTOF10: 1

## 2025-07-25 NOTE — PATIENT INSTRUCTIONS
[employer] (mentor and shipman)    Venous systems    Drug [facility]    Send mychart message to Dr. Rasmussen regarding pump specs

## 2025-07-25 NOTE — PROGRESS NOTES
Ref by Leesa for lymphedema and cording of the right arm. Right side mastectomy 8/14/2024.  Currently receiving lymphatic massages.

## 2025-07-25 NOTE — PROGRESS NOTES
[facility]          Hpi  40 y.o. f w pmh of right breast cancer Stage IIIA (cT3, cN2, cM0, G3, ER+, NH+, HER2-) sp   ddAC followed by weekly taxol 7/2024 followed by bilateral skin sparing mastectomy with right sided SLNB and completion dissection  5 total lymph nodes removed with 1 involved with macromets 8/2024 and reconstruction followed by adjuvant radiation therapy 10/2024.  She started  Gosarelin shots 12/2024 and tamoxifen, then started on abemaciclib 1/2025.     Most recently found lesions in sacrum, was asymptomatic. Pending biopsy and MRIs of pelvis.      She had multiple flights in May and April and noticed swelling despite a compression sleeve; she went to  lymphedema therapist and now will have another on in Sept.     She first noticed swelling in May,. She had some ROM difficulties. Going to [place] [facility] massage preventatively.  Received compression sleeve and paid for it. Only worn it flying.   States the arm has calmed down. She also had discomfort. Pain was from the cording.  States hand is still swollen; doesn't have redness.  Has had swelling along the R side., states might be a little swollen.     Still has some cording in axilla, has numbness,. Feels tightness is over implant and radiation.    Imaging  Pet 7/2205   FINDINGS:  HEAD AND NECK:  No evidence of focal hypermetabolic lesion in the brain parenchyma,  noting that evaluation is limited because of the expected physiologic  diffuse FDG uptake in the brain. No focal hypermetabolic soft tissue  lesion is seen in the neck. No hypermetabolic cervical  lymphadenopathy is present.      CHEST:  No focal hypermetabolic lesion is seen in the lung parenchyma.  No evidence of hypermetabolic mediastinal, axillary or hilar  lymphadenopathy. Postsurgical changes consistent with bilateral  breast reconstruction and axillary node dissection. There is mild FDG  avidity associated with underlying soft tissue attenuation in the  right  axilla (max SUV: 1.8), likely posttreatment changes. Circular  FDG uptake around bilateral breast implants, which is likely  inflammatory in nature (max SUV: 4.3).      ABDOMEN AND PELVIS:  No hypermetabolic soft tissue lesion is present in the abdomen and  pelvis. No evidence of hypermetabolic lymphadenopathy.  Physiologic radiotracer uptake is present in the liver and spleen  with excretion into the bowel loops and the genitourinary tract.      MUSCULOSKELETAL/EXTREMITIES:  Multiple areas of hypermetabolic activity. For example: Right sacrum  near the SI joint (max SUV: 10.2), right ilium near the SI joint (max  SUV: 14.5), left transverse process of L5 (max SUV: 10.4), left ilium  (max SUV: 5.3), right posterior acetabulum (max SUV: 7.9), left  anterior acetabulum (max SUV: 5.1) and the bilateral pubic rami (max  SUV: 13.3). There are not significant underlying changes seen on  corresponding anatomic CT associated with these foci.      IMPRESSION:  1. Multiple areas of FDG avid osseous disease involving predominantly  in the pelvis and sacrum is concerning for osseous metastatic disease.  2. Postsurgical changes of bilateral breast reconstruction and  axillary lymph simon dissection with no concerning focality to  suggest recurrence in the postsurgical bed. Circumferential FDG  avidity around the bilateral breast implant is likely inflammatory  representing fibrous capsule formation.  Medical History[1]  Surgical History[2]  Medical History[3]  Family History[4]  Social History     Socioeconomic History    Marital status:      Spouse name: Not on file    Number of children: Not on file    Years of education: Not on file    Highest education level: Not on file   Occupational History    Not on file   Tobacco Use    Smoking status: Never     Passive exposure: Never    Smokeless tobacco: Never   Vaping Use    Vaping status: Never Used   Substance and Sexual Activity    Alcohol use: Yes     Alcohol/week: 1.0  standard drink of alcohol     Types: 1 Glasses of wine per week     Comment: A few drinks a month    Drug use: Never    Sexual activity: Yes     Partners: Male     Birth control/protection: I.U.D.     Comment: PARAGARD   Other Topics Concern    Not on file   Social History Narrative    Not on file     Social Drivers of Health     Financial Resource Strain: Low Risk  (8/14/2024)    Overall Financial Resource Strain (CARDIA)     Difficulty of Paying Living Expenses: Not very hard   Food Insecurity: Not on file   Transportation Needs: No Transportation Needs (8/14/2024)    PRAPARE - Transportation     Lack of Transportation (Medical): No     Lack of Transportation (Non-Medical): No   Physical Activity: Not on file   Stress: Not on file   Social Connections: Not on file   Intimate Partner Violence: Not on file   Housing Stability: Low Risk  (8/14/2024)    Housing Stability Vital Sign     Unable to Pay for Housing in the Last Year: No     Number of Times Moved in the Last Year: 0     Homeless in the Last Year: No   Used to do relocation just recently  Lives w  3 kids  No smoking alcohol or drug use    REVIEW OF SYSTEMS      Pain: Denies  Sleep: WNL in a regular bed  Mood: WNL  Appetite/Nutrition: WNL  Motor: Denies weakness  Sensory: Denies numbness or tingling  Skin: No ulcerations, infection or drainage  Chest Pain: Denies  Dyspnea: Denies  Nausea/Vomiting: Denies  Fatigue (include 1-10 rating if present): Denies        PHYSICAL EXAMINATION      General: Alert, normal build, no acute distress  /80 (BP Location: Left arm, Patient Position: Sitting)   Pulse 103   Resp 20     Affect: Appropriate  Skin: No redness or streaking, no open areas. No drainage. No discoloration.  HEENT: WNL.  Heart: RRR  Lungs: Respirations unlabored.  Extremities: Normal contour. Stemmer's negative.     Arm Circumferences (cm):     7/2025    Index L 6    R 6.5    MCP's L 18    R 18.5    Below elbow L(10 cm) 23.5    R 22.5     Below elbow L (5cm) 24    R 25    Above elbow L (10 cm) 28.5    R 28.5         Wrist 15.5 bl      Neck: Supple  Back: Normal contour  Motor: 5/5 strength all extremities  :  (lbs)    L    R       Sensory: Intact LT  Cognition: Grossly intact  Reflexes: 1-2+ bilaterally  Coordination: Intact rapid alternating movements  Gait: WNL     IMPRESSION  40 y.o. f w pmh of right breast cancer Stage IIIA (cT3, cN2, cM0, G3, ER+, VA+, HER2-) sp   ddAC followed by weekly taxol 7/2024 followed by bilateral skin sparing mastectomy with right sided SLNB and completion dissection  5 total lymph nodes removed with 1 involved with macromets 8/2024 and reconstruction followed by adjuvant radiation therapy 10/2024.  She started  Gosarelin shots 12/2024 and tamoxifen, then started on abemaciclib 1/2025.     Most recently found lesions in sacrum, was asymptomatic. Pending biopsy and MRIs of pelvis.   Presents with RUE lymphedema after a flight. Improving w therapy. But needs garments and would benefit form a pump especially before reconstruction. Full ROM and minimal pain.     PLAN  Orders Placed This Encounter   Procedures    Miscellaneous DME    1. DME:   Compression sleeve and glove 20-30 mmhg     2. Pending lymphedema therapy but already going regularly to a private outpatient lymphedema clinic and working w lymphedema therapists  3. Discussed if has new swelling after a flight, best make sure no dvt; on exam she has no redness, warmth or pain; very unlikely especially that its been a few weeks   4. Gave info re gathering place resources etc   5. Agree with compression pneumatic pump; she will send me the specifications and will place an order    Tammie Hampton MD  Physical Medicine and Rehabilitation          [1]   Past Medical History:  Diagnosis Date    Breast cancer     Cancer (Multi)     Chest tightness 05/24/2024    Hyperlipidemia     Other specified health status 09/29/2016    No pertinent past medical  history    Shortness of breath 05/24/2024   [2]   Past Surgical History:  Procedure Laterality Date    OTHER SURGICAL HISTORY  09/29/2016    Oral Surgery Tooth Extraction [place] Tooth    WISDOM TOOTH EXTRACTION  6/1/2000   [3]   Past Medical History:  Diagnosis Date    Breast cancer     Cancer (Multi)     Chest tightness 05/24/2024    Hyperlipidemia     Other specified health status 09/29/2016    No pertinent past medical history    Shortness of breath 05/24/2024   [4]   Family History  Problem Relation Name Age of Onset    Stroke Father Jad     Other (cancer) Maternal Grandfather Sotero         liver or kidney    Cancer Paternal Grandmother Lizbeth         carcinoma of thigh    Stroke Paternal Grandmother Lizbeth     COPD Paternal Grandfather Mitchell       Tooth    WISDOM TOOTH EXTRACTION  6/1/2000   [3]   Past Medical History:  Diagnosis Date    Breast cancer     Cancer (Multi)     Chest tightness 05/24/2024    Hyperlipidemia     Other specified health status 09/29/2016    No pertinent past medical history    Shortness of breath 05/24/2024   [4]   Family History  Problem Relation Name Age of Onset    Stroke Father Jad     Other (cancer) Maternal Grandfather Sotero         liver or kidney    Cancer Paternal Grandmother Lizbeth         carcinoma of thigh    Stroke Paternal Grandmother Lizbeth     COPD Paternal Grandfather Mitchell

## 2025-07-29 ENCOUNTER — HOSPITAL ENCOUNTER (OUTPATIENT)
Dept: RADIOLOGY | Facility: HOSPITAL | Age: 40
Discharge: HOME | End: 2025-07-29
Payer: COMMERCIAL

## 2025-07-29 VITALS
SYSTOLIC BLOOD PRESSURE: 108 MMHG | HEIGHT: 68 IN | DIASTOLIC BLOOD PRESSURE: 67 MMHG | TEMPERATURE: 98.1 F | BODY MASS INDEX: 25.76 KG/M2 | HEART RATE: 76 BPM | WEIGHT: 170 LBS | RESPIRATION RATE: 16 BRPM | OXYGEN SATURATION: 98 %

## 2025-07-29 DIAGNOSIS — C50.811 MALIGNANT NEOPLASM OF OVERLAPPING SITES OF RIGHT BREAST IN FEMALE, ESTROGEN RECEPTOR POSITIVE: ICD-10-CM

## 2025-07-29 DIAGNOSIS — Z17.0 MALIGNANT NEOPLASM OF OVERLAPPING SITES OF RIGHT BREAST IN FEMALE, ESTROGEN RECEPTOR POSITIVE: ICD-10-CM

## 2025-07-29 LAB — HCG UR QL IA.RAPID: NEGATIVE

## 2025-07-29 PROCEDURE — 7100000010 HC PHASE TWO TIME - EACH INCREMENTAL 1 MINUTE

## 2025-07-29 PROCEDURE — 99152 MOD SED SAME PHYS/QHP 5/>YRS: CPT

## 2025-07-29 PROCEDURE — 81025 URINE PREGNANCY TEST: CPT | Performed by: RADIOLOGY

## 2025-07-29 PROCEDURE — 2500000005 HC RX 250 GENERAL PHARMACY W/O HCPCS: Performed by: RADIOLOGY

## 2025-07-29 PROCEDURE — 88307 TISSUE EXAM BY PATHOLOGIST: CPT | Mod: TC,STJLAB | Performed by: STUDENT IN AN ORGANIZED HEALTH CARE EDUCATION/TRAINING PROGRAM

## 2025-07-29 PROCEDURE — 2720000007 HC OR 272 NO HCPCS

## 2025-07-29 PROCEDURE — 77012 CT SCAN FOR NEEDLE BIOPSY: CPT

## 2025-07-29 PROCEDURE — 7100000009 HC PHASE TWO TIME - INITIAL BASE CHARGE

## 2025-07-29 PROCEDURE — 2500000004 HC RX 250 GENERAL PHARMACY W/ HCPCS (ALT 636 FOR OP/ED): Performed by: RADIOLOGY

## 2025-07-29 PROCEDURE — 99152 MOD SED SAME PHYS/QHP 5/>YRS: CPT | Performed by: RADIOLOGY

## 2025-07-29 RX ORDER — MIDAZOLAM HYDROCHLORIDE 1 MG/ML
INJECTION, SOLUTION INTRAMUSCULAR; INTRAVENOUS
Status: COMPLETED | OUTPATIENT
Start: 2025-07-29 | End: 2025-07-29

## 2025-07-29 RX ORDER — LIDOCAINE HYDROCHLORIDE 10 MG/ML
INJECTION, SOLUTION EPIDURAL; INFILTRATION; INTRACAUDAL; PERINEURAL
Status: COMPLETED | OUTPATIENT
Start: 2025-07-29 | End: 2025-07-29

## 2025-07-29 RX ORDER — FENTANYL CITRATE 50 UG/ML
INJECTION, SOLUTION INTRAMUSCULAR; INTRAVENOUS
Status: COMPLETED | OUTPATIENT
Start: 2025-07-29 | End: 2025-07-29

## 2025-07-29 RX ADMIN — Medication 1 DOSE: at 11:20

## 2025-07-29 RX ADMIN — LIDOCAINE HYDROCHLORIDE 10 ML: 10 INJECTION, SOLUTION EPIDURAL; INFILTRATION; INTRACAUDAL; PERINEURAL at 11:32

## 2025-07-29 RX ADMIN — MIDAZOLAM 2 MG: 1 INJECTION INTRAMUSCULAR; INTRAVENOUS at 11:23

## 2025-07-29 RX ADMIN — FENTANYL CITRATE 50 MCG: 50 INJECTION, SOLUTION INTRAMUSCULAR; INTRAVENOUS at 11:23

## 2025-07-29 ASSESSMENT — COLUMBIA-SUICIDE SEVERITY RATING SCALE - C-SSRS
1. IN THE PAST MONTH, HAVE YOU WISHED YOU WERE DEAD OR WISHED YOU COULD GO TO SLEEP AND NOT WAKE UP?: NO
2. HAVE YOU ACTUALLY HAD ANY THOUGHTS OF KILLING YOURSELF?: NO
6. HAVE YOU EVER DONE ANYTHING, STARTED TO DO ANYTHING, OR PREPARED TO DO ANYTHING TO END YOUR LIFE?: NO

## 2025-07-29 ASSESSMENT — PAIN SCALES - GENERAL
PAINLEVEL_OUTOF10: 0 - NO PAIN

## 2025-07-29 ASSESSMENT — PAIN - FUNCTIONAL ASSESSMENT
PAIN_FUNCTIONAL_ASSESSMENT: 0-10

## 2025-07-29 NOTE — Clinical Note
Right iliac bone lesion biopsy completed with no complications and samples taken as ordered. Right posterior site free from bleeding/hematoma, dressed with 4'4 and Tegaderm dressing. Patient tolerated procedure well, denies pain/nausea.

## 2025-07-29 NOTE — POST-PROCEDURE NOTE
Interventional Radiology Brief Postprocedure Note    Attending: Lloyd Young MD      Assistant: none.    Diagnosis:   1. Malignant neoplasm of overlapping sites of right breast in female, estrogen receptor positive  CT guided percutaneous biopsy bone    CT guided percutaneous biopsy bone    Surgical Pathology Exam    Surgical Pathology Exam            Description of procedure: CT guided right posterior iliac bone biopsy     Anesthesia:  MAC Moderate    Complications: None    Estimated Blood Loss: minimal    Medications (Filter: Administrations occurring from 1057 to 1152 on 07/29/25) As of 07/29/25 1152      oxygen (O2) therapy (Dose) Total dose:  1 Dose Dosing weight:  77.1      Date/Time Rate/Dose/Volume Action       07/29/25  1120 1 Dose Start      1144  Stopped               midazolam (Versed) injection (mg) Total dose:  2 mg      Date/Time Rate/Dose/Volume Action       07/29/25  1123 2 mg Given               fentaNYL PF (Sublimaze) injection (mcg) Total dose:  50 mcg      Date/Time Rate/Dose/Volume Action       07/29/25  1123 50 mcg Given               lidocaine PF (Xylocaine) 10 mg/mL (1 %) injection (mL) Total volume:  10 mL      Date/Time Rate/Dose/Volume Action       07/29/25  1132 10 mL Given                   ID Type Source Tests Collected by Time   1 : Right iliac bone lesion biopsy. Tissue BONE BIOPSY (DECAL) SURGICAL PATHOLOGY EXAM Lloyd Young MD 7/29/2025 1208         See detailed result report with images in PACS.    The patient tolerated the procedure well without incident or complication and is in stable condition.

## 2025-07-29 NOTE — PRE-PROCEDURE NOTE
Interventional Radiology Preprocedure Note    Indication for procedure: The encounter diagnosis was Malignant neoplasm of overlapping sites of right breast in female, estrogen receptor positive.    Relevant review of systems: NA    Relevant Labs:   Lab Results   Component Value Date    CREATININE 1.04 06/17/2025    EGFR 69 06/17/2025    INR 1.0 02/26/2024    PROTIME 11.7 02/26/2024       Planned Sedation/Anesthesia: Moderate    Airway assessment: normal    Directed physical examination:    Awake and alert, oriented  No acute distress  Regular rate, rhythm  Breathing non-labored    Mallampati: I (soft palate, uvula, fauces, and tonsillar pillars visible)    ASA Score: ASA 3 - Patient with moderate systemic disease with functional limitations    Benefits, risks and alternatives of procedure and planned sedation have been discussed with the patient and/or their representative. All questions answered and they agree to proceed.

## 2025-07-29 NOTE — Clinical Note
Patient taken to [facility] by this RN for bedside report with site inspection, recovery and discharge.

## 2025-07-31 ENCOUNTER — HOSPITAL ENCOUNTER (OUTPATIENT)
Dept: RADIOLOGY | Facility: HOSPITAL | Age: 40
Discharge: HOME | End: 2025-07-31
Payer: COMMERCIAL

## 2025-07-31 DIAGNOSIS — Z17.0 MALIGNANT NEOPLASM OF OVERLAPPING SITES OF RIGHT BREAST IN FEMALE, ESTROGEN RECEPTOR POSITIVE: ICD-10-CM

## 2025-07-31 DIAGNOSIS — C50.811 MALIGNANT NEOPLASM OF OVERLAPPING SITES OF RIGHT BREAST IN FEMALE, ESTROGEN RECEPTOR POSITIVE: ICD-10-CM

## 2025-07-31 PROCEDURE — 72197 MRI PELVIS W/O & W/DYE: CPT

## 2025-07-31 PROCEDURE — 2550000001 HC RX 255 CONTRASTS: Performed by: STUDENT IN AN ORGANIZED HEALTH CARE EDUCATION/TRAINING PROGRAM

## 2025-07-31 PROCEDURE — A9575 INJ GADOTERATE MEGLUMI 0.1ML: HCPCS | Performed by: STUDENT IN AN ORGANIZED HEALTH CARE EDUCATION/TRAINING PROGRAM

## 2025-07-31 PROCEDURE — 73723 MRI JOINT LWR EXTR W/O&W/DYE: CPT | Mod: LT

## 2025-07-31 PROCEDURE — 73723 MRI JOINT LWR EXTR W/O&W/DYE: CPT | Mod: RT

## 2025-07-31 RX ORDER — GADOTERATE MEGLUMINE 376.9 MG/ML
17 INJECTION INTRAVENOUS
Status: COMPLETED | OUTPATIENT
Start: 2025-07-31 | End: 2025-07-31

## 2025-07-31 RX ADMIN — GADOTERATE MEGLUMINE 17 ML: 376.9 INJECTION INTRAVENOUS at 19:00

## 2025-08-01 LAB
LAB AP ASR DISCLAIMER: NORMAL
LABORATORY COMMENT REPORT: NORMAL
PATH REPORT.FINAL DX SPEC: NORMAL
PATH REPORT.GROSS SPEC: NORMAL
PATH REPORT.RELEVANT HX SPEC: NORMAL
PATH REPORT.TOTAL CANCER: NORMAL

## 2025-08-04 ENCOUNTER — APPOINTMENT (OUTPATIENT)
Dept: CARDIOLOGY | Facility: HOSPITAL | Age: 40
End: 2025-08-04
Payer: COMMERCIAL

## 2025-08-05 ENCOUNTER — LAB (OUTPATIENT)
Dept: LAB | Facility: CLINIC | Age: 40
End: 2025-08-05
Payer: COMMERCIAL

## 2025-08-05 ENCOUNTER — TELEPHONE (OUTPATIENT)
Dept: HEMATOLOGY/ONCOLOGY | Facility: CLINIC | Age: 40
End: 2025-08-05
Payer: COMMERCIAL

## 2025-08-05 DIAGNOSIS — C50.919 MALIGNANT NEOPLASM OF BREAST IN FEMALE, ESTROGEN RECEPTOR POSITIVE, UNSPECIFIED LATERALITY, UNSPECIFIED SITE OF BREAST: Primary | ICD-10-CM

## 2025-08-05 DIAGNOSIS — Z17.0 MALIGNANT NEOPLASM OF BREAST IN FEMALE, ESTROGEN RECEPTOR POSITIVE, UNSPECIFIED LATERALITY, UNSPECIFIED SITE OF BREAST: Primary | ICD-10-CM

## 2025-08-05 NOTE — TELEPHONE ENCOUNTER
Left voicemail for patient to discuss biopsy results and MRI. Please message when patient calls back. Callback number provided.

## 2025-08-06 ENCOUNTER — LAB (OUTPATIENT)
Dept: LAB | Facility: CLINIC | Age: 40
End: 2025-08-06
Payer: COMMERCIAL

## 2025-08-06 DIAGNOSIS — C50.919 MALIGNANT NEOPLASM OF BREAST METASTATIC TO BONE: Primary | ICD-10-CM

## 2025-08-06 DIAGNOSIS — Z17.0 MALIGNANT NEOPLASM OF BREAST IN FEMALE, ESTROGEN RECEPTOR POSITIVE, UNSPECIFIED LATERALITY, UNSPECIFIED SITE OF BREAST: ICD-10-CM

## 2025-08-06 DIAGNOSIS — C79.51 MALIGNANT NEOPLASM OF BREAST METASTATIC TO BONE: Primary | ICD-10-CM

## 2025-08-06 DIAGNOSIS — C50.919 MALIGNANT NEOPLASM OF BREAST IN FEMALE, ESTROGEN RECEPTOR POSITIVE, UNSPECIFIED LATERALITY, UNSPECIFIED SITE OF BREAST: ICD-10-CM

## 2025-08-06 LAB
ALBUMIN SERPL BCP-MCNC: 4.4 G/DL (ref 3.4–5)
ALP SERPL-CCNC: 57 U/L (ref 33–110)
ALT SERPL W P-5'-P-CCNC: 12 U/L (ref 7–45)
ANION GAP SERPL CALC-SCNC: 12 MMOL/L (ref 10–20)
AST SERPL W P-5'-P-CCNC: 12 U/L (ref 9–39)
BASOPHILS # BLD AUTO: 0.03 X10*3/UL (ref 0–0.1)
BASOPHILS NFR BLD AUTO: 0.6 %
BILIRUB SERPL-MCNC: 0.4 MG/DL (ref 0–1.2)
BUN SERPL-MCNC: 12 MG/DL (ref 6–23)
CALCIUM SERPL-MCNC: 10.2 MG/DL (ref 8.6–10.3)
CHLORIDE SERPL-SCNC: 103 MMOL/L (ref 98–107)
CO2 SERPL-SCNC: 28 MMOL/L (ref 21–32)
CREAT SERPL-MCNC: 1.17 MG/DL (ref 0.5–1.05)
EGFRCR SERPLBLD CKD-EPI 2021: 60 ML/MIN/1.73M*2
EOSINOPHIL # BLD AUTO: 0.01 X10*3/UL (ref 0–0.7)
EOSINOPHIL NFR BLD AUTO: 0.2 %
ERYTHROCYTE [DISTWIDTH] IN BLOOD BY AUTOMATED COUNT: 12.3 % (ref 11.5–14.5)
GLUCOSE SERPL-MCNC: 102 MG/DL (ref 74–99)
HCT VFR BLD AUTO: 40 % (ref 36–46)
HGB BLD-MCNC: 13.1 G/DL (ref 12–16)
IMM GRANULOCYTES # BLD AUTO: 0.01 X10*3/UL (ref 0–0.7)
IMM GRANULOCYTES NFR BLD AUTO: 0.2 % (ref 0–0.9)
LYMPHOCYTES # BLD AUTO: 0.75 X10*3/UL (ref 1.2–4.8)
LYMPHOCYTES NFR BLD AUTO: 14.8 %
MCH RBC QN AUTO: 32.9 PG (ref 26–34)
MCHC RBC AUTO-ENTMCNC: 32.8 G/DL (ref 32–36)
MCV RBC AUTO: 101 FL (ref 80–100)
MONOCYTES # BLD AUTO: 0.19 X10*3/UL (ref 0.1–1)
MONOCYTES NFR BLD AUTO: 3.8 %
NEUTROPHILS # BLD AUTO: 4.07 X10*3/UL (ref 1.2–7.7)
NEUTROPHILS NFR BLD AUTO: 80.4 %
NRBC BLD-RTO: ABNORMAL /100{WBCS}
PLATELET # BLD AUTO: 193 X10*3/UL (ref 150–450)
POTASSIUM SERPL-SCNC: 4.3 MMOL/L (ref 3.5–5.3)
PROT SERPL-MCNC: 7.3 G/DL (ref 6.4–8.2)
RBC # BLD AUTO: 3.98 X10*6/UL (ref 4–5.2)
SODIUM SERPL-SCNC: 139 MMOL/L (ref 136–145)
WBC # BLD AUTO: 5.1 X10*3/UL (ref 4.4–11.3)

## 2025-08-06 PROCEDURE — 80053 COMPREHEN METABOLIC PANEL: CPT

## 2025-08-06 PROCEDURE — 86300 IMMUNOASSAY TUMOR CA 15-3: CPT

## 2025-08-06 PROCEDURE — 85025 COMPLETE CBC W/AUTO DIFF WBC: CPT

## 2025-08-06 PROCEDURE — 36415 COLL VENOUS BLD VENIPUNCTURE: CPT

## 2025-08-07 DIAGNOSIS — I89.0 LYMPHEDEMA OF ARM: ICD-10-CM

## 2025-08-07 DIAGNOSIS — Z17.0 MALIGNANT NEOPLASM OF OVERLAPPING SITES OF RIGHT BREAST IN FEMALE, ESTROGEN RECEPTOR POSITIVE: ICD-10-CM

## 2025-08-07 DIAGNOSIS — C50.811 MALIGNANT NEOPLASM OF OVERLAPPING SITES OF RIGHT BREAST IN FEMALE, ESTROGEN RECEPTOR POSITIVE: ICD-10-CM

## 2025-08-07 DIAGNOSIS — I89.0 LYMPHEDEMA OF RIGHT ARM: Primary | ICD-10-CM

## 2025-08-07 LAB — CANCER AG27-29 SERPL-ACNC: 89.9 U/ML (ref 0–38.6)

## 2025-08-08 DIAGNOSIS — C50.919 MALIGNANT NEOPLASM OF BREAST IN FEMALE, ESTROGEN RECEPTOR POSITIVE, UNSPECIFIED LATERALITY, UNSPECIFIED SITE OF BREAST: Primary | ICD-10-CM

## 2025-08-08 DIAGNOSIS — Z17.0 MALIGNANT NEOPLASM OF BREAST IN FEMALE, ESTROGEN RECEPTOR POSITIVE, UNSPECIFIED LATERALITY, UNSPECIFIED SITE OF BREAST: Primary | ICD-10-CM

## 2025-08-12 ENCOUNTER — INFUSION (OUTPATIENT)
Dept: HEMATOLOGY/ONCOLOGY | Facility: CLINIC | Age: 40
End: 2025-08-12
Payer: COMMERCIAL

## 2025-08-12 ENCOUNTER — HOSPITAL ENCOUNTER (OUTPATIENT)
Dept: CARDIOLOGY | Facility: CLINIC | Age: 40
Discharge: HOME | End: 2025-08-12
Payer: COMMERCIAL

## 2025-08-12 VITALS
WEIGHT: 159.06 LBS | BODY MASS INDEX: 24.19 KG/M2 | SYSTOLIC BLOOD PRESSURE: 104 MMHG | TEMPERATURE: 97.2 F | DIASTOLIC BLOOD PRESSURE: 73 MMHG | OXYGEN SATURATION: 98 % | RESPIRATION RATE: 16 BRPM | HEART RATE: 92 BPM

## 2025-08-12 DIAGNOSIS — C50.919 MALIGNANT NEOPLASM OF BREAST IN FEMALE, ESTROGEN RECEPTOR POSITIVE, UNSPECIFIED LATERALITY, UNSPECIFIED SITE OF BREAST: ICD-10-CM

## 2025-08-12 DIAGNOSIS — Z17.0 MALIGNANT NEOPLASM OF OVERLAPPING SITES OF RIGHT BREAST IN FEMALE, ESTROGEN RECEPTOR POSITIVE: ICD-10-CM

## 2025-08-12 DIAGNOSIS — Z17.0 MALIGNANT NEOPLASM OF BREAST IN FEMALE, ESTROGEN RECEPTOR POSITIVE, UNSPECIFIED LATERALITY, UNSPECIFIED SITE OF BREAST: ICD-10-CM

## 2025-08-12 DIAGNOSIS — I42.7 CARDIOTOXICITY (MULTI): ICD-10-CM

## 2025-08-12 DIAGNOSIS — C50.811 MALIGNANT NEOPLASM OF OVERLAPPING SITES OF RIGHT BREAST IN FEMALE, ESTROGEN RECEPTOR POSITIVE: ICD-10-CM

## 2025-08-12 LAB
AORTIC VALVE PEAK VELOCITY: 1.26 M/S
AV PEAK GRADIENT: 6 MMHG
AVA (PEAK VEL): 2.81 CM2
EJECTION FRACTION APICAL 4 CHAMBER: 62.9
EJECTION FRACTION: 62 %
GLOBAL LONGITUDINAL STRAIN: 19.7 %
LEFT ATRIUM VOLUME AREA LENGTH INDEX BSA: 8.4 ML/M2
LEFT VENTRICLE INTERNAL DIMENSION DIASTOLE: 4.21 CM (ref 3.5–6)
LEFT VENTRICULAR OUTFLOW TRACT DIAMETER: 1.97 CM
MITRAL VALVE E/A RATIO: 1.12
RIGHT VENTRICLE FREE WALL PEAK S': 9 CM/S
TRICUSPID ANNULAR PLANE SYSTOLIC EXCURSION: 1.7 CM

## 2025-08-12 PROCEDURE — 93306 TTE W/DOPPLER COMPLETE: CPT | Performed by: INTERNAL MEDICINE

## 2025-08-12 PROCEDURE — 96375 TX/PRO/DX INJ NEW DRUG ADDON: CPT | Mod: INF

## 2025-08-12 PROCEDURE — 96402 CHEMO HORMON ANTINEOPL SQ/IM: CPT

## 2025-08-12 PROCEDURE — 76376 3D RENDER W/INTRP POSTPROCES: CPT

## 2025-08-12 PROCEDURE — 2500000004 HC RX 250 GENERAL PHARMACY W/ HCPCS (ALT 636 FOR OP/ED): Mod: JZ,TB | Performed by: STUDENT IN AN ORGANIZED HEALTH CARE EDUCATION/TRAINING PROGRAM

## 2025-08-12 PROCEDURE — 93356 MYOCRD STRAIN IMG SPCKL TRCK: CPT | Performed by: INTERNAL MEDICINE

## 2025-08-12 PROCEDURE — 2500000004 HC RX 250 GENERAL PHARMACY W/ HCPCS (ALT 636 FOR OP/ED): Performed by: STUDENT IN AN ORGANIZED HEALTH CARE EDUCATION/TRAINING PROGRAM

## 2025-08-12 PROCEDURE — 76376 3D RENDER W/INTRP POSTPROCES: CPT | Performed by: INTERNAL MEDICINE

## 2025-08-12 RX ORDER — ALBUTEROL SULFATE 0.83 MG/ML
3 SOLUTION RESPIRATORY (INHALATION) AS NEEDED
OUTPATIENT
Start: 2025-09-09

## 2025-08-12 RX ORDER — FAMOTIDINE 10 MG/ML
20 INJECTION, SOLUTION INTRAVENOUS ONCE AS NEEDED
OUTPATIENT
Start: 2025-09-09

## 2025-08-12 RX ORDER — LIDOCAINE HYDROCHLORIDE 10 MG/ML
2 INJECTION, SOLUTION EPIDURAL; INFILTRATION; INTRACAUDAL; PERINEURAL ONCE
Status: COMPLETED | OUTPATIENT
Start: 2025-08-12 | End: 2025-08-12

## 2025-08-12 RX ORDER — LIDOCAINE HYDROCHLORIDE 10 MG/ML
2 INJECTION, SOLUTION EPIDURAL; INFILTRATION; INTRACAUDAL; PERINEURAL ONCE
OUTPATIENT
Start: 2025-09-09

## 2025-08-12 RX ORDER — EPINEPHRINE 0.3 MG/.3ML
0.3 INJECTION SUBCUTANEOUS EVERY 5 MIN PRN
OUTPATIENT
Start: 2025-09-09

## 2025-08-12 RX ORDER — DIPHENHYDRAMINE HYDROCHLORIDE 50 MG/ML
50 INJECTION, SOLUTION INTRAMUSCULAR; INTRAVENOUS AS NEEDED
OUTPATIENT
Start: 2025-09-09

## 2025-08-12 RX ADMIN — GOSERELIN ACETATE 3.6 MG: 3.6 IMPLANT SUBCUTANEOUS at 14:25

## 2025-08-12 RX ADMIN — LIDOCAINE HYDROCHLORIDE 20 MG: 10 INJECTION, SOLUTION EPIDURAL; INFILTRATION; INTRACAUDAL; PERINEURAL at 14:24

## 2025-08-12 ASSESSMENT — PAIN SCALES - GENERAL: PAINLEVEL_OUTOF10: 0-NO PAIN

## 2025-08-13 ENCOUNTER — TELEPHONE (OUTPATIENT)
Dept: CARDIOLOGY | Facility: HOSPITAL | Age: 40
End: 2025-08-13
Payer: COMMERCIAL

## 2025-08-18 ENCOUNTER — OFFICE VISIT (OUTPATIENT)
Dept: CARDIOLOGY | Facility: HOSPITAL | Age: 40
End: 2025-08-18
Payer: COMMERCIAL

## 2025-08-18 VITALS
DIASTOLIC BLOOD PRESSURE: 76 MMHG | TEMPERATURE: 97.9 F | SYSTOLIC BLOOD PRESSURE: 121 MMHG | HEART RATE: 97 BPM | BODY MASS INDEX: 24.07 KG/M2 | RESPIRATION RATE: 18 BRPM | WEIGHT: 158.29 LBS | OXYGEN SATURATION: 100 %

## 2025-08-18 DIAGNOSIS — E78.00 ELEVATED LDL CHOLESTEROL LEVEL: ICD-10-CM

## 2025-08-18 DIAGNOSIS — Z92.3 HISTORY OF RADIATION EXPOSURE: ICD-10-CM

## 2025-08-18 DIAGNOSIS — Z79.899 ENCOUNTER FOR MONITORING CARDIOTOXIC DRUG THERAPY: ICD-10-CM

## 2025-08-18 DIAGNOSIS — Z51.81 ENCOUNTER FOR MONITORING CARDIOTOXIC DRUG THERAPY: ICD-10-CM

## 2025-08-18 DIAGNOSIS — C50.811 MALIGNANT NEOPLASM OF OVERLAPPING SITES OF RIGHT BREAST IN FEMALE, ESTROGEN RECEPTOR POSITIVE: Primary | ICD-10-CM

## 2025-08-18 DIAGNOSIS — Z17.0 MALIGNANT NEOPLASM OF OVERLAPPING SITES OF RIGHT BREAST IN FEMALE, ESTROGEN RECEPTOR POSITIVE: Primary | ICD-10-CM

## 2025-08-18 LAB
DNA RANGE(S) EXAMINED NAR: NORMAL
GENE DIS ANL INTERP-IMP: POSITIVE
GENE DIS ASSESSED: NORMAL
REASON FOR STUDY: NORMAL
TEMPUS BLOOD TUMOR MUTATIONAL BURDEN: 4.3 M/MB
TEMPUS LCA: NORMAL
TEMPUS MSI NOTE: NORMAL
TEMPUS PORTAL: NORMAL
TEMPUS TREATMENT IMPLICATIONS NOTE: NORMAL
TEMPUS VUS NOTE: NORMAL

## 2025-08-18 PROCEDURE — 99214 OFFICE O/P EST MOD 30 MIN: CPT | Performed by: INTERNAL MEDICINE

## 2025-08-18 PROCEDURE — 1036F TOBACCO NON-USER: CPT | Performed by: INTERNAL MEDICINE

## 2025-08-18 ASSESSMENT — PAIN SCALES - GENERAL: PAINLEVEL_OUTOF10: 0-NO PAIN

## 2025-08-22 ENCOUNTER — SPECIALTY PHARMACY (OUTPATIENT)
Dept: HEMATOLOGY/ONCOLOGY | Facility: CLINIC | Age: 40
End: 2025-08-22
Payer: COMMERCIAL

## 2025-08-22 ENCOUNTER — OFFICE VISIT (OUTPATIENT)
Dept: HEMATOLOGY/ONCOLOGY | Facility: CLINIC | Age: 40
End: 2025-08-22
Payer: COMMERCIAL

## 2025-08-22 ENCOUNTER — LAB (OUTPATIENT)
Dept: LAB | Facility: CLINIC | Age: 40
End: 2025-08-22
Payer: COMMERCIAL

## 2025-08-22 VITALS
HEART RATE: 74 BPM | TEMPERATURE: 97.5 F | BODY MASS INDEX: 23.31 KG/M2 | SYSTOLIC BLOOD PRESSURE: 114 MMHG | HEIGHT: 69 IN | RESPIRATION RATE: 16 BRPM | OXYGEN SATURATION: 98 % | DIASTOLIC BLOOD PRESSURE: 76 MMHG | WEIGHT: 157.41 LBS

## 2025-08-22 DIAGNOSIS — C50.811 MALIGNANT NEOPLASM OF OVERLAPPING SITES OF RIGHT BREAST IN FEMALE, ESTROGEN RECEPTOR POSITIVE: Primary | ICD-10-CM

## 2025-08-22 DIAGNOSIS — C79.51 MALIGNANT NEOPLASM METASTATIC TO BONE (MULTI): ICD-10-CM

## 2025-08-22 DIAGNOSIS — Z17.0 MALIGNANT NEOPLASM OF OVERLAPPING SITES OF RIGHT BREAST IN FEMALE, ESTROGEN RECEPTOR POSITIVE: Primary | ICD-10-CM

## 2025-08-22 DIAGNOSIS — C50.811 MALIGNANT NEOPLASM OF OVERLAPPING SITES OF RIGHT BREAST IN FEMALE, ESTROGEN RECEPTOR POSITIVE: ICD-10-CM

## 2025-08-22 DIAGNOSIS — Z17.0 MALIGNANT NEOPLASM OF OVERLAPPING SITES OF RIGHT BREAST IN FEMALE, ESTROGEN RECEPTOR POSITIVE: ICD-10-CM

## 2025-08-22 PROBLEM — C50.919 STAGE IV BREAST CANCER IN FEMALE: Status: ACTIVE | Noted: 2025-08-14

## 2025-08-22 LAB
ALBUMIN SERPL BCP-MCNC: 4.8 G/DL (ref 3.4–5)
ALP SERPL-CCNC: 58 U/L (ref 33–110)
ALT SERPL W P-5'-P-CCNC: 15 U/L (ref 7–45)
ANION GAP SERPL CALC-SCNC: 14 MMOL/L (ref 10–20)
AST SERPL W P-5'-P-CCNC: 17 U/L (ref 9–39)
B-HCG SERPL-ACNC: <2 MIU/ML
BASOPHILS # BLD AUTO: 0.04 X10*3/UL (ref 0–0.1)
BASOPHILS NFR BLD AUTO: 0.8 %
BILIRUB SERPL-MCNC: 0.4 MG/DL (ref 0–1.2)
BUN SERPL-MCNC: 11 MG/DL (ref 6–23)
CALCIUM SERPL-MCNC: 10.2 MG/DL (ref 8.6–10.3)
CHLORIDE SERPL-SCNC: 104 MMOL/L (ref 98–107)
CO2 SERPL-SCNC: 28 MMOL/L (ref 21–32)
CREAT SERPL-MCNC: 1.11 MG/DL (ref 0.5–1.05)
EGFRCR SERPLBLD CKD-EPI 2021: 64 ML/MIN/1.73M*2
EOSINOPHIL # BLD AUTO: 0.03 X10*3/UL (ref 0–0.7)
EOSINOPHIL NFR BLD AUTO: 0.6 %
ERYTHROCYTE [DISTWIDTH] IN BLOOD BY AUTOMATED COUNT: 12.6 % (ref 11.5–14.5)
GLUCOSE SERPL-MCNC: 107 MG/DL (ref 74–99)
HCT VFR BLD AUTO: 38.9 % (ref 36–46)
HGB BLD-MCNC: 12.6 G/DL (ref 12–16)
HOLD SPECIMEN: NORMAL
IMM GRANULOCYTES # BLD AUTO: 0.02 X10*3/UL (ref 0–0.7)
IMM GRANULOCYTES NFR BLD AUTO: 0.4 % (ref 0–0.9)
LAB AP ASR DISCLAIMER: NORMAL
LABORATORY COMMENT REPORT: NORMAL
LYMPHOCYTES # BLD AUTO: 0.93 X10*3/UL (ref 1.2–4.8)
LYMPHOCYTES NFR BLD AUTO: 17.7 %
MAGNESIUM SERPL-MCNC: 2.1 MG/DL (ref 1.6–2.4)
MCH RBC QN AUTO: 32.6 PG (ref 26–34)
MCHC RBC AUTO-ENTMCNC: 32.4 G/DL (ref 32–36)
MCV RBC AUTO: 101 FL (ref 80–100)
MONOCYTES # BLD AUTO: 0.25 X10*3/UL (ref 0.1–1)
MONOCYTES NFR BLD AUTO: 4.8 %
NEUTROPHILS # BLD AUTO: 3.99 X10*3/UL (ref 1.2–7.7)
NEUTROPHILS NFR BLD AUTO: 75.7 %
NRBC BLD-RTO: ABNORMAL /100{WBCS}
PATH REPORT.ADDENDUM SPEC: NORMAL
PATH REPORT.FINAL DX SPEC: NORMAL
PATH REPORT.GROSS SPEC: NORMAL
PATH REPORT.RELEVANT HX SPEC: NORMAL
PATH REPORT.TOTAL CANCER: NORMAL
PLATELET # BLD AUTO: 212 X10*3/UL (ref 150–450)
POTASSIUM SERPL-SCNC: 3.7 MMOL/L (ref 3.5–5.3)
PROT SERPL-MCNC: 7.5 G/DL (ref 6.4–8.2)
RBC # BLD AUTO: 3.87 X10*6/UL (ref 4–5.2)
SODIUM SERPL-SCNC: 142 MMOL/L (ref 136–145)
WBC # BLD AUTO: 5.3 X10*3/UL (ref 4.4–11.3)

## 2025-08-22 PROCEDURE — 99417 PROLNG OP E/M EACH 15 MIN: CPT | Performed by: INTERNAL MEDICINE

## 2025-08-22 PROCEDURE — 99215 OFFICE O/P EST HI 40 MIN: CPT | Performed by: INTERNAL MEDICINE

## 2025-08-22 PROCEDURE — 85025 COMPLETE CBC W/AUTO DIFF WBC: CPT

## 2025-08-22 PROCEDURE — 36415 COLL VENOUS BLD VENIPUNCTURE: CPT

## 2025-08-22 PROCEDURE — 93005 ELECTROCARDIOGRAM TRACING: CPT | Performed by: INTERNAL MEDICINE

## 2025-08-22 PROCEDURE — 80053 COMPREHEN METABOLIC PANEL: CPT

## 2025-08-22 PROCEDURE — 84702 CHORIONIC GONADOTROPIN TEST: CPT

## 2025-08-22 PROCEDURE — 83735 ASSAY OF MAGNESIUM: CPT

## 2025-08-22 PROCEDURE — 89240 UNLISTED MISC PATH TEST: CPT

## 2025-08-22 RX ORDER — ALBUTEROL SULFATE 0.83 MG/ML
3 SOLUTION RESPIRATORY (INHALATION) AS NEEDED
OUTPATIENT
Start: 2025-09-09

## 2025-08-22 RX ORDER — EPINEPHRINE 0.3 MG/.3ML
0.3 INJECTION SUBCUTANEOUS EVERY 5 MIN PRN
OUTPATIENT
Start: 2025-09-09

## 2025-08-22 RX ORDER — DIPHENHYDRAMINE HYDROCHLORIDE 50 MG/ML
50 INJECTION, SOLUTION INTRAMUSCULAR; INTRAVENOUS AS NEEDED
OUTPATIENT
Start: 2025-09-09

## 2025-08-22 RX ORDER — ANASTROZOLE 1 MG/1
1 TABLET ORAL
COMMUNITY
Start: 2025-08-14

## 2025-08-22 RX ORDER — FAMOTIDINE 10 MG/ML
20 INJECTION, SOLUTION INTRAVENOUS ONCE AS NEEDED
OUTPATIENT
Start: 2025-09-09

## 2025-08-22 ASSESSMENT — PAIN SCALES - GENERAL: PAINLEVEL_OUTOF10: 2

## 2025-08-25 ENCOUNTER — APPOINTMENT (OUTPATIENT)
Dept: PLASTIC SURGERY | Facility: CLINIC | Age: 40
End: 2025-08-25
Payer: COMMERCIAL

## 2025-08-25 LAB — HOLD SPECIMEN: NORMAL

## 2025-08-26 DIAGNOSIS — C50.811 MALIGNANT NEOPLASM OF OVERLAPPING SITES OF RIGHT BREAST IN FEMALE, ESTROGEN RECEPTOR POSITIVE: ICD-10-CM

## 2025-08-26 DIAGNOSIS — Z17.0 MALIGNANT NEOPLASM OF OVERLAPPING SITES OF RIGHT BREAST IN FEMALE, ESTROGEN RECEPTOR POSITIVE: ICD-10-CM

## 2025-08-27 ENCOUNTER — SOCIAL WORK (OUTPATIENT)
Dept: HEMATOLOGY/ONCOLOGY | Facility: CLINIC | Age: 40
End: 2025-08-27
Payer: COMMERCIAL

## 2025-08-28 LAB
ATRIAL RATE: 68 BPM
P AXIS: 47 DEGREES
P OFFSET: 200 MS
P ONSET: 158 MS
PR INTERVAL: 126 MS
Q ONSET: 221 MS
QRS COUNT: 11 BEATS
QRS DURATION: 90 MS
QT INTERVAL: 400 MS
QTC CALCULATION(BAZETT): 425 MS
QTC FREDERICIA: 417 MS
R AXIS: 47 DEGREES
T AXIS: 17 DEGREES
T OFFSET: 421 MS
VENTRICULAR RATE: 68 BPM

## 2025-08-29 DIAGNOSIS — G89.3 CANCER RELATED PAIN: ICD-10-CM

## 2025-08-29 DIAGNOSIS — Z17.0 MALIGNANT NEOPLASM OF BREAST IN FEMALE, ESTROGEN RECEPTOR POSITIVE, UNSPECIFIED LATERALITY, UNSPECIFIED SITE OF BREAST: Primary | ICD-10-CM

## 2025-08-29 DIAGNOSIS — C50.919 MALIGNANT NEOPLASM OF BREAST IN FEMALE, ESTROGEN RECEPTOR POSITIVE, UNSPECIFIED LATERALITY, UNSPECIFIED SITE OF BREAST: Primary | ICD-10-CM

## 2025-08-29 RX ORDER — TRAMADOL HYDROCHLORIDE 50 MG/1
50 TABLET, FILM COATED ORAL EVERY 6 HOURS PRN
Qty: 60 TABLET | Refills: 0 | Status: SHIPPED | OUTPATIENT
Start: 2025-08-29 | End: 2025-09-28

## 2025-09-03 ENCOUNTER — EVALUATION (OUTPATIENT)
Dept: OCCUPATIONAL THERAPY | Facility: CLINIC | Age: 40
End: 2025-09-03
Payer: COMMERCIAL

## 2025-09-03 DIAGNOSIS — I89.0 LYMPHEDEMA OF ARM: Primary | ICD-10-CM

## 2025-09-03 DIAGNOSIS — R53.1 GENERALIZED WEAKNESS: ICD-10-CM

## 2025-09-03 DIAGNOSIS — I97.2 POSTMASTECTOMY LYMPHEDEMA SYNDROME OF RIGHT UPPER EXTREMITY: ICD-10-CM

## 2025-09-03 PROCEDURE — 97166 OT EVAL MOD COMPLEX 45 MIN: CPT | Mod: GO | Performed by: OCCUPATIONAL THERAPIST

## 2025-09-04 ENCOUNTER — TELEPHONE (OUTPATIENT)
Dept: HEMATOLOGY/ONCOLOGY | Facility: CLINIC | Age: 40
End: 2025-09-04
Payer: COMMERCIAL

## 2025-09-05 ENCOUNTER — DOCUMENTATION (OUTPATIENT)
Dept: HEMATOLOGY/ONCOLOGY | Facility: CLINIC | Age: 40
End: 2025-09-05
Payer: COMMERCIAL

## 2025-09-09 ENCOUNTER — APPOINTMENT (OUTPATIENT)
Dept: HEMATOLOGY/ONCOLOGY | Facility: CLINIC | Age: 40
End: 2025-09-09
Payer: COMMERCIAL

## 2025-10-02 ENCOUNTER — APPOINTMENT (OUTPATIENT)
Dept: HEMATOLOGY/ONCOLOGY | Facility: CLINIC | Age: 40
End: 2025-10-02
Payer: COMMERCIAL

## 2025-10-06 ENCOUNTER — APPOINTMENT (OUTPATIENT)
Dept: PLASTIC SURGERY | Facility: CLINIC | Age: 40
End: 2025-10-06
Payer: COMMERCIAL

## 2025-10-17 ENCOUNTER — APPOINTMENT (OUTPATIENT)
Dept: PHYSICAL MEDICINE AND REHAB | Facility: CLINIC | Age: 40
End: 2025-10-17
Payer: COMMERCIAL

## 2025-11-12 ENCOUNTER — APPOINTMENT (OUTPATIENT)
Facility: CLINIC | Age: 40
End: 2025-11-12
Payer: COMMERCIAL

## (undated) DEVICE — NEEDLE, SPINAL, 22 G X 3.5 IN, BLACK HUB

## (undated) DEVICE — CONTAINER STERILE SPECIMEN 90ML, STERILE

## (undated) DEVICE — PROBE COVER, INTRAOPERATIVE, 13 X 244CM (5 X 96IN)

## (undated) DEVICE — APPLICATOR, CHLORAPREP, W/ORANGE TINT, 26ML

## (undated) DEVICE — SOLUTION, IRRIGATION, SODIUM CHLORIDE 0.9%, 1000 ML, POUR BOTTLE

## (undated) DEVICE — SEALANT, HEMOSTATIC, FLOSEAL, 10 ML

## (undated) DEVICE — 175MM X 30MM ONETRAC LX RETRACTOR W/ BUILT-IN SINGLE-USE MULTI-LED LIGHT SOURCE

## (undated) DEVICE — SYRINGE, 60 CC, LUER LOCK, MONOJECT, W/O CAP, LF

## (undated) DEVICE — EVACUATOR, WOUND, SUCTION, CLOSED, JACKSON-PRATT, 100 CC, SILICONE

## (undated) DEVICE — Device

## (undated) DEVICE — DRAIN, JP CHANNEL, 15 FR, RND, W/TROCAR

## (undated) DEVICE — SUTURE, PLAIN, 5-0, 18 IN, P3, YELLOW

## (undated) DEVICE — WOUND SYSTEM, DEBRIDEMENT & CLEANING, O.R DUOPAK

## (undated) DEVICE — STRIP, SKIN CLOSURE, STERI STRIP, REINFORCED, 0.5 X 4 IN

## (undated) DEVICE — BLADE, CAUTERY, EXTENDED, PEAK PLASMA

## (undated) DEVICE — BANDAGE, GAUZE, 6 PLY, KERLIX, 4.5 IN X 4.1 YD, AMD, STERILE

## (undated) DEVICE — SUTURE, PROLENE, 0, 30 IN, SH

## (undated) DEVICE — SUTURE, MONOCRYL, 4-0, 27 IN, PS-2, UNDYED

## (undated) DEVICE — SUTURE, PDS II, 2-0, 27 IN, SH, VIOLET

## (undated) DEVICE — COVER, MAYO STAND, W/PAD, 23 IN, DISPOSABLE, PLASTIC, LF, STERILE

## (undated) DEVICE — SLEEVE, SUCTION, E-SEP, 125MM

## (undated) DEVICE — MARKER, SKIN, DUAL TIP, W/RULER AND LABEL

## (undated) DEVICE — SUTURE, STRATAFIX, 3-0, SPIRAL MONOCRYL PLUS, PS, 70CM, UNDYED

## (undated) DEVICE — GLOVE, SURGICAL, PROTEXIS PI MICRO, 7.5, PF, LF

## (undated) DEVICE — TOWEL PACK, STERILE, 4/PACK, BLUE

## (undated) DEVICE — APPLIER,  LIGACLIP MULTI CLIP, 30 MED 11 1/2

## (undated) DEVICE — SUTURE, VICRYL, 3-0, 27 IN, SH

## (undated) DEVICE — MARKER, LIQUID, MAGTRACE

## (undated) DEVICE — GLOVE, SURGICAL, PROTEXIS PI , 5.5, PF, LF

## (undated) DEVICE — SUTURE, VICRYL, 2-0, 27 IN, SH, UNDYED

## (undated) DEVICE — PHOTONGUIDE, WIDE/FLAT LIGHT CARRIER

## (undated) DEVICE — ADHESIVE, SKIN, MASTISOL, 2/3 CC VIAL

## (undated) DEVICE — SUTURE, PDS II, 0, 27 IN, CT1, VIOLET

## (undated) DEVICE — SOLUTION, IRRIGATION, STERILE WATER, 1000 ML, POUR BOTTLE

## (undated) DEVICE — DRESSING, TRANSPARENT, TEGADERM, 4 X 4-3/4 IN

## (undated) DEVICE — TIP,  ELECTRODE COATED INSULATED, EXTENDED, LF

## (undated) DEVICE — SOLUTION, INJECTION, USP, SODIUM CHLORIDE 0.9%, .9, NACL, 1000 ML, BAG

## (undated) DEVICE — ELECTRODE, ELECTROSURGICAL, BLADE EXT 4 INCH, INSULATED

## (undated) DEVICE — GLOVE, SURGICAL, PROTEXIS PI MICRO, 8.0, PF, LF